# Patient Record
Sex: FEMALE | Race: BLACK OR AFRICAN AMERICAN | NOT HISPANIC OR LATINO | Employment: UNEMPLOYED | URBAN - METROPOLITAN AREA
[De-identification: names, ages, dates, MRNs, and addresses within clinical notes are randomized per-mention and may not be internally consistent; named-entity substitution may affect disease eponyms.]

---

## 2022-03-25 ENCOUNTER — HOSPITAL ENCOUNTER (EMERGENCY)
Facility: CLINIC | Age: 32
Discharge: HOME | End: 2022-03-25
Attending: EMERGENCY MEDICINE | Admitting: EMERGENCY MEDICINE

## 2022-03-25 ENCOUNTER — APPOINTMENT (OUTPATIENT)
Dept: CT IMAGING | Facility: CLINIC | Age: 32
End: 2022-03-25
Attending: EMERGENCY MEDICINE

## 2022-03-25 VITALS
OXYGEN SATURATION: 99 % | HEART RATE: 69 BPM | WEIGHT: 200 LBS | DIASTOLIC BLOOD PRESSURE: 68 MMHG | SYSTOLIC BLOOD PRESSURE: 108 MMHG | RESPIRATION RATE: 16 BRPM | TEMPERATURE: 98.8 F

## 2022-03-25 DIAGNOSIS — N76.0 BACTERIAL VAGINOSIS: ICD-10-CM

## 2022-03-25 DIAGNOSIS — N90.89 PERINEAL CYST IN FEMALE: ICD-10-CM

## 2022-03-25 DIAGNOSIS — N83.202 CYST OF LEFT OVARY: ICD-10-CM

## 2022-03-25 DIAGNOSIS — R10.84 GENERALIZED ABDOMINAL PAIN: ICD-10-CM

## 2022-03-25 DIAGNOSIS — B96.89 BACTERIAL VAGINOSIS: ICD-10-CM

## 2022-03-25 LAB
ALBUMIN SERPL-MCNC: 3.6 G/DL (ref 3.4–5)
ALBUMIN UR-MCNC: NEGATIVE MG/DL
ALP SERPL-CCNC: 54 U/L (ref 40–150)
ALT SERPL W P-5'-P-CCNC: 16 U/L (ref 0–50)
ANION GAP SERPL CALCULATED.3IONS-SCNC: 4 MMOL/L (ref 3–14)
APPEARANCE UR: CLEAR
AST SERPL W P-5'-P-CCNC: 13 U/L (ref 0–45)
BASOPHILS # BLD AUTO: 0 10E3/UL (ref 0–0.2)
BASOPHILS NFR BLD AUTO: 0 %
BILIRUB DIRECT SERPL-MCNC: <0.1 MG/DL (ref 0–0.2)
BILIRUB SERPL-MCNC: 0.4 MG/DL (ref 0.2–1.3)
BILIRUB UR QL STRIP: NEGATIVE
BUN SERPL-MCNC: 7 MG/DL (ref 7–30)
CALCIUM SERPL-MCNC: 9.3 MG/DL (ref 8.5–10.1)
CHLORIDE BLD-SCNC: 108 MMOL/L (ref 94–109)
CLUE CELLS: PRESENT
CO2 SERPL-SCNC: 24 MMOL/L (ref 20–32)
COLOR UR AUTO: ABNORMAL
CREAT SERPL-MCNC: 0.52 MG/DL (ref 0.52–1.04)
EOSINOPHIL # BLD AUTO: 0.2 10E3/UL (ref 0–0.7)
EOSINOPHIL NFR BLD AUTO: 2 %
ERYTHROCYTE [DISTWIDTH] IN BLOOD BY AUTOMATED COUNT: 13.6 % (ref 10–15)
FLUAV RNA SPEC QL NAA+PROBE: NEGATIVE
FLUBV RNA RESP QL NAA+PROBE: NEGATIVE
GFR SERPL CREATININE-BSD FRML MDRD: >90 ML/MIN/1.73M2
GLUCOSE BLD-MCNC: 96 MG/DL (ref 70–99)
GLUCOSE UR STRIP-MCNC: NEGATIVE MG/DL
HCG UR QL: NEGATIVE
HCT VFR BLD AUTO: 40.3 % (ref 35–47)
HGB BLD-MCNC: 13.4 G/DL (ref 11.7–15.7)
HGB UR QL STRIP: NEGATIVE
IMM GRANULOCYTES # BLD: 0 10E3/UL
IMM GRANULOCYTES NFR BLD: 0 %
INTERNAL QC OK POCT: NORMAL
KETONES UR STRIP-MCNC: NEGATIVE MG/DL
LEUKOCYTE ESTERASE UR QL STRIP: ABNORMAL
LIPASE SERPL-CCNC: 100 U/L (ref 73–393)
LYMPHOCYTES # BLD AUTO: 2.2 10E3/UL (ref 0.8–5.3)
LYMPHOCYTES NFR BLD AUTO: 18 %
MCH RBC QN AUTO: 28.8 PG (ref 26.5–33)
MCHC RBC AUTO-ENTMCNC: 33.3 G/DL (ref 31.5–36.5)
MCV RBC AUTO: 87 FL (ref 78–100)
MONOCYTES # BLD AUTO: 0.7 10E3/UL (ref 0–1.3)
MONOCYTES NFR BLD AUTO: 5 %
MUCOUS THREADS #/AREA URNS LPF: PRESENT /LPF
NEUTROPHILS # BLD AUTO: 9.2 10E3/UL (ref 1.6–8.3)
NEUTROPHILS NFR BLD AUTO: 75 %
NITRATE UR QL: NEGATIVE
NRBC # BLD AUTO: 0 10E3/UL
NRBC BLD AUTO-RTO: 0 /100
PH UR STRIP: 6.5 [PH] (ref 5–7)
PLATELET # BLD AUTO: 369 10E3/UL (ref 150–450)
POCT KIT EXPIRATION DATE: 1
POCT KIT LOT NUMBER: 1
POTASSIUM BLD-SCNC: 4.1 MMOL/L (ref 3.4–5.3)
PROT SERPL-MCNC: 8.1 G/DL (ref 6.8–8.8)
RBC # BLD AUTO: 4.66 10E6/UL (ref 3.8–5.2)
RBC URINE: 1 /HPF
SARS-COV-2 RNA RESP QL NAA+PROBE: NEGATIVE
SODIUM SERPL-SCNC: 136 MMOL/L (ref 133–144)
SP GR UR STRIP: 1.02 (ref 1–1.03)
SQUAMOUS EPITHELIAL: 4 /HPF
TRANSITIONAL EPI: <1 /HPF
TRICHOMONAS, WET PREP: ABNORMAL
UROBILINOGEN UR STRIP-MCNC: NORMAL MG/DL
WBC # BLD AUTO: 12.4 10E3/UL (ref 4–11)
WBC URINE: 4 /HPF
WBC'S/HIGH POWER FIELD, WET PREP: ABNORMAL
YEAST, WET PREP: ABNORMAL

## 2022-03-25 PROCEDURE — 87491 CHLMYD TRACH DNA AMP PROBE: CPT | Performed by: EMERGENCY MEDICINE

## 2022-03-25 PROCEDURE — 250N000009 HC RX 250: Performed by: EMERGENCY MEDICINE

## 2022-03-25 PROCEDURE — C9803 HOPD COVID-19 SPEC COLLECT: HCPCS

## 2022-03-25 PROCEDURE — 85025 COMPLETE CBC W/AUTO DIFF WBC: CPT | Performed by: EMERGENCY MEDICINE

## 2022-03-25 PROCEDURE — 81025 URINE PREGNANCY TEST: CPT | Performed by: EMERGENCY MEDICINE

## 2022-03-25 PROCEDURE — 82248 BILIRUBIN DIRECT: CPT | Performed by: EMERGENCY MEDICINE

## 2022-03-25 PROCEDURE — 74177 CT ABD & PELVIS W/CONTRAST: CPT

## 2022-03-25 PROCEDURE — 250N000011 HC RX IP 250 OP 636: Performed by: EMERGENCY MEDICINE

## 2022-03-25 PROCEDURE — 87636 SARSCOV2 & INF A&B AMP PRB: CPT | Performed by: EMERGENCY MEDICINE

## 2022-03-25 PROCEDURE — 36415 COLL VENOUS BLD VENIPUNCTURE: CPT | Performed by: EMERGENCY MEDICINE

## 2022-03-25 PROCEDURE — 250N000013 HC RX MED GY IP 250 OP 250 PS 637: Performed by: EMERGENCY MEDICINE

## 2022-03-25 PROCEDURE — 96375 TX/PRO/DX INJ NEW DRUG ADDON: CPT

## 2022-03-25 PROCEDURE — 87210 SMEAR WET MOUNT SALINE/INK: CPT | Performed by: EMERGENCY MEDICINE

## 2022-03-25 PROCEDURE — 96361 HYDRATE IV INFUSION ADD-ON: CPT

## 2022-03-25 PROCEDURE — 258N000003 HC RX IP 258 OP 636: Performed by: EMERGENCY MEDICINE

## 2022-03-25 PROCEDURE — 83690 ASSAY OF LIPASE: CPT | Performed by: EMERGENCY MEDICINE

## 2022-03-25 PROCEDURE — 96374 THER/PROPH/DIAG INJ IV PUSH: CPT | Mod: 59

## 2022-03-25 PROCEDURE — 81001 URINALYSIS AUTO W/SCOPE: CPT | Performed by: EMERGENCY MEDICINE

## 2022-03-25 PROCEDURE — 99285 EMERGENCY DEPT VISIT HI MDM: CPT | Mod: 25

## 2022-03-25 PROCEDURE — 99285 EMERGENCY DEPT VISIT HI MDM: CPT | Performed by: EMERGENCY MEDICINE

## 2022-03-25 PROCEDURE — 87591 N.GONORRHOEAE DNA AMP PROB: CPT | Performed by: EMERGENCY MEDICINE

## 2022-03-25 PROCEDURE — 82310 ASSAY OF CALCIUM: CPT | Performed by: EMERGENCY MEDICINE

## 2022-03-25 RX ORDER — ONDANSETRON 2 MG/ML
4 INJECTION INTRAMUSCULAR; INTRAVENOUS EVERY 30 MIN PRN
Status: DISCONTINUED | OUTPATIENT
Start: 2022-03-25 | End: 2022-03-25 | Stop reason: HOSPADM

## 2022-03-25 RX ORDER — IOPAMIDOL 755 MG/ML
100 INJECTION, SOLUTION INTRAVASCULAR ONCE
Status: COMPLETED | OUTPATIENT
Start: 2022-03-25 | End: 2022-03-25

## 2022-03-25 RX ORDER — ACETAMINOPHEN 500 MG
500-1000 TABLET ORAL EVERY 6 HOURS PRN
COMMUNITY
End: 2022-09-26

## 2022-03-25 RX ORDER — METRONIDAZOLE 500 MG/1
500 TABLET ORAL ONCE
Status: COMPLETED | OUTPATIENT
Start: 2022-03-25 | End: 2022-03-25

## 2022-03-25 RX ORDER — METRONIDAZOLE 500 MG/1
500 TABLET ORAL 2 TIMES DAILY
Qty: 14 TABLET | Refills: 0 | Status: SHIPPED | OUTPATIENT
Start: 2022-03-25 | End: 2022-04-01

## 2022-03-25 RX ORDER — SODIUM CHLORIDE 9 MG/ML
INJECTION, SOLUTION INTRAVENOUS CONTINUOUS
Status: DISCONTINUED | OUTPATIENT
Start: 2022-03-25 | End: 2022-03-25 | Stop reason: HOSPADM

## 2022-03-25 RX ORDER — HYDROMORPHONE HCL IN WATER/PF 6 MG/30 ML
0.2 PATIENT CONTROLLED ANALGESIA SYRINGE INTRAVENOUS
Status: COMPLETED | OUTPATIENT
Start: 2022-03-25 | End: 2022-03-25

## 2022-03-25 RX ORDER — ONDANSETRON 4 MG/1
4 TABLET, ORALLY DISINTEGRATING ORAL EVERY 6 HOURS PRN
Qty: 10 TABLET | Refills: 0 | Status: SHIPPED | OUTPATIENT
Start: 2022-03-25 | End: 2022-04-01

## 2022-03-25 RX ADMIN — METRONIDAZOLE 500 MG: 500 TABLET, FILM COATED ORAL at 13:25

## 2022-03-25 RX ADMIN — SODIUM CHLORIDE 65 ML: 9 INJECTION, SOLUTION INTRAVENOUS at 13:15

## 2022-03-25 RX ADMIN — IOPAMIDOL 98 ML: 755 INJECTION, SOLUTION INTRAVENOUS at 13:14

## 2022-03-25 RX ADMIN — SODIUM CHLORIDE 1000 ML: 9 INJECTION, SOLUTION INTRAVENOUS at 10:52

## 2022-03-25 RX ADMIN — ONDANSETRON 4 MG: 2 INJECTION INTRAMUSCULAR; INTRAVENOUS at 10:52

## 2022-03-25 RX ADMIN — SODIUM CHLORIDE: 9 INJECTION, SOLUTION INTRAVENOUS at 13:29

## 2022-03-25 RX ADMIN — HYDROMORPHONE HYDROCHLORIDE 0.2 MG: 0.2 INJECTION, SOLUTION INTRAMUSCULAR; INTRAVENOUS; SUBCUTANEOUS at 10:53

## 2022-03-25 ASSESSMENT — ENCOUNTER SYMPTOMS
APPETITE CHANGE: 1
CHILLS: 1
FLANK PAIN: 1
DIARRHEA: 0
NAUSEA: 1
FREQUENCY: 1
HEMATURIA: 0
FEVER: 0
ABDOMINAL PAIN: 1
VOMITING: 0
DYSURIA: 0

## 2022-03-25 NOTE — ED PROVIDER NOTES
[facility] EMERGENCY DEPARTMENT ([facility])    3/25/22     ED 4   11:58 AM   History     Chief Complaint   Patient presents with     Abdominal Pain     Pt reports for last 2 weeks     Flank Pain     Left flank pain started last week      The history is provided by the patient and medical records.     Briana Newman is a 32 year old healthy female with no past medical history who presents with abdominal pain, flank pain, and increased flatulence. She had multiple episodes of diarrhea from food poisoning last week.  The diarrhea did subside but she has had ongoing abdominal discomfort, abdominal cramping and nausea since then. This abdominal cramping is different than menses. She is going to bathroom after every sip of water with increased abdominal bloating and very gassy now. She has been eating less as a result of symptoms. She has had some flank pain with this. She has had prior UTIs in the past, but it felt different. Symptoms today feel more GI in origin. She has constant epigastric abdominal pain that did improve after medications given here. No fevers. Has chills. No hematuria, dysuria but does have urinary frequency. No vaginal discharge, odor or rash. She denies concerns for STI. No sick contacts. No recent antibiotics. No bloody stools. She notes something that came out of her rectum while she was pregnant 2 years ago. She doesn't know what it is, thought it was like an ingrown hair but now can feel it whenever she sits. Doesn't feel related to her other symptoms.    Patient has had COVID-19 vaccination.                     Past Medical History  History reviewed. No pertinent past medical history.  Past Surgical History:   Procedure Laterality Date     BREAST SURGERY       acetaminophen (TYLENOL) 500 MG tablet  metroNIDAZOLE (FLAGYL) 500 MG tablet  ondansetron (ZOFRAN ODT) 4 MG ODT tab      No Known Allergies  Family History  History reviewed. No pertinent family history.  Social History   Social  History     Tobacco Use     Smoking status: Never Smoker     Smokeless tobacco: Never Used   Substance Use Topics     Alcohol use: Never     Drug use: Never      Past medical history, past surgical history, medications, allergies, family history, and social history were reviewed with the patient. No additional pertinent items.       Review of Systems   Constitutional: Positive for appetite change (Poor) and chills. Negative for fever.   Gastrointestinal: Positive for abdominal pain (With bloating, gas) and nausea. Negative for diarrhea and vomiting.   Genitourinary: Positive for flank pain and frequency. Negative for dysuria, hematuria and vaginal discharge.   All other systems reviewed and are negative.    A complete review of systems was performed with pertinent positives and negatives noted in the HPI, and all other systems negative.    Physical Exam   BP: 109/66  Pulse: 68  Temp: 98.8  F (37.1  C)  Resp: 16  Weight: 90.7 kg (200 lb)  SpO2: 99 %  Physical Exam  Vitals and nursing note reviewed.   Constitutional:       General: She is not in acute distress.     Appearance: Normal appearance. She is well-developed. She is not diaphoretic.   HENT:      Head: Normocephalic and atraumatic.      Nose: Nose normal.      Mouth/Throat:      Mouth: Mucous membranes are dry.   Eyes:      General: No scleral icterus.     Conjunctiva/sclera: Conjunctivae normal.   Cardiovascular:      Rate and Rhythm: Normal rate.   Pulmonary:      Effort: Pulmonary effort is normal. No respiratory distress.      Breath sounds: No stridor.   Abdominal:      General: There is no distension.      Palpations: Abdomen is soft. There is no mass.      Tenderness: There is generalized abdominal tenderness and tenderness in the suprapubic area. There is right CVA tenderness and left CVA tenderness. There is no guarding or rebound. Negative signs include Collier's sign.   Genitourinary:     Vagina: Vaginal discharge present. No erythema, tenderness,  bleeding or prolapsed vaginal walls.      Cervix: Discharge present. No cervical motion tenderness, friability, lesion or cervical bleeding.      Uterus: Not deviated, not tender and no uterine prolapse.       Adnexa: Left adnexa normal.        Right: Tenderness present.           Comments: 1 cm round, hard, nontender, nodular lesion on right perineum just lateral to vaginal introitus. No redness or warmth. No fluctuance. No CMT. Mildly tender over right adnexal region. Small amount of thin white discharge with green mucoid streaks.  Musculoskeletal:         General: No deformity or signs of injury. Normal range of motion.      Cervical back: Normal range of motion and neck supple. No rigidity.   Skin:     General: Skin is warm and dry.      Coloration: Skin is not jaundiced or pale.      Findings: No rash.   Neurological:      General: No focal deficit present.      Mental Status: She is alert and oriented to person, place, and time.   Psychiatric:         Mood and Affect: Mood normal.         Behavior: Behavior normal.         Thought Content: Thought content normal.         ED Course      Procedures                     Results for orders placed or performed during the hospital encounter of 03/25/22   CT Abdomen Pelvis w Contrast     Status: None    Narrative    CT ABDOMEN AND PELVIS WITH CONTRAST 3/25/2022 1:16 PM    CLINICAL HISTORY: Flank pain, kidney stone suspected. Diverticulitis  suspected. Nausea/vomiting. Abdominal pain, acute, nonlocalized.    TECHNIQUE: CT scan of the abdomen and pelvis was performed following  injection of IV contrast. Multiplanar reformats were obtained. Dose  reduction techniques were used.  CONTRAST: 98 mL of isovue 370    COMPARISON: None.    FINDINGS:   LOWER CHEST: Normal.    HEPATOBILIARY: Normal.    PANCREAS: Normal.    SPLEEN: Normal.    ADRENAL GLANDS: Normal.    KIDNEYS/BLADDER: Normal.    BOWEL: No bowel obstruction or inflammatory change. Normal appendix.    PELVIC ORGANS:  3.9 cm left ovarian cyst. Small amount of pelvic free  fluid.    ADDITIONAL FINDINGS: None.    MUSCULOSKELETAL: Normal.      Impression    IMPRESSION:  3.9 cm left ovarian cyst associated with small amount of  pelvic free fluid.    LEONOR FRANZ MD         SYSTEM ID:  [id number]   Lipase     Status: Normal   Result Value Ref Range    Lipase 100 73 - 393 U/L   UA with Microscopic reflex to Culture     Status: Abnormal    Specimen: Urine, Clean Catch   Result Value Ref Range    Color Urine Light Yellow Colorless, Straw, Light Yellow, Yellow    Appearance Urine Clear Clear    Glucose Urine Negative Negative mg/dL    Bilirubin Urine Negative Negative    Ketones Urine Negative Negative mg/dL    Specific Gravity Urine 1.017 1.003 - 1.035    Blood Urine Negative Negative    pH Urine 6.5 5.0 - 7.0    Protein Albumin Urine Negative Negative mg/dL    Urobilinogen Urine Normal Normal, 2.0 mg/dL    Nitrite Urine Negative Negative    Leukocyte Esterase Urine Small (A) Negative    Mucus Urine Present (A) None Seen /LPF    RBC Urine 1 <=2 /HPF    WBC Urine 4 <=5 /HPF    Squamous Epithelials Urine 4 (H) <=1 /HPF    Transitional Epithelials Urine <1 <=1 /HPF    Narrative    Urine Culture not indicated   Basic metabolic panel     Status: Normal   Result Value Ref Range    Sodium 136 133 - 144 mmol/L    Potassium 4.1 3.4 - 5.3 mmol/L    Chloride 108 94 - 109 mmol/L    Carbon Dioxide (CO2) 24 20 - 32 mmol/L    Anion Gap 4 3 - 14 mmol/L    Urea Nitrogen 7 7 - 30 mg/dL    Creatinine 0.52 0.52 - 1.04 mg/dL    Calcium 9.3 8.5 - 10.1 mg/dL    Glucose 96 70 - 99 mg/dL    GFR Estimate >90 >60 mL/min/1.73m2   CBC with platelets and differential     Status: Abnormal   Result Value Ref Range    WBC Count 12.4 (H) 4.0 - 11.0 10e3/uL    RBC Count 4.66 3.80 - 5.20 10e6/uL    Hemoglobin 13.4 11.7 - 15.7 g/dL    Hematocrit 40.3 35.0 - 47.0 %    MCV 87 78 - 100 fL    MCH 28.8 26.5 - 33.0 pg    MCHC 33.3 31.5 - 36.5 g/dL    RDW 13.6 10.0 - 15.0 %     Platelet Count 369 150 - 450 10e3/uL    % Neutrophils 75 %    % Lymphocytes 18 %    % Monocytes 5 %    % Eosinophils 2 %    % Basophils 0 %    % Immature Granulocytes 0 %    NRBCs per 100 WBC 0 <1 /100    Absolute Neutrophils 9.2 (H) 1.6 - 8.3 10e3/uL    Absolute Lymphocytes 2.2 0.8 - 5.3 10e3/uL    Absolute Monocytes 0.7 0.0 - 1.3 10e3/uL    Absolute Eosinophils 0.2 0.0 - 0.7 10e3/uL    Absolute Basophils 0.0 0.0 - 0.2 10e3/uL    Absolute Immature Granulocytes 0.0 <=0.4 10e3/uL    Absolute NRBCs 0.0 10e3/uL   Symptomatic; Unknown Influenza A/B & SARS-CoV2 (COVID-19) Virus PCR Multiplex Nasopharyngeal     Status: Normal    Specimen: Nasopharyngeal; Swab   Result Value Ref Range    Influenza A PCR Negative Negative    Influenza B PCR Negative Negative    SARS CoV2 PCR Negative Negative    Narrative    Testing was performed using the ari SARS-CoV-2 & Influenza A/B Assay on the ari Ernestina System. This test should be ordered for the detection of SARS-CoV-2 and influenza viruses in individuals who meet clinical and/or epidemiological criteria. Test performance is unknown in asymptomatic patients. This test is for in vitro diagnostic use under the FDA EUA for laboratories certified under CLIA to perform moderate and/or high complexity testing. This test has not been FDA cleared or approved. A negative result does not rule out the presence of PCR inhibitors in the specimen or target RNA in concentration below the limit of detection for the assay. If only one viral target is positive but coinfection with multiple targets is suspected, the sample should be re-tested with another FDA cleared, approved or authorized test, if coinfection would change clinical management. [facility] are certified under the Clinical Laboratory Improvement Amendments of 1988 (CLIA-88) as  qualified to perform moderate and/or high complexity laboratory testing.   hCG qual urine POCT     Status: Normal   Result Value Ref Range     HCG Qual Urine Negative Negative    Internal QC Check POCT Valid Valid    POCT Kit Lot Number 1     POCT Kit Expiration Date 1    Wet prep     Status: Abnormal    Specimen: Vagina; Swab   Result Value Ref Range    Trichomonas Absent Absent    Yeast Absent Absent    Clue Cells Present (A) Absent    WBCs/high power field 1+ (A) None   CBC with Platelets & Differential     Status: Abnormal    Narrative    The following orders were created for panel order CBC with Platelets & Differential.  Procedure                               Abnormality         Status                     ---------                               -----------         ------                     CBC with platelets and d...[[id number]]  Abnormal            Final result                 Please view results for these tests on the individual orders.     Medications   0.9% sodium chloride BOLUS (0 mLs Intravenous Stopped 3/25/22 1325)   HYDROmorphone (DILAUDID) injection 0.2 mg (0.2 mg Intravenous Given 3/25/22 1053)   iopamidol (ISOVUE-370) solution 100 mL (98 mLs Intravenous Given 3/25/22 1314)   metroNIDAZOLE (FLAGYL) tablet 500 mg (500 mg Oral Given 3/25/22 1325)        Assessments & Plan (with Medical Decision Making)   Briana Newman is a 32 year old healthy female with no past medical history who presents with abdominal pain, flank pain, and increased flatulence.     Ddx: gastroenteritis, colitis, c diff, IBS, UTI, pyelo, vaginitis, PID, ectopic pregnancy, constipation    Patient with generalized abdominal pain, pelvic pain, and flank pain. Recent diarrheal illness. Unable to provide stool sample while in ED. Flu and COVID neg. BMP nl. WBC 12.4. Hgb nl. Lipase nl. UA wnl. UPT neg. Given IVF, zofran, dilaudid. CT with 3.9 cm left ovarian cyst and small amount of pelvic free fluid. No bowel abnormality. Patient with no left adnexal tenderness and pain is generalized. Discussed return for TVUS if pelvic pain worsens. Follow up with gyn for ovarian cyst  and lesion on perineum. Does not appear infected. Likely sebaceous cyst vs Genital wart vs noninfected bartholins gland lesion. Given formal referral and contact info. Wet prep with clue cells. Given course of flagyl for BV and zofran for nausea. No c/f STI. Suspect more diffuse pain is related to recent gastroenteritis syndrome that is largely resolved now. Return precautions provided.       I have reviewed the nursing notes. I have reviewed the findings, diagnosis, plan and need for follow up with the patient.    Discharge Medication List as of 3/25/2022  2:36 PM      START taking these medications    Details   metroNIDAZOLE (FLAGYL) 500 MG tablet Take 1 tablet (500 mg) by mouth 2 times daily for 7 days, Disp-14 tablet, R-0, E-PrescribeEat yogurt or cottage cheese daily to prevent diarrhea that can be caused by taking this antibiotic.      ondansetron (ZOFRAN ODT) 4 MG ODT tab Take 1 tablet (4 mg) by mouth every 6 hours as needed for nausea, Disp-10 tablet, R-0, E-Prescribe             Final diagnoses:   Generalized abdominal pain   Bacterial vaginosis   Cyst of left ovary   Perineal cyst in female     I, Cecilia Salguero, am serving as a trained medical scribe to document services personally performed by Claribel Molina MD based on the provider's statements to me on March 25, 2022.  This document has been checked and approved by the attending provider.    I, Claribel Molina MD, was physically present and have reviewed and verified the accuracy of this note documented by Cecilia Salguero, medical scribe.         --  Claribel Molina MD  [facility] EMERGENCY DEPARTMENT  3/25/2022     Claribel Molina MD  03/25/22 0406

## 2022-03-25 NOTE — ED TRIAGE NOTES
"Pt reports eating bad fish 2 weeks ago and developing food poisoning (n/v/d) and recovered from that. Pt reports since then having abdominal pain that has since worsened yesterday 8/10. Pt reports this is upper abdominal pain radiating to left and bilateral groin down legs. Pt reports left flank pain with urine frequency for last \"few days.\" Pt reports last period 03/06 with possibility of pregnancy.   "

## 2022-03-25 NOTE — DISCHARGE INSTRUCTIONS
Please make an appointment to follow up with Your Primary Care Provider and OB/Gyn - [facility] (phone: [phone]) in 7-10 days.    You were given a referral to Gynecology clinic. You may follow up regarding ovarian cyst, BV, and bump on skin near vagina. Someone should call you to set up this appointment, but please call the number listed above, if you do not hear from someone within 1-2 business days.     Take Flagyl to treat bacterial vaginosis.     Take over-the-counter Tylenol, ibuprofen for abdominal pain and fever.      Drink plenty of fluids to maintain hydration.  You should drink water and an electrolyte beverage (such as Powerade, Pedialyte, or Gatorade).      Limit food intake to bland, clear liquids (such as chicken or vegetable broth) until your symptoms improve.  You can advance your diet, as tolerated.      Take over the counter pepto bismol and/or Citrucel to help treat diarrhea and stomach cramping. You may take over-the-counter Imodium to help with diarrhea symptoms.  However, this can make some bacterial infections worse and you should discontinue if your symptoms progress.    You may take Zofran for nausea.    Seek evaluation in the emergency department if you develop worsening pain, dehydration, blood in your stool or vomit.

## 2022-03-26 LAB
C TRACH DNA SPEC QL NAA+PROBE: NEGATIVE
N GONORRHOEA DNA SPEC QL NAA+PROBE: NEGATIVE

## 2022-04-20 PROCEDURE — 56405 I&D VULVA/PERINEAL ABSCESS: CPT | Performed by: EMERGENCY MEDICINE

## 2022-04-20 PROCEDURE — 99284 EMERGENCY DEPT VISIT MOD MDM: CPT | Mod: 25 | Performed by: EMERGENCY MEDICINE

## 2022-04-20 PROCEDURE — 99283 EMERGENCY DEPT VISIT LOW MDM: CPT | Mod: 25 | Performed by: EMERGENCY MEDICINE

## 2022-04-21 ENCOUNTER — HOSPITAL ENCOUNTER (EMERGENCY)
Facility: CLINIC | Age: 32
Discharge: HOME | End: 2022-04-21
Attending: EMERGENCY MEDICINE | Admitting: EMERGENCY MEDICINE

## 2022-04-21 VITALS
OXYGEN SATURATION: 100 % | SYSTOLIC BLOOD PRESSURE: 120 MMHG | WEIGHT: 196 LBS | HEIGHT: 64 IN | DIASTOLIC BLOOD PRESSURE: 69 MMHG | BODY MASS INDEX: 33.46 KG/M2 | TEMPERATURE: 97.8 F | HEART RATE: 61 BPM | RESPIRATION RATE: 18 BRPM

## 2022-04-21 DIAGNOSIS — L72.3 SEBACEOUS CYST: ICD-10-CM

## 2022-04-21 RX ORDER — LIDOCAINE HYDROCHLORIDE AND EPINEPHRINE 10; 10 MG/ML; UG/ML
INJECTION, SOLUTION INFILTRATION; PERINEURAL
Status: DISCONTINUED
Start: 2022-04-21 | End: 2022-04-21 | Stop reason: HOSPADM

## 2022-04-21 NOTE — DISCHARGE INSTRUCTIONS
Thank you for your patience today.  Please follow-up with your regular doctor in the next 2-3 days for further evaluation and follow-up care.  Please call to schedule an appointment.  Please continue your own medications.  Please take a sitz bath 2-3 times daily to keep the area clean. Please take tylenol or ibuprofen every 6 hours as needed for pain. Please return to the ER if you develop high fever, severe pain, any worsening of your current symptoms.  It was a pleasure taking care of you today.  We hope you feel better soon.

## 2022-04-21 NOTE — ED PROVIDER NOTES
"ED Provider Note  [facility]      History   No chief complaint on file.    HPI  Briana Newman is a 32 year old female who has no significant past medical history who presents to the emergency department from home for evaluation of vaginal pain.  Patient reports that she has had a small bump near her vagina for the past 2 years.  Patient states that she has had it since she was pregnant with her first child.  Patient thought initially was an ingrown hair however head has been the same size until yesterday.  Patient noticed increased pain and swelling in that area.  Patient reports sharp pain with no radiation, pain is worse with sitting and seems to be better with standing.  Patient tried placing Neosporin on the area with no improvement of the symptoms.  Patient also reports taking Tylenol at home.  Patient denies any fever, chills, nausea, vomiting, abdominal pain.  Patient denies any vaginal pain, vaginal discharge.  No other complaints.  Patient also notes that she has some \"excessive flesh\" near her rectum but denies any difficulty or changes in her bowel movements, no bloody stools.    Past Medical History  No past medical history on file.  Past Surgical History:   Procedure Laterality Date     BREAST SURGERY       acetaminophen (TYLENOL) 500 MG tablet      No Known Allergies  Family History  No family history on file.  Social History   Social History     Tobacco Use     Smoking status: Never Smoker     Smokeless tobacco: Never Used   Substance Use Topics     Alcohol use: Never     Drug use: Never      Past medical history, past surgical history, medications, allergies, family history, and social history were reviewed with the patient. No additional pertinent items.       Review of Systems  A complete review of systems was performed with pertinent positives and negatives noted in the HPI, and all other systems negative.    Physical Exam   BP: 129/47  Pulse: 82  Temp: 97.8  F (36.6 " " C)  Resp: 18  Height: 162.6 cm (5' 4\")  Weight: 88.9 kg (196 lb)  SpO2: 99 %  General: Afebrile, no acute distress   HEENT: Normocephalic, atraumatic, conjunctivae normal. MMM  Neck: non-tender, supple  Cardio: regular rate. regular rhythm   Resp: Normal work of breathing, no respiratory distress, lungs clear bilaterally, no wheezing, rhonchi, rales  Chest/Back: no visual signs of trauma, no CVA tenderness   Abdomen: soft, non distension, no tenderness, no peritoneal signs  : Normal external genitalia, there is a small pea size area of fluctuance on her right lower labia majora with no surrounding erythema; small non thrombosed external hemorrhoid  Neuro: alert and fully oriented. CN II-XII grossly intact. Grossly normal strength and sensation in all extremities.   MSK: no deformities. Normal range of motion  Integumentary/Skin: no rash visualized, normal color  Psych: normal affect, normal behavior    ED Course      Procedures  .    Procedure: Incision and Drainage   LOCATIONS:  Vaginal area     ANESTHESIA:  Local field block using Lidocaine 1% with epinephrine, total of 2 mLs     PREPARATION:  Cleansed with chloraprep     PROCEDURE:  Area was incised with # 11 Blade ([place]) with a Single Straight incision.  Wound treatment included Expression of cyst.    Appropriate dressing was applied to cover the area.    Patient Status:        Patient tolerated the procedure well. There were no complications.         No results found for any visits on 04/21/22.  Medications   lidocaine 1% with EPINEPHrine 1:100,000 1 %-1:704610 injection (has no administration in time range)        Assessments & Plan (with Medical Decision Making)   Briana Newman is a 32 year old female who has no significant past medical history who presents to the emergency department from home for evaluation of vaginal pain.  Upon arrival patient is well-appearing, afebrile, no distress.  Patient here with increased pain and swelling to her right " lower vaginal region.  On exam area of fluctuance with no surrounding erythema.  I discussed with patient who verbally consents to incision and drainage.  On incision and drainage no purulent discharge however was able to remove a small pea sized white cheese like material suggestive of cyst. Patient tolerated procedure well with no complications. Also note patient does have a small non-thrombosed hemorrhoid.  I discussed findings with patient at this time plan for discharge home with continued supportive care Tylenol and ibuprofen as needed for pain, stool softeners, sitz bath 2-3 times daily, and close follow-up.  Patient is followed with her provider in May.  Return precautions discussed if high fever, severe pain, new or worsening symptoms.  Patient understands and agrees with plan.    I have reviewed the nursing notes. I have reviewed the findings, diagnosis, plan and need for follow up with the patient.    Discharge Medication List as of 4/21/2022  2:57 AM          Final diagnoses:   Sebaceous cyst       --  Mariel Uribe MD  [facility] EMERGENCY DEPARTMENT  4/20/2022     Mariel Uribe MD  04/21/22 031

## 2022-04-21 NOTE — ED TRIAGE NOTES
"Per pt has swelling in vaginal and rectum area. Pt thought it was an ingrown hair initially, but has now had increased swelling and pain to the area. She also c/o feeling \"excess flesh\" near rectum.   "

## 2022-07-05 ENCOUNTER — TELEPHONE (OUTPATIENT)
Dept: OBGYN | Facility: CLINIC | Age: 32
End: 2022-07-05

## 2022-07-06 ENCOUNTER — ANCILLARY PROCEDURE (OUTPATIENT)
Dept: ULTRASOUND IMAGING | Facility: CLINIC | Age: 32
End: 2022-07-06
Attending: ADVANCED PRACTICE MIDWIFE
Payer: MEDICAID

## 2022-07-06 DIAGNOSIS — Z32.01 PREGNANCY TEST POSITIVE: ICD-10-CM

## 2022-07-06 DIAGNOSIS — Z32.01 PREGNANCY TEST POSITIVE: Primary | ICD-10-CM

## 2022-07-06 PROCEDURE — 76801 OB US < 14 WKS SINGLE FETUS: CPT

## 2022-07-06 PROCEDURE — 76801 OB US < 14 WKS SINGLE FETUS: CPT | Mod: 26 | Performed by: OBSTETRICS & GYNECOLOGY

## 2022-07-14 ENCOUNTER — LAB (OUTPATIENT)
Dept: LAB | Facility: CLINIC | Age: 32
End: 2022-07-14
Attending: ADVANCED PRACTICE MIDWIFE
Payer: MEDICAID

## 2022-07-14 ENCOUNTER — OFFICE VISIT (OUTPATIENT)
Dept: OBGYN | Facility: CLINIC | Age: 32
End: 2022-07-14
Attending: ADVANCED PRACTICE MIDWIFE
Payer: MEDICAID

## 2022-07-14 VITALS — HEIGHT: 64 IN | WEIGHT: 198.1 LBS | BODY MASS INDEX: 33.82 KG/M2

## 2022-07-14 DIAGNOSIS — O09.90 HIGH-RISK PREGNANCY, UNSPECIFIED TRIMESTER: ICD-10-CM

## 2022-07-14 DIAGNOSIS — O99.891 HX OF POSTPARTUM DEPRESSION, CURRENTLY PREGNANT: ICD-10-CM

## 2022-07-14 DIAGNOSIS — Z86.59 HX OF POSTPARTUM DEPRESSION, CURRENTLY PREGNANT: ICD-10-CM

## 2022-07-14 DIAGNOSIS — O09.299 HX OF PREECLAMPSIA, PRIOR PREGNANCY, CURRENTLY PREGNANT: ICD-10-CM

## 2022-07-14 DIAGNOSIS — O09.90 HIGH-RISK PREGNANCY, UNSPECIFIED TRIMESTER: Primary | ICD-10-CM

## 2022-07-14 LAB
ABO/RH(D): NORMAL
ALT SERPL W P-5'-P-CCNC: 12 U/L (ref 0–50)
ANTIBODY SCREEN: NEGATIVE
AST SERPL W P-5'-P-CCNC: 7 U/L (ref 0–45)
CREAT UR-MCNC: 219 MG/DL
ERYTHROCYTE [DISTWIDTH] IN BLOOD BY AUTOMATED COUNT: 13.5 % (ref 10–15)
HCT VFR BLD AUTO: 35.5 % (ref 35–47)
HGB BLD-MCNC: 12.3 G/DL (ref 11.7–15.7)
MCH RBC QN AUTO: 29.7 PG (ref 26.5–33)
MCHC RBC AUTO-ENTMCNC: 34.6 G/DL (ref 31.5–36.5)
MCV RBC AUTO: 86 FL (ref 78–100)
PLATELET # BLD AUTO: 314 10E3/UL (ref 150–450)
PROT UR-MCNC: 0.17 G/L
PROT/CREAT 24H UR: 0.08 G/G CR (ref 0–0.2)
RBC # BLD AUTO: 4.14 10E6/UL (ref 3.8–5.2)
SPECIMEN EXPIRATION DATE: NORMAL
URATE SERPL-MCNC: 3.7 MG/DL (ref 2.6–6)
WBC # BLD AUTO: 12.3 10E3/UL (ref 4–11)

## 2022-07-14 PROCEDURE — 84460 ALANINE AMINO (ALT) (SGPT): CPT

## 2022-07-14 PROCEDURE — 86803 HEPATITIS C AB TEST: CPT

## 2022-07-14 PROCEDURE — 82306 VITAMIN D 25 HYDROXY: CPT

## 2022-07-14 PROCEDURE — 84550 ASSAY OF BLOOD/URIC ACID: CPT

## 2022-07-14 PROCEDURE — 87086 URINE CULTURE/COLONY COUNT: CPT

## 2022-07-14 PROCEDURE — G0463 HOSPITAL OUTPT CLINIC VISIT: HCPCS

## 2022-07-14 PROCEDURE — 84156 ASSAY OF PROTEIN URINE: CPT

## 2022-07-14 PROCEDURE — 87340 HEPATITIS B SURFACE AG IA: CPT

## 2022-07-14 PROCEDURE — 86762 RUBELLA ANTIBODY: CPT

## 2022-07-14 PROCEDURE — 87389 HIV-1 AG W/HIV-1&-2 AB AG IA: CPT

## 2022-07-14 PROCEDURE — 99207 PR PRENATAL VISIT: CPT | Performed by: ADVANCED PRACTICE MIDWIFE

## 2022-07-14 PROCEDURE — 84450 TRANSFERASE (AST) (SGOT): CPT

## 2022-07-14 PROCEDURE — 85014 HEMATOCRIT: CPT

## 2022-07-14 PROCEDURE — 86706 HEP B SURFACE ANTIBODY: CPT

## 2022-07-14 PROCEDURE — 86780 TREPONEMA PALLIDUM: CPT

## 2022-07-14 PROCEDURE — 86850 RBC ANTIBODY SCREEN: CPT

## 2022-07-14 PROCEDURE — 36415 COLL VENOUS BLD VENIPUNCTURE: CPT

## 2022-07-14 RX ORDER — ONDANSETRON 4 MG/1
4 TABLET, FILM COATED ORAL EVERY 8 HOURS PRN
Qty: 15 TABLET | Refills: 1 | Status: SHIPPED | OUTPATIENT
Start: 2022-07-14 | End: 2022-09-26

## 2022-07-14 RX ORDER — FOLIC ACID 0.8 MG
800 TABLET ORAL DAILY
Qty: 90 TABLET | Refills: 3 | Status: ON HOLD | OUTPATIENT
Start: 2022-07-14 | End: 2023-01-24

## 2022-07-14 ASSESSMENT — PAIN SCALES - GENERAL: PAINLEVEL: NO PAIN (0)

## 2022-07-14 NOTE — PROGRESS NOTES
[facility] OB Intake note  Subjective   32 year old female presents to clinic for initiation of OB care. Patient's last menstrual period was 2022 (approximate). Reports LMP 2022 today.  at 10w1d by Estimated Date of Delivery: 2023 based on US confirms. Reviewed dating ultrasound. Pregnancy is unplanned but coping well.    Partner name - Shannon Ca      Symptoms since LMP include nausea and vomiting. Patient has tried these relief measures: Zofran. Taking PNV, but has missed doses due to severe nausea. She has history of pre-eclampsia w/ severe features from last pregnancy, had systolic bp ~200, SOB, blacking out; she was induced and underwent  with no complications. She has history of postpartum depression, did not take any medications or go to any therapy for it. Currently feeling slightly disconnected from pregnancy, primarily hasn't envisioned future with second child.    - Genetic/Infection questionnaire completed, risks include none. Pt  does not have a recent known exposure to Parvo or CMV so IgG/IgM testing WILL NOT be ordered.   Recommended Flu Vaccine.  Patient declined, will consider next visit    - Current Medications   Current Outpatient Medications   Medication Sig Dispense Refill     aspirin (ASA) 81 MG EC tablet Take 1 tablet (81 mg) by mouth daily 100 tablet 3     folic acid 800 MCG tablet Take 1 tablet (800 mcg) by mouth daily 90 tablet 3     ondansetron (ZOFRAN) 4 MG tablet Take 1 tablet (4 mg) by mouth every 8 hours as needed for nausea 15 tablet 1     acetaminophen (TYLENOL) 500 MG tablet Take 500-1,000 mg by mouth every 6 hours as needed for mild pain       vitamin D3 (CHOLECALCIFEROL) 50 mcg (2000 units) tablet Take 2 tablets (100 mcg) by mouth daily 120 tablet 3         - Co-morbids   Past Medical History:   Diagnosis Date     Pre-eclampsia     HTN started 3rd tri, them PreE w severe features,induced at term     - Risk for GDM : Pre pregnancy BMI>30 so  WILL have an  early GCT and Hgb A1C    - High risk factors for Pre E-  History of Pre Eclampsia     Pregnant individuals at high risk of preeclampsia with one or more of the following risk factors:  History of preeclampsia, especially when accompanied by an adverse outcome  Multifetal gestation  Chronic hypertension  Pregestational type 1 or 2 diabetes  Kidney disease  Autoimmune disease (ie, systemic lupus erythematous, antiphospholipid syndrome)  Combinations of multiple moderate-risk factors    - Moderate risk factor for Pre E Pre Pregnancy body mass index >30   Meets one high risk factors or one of the moderate risk facrtors  Nulliparity  Obesity (ie, body mass index > 30)  Family history of preeclampsia (ie, mother or sister)  Black race (as a proxy for underlying racism)  Lower income  Age 35 years or older  Personal history factors (eg, low birth weight or small for gestational age, previous adverse pregnancy outcome, >10-year pregnancy interval)  In vitro fertilization  so WILL consider starting low dose aspirin (81mg) starting between 12 and 28 weeks to prevent early onset preeclampsia--RX sent      - The patient  does not have a history of spontaneous  birth so  WILL NOT consider progesterone starting at 16-20 weeks and/or serial transvaginal cervical length ultrasounds from 16-24 weeks.     -The patient does not have a history of immunosuppresion or HIV so Toxoplasma IgG/IgM WILL NOT be ordered.    -Assess risk for asymptomatic latent TB (prior infection, recent immigrant from epidemic areas, immunosuppression, living in overcrowded environment):   WILL NOT have PPD skin test or Quantiferon-TB Gold Plus blood draw. *both options valid*       PERSONAL/SOCIAL HISTORY    Lives with child - 2 year old daughter.  Employment: Unemployed 2/2 severe nausea, typically works as a .  Job involves light activity and moderate activity.  Immediate family lives in [place], has cousins, extended family,  "and friends in [place]  Her partner is currently in [place] (Citizen of [place] citizen) and they will be initiating the immigration process for him soon.  History of anxiety or depression - Postpartum depression, no medications or therapy per patient.  Additional items: None    Objective'  Ht 1.626 m (5' 4.02\")   Wt 89.9 kg (198 lb 1.6 oz)   LMP 05/02/2022 (Approximate)   BMI 33.99 kg/m    .vs  -VS: reviewed and within normal limits   -General appearance: no acute distress, patient is comfortable   NEUROLOGICAL/PSYCHIATRIC   - Orientated x3,   -Mood and affect: : normal     Assessment/Plan  Briana was seen today for prenatal care.    Diagnoses and all orders for this visit:    High-risk pregnancy, unspecified trimester  -     ABO/Rh type and screen; Future  -     Rubella Antibody IgG; Future  -     Hepatitis B Surface Antibody; Future  -     Hepatitis B surface antigen; Future  -     Hepatitis C antibody; Future  -     Vitamin D Deficiency; Future  -     HIV Antigen Antibody Combo; Future  -     CBC with platelets; Future  -     Urine Culture; Future  -     Treponema Abs w Reflex to RPR and Titer; Future  -     ALT; Future  -     AST; Future  -     CBC with platelets; Future  -     Protein  random urine; Future  -     Uric acid; Future  -     aspirin (ASA) 81 MG EC tablet; Take 1 tablet (81 mg) by mouth daily  -     folic acid 800 MCG tablet; Take 1 tablet (800 mcg) by mouth daily  -     ondansetron (ZOFRAN) 4 MG tablet; Take 1 tablet (4 mg) by mouth every 8 hours as needed for nausea  -     [facility] Referral - Pregnancy; Future    Hx of preeclampsia, prior pregnancy, currently pregnant  -     aspirin (ASA) 81 MG EC tablet; Take 1 tablet (81 mg) by mouth daily  -     folic acid 800 MCG tablet; Take 1 tablet (800 mcg) by mouth daily  -     ondansetron (ZOFRAN) 4 MG tablet; Take 1 tablet (4 mg) by mouth every 8 hours as needed for nausea  -     [facility] Referral - Pregnancy; Future        32 year old "  10w1d weeks of pregnancy with BARTOLOME of 2023 by LMP of Patient's last menstrual period was 2022 (approximate).. Ultrasound confirms.   Outpatient Encounter Medications as of 2022   Medication Sig Dispense Refill     aspirin (ASA) 81 MG EC tablet Take 1 tablet (81 mg) by mouth daily 100 tablet 3     folic acid 800 MCG tablet Take 1 tablet (800 mcg) by mouth daily 90 tablet 3     ondansetron (ZOFRAN) 4 MG tablet Take 1 tablet (4 mg) by mouth every 8 hours as needed for nausea 15 tablet 1     acetaminophen (TYLENOL) 500 MG tablet Take 500-1,000 mg by mouth every 6 hours as needed for mild pain       No facility-administered encounter medications on file as of 2022.      Orders Placed This Encounter   Procedures     Rubella Antibody IgG     Hepatitis B Surface Antibody     Hepatitis B surface antigen     Hepatitis C antibody     Vitamin D Deficiency     HIV Antigen Antibody Combo     CBC with platelets     Treponema Abs w Reflex to RPR and Titer     ALT     AST     CBC with platelets     Protein  random urine     Uric acid     [facility] Referral - Pregnancy                 Orders Placed This Encounter   Procedures     Rubella Antibody IgG     Hepatitis B Surface Antibody     Hepatitis B surface antigen     Hepatitis C antibody     Vitamin D Deficiency     HIV Antigen Antibody Combo     CBC with platelets     Treponema Abs w Reflex to RPR and Titer     ALT     AST     CBC with platelets     Protein  random urine     Uric acid     [facility] F[facility] Referral - Pregnancy     ABO/Rh type and screen         - Oriented to Practice, types of care, and how to reach a provider.  Pt prefers CNM team  - Patient received 1st trimester new OB education packet complete with aide of [employer] Expect[employer] Family booklet including information on genetic screening test options.  - Patient desires 1st trimester screening and desires level II ultrasound which were ordered.  - Educational handout on the prevention  of infections diseases during pregnancy provided.  - Patient was encouraged to start prenatal vitamins as tolerated.    - Patient was sent to lab for routine OB labs including baseline Pre-eclampsia labs.   - Reviewed risk for diabetes in pregnancy, plan to do 1 hr GCT NOB visit.  - Reviewed recommendation for 81mg low dose aspirin daily to prevent pre eclampsia, pt agrees, rx sent, follow up at NOB visit.  - Pregnancy concerns to be addressed by provider at new OB exam include: history of preeclampsia. ?use of SSRI  - Severe nausea, prescription for Zofran sent  - Discussed that it is normal to have a wide range of feelings regarding pregnancy and feeling disconnected isn't uncommon in early pregnancy. Continue to monitor mood and requested patient reach out to us if mood symptoms worsen, she was amenable to this plan.    Pt to RTO for NOB visit in 2 weeks and prn if questions or concerns    Jelly Campos, MS3    Luisana Pierson, APRN CNM  I agree with the PFSH and ROS as completed by the student, except for changes made by me. The remainder of the encounter was performed by me and scribed by the student. The scribed note accurately reflects my personal services and decisions made by me.  Luisana Pierson, RADHAMES, CNM, APRN

## 2022-07-14 NOTE — LETTER
Date:July 21, 2022      Patient was self referred, no letter generated. Do not send.        [facility] Health Information

## 2022-07-14 NOTE — LETTER
2022       RE: Briana Newman  [address] [address]     Dear Colleague,    Thank you for referring your patient, Briana Newman, to the [facility] [place] at [facility]. Please see a copy of my visit note below.    [facility] OB Intake note  Subjective   32 year old female presents to clinic for initiation of OB care. Patient's last menstrual period was 2022 (approximate). Reports LMP 2022 today.  at 10w1d by Estimated Date of Delivery: 2023 based on US confirms. Reviewed dating ultrasound. Pregnancy is unplanned but coping well.    Partner name - Shannon Lanny      Symptoms since LMP include nausea and vomiting. Patient has tried these relief measures: Zofran. Taking PNV, but has missed doses due to severe nausea. She has history of pre-eclampsia w/ severe features from last pregnancy, had systolic bp ~200, SOB, blacking out; she was induced and underwent  with no complications. She has history of postpartum depression, did not take any medications or go to any therapy for it. Currently feeling slightly disconnected from pregnancy, primarily hasn't envisioned future with second child.    - Genetic/Infection questionnaire completed, risks include none. Pt  does not have a recent known exposure to Parvo or CMV so IgG/IgM testing WILL NOT be ordered.   Recommended Flu Vaccine.  Patient declined, will consider next visit    - Current Medications   Current Outpatient Medications   Medication Sig Dispense Refill     aspirin (ASA) 81 MG EC tablet Take 1 tablet (81 mg) by mouth daily 100 tablet 3     folic acid 800 MCG tablet Take 1 tablet (800 mcg) by mouth daily 90 tablet 3     ondansetron (ZOFRAN) 4 MG tablet Take 1 tablet (4 mg) by mouth every 8 hours as needed for nausea 15 tablet 1     acetaminophen (TYLENOL) 500 MG tablet Take 500-1,000 mg by mouth every 6 hours as needed for mild pain       vitamin D3  (CHOLECALCIFEROL) 50 mcg (2000 units) tablet Take 2 tablets (100 mcg) by mouth daily 120 tablet 3         - Co-morbids   Past Medical History:   Diagnosis Date     Pre-eclampsia     HTN started 3rd tri, them PreE w severe features,induced at term     - Risk for GDM : Pre pregnancy BMI>30 so  WILL have an early GCT and Hgb A1C    - High risk factors for Pre E-  History of Pre Eclampsia     Pregnant individuals at high risk of preeclampsia with one or more of the following risk factors:  History of preeclampsia, especially when accompanied by an adverse outcome  Multifetal gestation  Chronic hypertension  Pregestational type 1 or 2 diabetes  Kidney disease  Autoimmune disease (ie, systemic lupus erythematous, antiphospholipid syndrome)  Combinations of multiple moderate-risk factors    - Moderate risk factor for Pre E Pre Pregnancy body mass index >30   Meets one high risk factors or one of the moderate risk facrtors  Nulliparity  Obesity (ie, body mass index > 30)  Family history of preeclampsia (ie, mother or sister)  Black race (as a proxy for underlying racism)  Lower income  Age 35 years or older  Personal history factors (eg, low birth weight or small for gestational age, previous adverse pregnancy outcome, >10-year pregnancy interval)  In vitro fertilization  so WILL consider starting low dose aspirin (81mg) starting between 12 and 28 weeks to prevent early onset preeclampsia--RX sent      - The patient  does not have a history of spontaneous  birth so  WILL NOT consider progesterone starting at 16-20 weeks and/or serial transvaginal cervical length ultrasounds from 16-24 weeks.     -The patient does not have a history of immunosuppresion or HIV so Toxoplasma IgG/IgM WILL NOT be ordered.    -Assess risk for asymptomatic latent TB (prior infection, recent immigrant from epidemic areas, immunosuppression, living in overcrowded environment):   WILL NOT have PPD skin test or Quantiferon-TB Gold Plus blood  "draw. *both options valid*       PERSONAL/SOCIAL HISTORY    Lives with child - 2 year old daughter.  Employment: Unemployed 2/2 severe nausea, typically works as a .  Job involves light activity and moderate activity.  Immediate family lives in [place], has cousins, extended family, and friends in [place]  Her partner is currently in [place] ([place] citizen) and they will be initiating the immigration process for him soon.  History of anxiety or depression - Postpartum depression, no medications or therapy per patient.  Additional items: None    Objective'  Ht 1.626 m (5' 4.02\")   Wt 89.9 kg (198 lb 1.6 oz)   LMP 05/02/2022 (Approximate)   BMI 33.99 kg/m    .vs  -VS: reviewed and within normal limits   -General appearance: no acute distress, patient is comfortable   NEUROLOGICAL/PSYCHIATRIC   - Orientated x3,   -Mood and affect: : normal     Assessment/Plan  Briana was seen today for prenatal care.    Diagnoses and all orders for this visit:    High-risk pregnancy, unspecified trimester  -     ABO/Rh type and screen; Future  -     Rubella Antibody IgG; Future  -     Hepatitis B Surface Antibody; Future  -     Hepatitis B surface antigen; Future  -     Hepatitis C antibody; Future  -     Vitamin D Deficiency; Future  -     HIV Antigen Antibody Combo; Future  -     CBC with platelets; Future  -     Urine Culture; Future  -     Treponema Abs w Reflex to RPR and Titer; Future  -     ALT; Future  -     AST; Future  -     CBC with platelets; Future  -     Protein  random urine; Future  -     Uric acid; Future  -     aspirin (ASA) 81 MG EC tablet; Take 1 tablet (81 mg) by mouth daily  -     folic acid 800 MCG tablet; Take 1 tablet (800 mcg) by mouth daily  -     ondansetron (ZOFRAN) 4 MG tablet; Take 1 tablet (4 mg) by mouth every 8 hours as needed for nausea  -     Mat Fetal Med Ctr Referral - Pregnancy; Future    Hx of preeclampsia, prior pregnancy, currently pregnant  -     aspirin (ASA) 81 " MG EC tablet; Take 1 tablet (81 mg) by mouth daily  -     folic acid 800 MCG tablet; Take 1 tablet (800 mcg) by mouth daily  -     ondansetron (ZOFRAN) 4 MG tablet; Take 1 tablet (4 mg) by mouth every 8 hours as needed for nausea  -     Mat Fetal Med Ctr Referral - Pregnancy; Future        32 year old  10w1d weeks of pregnancy with BARTOLOME of 2023 by LMP of Patient's last menstrual period was 2022 (approximate).. Ultrasound confirms.   Outpatient Encounter Medications as of 2022   Medication Sig Dispense Refill     aspirin (ASA) 81 MG EC tablet Take 1 tablet (81 mg) by mouth daily 100 tablet 3     folic acid 800 MCG tablet Take 1 tablet (800 mcg) by mouth daily 90 tablet 3     ondansetron (ZOFRAN) 4 MG tablet Take 1 tablet (4 mg) by mouth every 8 hours as needed for nausea 15 tablet 1     acetaminophen (TYLENOL) 500 MG tablet Take 500-1,000 mg by mouth every 6 hours as needed for mild pain       No facility-administered encounter medications on file as of 2022.      Orders Placed This Encounter   Procedures     Rubella Antibody IgG     Hepatitis B Surface Antibody     Hepatitis B surface antigen     Hepatitis C antibody     Vitamin D Deficiency     HIV Antigen Antibody Combo     CBC with platelets     Treponema Abs w Reflex to RPR and Titer     ALT     AST     CBC with platelets     Protein  random urine     Uric acid     [facility] Fetal Med [facility]tr Referral - Pregnancy                 Orders Placed This Encounter   Procedures     Rubella Antibody IgG     Hepatitis B Surface Antibody     Hepatitis B surface antigen     Hepatitis C antibody     Vitamin D Deficiency     HIV Antigen Antibody Combo     CBC with platelets     Treponema Abs w Reflex to RPR and Titer     ALT     AST     CBC with platelets     Protein  random urine     Uric acid     Mat Fetal Med Ctr Referral - Pregnancy     ABO/Rh type and screen         - Oriented to Practice, types of care, and how to reach a provider.  Pt prefers CNM  team  - Patient received 1st trimester new OB education packet complete with aide of [employer] Expectant Family booklet including information on genetic screening test options.  - Patient desires 1st trimester screening and desires level II ultrasound which were ordered.  - Educational handout on the prevention of infections diseases during pregnancy provided.  - Patient was encouraged to start prenatal vitamins as tolerated.    - Patient was sent to lab for routine OB labs including baseline Pre-eclampsia labs.   - Reviewed risk for diabetes in pregnancy, plan to do 1 hr GCT NOB visit.  - Reviewed recommendation for 81mg low dose aspirin daily to prevent pre eclampsia, pt agrees, rx sent, follow up at NOB visit.  - Pregnancy concerns to be addressed by provider at new OB exam include: history of preeclampsia. ?use of SSRI  - Severe nausea, prescription for Zofran sent  - Discussed that it is normal to have a wide range of feelings regarding pregnancy and feeling disconnected isn't uncommon in early pregnancy. Continue to monitor mood and requested patient reach out to us if mood symptoms worsen, she was amenable to this plan.    Pt to RTO for NOB visit in 2 weeks and prn if questions or concerns    Jelly Campos, MS3    ERICA José CNM  I agree with the PFSH and ROS as completed by the student, except for changes made by me. The remainder of the encounter was performed by me and scribed by the student. The scribed note accurately reflects my personal services and decisions made by me.  Luisana Pierson, RADHAMES, LEAH, APRLANCE                Again, thank you for allowing me to participate in the care of your patient.      Sincerely,    ERICA José CNM

## 2022-07-15 DIAGNOSIS — E55.9 VITAMIN D DEFICIENCY: Primary | ICD-10-CM

## 2022-07-15 LAB
DEPRECATED CALCIDIOL+CALCIFEROL SERPL-MC: 18 UG/L (ref 20–75)
HBV SURFACE AB SERPL IA-ACNC: 288.77 M[IU]/ML
HBV SURFACE AG SERPL QL IA: NONREACTIVE
HCV AB SERPL QL IA: NONREACTIVE
HIV 1+2 AB+HIV1 P24 AG SERPL QL IA: NONREACTIVE
RUBV IGG SERPL QL IA: 1 INDEX
RUBV IGG SERPL QL IA: POSITIVE
T PALLIDUM AB SER QL: NONREACTIVE

## 2022-07-15 RX ORDER — CHOLECALCIFEROL (VITAMIN D3) 50 MCG
2 TABLET ORAL DAILY
Qty: 120 TABLET | Refills: 3 | Status: SHIPPED | OUTPATIENT
Start: 2022-07-15 | End: 2023-02-02

## 2022-07-16 LAB — BACTERIA UR CULT: NORMAL

## 2022-07-20 PROBLEM — Z86.59 HX OF POSTPARTUM DEPRESSION, CURRENTLY PREGNANT: Status: ACTIVE | Noted: 2022-07-20

## 2022-07-20 PROBLEM — O99.891 HX OF POSTPARTUM DEPRESSION, CURRENTLY PREGNANT: Status: ACTIVE | Noted: 2022-07-20

## 2022-07-21 ENCOUNTER — TRANSCRIBE ORDERS (OUTPATIENT)
Dept: MATERNAL FETAL MEDICINE | Facility: CLINIC | Age: 32
End: 2022-07-21

## 2022-07-21 ENCOUNTER — HOSPITAL ENCOUNTER (EMERGENCY)
Facility: CLINIC | Age: 32
Discharge: HOME | End: 2022-07-21
Attending: EMERGENCY MEDICINE | Admitting: EMERGENCY MEDICINE
Payer: MEDICAID

## 2022-07-21 VITALS
OXYGEN SATURATION: 97 % | HEART RATE: 105 BPM | SYSTOLIC BLOOD PRESSURE: 136 MMHG | RESPIRATION RATE: 16 BRPM | TEMPERATURE: 98.7 F | DIASTOLIC BLOOD PRESSURE: 87 MMHG

## 2022-07-21 DIAGNOSIS — N39.0 URINARY TRACT INFECTION WITHOUT HEMATURIA, SITE UNSPECIFIED: ICD-10-CM

## 2022-07-21 DIAGNOSIS — R42 LIGHTHEADEDNESS: ICD-10-CM

## 2022-07-21 DIAGNOSIS — O26.90 PREGNANCY RELATED CONDITION, ANTEPARTUM: Primary | ICD-10-CM

## 2022-07-21 DIAGNOSIS — R03.0 ELEVATED BLOOD PRESSURE READING WITHOUT DIAGNOSIS OF HYPERTENSION: ICD-10-CM

## 2022-07-21 LAB
ALBUMIN SERPL BCG-MCNC: 4.4 G/DL (ref 3.5–5.2)
ALBUMIN UR-MCNC: NEGATIVE MG/DL
ALP SERPL-CCNC: 47 U/L (ref 35–104)
ALT SERPL W P-5'-P-CCNC: 7 U/L (ref 10–35)
ANION GAP SERPL CALCULATED.3IONS-SCNC: 11 MMOL/L (ref 7–15)
APPEARANCE UR: CLEAR
AST SERPL W P-5'-P-CCNC: 15 U/L (ref 10–35)
BACTERIA #/AREA URNS HPF: ABNORMAL /HPF
BASOPHILS # BLD AUTO: 0 10E3/UL (ref 0–0.2)
BASOPHILS NFR BLD AUTO: 0 %
BILIRUB SERPL-MCNC: 0.2 MG/DL
BILIRUB UR QL STRIP: NEGATIVE
BUN SERPL-MCNC: 9.9 MG/DL (ref 6–20)
CALCIUM SERPL-MCNC: 9.9 MG/DL (ref 8.6–10)
CHLORIDE SERPL-SCNC: 100 MMOL/L (ref 98–107)
COLOR UR AUTO: ABNORMAL
CREAT SERPL-MCNC: 0.45 MG/DL (ref 0.51–0.95)
DEPRECATED HCO3 PLAS-SCNC: 22 MMOL/L (ref 22–29)
EOSINOPHIL # BLD AUTO: 0.1 10E3/UL (ref 0–0.7)
EOSINOPHIL NFR BLD AUTO: 1 %
ERYTHROCYTE [DISTWIDTH] IN BLOOD BY AUTOMATED COUNT: 13.4 % (ref 10–15)
FLUAV RNA SPEC QL NAA+PROBE: NEGATIVE
FLUBV RNA RESP QL NAA+PROBE: NEGATIVE
GFR SERPL CREATININE-BSD FRML MDRD: >90 ML/MIN/1.73M2
GLUCOSE SERPL-MCNC: 85 MG/DL (ref 70–99)
GLUCOSE UR STRIP-MCNC: NEGATIVE MG/DL
HCT VFR BLD AUTO: 36.4 % (ref 35–47)
HGB BLD-MCNC: 12.4 G/DL (ref 11.7–15.7)
HGB UR QL STRIP: NEGATIVE
HYALINE CASTS: 1 /LPF
IMM GRANULOCYTES # BLD: 0.1 10E3/UL
IMM GRANULOCYTES NFR BLD: 1 %
KETONES UR STRIP-MCNC: NEGATIVE MG/DL
LEUKOCYTE ESTERASE UR QL STRIP: ABNORMAL
LYMPHOCYTES # BLD AUTO: 2.4 10E3/UL (ref 0.8–5.3)
LYMPHOCYTES NFR BLD AUTO: 16 %
MCH RBC QN AUTO: 29.5 PG (ref 26.5–33)
MCHC RBC AUTO-ENTMCNC: 34.1 G/DL (ref 31.5–36.5)
MCV RBC AUTO: 87 FL (ref 78–100)
MONOCYTES # BLD AUTO: 0.8 10E3/UL (ref 0–1.3)
MONOCYTES NFR BLD AUTO: 5 %
MUCOUS THREADS #/AREA URNS LPF: PRESENT /LPF
NEUTROPHILS # BLD AUTO: 11.8 10E3/UL (ref 1.6–8.3)
NEUTROPHILS NFR BLD AUTO: 77 %
NITRATE UR QL: NEGATIVE
NRBC # BLD AUTO: 0 10E3/UL
NRBC BLD AUTO-RTO: 0 /100
PH UR STRIP: 6 [PH] (ref 5–7)
PLATELET # BLD AUTO: 320 10E3/UL (ref 150–450)
POTASSIUM SERPL-SCNC: 3.8 MMOL/L (ref 3.4–5.3)
PROT SERPL-MCNC: 7.8 G/DL (ref 6.4–8.3)
RBC # BLD AUTO: 4.21 10E6/UL (ref 3.8–5.2)
RBC URINE: 2 /HPF
RSV RNA SPEC NAA+PROBE: NEGATIVE
SARS-COV-2 RNA RESP QL NAA+PROBE: NEGATIVE
SODIUM SERPL-SCNC: 133 MMOL/L (ref 136–145)
SP GR UR STRIP: 1.02 (ref 1–1.03)
SQUAMOUS EPITHELIAL: 4 /HPF
TROPONIN T SERPL HS-MCNC: <6 NG/L
UROBILINOGEN UR STRIP-MCNC: NORMAL MG/DL
WBC # BLD AUTO: 15.2 10E3/UL (ref 4–11)
WBC URINE: 9 /HPF

## 2022-07-21 PROCEDURE — 93005 ELECTROCARDIOGRAM TRACING: CPT | Performed by: EMERGENCY MEDICINE

## 2022-07-21 PROCEDURE — 87637 SARSCOV2&INF A&B&RSV AMP PRB: CPT | Performed by: NURSE PRACTITIONER

## 2022-07-21 PROCEDURE — 93010 ELECTROCARDIOGRAM REPORT: CPT | Performed by: EMERGENCY MEDICINE

## 2022-07-21 PROCEDURE — 81001 URINALYSIS AUTO W/SCOPE: CPT | Performed by: NURSE PRACTITIONER

## 2022-07-21 PROCEDURE — 84484 ASSAY OF TROPONIN QUANT: CPT | Performed by: NURSE PRACTITIONER

## 2022-07-21 PROCEDURE — C9803 HOPD COVID-19 SPEC COLLECT: HCPCS | Performed by: EMERGENCY MEDICINE

## 2022-07-21 PROCEDURE — 99284 EMERGENCY DEPT VISIT MOD MDM: CPT | Performed by: EMERGENCY MEDICINE

## 2022-07-21 PROCEDURE — 36415 COLL VENOUS BLD VENIPUNCTURE: CPT | Performed by: NURSE PRACTITIONER

## 2022-07-21 PROCEDURE — 85025 COMPLETE CBC W/AUTO DIFF WBC: CPT | Performed by: NURSE PRACTITIONER

## 2022-07-21 PROCEDURE — 87086 URINE CULTURE/COLONY COUNT: CPT | Performed by: NURSE PRACTITIONER

## 2022-07-21 PROCEDURE — 80053 COMPREHEN METABOLIC PANEL: CPT | Performed by: NURSE PRACTITIONER

## 2022-07-21 PROCEDURE — 99285 EMERGENCY DEPT VISIT HI MDM: CPT | Mod: 25 | Performed by: EMERGENCY MEDICINE

## 2022-07-21 RX ORDER — CEFDINIR 300 MG/1
300 CAPSULE ORAL 2 TIMES DAILY
Qty: 10 CAPSULE | Refills: 0 | Status: SHIPPED | OUTPATIENT
Start: 2022-07-21 | End: 2022-07-26

## 2022-07-22 ENCOUNTER — PRE VISIT (OUTPATIENT)
Dept: MATERNAL FETAL MEDICINE | Facility: CLINIC | Age: 32
End: 2022-07-22

## 2022-07-22 LAB
ATRIAL RATE - MUSE: 80 BPM
DIASTOLIC BLOOD PRESSURE - MUSE: NORMAL MMHG
INTERPRETATION ECG - MUSE: NORMAL
P AXIS - MUSE: 39 DEGREES
PR INTERVAL - MUSE: 152 MS
QRS DURATION - MUSE: 86 MS
QT - MUSE: 356 MS
QTC - MUSE: 410 MS
R AXIS - MUSE: 9 DEGREES
SYSTOLIC BLOOD PRESSURE - MUSE: NORMAL MMHG
T AXIS - MUSE: 20 DEGREES
VENTRICULAR RATE- MUSE: 80 BPM

## 2022-07-22 NOTE — DISCHARGE INSTRUCTIONS
Follow-up with your nurse midwife by phone tomorrow to let them know how you are doing and for further recommendations on blood pressure monitoring and or treatment.    Cefdinir as directed.    Rest and stay well-hydrated by drinking plenty of fluids.    Return to the emergency department for any problems.

## 2022-07-23 LAB — BACTERIA UR CULT: NORMAL

## 2022-07-25 ENCOUNTER — TELEPHONE (OUTPATIENT)
Dept: OBGYN | Facility: CLINIC | Age: 32
End: 2022-07-25

## 2022-07-25 NOTE — ED PROVIDER NOTES
ED Provider Note  [facility]      History     Chief Complaint   Patient presents with     Shortness of Breath     Chest Pain     HPI  Briana Newman is a 32 year old female who  has a past medical history of Postpartum depression and Pre-eclampsia.   She presents to the ED with weakness, shortness of breath, elevated blood pressure and chest pressure. Currently 11 weeks pregnant. Patient reports she's been feeling unwell for the past few days. Purchased a home BP cuff today and had BPs of 140/101 at home (normally 90s/60s since delivering 2 years ago).   She denies headache, neurologic changes, change in vision or history of seizure.  She did not live in [place] during her last pregnancy, but was followed by the high risk pregnancy service.  She is currently seeing a nurse midwife here.    Past Medical History  Past Medical History:   Diagnosis Date     Postpartum depression     w first, unsure about med use     Pre-eclampsia     HTN started 3rd tri, them PreE w severe features,induced at term     Past Surgical History:   Procedure Laterality Date     BREAST SURGERY  07/10/2012    cystecomy right breast-benign     wisdom teeth       cefdinir (OMNICEF) 300 MG capsule  acetaminophen (TYLENOL) 500 MG tablet  aspirin (ASA) 81 MG EC tablet  folic acid 800 MCG tablet  ondansetron (ZOFRAN) 4 MG tablet  vitamin D3 (CHOLECALCIFEROL) 50 mcg (2000 units) tablet      No Known Allergies  Family History  Family History   Problem Relation Age of Onset     No Known Problems Mother      No Known Problems Father      No Known Problems Sister      No Known Problems Brother      No Known Problems Brother      No Known Problems Brother      No Known Problems Brother      Hypertension Maternal Grandmother      Diabetes Maternal Grandmother      Ovarian Cancer Maternal Grandmother      Breast Cancer No family hx of      Colon Cancer No family hx of      Hyperlipidemia No family hx of      Asthma No family hx  of      Osteoporosis No family hx of      Mental Illness No family hx of      Depression No family hx of      Anxiety Disorder No family hx of      Substance Abuse No family hx of      Social History   Social History     Tobacco Use     Smoking status: Never Smoker     Smokeless tobacco: Never Used   Vaping Use     Vaping Use: Never used   Substance Use Topics     Alcohol use: Never     Drug use: Never      Past medical history, past surgical history, medications, allergies, family history, and social history were reviewed with the patient. No additional pertinent items.       Review of Systems  A complete review of systems was performed with pertinent positives and negatives noted in the HPI, and all other systems negative.    Physical Exam   BP: (!) 160/90  Pulse: 108  Temp: 98.7  F (37.1  C)  Resp: 16  SpO2: 100 %  Physical Exam  Vitals and nursing note reviewed.   Constitutional:       General: She is not in acute distress.     Appearance: She is well-developed. She is not ill-appearing or toxic-appearing.   HENT:      Head: Normocephalic and atraumatic.   Eyes:      General: No scleral icterus.     Pupils: Pupils are equal, round, and reactive to light.   Cardiovascular:      Rate and Rhythm: Normal rate and regular rhythm.      Pulses: Normal pulses.   Pulmonary:      Effort: Pulmonary effort is normal. No tachypnea or respiratory distress.      Breath sounds: Normal breath sounds.   Musculoskeletal:      Cervical back: Normal range of motion and neck supple.      Right lower leg: No edema.      Left lower leg: No edema.   Skin:     General: Skin is warm and dry.      Coloration: Skin is not pale.      Findings: No rash.   Neurological:      General: No focal deficit present.      Mental Status: She is alert and oriented to person, place, and time.      Cranial Nerves: No cranial nerve deficit.      Sensory: No sensory deficit.      Motor: No weakness.   Psychiatric:         Behavior: Behavior normal.          ED Course      Procedures            EKG Interpretation:      Interpreted by Greg Negro MD    Symptoms at time of EKG: high blood pressure   Rhythm: normal sinus   Rate: 80  Ectopy: none  Conduction: normal  ST Segments/ T Waves: No ST-T wave changes  Q Waves: none  Comparison to prior: No old EKG available    Clinical Impression: normal EKG                    Results for orders placed or performed during the hospital encounter of 07/21/22   Symptomatic; Unknown Influenza A/B & SARS-CoV2 (COVID-19) Virus PCR Multiplex Nasopharyngeal     Status: Normal    Specimen: Nasopharyngeal; Swab   Result Value Ref Range    Influenza A PCR Negative Negative    Influenza B PCR Negative Negative    RSV PCR Negative Negative    SARS CoV2 PCR Negative Negative, Testing sent to reference lab. Results will be returned via unsolicited result    Narrative    Testing was performed using the Xpert Xpress CoV2/Flu/RSV Assay on the Cepheid GeneXpert Instrument. This test should be ordered for the detection of SARS-CoV-2 and influenza viruses in individuals who meet clinical and/or epidemiological criteria. Test performance is unknown in asymptomatic patients. This test is for in vitro diagnostic use under the FDA EUA for laboratories certified under CLIA to perform high or moderate complexity testing. This test has not been FDA cleared or approved. A negative result does not rule out the presence of PCR inhibitors in the specimen or target RNA in concentration below the limit of detection for the assay. If only one viral target is positive but coinfection with multiple targets is suspected, the sample should be re-tested with another FDA cleared, approved, or authorized test, if coinfection would change clinical management. This test was validated by the [facility] [place] [facility]. These laboratories are certified under the Clinical  Laboratory Improvement Amendments of 1988 (CLIA-88) as qualified to perform high  complexity laboratory testing.   Comprehensive metabolic panel     Status: Abnormal   Result Value Ref Range    Sodium 133 (L) 136 - 145 mmol/L    Potassium 3.8 3.4 - 5.3 mmol/L    Creatinine 0.45 (L) 0.51 - 0.95 mg/dL    Urea Nitrogen 9.9 6.0 - 20.0 mg/dL    Chloride 100 98 - 107 mmol/L    Carbon Dioxide (CO2) 22 22 - 29 mmol/L    Anion Gap 11 7 - 15 mmol/L    Glucose 85 70 - 99 mg/dL    Calcium 9.9 8.6 - 10.0 mg/dL    Protein Total 7.8 6.4 - 8.3 g/dL    Albumin 4.4 3.5 - 5.2 g/dL    Bilirubin Total 0.2 <=1.2 mg/dL    Alkaline Phosphatase 47 35 - 104 U/L    AST 15 10 - 35 U/L    ALT 7 (L) 10 - 35 U/L    GFR Estimate >90 >60 mL/min/1.73m2   UA with Microscopic reflex to Culture     Status: Abnormal    Specimen: Urine, Midstream   Result Value Ref Range    Color Urine Light Yellow Colorless, Straw, Light Yellow, Yellow    Appearance Urine Clear Clear    Glucose Urine Negative Negative mg/dL    Bilirubin Urine Negative Negative    Ketones Urine Negative Negative mg/dL    Specific Gravity Urine 1.019 1.003 - 1.035    Blood Urine Negative Negative    pH Urine 6.0 5.0 - 7.0    Protein Albumin Urine Negative Negative mg/dL    Urobilinogen Urine Normal Normal, 2.0 mg/dL    Nitrite Urine Negative Negative    Leukocyte Esterase Urine Moderate (A) Negative    Bacteria Urine Few (A) None Seen /HPF    Mucus Urine Present (A) None Seen /LPF    RBC Urine 2 <=2 /HPF    WBC Urine 9 (H) <=5 /HPF    Squamous Epithelials Urine 4 (H) <=1 /HPF    Hyaline Casts Urine 1 <=2 /LPF    Narrative    Urine Culture ordered based on laboratory criteria   Troponin T, High Sensitivity     Status: Normal   Result Value Ref Range    Troponin T, High Sensitivity <6 <=14 ng/L   CBC with platelets and differential     Status: Abnormal   Result Value Ref Range    WBC Count 15.2 (H) 4.0 - 11.0 10e3/uL    RBC Count 4.21 3.80 - 5.20 10e6/uL    Hemoglobin 12.4 11.7 - 15.7 g/dL    Hematocrit 36.4 35.0 - 47.0 %    MCV 87 78 - 100 fL    MCH 29.5 26.5 - 33.0 pg     MCHC 34.1 31.5 - 36.5 g/dL    RDW 13.4 10.0 - 15.0 %    Platelet Count 320 150 - 450 10e3/uL    % Neutrophils 77 %    % Lymphocytes 16 %    % Monocytes 5 %    % Eosinophils 1 %    % Basophils 0 %    % Immature Granulocytes 1 %    NRBCs per 100 WBC 0 <1 /100    Absolute Neutrophils 11.8 (H) 1.6 - 8.3 10e3/uL    Absolute Lymphocytes 2.4 0.8 - 5.3 10e3/uL    Absolute Monocytes 0.8 0.0 - 1.3 10e3/uL    Absolute Eosinophils 0.1 0.0 - 0.7 10e3/uL    Absolute Basophils 0.0 0.0 - 0.2 10e3/uL    Absolute Immature Granulocytes 0.1 <=0.4 10e3/uL    Absolute NRBCs 0.0 10e3/uL   EKG 12-lead, tracing only     Status: None   Result Value Ref Range    Systolic Blood Pressure  mmHg    Diastolic Blood Pressure  mmHg    Ventricular Rate 80 BPM    Atrial Rate 80 BPM    MO Interval 152 ms    QRS Duration 86 ms     ms    QTc 410 ms    P Axis 39 degrees    R AXIS 9 degrees    T Axis 20 degrees    Interpretation ECG       Sinus rhythm  Moderate voltage criteria for LVH, may be normal variant  Borderline ECG  Unconfirmed report - interpretation of this ECG is computer generated - see medical record for final interpretation    Confirmed by - EMERGENCY ROOM, PHYSICIAN ([id number]),  CRYSTAL SALAZAR ([id number]) on 7/22/2022 6:49:56 AM     Urine Culture     Status: None    Specimen: Urine, Midstream   Result Value Ref Range    Culture <10,000 CFU/mL Mixture of urogenital ernestina    CBC with platelets differential     Status: Abnormal    Narrative    The following orders were created for panel order CBC with platelets differential.  Procedure                               Abnormality         Status                     ---------                               -----------         ------                     CBC with platelets and d...[[id number]]  Abnormal            Final result                 Please view results for these tests on the individual orders.     Medications - No data to display     Assessments & Plan (with Medical Decision Making)      This patient presented with concerns of lightheadedness and elevated blood pressure.  She is too early in her pregnancy to be considered for preeclampsia.  No evidence for end organ damage. EKG demonstrates no signs of WPW, prolonged QT interval, AV suly block, ischemia, past infarction or hypertrophy indicative of structural disease.  PE, SAH, aortic dissection/aneurysm and pericardial tamponade are unlikely given the patient's lack of risk factors or clinical signs and symptoms suggesting these serious conditions.  The patient has normal vital signs at this time which rules against significant hypovolemia or hemorrhage as a causative factor in his syncopal episode.  Orthostatic VS were negative.  At this time I'm comfortable discharging and having her f/u with her midwife.  I suspect she will need to be followed by MFM if she continues to be hypertensive during her pregnancy.      I have reviewed the nursing notes. I have reviewed the findings, diagnosis, plan and need for follow up with the patient.    Discharge Medication List as of 7/21/2022 11:27 PM      START taking these medications    Details   cefdinir (OMNICEF) 300 MG capsule Take 1 capsule (300 mg) by mouth 2 times daily for 5 days, Disp-10 capsule, R-0, Local Print             Final diagnoses:   Lightheadedness   Urinary tract infection without hematuria, site unspecified   Elevated blood pressure reading without diagnosis of hypertension       --    [facility] EMERGENCY DEPARTMENT  7/21/2022     Greg Negro MD  07/25/22 [id number]

## 2022-07-25 NOTE — TELEPHONE ENCOUNTER
"Writer received phone call from patient who is reporting cramping, shortness of breath, fatigue, and a \"fast heart rate\" since Thursday. Patient was seen in the ED and prescribed an antibiotic for a suspected UTI. Patient states she did not have good luck taking the antibiotic due to nausea in pregnancy. Writer encouraged patient to be seen again in ED due to shortness of breath and palpitations, patient declined. Writer encouraged patient to ensure that she is taking her antibiotic, as an untreated UTI can cause cramping as lead to bigger concerns. Writer also encouraged patient to call clinic with additional questions or concerns, and reemphasized the recommendation to present to the ED. Patient verbalized understanding and agreement to plan.  "

## 2022-07-27 ENCOUNTER — HOSPITAL ENCOUNTER (OUTPATIENT)
Dept: ULTRASOUND IMAGING | Facility: CLINIC | Age: 32
Discharge: HOME | End: 2022-07-27
Attending: ADVANCED PRACTICE MIDWIFE
Payer: MEDICAID

## 2022-07-27 ENCOUNTER — OFFICE VISIT (OUTPATIENT)
Dept: MATERNAL FETAL MEDICINE | Facility: CLINIC | Age: 32
End: 2022-07-27
Attending: ADVANCED PRACTICE MIDWIFE
Payer: MEDICAID

## 2022-07-27 DIAGNOSIS — O26.90 PREGNANCY RELATED CONDITION, ANTEPARTUM: Primary | ICD-10-CM

## 2022-07-27 DIAGNOSIS — O26.90 PREGNANCY RELATED CONDITION, ANTEPARTUM: ICD-10-CM

## 2022-07-27 DIAGNOSIS — Z36.9 ENCOUNTER FOR ANTENATAL SCREENING: Primary | ICD-10-CM

## 2022-07-27 PROCEDURE — 76813 OB US NUCHAL MEAS 1 GEST: CPT

## 2022-07-27 PROCEDURE — 96040 HC GENETIC COUNSELING, EACH 30 MINUTES: CPT | Performed by: GENETIC COUNSELOR, MS

## 2022-07-27 PROCEDURE — 76813 OB US NUCHAL MEAS 1 GEST: CPT | Mod: 26 | Performed by: OBSTETRICS & GYNECOLOGY

## 2022-07-27 NOTE — PROGRESS NOTES
"Please see \"Imaging\" tab under \"Chart Review\" for details of today's visit.    Zoe Reid MD PhD  Maternal Fetal Medicine     "

## 2022-07-27 NOTE — PROGRESS NOTES
[facility]  Genetic Counseling Consult    Patient: Briana Newman YOB: 1990   Date of Service: 22      Briana Newman was seen at [facility] for genetic consultation to discuss the options for routine screening for fetal chromosome abnormalities. The patient was unaccompanied to today's visit.       Impression/Plan:   1.  Briana had an ultrasound and genetic counseling consultation today. We reviewed availability of screening and diagnostic testing options. Briana has declined screening and diagnostic testing, however is aware these will remain available.     2. Maternal serum AFP (single marker screen) is recommended after 15 weeks to screen for open neural tube defects. A quad screen should not be performed.    3. Briana has a level II comprehensive ultrasound scheduled for .     Pregnancy History:   /Parity:    Age at Delivery: 32 year old  BARTOLOME: 2023, by Ultrasound  Gestational Age: 12w0d    No significant complications or exposures were reported in the current pregnancy.    Briana bryant pregnancy history is significant for one term delivery complicated by preeclampsia/htn.    Medical History:   Briana bryant reported medical history is not expected to impact pregnancy management or risks to fetal development.       Family History:   A three-generation pedigree was obtained, and is scanned under the  Media  tab.   The following significant findings were reported by Briana:  Briana's maternal grandmother reportedly passed away from ovarian cancer at age 64. No other family history of cancer was reported. We discussed how most cancer seen in families occurs sporadically, but about 5-10% may be due to an underlying genetic etiology. Briana was encouraged to share this family history information with her primary care provider to ensure appropriate screening. She was also made aware of the [facility]  [place]'s cancer risk management clinic if she or her family members are interested in more information.  Briana's partner, Shannon was reported to have a maternal half sister who was found to have bilateral deafness due to meningitis.   Brinaa shared that she has several maternal aunts/uncles who have experienced infertility or recurrent miscarriages. We discussed how infertility or recurrent pregnancy loss can sometimes be due to an underlying cause. Common causes of recurrent pregnancy loss include maternal factors, endocrine disorders, immune disorders, and chromosomal and single gene disorders. Without further information regarding an underlying cause for these relative's recurrent pregnancy loss, risk assessment is challenging and the possibility of a familial chromosomal rearrangement cannot be ruled out.     Otherwise, the reported family history is negative for multiple miscarriages, stillbirths, birth defects, intellectual disability, known genetic conditions, and consanguinity.       Carrier Screening:   The patient reports that she and the father of the pregnancy have  ancestry:     The hemoglobinopathies are a group of genetic blood diseases that occur with increased frequency in individuals of  ancestry and carrier screening for these conditions is available.  Carrier screening for the hemoglobinopathies includes a CBC with red blood cell indices, a ferritin level, and a quantitative hemoglobin electrophoresis or HPLC.  In addition,  screening in the [facility] includes many of the hemoglobinopathies.      Expanded carrier screening for mutations in a large panel of genes associated with autosomal recessive conditions including cystic fibrosis, spinal muscular atrophy, and others, is now available.      The patient has declined the carrier screening options reviewed today.       Risk Assessment for Chromosome Conditions:   We explained that the risk for fetal chromosome  abnormalities increases with maternal age. We discussed specific features of common chromosome abnormalities, including Down syndrome, trisomy 13, trisomy 18, and sex chromosome trisomies.      - At age 32 at midtrimester, the risk to have a baby with Down syndrome is 1 in 690.     - At age 32 at midtrimester, the risk to have a baby with any chromosome abnormality is 1 in 345.     Testing Options:   We discussed the following options:   First trimester screening    First trimester ultrasound with nuchal translucency and nasal bone assessments, maternal plasma hCG, ELISSA-A, and AFP measurement    Screens for fetal trisomy 21, trisomy 13, and trisomy 18    Cannot screen for open neural tube defects; maternal serum AFP after 15 weeks is recommended     Non-invasive Prenatal Testing (NIPT)    Maternal plasma cell-free DNA testing; first trimester ultrasound with nuchal translucency and nasal bone assessment is recommended, when appropriate    Screens for fetal trisomy 21, trisomy 13, trisomy 18, and sex chromosome aneuploidy    Cannot screen for open neural tube defects; maternal serum AFP after 15 weeks is recommended     Chorionic villus sampling (CVS)    Invasive procedure typically performed in the first trimester by which placental villi are obtained for the purpose of chromosome analysis and/or other prenatal genetic analysis    Diagnostic results; >99% sensitivity for fetal chromosome abnormalities    Cannot test for open neural tube defects; maternal serum AFP after 15 weeks is recommended     Genetic Amniocentesis    Invasive procedure typically performed in the second trimester by which amniotic fluid is obtained for the purpose of chromosome analysis and/or other prenatal genetic analysis    Diagnostic results; >99% sensitivity for fetal chromosome abnormalities    AFAFP measurement tests for open neural tube defects     Comprehensive (Level II) ultrasound: Detailed ultrasound performed between 18-22 weeks  gestation to screen for major birth defects and markers for aneuploidy.      We reviewed the benefits and limitations of this testing.  Screening tests provide a risk assessment specific to the pregnancy for certain fetal chromosome abnormalities, but cannot definitively diagnose or exclude a fetal chromosome abnormality.  Follow-up genetic counseling and consideration of diagnostic testing is recommended with any abnormal screening result.     Diagnostic tests carry inherent risks- including risk of miscarriage- that require careful consideration.  These tests can detect fetal chromosome abnormalities with greater than 99% certainty.  Results can be compromised by maternal cell contamination or mosaicism, and are limited by the resolution of cytogenetic G-banding technology.  There is no screening nor diagnostic test that can detect all forms of birth defects or mental disability.     It was a pleasure to be involved with Briana [facility]. Face-to-face time of the meeting was 20 minutes.    Mague Mcintyre MS, [facility]  Licensed Genetic Counselor  [facility]  Maternal Fetal Medicine  Ph: [phone]  [email or web address]

## 2022-07-29 ENCOUNTER — LAB (OUTPATIENT)
Dept: LAB | Facility: CLINIC | Age: 32
End: 2022-07-29
Attending: ADVANCED PRACTICE MIDWIFE
Payer: MEDICAID

## 2022-07-29 ENCOUNTER — OFFICE VISIT (OUTPATIENT)
Dept: OBGYN | Facility: CLINIC | Age: 32
End: 2022-07-29
Attending: ADVANCED PRACTICE MIDWIFE
Payer: MEDICAID

## 2022-07-29 VITALS
SYSTOLIC BLOOD PRESSURE: 129 MMHG | HEART RATE: 78 BPM | DIASTOLIC BLOOD PRESSURE: 84 MMHG | HEIGHT: 64 IN | WEIGHT: 198.4 LBS | BODY MASS INDEX: 33.87 KG/M2

## 2022-07-29 DIAGNOSIS — O09.299 HX OF PREECLAMPSIA, PRIOR PREGNANCY, CURRENTLY PREGNANT: ICD-10-CM

## 2022-07-29 DIAGNOSIS — Z86.59 HX OF POSTPARTUM DEPRESSION, CURRENTLY PREGNANT: ICD-10-CM

## 2022-07-29 DIAGNOSIS — O99.891 HX OF POSTPARTUM DEPRESSION, CURRENTLY PREGNANT: ICD-10-CM

## 2022-07-29 DIAGNOSIS — O09.90 HIGH-RISK PREGNANCY, UNSPECIFIED TRIMESTER: ICD-10-CM

## 2022-07-29 DIAGNOSIS — O09.90 HIGH-RISK PREGNANCY, UNSPECIFIED TRIMESTER: Primary | ICD-10-CM

## 2022-07-29 DIAGNOSIS — E55.9 VITAMIN D DEFICIENCY: ICD-10-CM

## 2022-07-29 LAB — HBA1C MFR BLD: 5.4 % (ref 0–5.6)

## 2022-07-29 PROCEDURE — 87591 N.GONORRHOEAE DNA AMP PROB: CPT

## 2022-07-29 PROCEDURE — 87491 CHLMYD TRACH DNA AMP PROBE: CPT

## 2022-07-29 PROCEDURE — G0463 HOSPITAL OUTPT CLINIC VISIT: HCPCS

## 2022-07-29 PROCEDURE — 99207 PR PRENATAL VISIT: CPT | Performed by: ADVANCED PRACTICE MIDWIFE

## 2022-07-29 PROCEDURE — 83036 HEMOGLOBIN GLYCOSYLATED A1C: CPT

## 2022-07-29 PROCEDURE — 36415 COLL VENOUS BLD VENIPUNCTURE: CPT

## 2022-07-29 ASSESSMENT — PAIN SCALES - GENERAL: PAINLEVEL: NO PAIN (0)

## 2022-07-29 NOTE — LETTER
Date:August 3, 2022      Provider requested that no letter be sent. Do not send.       [facility]
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
## 2022-07-30 LAB
C TRACH DNA SPEC QL NAA+PROBE: NEGATIVE
N GONORRHOEA DNA SPEC QL NAA+PROBE: NEGATIVE

## 2022-08-01 ASSESSMENT — PATIENT HEALTH QUESTIONNAIRE - PHQ9
5. POOR APPETITE OR OVEREATING: SEVERAL DAYS
SUM OF ALL RESPONSES TO PHQ QUESTIONS 1-9: 3

## 2022-08-01 ASSESSMENT — ANXIETY QUESTIONNAIRES
GAD7 TOTAL SCORE: 5
GAD7 TOTAL SCORE: 5
7. FEELING AFRAID AS IF SOMETHING AWFUL MIGHT HAPPEN: NOT AT ALL
5. BEING SO RESTLESS THAT IT IS HARD TO SIT STILL: NOT AT ALL
1. FEELING NERVOUS, ANXIOUS, OR ON EDGE: SEVERAL DAYS
3. WORRYING TOO MUCH ABOUT DIFFERENT THINGS: SEVERAL DAYS
6. BECOMING EASILY ANNOYED OR IRRITABLE: SEVERAL DAYS
2. NOT BEING ABLE TO STOP OR CONTROL WORRYING: SEVERAL DAYS

## 2022-08-02 ENCOUNTER — APPOINTMENT (OUTPATIENT)
Dept: CT IMAGING | Facility: CLINIC | Age: 32
End: 2022-08-02
Payer: MEDICAID

## 2022-08-02 ENCOUNTER — HOSPITAL ENCOUNTER (EMERGENCY)
Facility: CLINIC | Age: 32
Discharge: HOME | End: 2022-08-03
Attending: EMERGENCY MEDICINE | Admitting: EMERGENCY MEDICINE
Payer: MEDICAID

## 2022-08-02 ENCOUNTER — APPOINTMENT (OUTPATIENT)
Dept: GENERAL RADIOLOGY | Facility: CLINIC | Age: 32
End: 2022-08-02
Attending: EMERGENCY MEDICINE
Payer: MEDICAID

## 2022-08-02 DIAGNOSIS — O26.899 ABDOMINAL PAIN AFFECTING PREGNANCY: ICD-10-CM

## 2022-08-02 DIAGNOSIS — U07.1 INFECTION DUE TO 2019 NOVEL CORONAVIRUS: Primary | ICD-10-CM

## 2022-08-02 DIAGNOSIS — R31.29 MICROSCOPIC HEMATURIA: ICD-10-CM

## 2022-08-02 DIAGNOSIS — R10.9 ABDOMINAL PAIN AFFECTING PREGNANCY: ICD-10-CM

## 2022-08-02 DIAGNOSIS — R06.09 DYSPNEA ON EXERTION: ICD-10-CM

## 2022-08-02 LAB
ALBUMIN SERPL-MCNC: 3.6 G/DL (ref 3.4–5)
ALBUMIN UR-MCNC: 10 MG/DL
ALP SERPL-CCNC: 52 U/L (ref 40–150)
ALT SERPL W P-5'-P-CCNC: 21 U/L (ref 0–50)
ANION GAP SERPL CALCULATED.3IONS-SCNC: 10 MMOL/L (ref 3–14)
APPEARANCE UR: CLEAR
AST SERPL W P-5'-P-CCNC: 11 U/L (ref 0–45)
ATRIAL RATE - MUSE: 78 BPM
BASOPHILS # BLD AUTO: 0 10E3/UL (ref 0–0.2)
BASOPHILS NFR BLD AUTO: 0 %
BILIRUB SERPL-MCNC: 0.4 MG/DL (ref 0.2–1.3)
BILIRUB UR QL STRIP: NEGATIVE
BUN SERPL-MCNC: 5 MG/DL (ref 7–30)
CALCIUM SERPL-MCNC: 10 MG/DL (ref 8.5–10.1)
CHLORIDE BLD-SCNC: 106 MMOL/L (ref 94–109)
CO2 SERPL-SCNC: 22 MMOL/L (ref 20–32)
COLOR UR AUTO: ABNORMAL
CREAT SERPL-MCNC: 0.49 MG/DL (ref 0.52–1.04)
D DIMER PPP FEU-MCNC: 0.58 UG/ML FEU (ref 0–0.5)
DIASTOLIC BLOOD PRESSURE - MUSE: NORMAL MMHG
EOSINOPHIL # BLD AUTO: 0.1 10E3/UL (ref 0–0.7)
EOSINOPHIL NFR BLD AUTO: 1 %
ERYTHROCYTE [DISTWIDTH] IN BLOOD BY AUTOMATED COUNT: 13.3 % (ref 10–15)
FLUAV RNA SPEC QL NAA+PROBE: NEGATIVE
FLUBV RNA RESP QL NAA+PROBE: NEGATIVE
GFR SERPL CREATININE-BSD FRML MDRD: >90 ML/MIN/1.73M2
GLUCOSE BLD-MCNC: 67 MG/DL (ref 70–99)
GLUCOSE UR STRIP-MCNC: NEGATIVE MG/DL
HCG UR QL: POSITIVE
HCT VFR BLD AUTO: 35.4 % (ref 35–47)
HGB BLD-MCNC: 12.1 G/DL (ref 11.7–15.7)
HGB UR QL STRIP: ABNORMAL
HOLD SPECIMEN: NORMAL
IMM GRANULOCYTES # BLD: 0.1 10E3/UL
IMM GRANULOCYTES NFR BLD: 1 %
INTERNAL QC OK POCT: ABNORMAL
INTERPRETATION ECG - MUSE: NORMAL
KETONES UR STRIP-MCNC: 60 MG/DL
LEUKOCYTE ESTERASE UR QL STRIP: ABNORMAL
LIPASE SERPL-CCNC: 123 U/L (ref 73–393)
LYMPHOCYTES # BLD AUTO: 2.3 10E3/UL (ref 0.8–5.3)
LYMPHOCYTES NFR BLD AUTO: 17 %
MCH RBC QN AUTO: 29.5 PG (ref 26.5–33)
MCHC RBC AUTO-ENTMCNC: 34.2 G/DL (ref 31.5–36.5)
MCV RBC AUTO: 86 FL (ref 78–100)
MONOCYTES # BLD AUTO: 0.6 10E3/UL (ref 0–1.3)
MONOCYTES NFR BLD AUTO: 5 %
MUCOUS THREADS #/AREA URNS LPF: PRESENT /LPF
NEUTROPHILS # BLD AUTO: 10.7 10E3/UL (ref 1.6–8.3)
NEUTROPHILS NFR BLD AUTO: 76 %
NITRATE UR QL: NEGATIVE
NRBC # BLD AUTO: 0 10E3/UL
NRBC BLD AUTO-RTO: 0 /100
P AXIS - MUSE: 45 DEGREES
PH UR STRIP: 5.5 [PH] (ref 5–7)
PLATELET # BLD AUTO: 323 10E3/UL (ref 150–450)
POCT KIT EXPIRATION DATE: ABNORMAL
POCT KIT LOT NUMBER: ABNORMAL
POTASSIUM BLD-SCNC: 3.6 MMOL/L (ref 3.4–5.3)
PR INTERVAL - MUSE: 148 MS
PROT SERPL-MCNC: 8.7 G/DL (ref 6.8–8.8)
QRS DURATION - MUSE: 80 MS
QT - MUSE: 364 MS
QTC - MUSE: 414 MS
R AXIS - MUSE: 8 DEGREES
RBC # BLD AUTO: 4.1 10E6/UL (ref 3.8–5.2)
RBC URINE: 47 /HPF
SARS-COV-2 RNA RESP QL NAA+PROBE: POSITIVE
SODIUM SERPL-SCNC: 138 MMOL/L (ref 133–144)
SP GR UR STRIP: 1.02 (ref 1–1.03)
SQUAMOUS EPITHELIAL: 1 /HPF
SYSTOLIC BLOOD PRESSURE - MUSE: NORMAL MMHG
T AXIS - MUSE: 13 DEGREES
TROPONIN I SERPL HS-MCNC: 3 NG/L
TSH SERPL DL<=0.005 MIU/L-ACNC: 0.77 MU/L (ref 0.4–4)
UROBILINOGEN UR STRIP-MCNC: NORMAL MG/DL
VENTRICULAR RATE- MUSE: 78 BPM
WBC # BLD AUTO: 13.9 10E3/UL (ref 4–11)
WBC URINE: 1 /HPF

## 2022-08-02 PROCEDURE — 85379 FIBRIN DEGRADATION QUANT: CPT | Performed by: STUDENT IN AN ORGANIZED HEALTH CARE EDUCATION/TRAINING PROGRAM

## 2022-08-02 PROCEDURE — 81025 URINE PREGNANCY TEST: CPT | Performed by: EMERGENCY MEDICINE

## 2022-08-02 PROCEDURE — 71275 CT ANGIOGRAPHY CHEST: CPT

## 2022-08-02 PROCEDURE — 84484 ASSAY OF TROPONIN QUANT: CPT | Performed by: STUDENT IN AN ORGANIZED HEALTH CARE EDUCATION/TRAINING PROGRAM

## 2022-08-02 PROCEDURE — 250N000009 HC RX 250: Performed by: EMERGENCY MEDICINE

## 2022-08-02 PROCEDURE — 99285 EMERGENCY DEPT VISIT HI MDM: CPT | Mod: CS,25 | Performed by: EMERGENCY MEDICINE

## 2022-08-02 PROCEDURE — 80053 COMPREHEN METABOLIC PANEL: CPT | Performed by: STUDENT IN AN ORGANIZED HEALTH CARE EDUCATION/TRAINING PROGRAM

## 2022-08-02 PROCEDURE — C9803 HOPD COVID-19 SPEC COLLECT: HCPCS | Performed by: EMERGENCY MEDICINE

## 2022-08-02 PROCEDURE — 87636 SARSCOV2 & INF A&B AMP PRB: CPT | Performed by: STUDENT IN AN ORGANIZED HEALTH CARE EDUCATION/TRAINING PROGRAM

## 2022-08-02 PROCEDURE — 71046 X-RAY EXAM CHEST 2 VIEWS: CPT

## 2022-08-02 PROCEDURE — 250N000011 HC RX IP 250 OP 636: Performed by: EMERGENCY MEDICINE

## 2022-08-02 PROCEDURE — 250N000013 HC RX MED GY IP 250 OP 250 PS 637: Performed by: STUDENT IN AN ORGANIZED HEALTH CARE EDUCATION/TRAINING PROGRAM

## 2022-08-02 PROCEDURE — 36415 COLL VENOUS BLD VENIPUNCTURE: CPT | Performed by: STUDENT IN AN ORGANIZED HEALTH CARE EDUCATION/TRAINING PROGRAM

## 2022-08-02 PROCEDURE — 81001 URINALYSIS AUTO W/SCOPE: CPT | Performed by: STUDENT IN AN ORGANIZED HEALTH CARE EDUCATION/TRAINING PROGRAM

## 2022-08-02 PROCEDURE — 93010 ELECTROCARDIOGRAM REPORT: CPT | Performed by: EMERGENCY MEDICINE

## 2022-08-02 PROCEDURE — 85025 COMPLETE CBC W/AUTO DIFF WBC: CPT | Performed by: STUDENT IN AN ORGANIZED HEALTH CARE EDUCATION/TRAINING PROGRAM

## 2022-08-02 PROCEDURE — 99285 EMERGENCY DEPT VISIT HI MDM: CPT | Mod: CS | Performed by: EMERGENCY MEDICINE

## 2022-08-02 PROCEDURE — 93005 ELECTROCARDIOGRAM TRACING: CPT | Performed by: EMERGENCY MEDICINE

## 2022-08-02 PROCEDURE — 84443 ASSAY THYROID STIM HORMONE: CPT | Performed by: STUDENT IN AN ORGANIZED HEALTH CARE EDUCATION/TRAINING PROGRAM

## 2022-08-02 PROCEDURE — 83690 ASSAY OF LIPASE: CPT | Performed by: STUDENT IN AN ORGANIZED HEALTH CARE EDUCATION/TRAINING PROGRAM

## 2022-08-02 RX ORDER — IOPAMIDOL 755 MG/ML
100 INJECTION, SOLUTION INTRAVASCULAR ONCE
Status: COMPLETED | OUTPATIENT
Start: 2022-08-02 | End: 2022-08-02

## 2022-08-02 RX ORDER — ACETAMINOPHEN 325 MG/1
650 TABLET ORAL ONCE
Status: COMPLETED | OUTPATIENT
Start: 2022-08-02 | End: 2022-08-02

## 2022-08-02 RX ADMIN — IOPAMIDOL 64 ML: 755 INJECTION, SOLUTION INTRAVENOUS at 23:55

## 2022-08-02 RX ADMIN — ACETAMINOPHEN 650 MG: 325 TABLET, FILM COATED ORAL at 17:46

## 2022-08-02 RX ADMIN — SODIUM CHLORIDE 84 ML: 9 INJECTION, SOLUTION INTRAVENOUS at 23:59

## 2022-08-02 ASSESSMENT — ENCOUNTER SYMPTOMS
BLOOD IN STOOL: 0
SORE THROAT: 0
CHILLS: 0
PALPITATIONS: 1
BACK PAIN: 1
ADENOPATHY: 0
DIAPHORESIS: 0
RHINORRHEA: 0
COUGH: 0
ABDOMINAL PAIN: 1
FATIGUE: 0
NAUSEA: 1
CONSTIPATION: 1
ABDOMINAL DISTENTION: 0
VOMITING: 1
APPETITE CHANGE: 1
DIARRHEA: 0
FEVER: 0
MYALGIAS: 0
DIZZINESS: 1
SPEECH DIFFICULTY: 0
DIFFICULTY URINATING: 0
ROS SKIN COMMENTS: ITCHING
NUMBNESS: 0
TREMORS: 0
LIGHT-HEADEDNESS: 1
WEAKNESS: 0
WHEEZING: 0
FREQUENCY: 1
SHORTNESS OF BREATH: 1
HEADACHES: 0
DYSURIA: 0
ARTHRALGIAS: 0
HEMATURIA: 0

## 2022-08-02 NOTE — ED TRIAGE NOTES
Triage Assessment     Row Name 08/02/22 6551       Triage Assessment (Adult)    Airway WDL WDL       Respiratory WDL    Respiratory WDL X;rhythm/pattern    Rhythm/Pattern, Respiratory shortness of breath  subjective       Skin Circulation/Temperature WDL    Skin Circulation/Temperature WDL WDL       Cardiac WDL    Cardiac WDL WDL

## 2022-08-03 ENCOUNTER — APPOINTMENT (OUTPATIENT)
Dept: ULTRASOUND IMAGING | Facility: CLINIC | Age: 32
End: 2022-08-03
Payer: MEDICAID

## 2022-08-03 VITALS
WEIGHT: 199 LBS | HEART RATE: 89 BPM | BODY MASS INDEX: 34.14 KG/M2 | SYSTOLIC BLOOD PRESSURE: 111 MMHG | RESPIRATION RATE: 20 BRPM | TEMPERATURE: 98.8 F | OXYGEN SATURATION: 93 % | DIASTOLIC BLOOD PRESSURE: 78 MMHG

## 2022-08-03 PROCEDURE — 76801 OB US < 14 WKS SINGLE FETUS: CPT

## 2022-08-03 NOTE — DISCHARGE INSTRUCTIONS
You tested positive for COVID, which explains your shortness of breath. You do not have a blood clot in your lungs.     You can take tylenol for your abdominal pain.     Apply non-scented lotion to your dry itchy skin.     If you develop worsening shortness of breath, lightheadedness, chest pain, worsening abdominal pain, heavy vaginal bleeding, or other concerns, return to the emergency department.     Follow up with your OB provider for your routine prenatal care.

## 2022-08-08 ENCOUNTER — TELEPHONE (OUTPATIENT)
Dept: OBGYN | Facility: CLINIC | Age: 32
End: 2022-08-08

## 2022-08-08 NOTE — TELEPHONE ENCOUNTER
[facility]    Phone Message    May a detailed message be left on voicemail: yes     Reason for Call: Other: Patient called and spoke with writer, she states she was in ER last night and she is, is to be seen within 1-2 days. Please triage and call patient to schedule.      Action Taken: Message routed to:  [facility] ([facility] S    Travel Screening: Not Applicable

## 2022-08-15 NOTE — PROGRESS NOTES
Subjective:     32 year old  at 14w6d presents for a routine prenatal appointment.  Moderate cramping radiating from abdomen to mid-lower back. Epigastric pain. Recent onset scotomata.  No vaginal bleeding or leakage of fluid.  No contractions. No fetal movement. No HA or RUQ pain.    The patient presents with the following concerns:   - Postural tachycardia w/ syncope. Has been to the ED twice in the past month for concerns of lightheadedness and was diagnosed with COVID-19 in ED on 22. She does not believe these symptoms are related to COVID as they have been ongoing since the beginning of her pregnancy.  - Visual scotomata for the past few weeks. Describes as swirlyness in vision or black spots.   - Chronic depression with recent exacerbation by tachycardia syncope episodes.   - Nausea w/o vomiting.  - Reviewed intake/NOB labs - vit D 18. Vit D preciously ordered.        Objective:  Vitals:    22 1557   BP: 101/67   BP Location: Left arm   Patient Position: Chair   Pulse: 76   Weight: 90 kg (198 lb 8 oz)     See OB flowsheet     bpm    Assessment/Plan     Encounter Diagnoses   Name Primary?     High-risk pregnancy, unspecified trimester Yes     Hx of postpartum depression, currently pregnant      Hx of preeclampsia, prior pregnancy, currently pregnant      Vitamin D deficiency      BMI 33.0-33.9,adult      No orders of the defined types were placed in this encounter.    No orders of the defined types were placed in this encounter.    - Referral to internal medicine to further investigate postural tachycardia and scotomata   - Reviewed total weight gain, encouraged continued healthy diet and exercise.      - Reviewed why/how to contact provider.   - Reinforced COVID 19 recommendations including covid vaccine, social distancing, hand hygiene, report any known or suspected exposure or s/s promptly. Reviewed most common symptoms of fever, cough, and shortness of breath. Discussed hospital  restrictions- limit to 2 visitor, visitor must remain in room and can not switch visitors. Doulas are excluded from this restriction.      Patient education/orders or handouts today:  PTL signs/symptoms and level 2 u/s scheduled     Next visit in four weeks   Call prn if questions or concerns.     Patient was seen with student who acted as my scribe and was present for learning. I personally assessed, examined and made medical decisions reflected in the documentation. Any necessary edits to the note have been made by myself.     José Myers, Medical Student  [employer]  08/16/2022 5:01 PM     The encounter was performed by me and scribed by the SN. The scribed note accurately reflects my personal services and decisions made by me.     ERICA Cam, CNM

## 2022-08-16 ENCOUNTER — OFFICE VISIT (OUTPATIENT)
Dept: OBGYN | Facility: CLINIC | Age: 32
End: 2022-08-16
Attending: MIDWIFE
Payer: MEDICAID

## 2022-08-16 VITALS
DIASTOLIC BLOOD PRESSURE: 67 MMHG | BODY MASS INDEX: 34.06 KG/M2 | WEIGHT: 198.5 LBS | HEART RATE: 76 BPM | SYSTOLIC BLOOD PRESSURE: 101 MMHG

## 2022-08-16 DIAGNOSIS — O99.891 HX OF POSTPARTUM DEPRESSION, CURRENTLY PREGNANT: ICD-10-CM

## 2022-08-16 DIAGNOSIS — O09.299 HX OF PREECLAMPSIA, PRIOR PREGNANCY, CURRENTLY PREGNANT: ICD-10-CM

## 2022-08-16 DIAGNOSIS — H53.419 VISUAL FIELD SCOTOMA, UNSPECIFIED LATERALITY: ICD-10-CM

## 2022-08-16 DIAGNOSIS — Z86.59 HX OF POSTPARTUM DEPRESSION, CURRENTLY PREGNANT: ICD-10-CM

## 2022-08-16 DIAGNOSIS — E55.9 VITAMIN D DEFICIENCY: ICD-10-CM

## 2022-08-16 DIAGNOSIS — O09.90 HIGH-RISK PREGNANCY, UNSPECIFIED TRIMESTER: Primary | ICD-10-CM

## 2022-08-16 PROCEDURE — G0463 HOSPITAL OUTPT CLINIC VISIT: HCPCS

## 2022-08-16 PROCEDURE — 99207 PR PRENATAL VISIT: CPT | Performed by: MIDWIFE

## 2022-08-16 NOTE — NURSING NOTE
Chief Complaint   Patient presents with     Prenatal Care     14w6d ER follow up. Pt is experiencing tachycardia.        See SARAH Valencia 8/16/2022

## 2022-08-16 NOTE — Clinical Note
2022       RE: Briana Newman  [address] [facility] [address]     Dear Colleague,    Thank you for referring your patient, Briana Newman, to the [facility] [place] at [facility]. Please see a copy of my visit note below.    Subjective:      32 year old  at 14w6d presents for a routine prenatal appointment.       *** vaginal bleeding or leakage of fluid.  *** contractions or cramping. *** fetal movement.       No HA, visual changes, RUQ or epigastric pain.     The patient presents with the following concerns:   - ***  - Reviewed intake/NOB labs - vit D 18. Recommended starting 4,000 international unit(s) supplement  - Covid 22 - post covid prenatal check-up     Objective:  There were no vitals filed for this visit.  See OB flowsheet    Assessment/Plan     Encounter Diagnoses   Name Primary?     High-risk pregnancy, unspecified trimester Yes     Hx of postpartum depression, currently pregnant      Hx of preeclampsia, prior pregnancy, currently pregnant      Vitamin D deficiency      BMI 33.0-33.9,adult      No orders of the defined types were placed in this encounter.    No orders of the defined types were placed in this encounter.      - Reviewed total weight gain, encouraged continued healthy diet and exercise.      - Reviewed why/how to contact provider.   - Reinforced COVID 19 recommendations including covid vaccine, social distancing, hand hygiene, report any known or suspected exposure or s/s promptly. Reviewed most common symptoms of fever, cough, and shortness of breath. Discussed hospital restrictions- limit to 2 visitor, visitor must remain in room and can not switch visitors. Doulas are excluded from this restriction.      Patient education/orders or handouts today:  {Pt Education/orders/handouts today:449829}     Next visit in *** weeks and should be ***  Call prn if questions or concerns.     Marge Cummins, APRN  [facility]                Subjective:      32 year old  at 14w6d presents for a routine prenatal appointment.  Moderate cramping radiating from abdomen to mid-lower back. Epigastric pain. Recent onset scotomata.  No vaginal bleeding or leakage of fluid.  No contractions. No fetal movement. No HA or RUQ pain.    The patient presents with the following concerns:   - Orthostatic tachycardia w/ syncope. Has been to the ED twice in the past month for concerns of lightheadedness and was diagnosed with COVID-19 in ED on 22. She does not believe these symptoms are related to COVID as they have been ongoing since the beginning of her pregnancy.  - Visual scotomata for the past few weeks  - Chronic depression with recent exacerbation by tachycardia syncope episodes.  - Nausea w/o vomiting.  - Reviewed intake/NOB labs - vit D 18. Recommended starting 4,000 international unit(s) supplement  - Covid 22 - post covid prenatal check-up     Objective:  There were no vitals filed for this visit.  See OB flowsheet    Assessment/Plan     Encounter Diagnoses   Name Primary?     High-risk pregnancy, unspecified trimester Yes     Hx of postpartum depression, currently pregnant      Hx of preeclampsia, prior pregnancy, currently pregnant      Vitamin D deficiency      BMI 33.0-33.9,adult      No orders of the defined types were placed in this encounter.    No orders of the defined types were placed in this encounter.      - Reviewed total weight gain, encouraged continued healthy diet and exercise.      - Reviewed why/how to contact provider.   - Reinforced COVID 19 recommendations including covid vaccine, social distancing, hand hygiene, report any known or suspected exposure or s/s promptly. Reviewed most common symptoms of fever, cough, and shortness of breath. Discussed hospital restrictions- limit to 2 visitor, visitor must remain in room and can not switch visitors. Doulas are excluded from this restriction.      Patient  education/orders or handouts today:  {Pt Education/orders/handouts today:887876}     Next visit in *** weeks and should be ***  Call prn if questions or concerns.     ERICA Cam CNM                  Again, thank you for allowing me to participate in the care of your patient.      Sincerely,    ERICA Cam CNM     Yes

## 2022-08-16 NOTE — LETTER
Date:August 24, 2022      Provider requested that no letter be sent. Do not send.       [facility]

## 2022-08-23 ENCOUNTER — OFFICE VISIT (OUTPATIENT)
Dept: FAMILY MEDICINE | Facility: CLINIC | Age: 32
End: 2022-08-23
Attending: FAMILY MEDICINE
Payer: MEDICAID

## 2022-08-23 VITALS
SYSTOLIC BLOOD PRESSURE: 105 MMHG | BODY MASS INDEX: 33.53 KG/M2 | DIASTOLIC BLOOD PRESSURE: 73 MMHG | HEIGHT: 64 IN | HEART RATE: 98 BPM | WEIGHT: 196.4 LBS

## 2022-08-23 DIAGNOSIS — R00.0 TACHYCARDIA: ICD-10-CM

## 2022-08-23 DIAGNOSIS — O09.92 HIGH-RISK PREGNANCY IN SECOND TRIMESTER: Primary | ICD-10-CM

## 2022-08-23 PROBLEM — H53.419 SCOTOMA: Status: ACTIVE | Noted: 2022-08-23

## 2022-08-23 PROCEDURE — 99204 OFFICE O/P NEW MOD 45 MIN: CPT | Performed by: FAMILY MEDICINE

## 2022-08-23 PROCEDURE — G0463 HOSPITAL OUTPT CLINIC VISIT: HCPCS

## 2022-08-23 NOTE — PROGRESS NOTES
CC: Prenatal Care (Heart Rate)      ASSESSMENT/PLAN:   Briana was seen today for   High-risk pregnancy in second trimester  Tachycardia  -     Echocardiogram Complete; Future  -     Adult Leadless EKG Monitor 3 to 7 Days; Future  -     Adult Cardiology Eval  Referral; Future    Briana presents with postural tachycardia throughout this pregnancy, in clinic today HR went up 39 bpm with position changes. Will obtain leadless EKG 3 days, echo, and cardiology consult. Briana was given phone number for cardiology to schedule these next steps. Discussed worrisome s/sx and reasons to return to the ED.   Worrisome signs and symptoms were discussed with Briana and she was instructed to return to the clinic for concerning symptoms or to call with questions.    Veronica Pérez MD  Family Medicine      SUBJECTIVE: Briana Newman is a 32 year old female at 15w6d who comes in with the following concerns:    Tachycardia. Has been to the ED 2x for tachycardia during her pregnancy. Sometime in the first trimester started noticing symptoms like she felt really faint w/ position changes. Thought it was a BP issue, but her BP has been normal. Her HR will go into the 160s sometimes on her smart watch when moving positions and then walking, will get presyncopal and will have to pause and get herself together. Can feel that her heart is beating really fast. Will have chest tightness with over-exertion. Feels SOB with this as well. Heart rate does not feel irregular at this time. Heart rate will normalize pretty quickly. Only notices it w/ position changes or exertion (such as taking the stairs). Has even had syncope because of this.   She is hydrating well.     I reviewed notes, EKG, labs from the 2 ED visits -   ED 7/21 and 8/2 -   At ED visit 7/21 - BP was elevated, EKG NSR, mildly low Na 133, leukocytosis, orthostatic VS were normal, discharged home.   At ED visit 8/2 - PCR positive for Covid-19, but denied any Covid-19 sx. CT-PE  "negative. EKG showed sinus rhythm with voltage criteria for LVH. Labs showed leukocytosis, otherwise labs unremarkable. Sx thought to be secondary to Covid-19.     No symptoms like this with prior pregnancy. Last pregnancy did have pre-eclampsia.     Today, rooming staff did orthostatic VS - BP remained stable, HR went up from 95 bpm to 134 bpm, recovered within a minute to 98 bpm.      ROS: A complete ROS was reviewed and negative except as noted above.     OBJECTIVE:   /73   Pulse 98   Ht 1.626 m (5' 4.02\")   Wt 89.1 kg (196 lb 6.4 oz)   LMP 05/02/2022 (Approximate)   BMI 33.69 kg/m    General: Alert and oriented in no acute distress.  Skin: Clear without lesions or rashes.   Eyes: PERRL. EOMI.   Neuro: Grossly intact, nonfocal.  Cardio: Regular rhythm, tachycardic, normal S1 and S2, without murmurs, rubs, or gallops appreciated.    Resp: CTA bilaterally. Normal respiratory effort.   MSK: Distal pulses 2+ and symmetric, extremities without deformity, edema.  Psych: Mood and affect appropriate.    "

## 2022-08-24 ENCOUNTER — ANCILLARY PROCEDURE (OUTPATIENT)
Dept: CARDIOLOGY | Facility: CLINIC | Age: 32
End: 2022-08-24
Attending: FAMILY MEDICINE
Payer: MEDICAID

## 2022-08-24 DIAGNOSIS — R00.0 TACHYCARDIA: ICD-10-CM

## 2022-08-24 PROCEDURE — 93242 EXT ECG>48HR<7D RECORDING: CPT

## 2022-08-24 PROCEDURE — 93227 XTRNL ECG REC<48 HR R&I: CPT | Performed by: INTERNAL MEDICINE

## 2022-08-24 NOTE — PROGRESS NOTES
Per Dr. Pérez, patient to have 3-day zio monitor placed.  Diagnosis: Tachycardia   Monitor placed: Yes  Patient Instructed: Yes  Patient verbalized understanding: Yes  Holter # [id number]    Monitor was placed by JIGAR Lackey   11:28 AM

## 2022-08-25 NOTE — PROGRESS NOTES
"Subjective:      32 year old  at 16w1d presents for a routine prenatal appointment.     Hx of Pre-eclampsia, baseline HELLP panel normal  BMI>30, was too nauseous to complete 1 hour GCT at [facility], Briana declines early 1 hour GCT   HgbA1C 5.4%   Briana declined genetic screening, offered AFP today, Briana declines. Has her Level II scheduled     Hx of postpartum depression, currently feeling a little depressed due to feeling so nauseous and lightheaded but feels stable  Her family members live in [place]. Has some family here. Sol is in [place], the plan is for him to move here.     -Has had postural tachycardia with syncope, went to ED twice for concerns of lightheadedness   Heart rate has gone up to 150 during these episodes  Wore a Holter monitor for three days. She will ship the monitor today. Has an echo scheduled for early September and visit with cardiology in October    Briana has been trying to lay low to and not exert herself. Her sister, aunt and mother has been helping her at home with Jamaad     Denies vaginal bleeding or leakage of fluid.  Denies contractions or cramping.    Good fetal movement.       No HA, visual changes, RUQ or epigastric pain.     Level II US  Scheduled.   Offered AFP: declines     Objective:  Vitals:    22 1122   BP: 103/73   Pulse: 102   Weight: 88.9 kg (195 lb 14.4 oz)   Height: 1.624 m (5' 3.94\")     See OB flowsheet    Assessment/Plan     Encounter Diagnoses   Name Primary?     High-risk pregnancy, second trimester Yes     Hx of preeclampsia, prior pregnancy, currently pregnant      Postural tachycardia with syncope      No orders of the defined types were placed in this encounter.    No orders of the defined types were placed in this encounter.    -reviewed normal HELLP panel  -Plan to see Cardiology regarding her postural tachycardia and syncope  -Level II scheduled  -Offered continued support regarding her health concerns. Reviewed family/community support " systems  - Reviewed total weight gain, encouraged continued healthy diet and exercise.      - Reviewed why/how to contact provider.    Patient education/orders or handouts today:  PTL signs/symptoms, AFP only and Level 2 u/s scheduled   Return to clinic in 4 weeks and prn if questions or concerns.   Patricia Booker CNM

## 2022-08-26 ENCOUNTER — OFFICE VISIT (OUTPATIENT)
Dept: OBGYN | Facility: CLINIC | Age: 32
End: 2022-08-26
Attending: ADVANCED PRACTICE MIDWIFE
Payer: MEDICAID

## 2022-08-26 VITALS
WEIGHT: 195.9 LBS | SYSTOLIC BLOOD PRESSURE: 103 MMHG | HEART RATE: 102 BPM | DIASTOLIC BLOOD PRESSURE: 73 MMHG | BODY MASS INDEX: 33.44 KG/M2 | HEIGHT: 64 IN

## 2022-08-26 DIAGNOSIS — O09.92 HIGH-RISK PREGNANCY, SECOND TRIMESTER: Primary | ICD-10-CM

## 2022-08-26 DIAGNOSIS — O09.299 HX OF PREECLAMPSIA, PRIOR PREGNANCY, CURRENTLY PREGNANT: ICD-10-CM

## 2022-08-26 DIAGNOSIS — R55 VASOVAGAL SYNCOPE: ICD-10-CM

## 2022-08-26 PROCEDURE — 99207 PR PRENATAL VISIT: CPT | Performed by: ADVANCED PRACTICE MIDWIFE

## 2022-08-26 PROCEDURE — G0463 HOSPITAL OUTPT CLINIC VISIT: HCPCS

## 2022-08-26 ASSESSMENT — PAIN SCALES - GENERAL: PAINLEVEL: NO PAIN (0)

## 2022-08-26 NOTE — LETTER
Date:August 29, 2022      Provider requested that no letter be sent. Do not send.       [facility]

## 2022-08-26 NOTE — LETTER
"2022       RE: Briana Newman  [address] [address]     Dear Colleague,    Thank you for referring your patient, Briana Newman, to the [facility] [place] at [facility]. Please see a copy of my visit note below.    Subjective:      32 year old  at 16w1d presents for a routine prenatal appointment.     Hx of Pre-eclampsia, baseline HELLP panel normal  BMI>30, was too nauseous to complete 1 hour GCT at [facility], Briana declines early 1 hour GCT   HgbA1C 5.4%   Briana declined genetic screening, offered AFP today, Briana declines. Has her Level II scheduled     Hx of postpartum depression, currently feeling a little depressed due to feeling so nauseous and lightheaded but feels stable  Her family members live in [place]. Has some family here. Sol is in [place], the plan is for him to move here.     -Has had postural tachycardia with syncope, went to ED twice for concerns of lightheadedness   Heart rate has gone up to 150 during these episodes  Wore a Holter monitor for three days. She will ship the monitor today. Has an echo scheduled for early September and visit with cardiology in October    Briana has been trying to lay low to and not exert herself. Her sister, aunt and mother has been helping her at home with Jamaad     Denies vaginal bleeding or leakage of fluid.  Denies contractions or cramping.    Good fetal movement.       No HA, visual changes, RUQ or epigastric pain.     Level II US  Scheduled.   Offered AFP: declines     Objective:  Vitals:    22 1122   BP: 103/73   Pulse: 102   Weight: 88.9 kg (195 lb 14.4 oz)   Height: 1.624 m (5' 3.94\")     See OB flowsheet    Assessment/Plan     Encounter Diagnoses   Name Primary?     High-risk pregnancy, second trimester Yes     Hx of preeclampsia, prior pregnancy, currently pregnant      Postural tachycardia with syncope      No orders of the defined types were " placed in this encounter.    No orders of the defined types were placed in this encounter.    -reviewed normal HELLP panel  -Plan to see Cardiology regarding her postural tachycardia and syncope  -Level II scheduled  -Offered continued support regarding her health concerns. Reviewed family/community support systems  - Reviewed total weight gain, encouraged continued healthy diet and exercise.      - Reviewed why/how to contact provider.    Patient education/orders or handouts today:  PTL signs/symptoms, AFP only and Level 2 u/s scheduled   Return to clinic in 4 weeks and prn if questions or concerns.   Patricia Booker CNM                    Again, thank you for allowing me to participate in the care of your patient.      Sincerely,    Patricia Booker CNM

## 2022-09-06 ENCOUNTER — ANCILLARY PROCEDURE (OUTPATIENT)
Dept: CARDIOLOGY | Facility: CLINIC | Age: 32
End: 2022-09-06
Attending: FAMILY MEDICINE
Payer: COMMERCIAL

## 2022-09-06 DIAGNOSIS — R00.0 TACHYCARDIA: ICD-10-CM

## 2022-09-06 LAB — LVEF ECHO: NORMAL

## 2022-09-06 PROCEDURE — 93306 TTE W/DOPPLER COMPLETE: CPT | Performed by: INTERNAL MEDICINE

## 2022-09-09 ENCOUNTER — HOSPITAL ENCOUNTER (OUTPATIENT)
Dept: ULTRASOUND IMAGING | Facility: CLINIC | Age: 32
Discharge: HOME | End: 2022-09-09
Attending: ADVANCED PRACTICE MIDWIFE
Payer: COMMERCIAL

## 2022-09-09 ENCOUNTER — OFFICE VISIT (OUTPATIENT)
Dept: MATERNAL FETAL MEDICINE | Facility: CLINIC | Age: 32
End: 2022-09-09
Attending: ADVANCED PRACTICE MIDWIFE
Payer: COMMERCIAL

## 2022-09-09 DIAGNOSIS — O26.90 PREGNANCY RELATED CONDITION, ANTEPARTUM: ICD-10-CM

## 2022-09-09 DIAGNOSIS — Z36.3 ENCOUNTER FOR ROUTINE SCREENING FOR MALFORMATION USING ULTRASONICS: Primary | ICD-10-CM

## 2022-09-09 PROCEDURE — 76811 OB US DETAILED SNGL FETUS: CPT

## 2022-09-09 PROCEDURE — 76811 OB US DETAILED SNGL FETUS: CPT | Mod: 26 | Performed by: OBSTETRICS & GYNECOLOGY

## 2022-09-09 NOTE — PROGRESS NOTES
Please refer to ultrasound report under 'Imaging' Studies of 'Chart Review' tabs.    Sandip Lu M.D.

## 2022-09-12 PROBLEM — H53.419 SCOTOMA: Status: RESOLVED | Noted: 2022-08-23 | Resolved: 2022-09-12

## 2022-09-26 ENCOUNTER — APPOINTMENT (OUTPATIENT)
Dept: LAB | Facility: CLINIC | Age: 32
End: 2022-09-26
Attending: ADVANCED PRACTICE MIDWIFE
Payer: COMMERCIAL

## 2022-09-26 ENCOUNTER — OFFICE VISIT (OUTPATIENT)
Dept: OBGYN | Facility: CLINIC | Age: 32
End: 2022-09-26
Attending: ADVANCED PRACTICE MIDWIFE
Payer: COMMERCIAL

## 2022-09-26 VITALS
SYSTOLIC BLOOD PRESSURE: 107 MMHG | DIASTOLIC BLOOD PRESSURE: 69 MMHG | HEART RATE: 86 BPM | WEIGHT: 195 LBS | BODY MASS INDEX: 33.29 KG/M2 | HEIGHT: 64 IN

## 2022-09-26 DIAGNOSIS — O09.92 HIGH-RISK PREGNANCY, SECOND TRIMESTER: Primary | ICD-10-CM

## 2022-09-26 DIAGNOSIS — N90.810 FEMALE GENITAL MUTILATION: ICD-10-CM

## 2022-09-26 DIAGNOSIS — R55 VASOVAGAL SYNCOPE: ICD-10-CM

## 2022-09-26 DIAGNOSIS — L29.9 ITCHING: ICD-10-CM

## 2022-09-26 LAB
ALT SERPL W P-5'-P-CCNC: 8 U/L (ref 0–50)
AST SERPL W P-5'-P-CCNC: 9 U/L (ref 0–45)
CREAT SERPL-MCNC: 0.51 MG/DL (ref 0.52–1.04)
CREAT UR-MCNC: 168 MG/DL
ERYTHROCYTE [DISTWIDTH] IN BLOOD BY AUTOMATED COUNT: 13.4 % (ref 10–15)
GFR SERPL CREATININE-BSD FRML MDRD: >90 ML/MIN/1.73M2
HCT VFR BLD AUTO: 33.6 % (ref 35–47)
HGB BLD-MCNC: 11.4 G/DL (ref 11.7–15.7)
MCH RBC QN AUTO: 29.2 PG (ref 26.5–33)
MCHC RBC AUTO-ENTMCNC: 33.9 G/DL (ref 31.5–36.5)
MCV RBC AUTO: 86 FL (ref 78–100)
PLATELET # BLD AUTO: 322 10E3/UL (ref 150–450)
PROT UR-MCNC: 0.24 G/L
PROT/CREAT 24H UR: 0.14 G/G CR (ref 0–0.2)
RBC # BLD AUTO: 3.9 10E6/UL (ref 3.8–5.2)
URATE SERPL-MCNC: 3.6 MG/DL (ref 2.6–6)
WBC # BLD AUTO: 12.3 10E3/UL (ref 4–11)

## 2022-09-26 PROCEDURE — 99207 PR PRENATAL VISIT: CPT | Performed by: ADVANCED PRACTICE MIDWIFE

## 2022-09-26 PROCEDURE — 82565 ASSAY OF CREATININE: CPT | Performed by: ADVANCED PRACTICE MIDWIFE

## 2022-09-26 PROCEDURE — G0463 HOSPITAL OUTPT CLINIC VISIT: HCPCS

## 2022-09-26 PROCEDURE — 84550 ASSAY OF BLOOD/URIC ACID: CPT | Performed by: ADVANCED PRACTICE MIDWIFE

## 2022-09-26 PROCEDURE — 84460 ALANINE AMINO (ALT) (SGPT): CPT | Performed by: ADVANCED PRACTICE MIDWIFE

## 2022-09-26 PROCEDURE — 85027 COMPLETE CBC AUTOMATED: CPT | Performed by: ADVANCED PRACTICE MIDWIFE

## 2022-09-26 PROCEDURE — 36415 COLL VENOUS BLD VENIPUNCTURE: CPT | Performed by: ADVANCED PRACTICE MIDWIFE

## 2022-09-26 PROCEDURE — 84156 ASSAY OF PROTEIN URINE: CPT | Performed by: ADVANCED PRACTICE MIDWIFE

## 2022-09-26 PROCEDURE — 84450 TRANSFERASE (AST) (SGOT): CPT | Performed by: ADVANCED PRACTICE MIDWIFE

## 2022-09-26 RX ORDER — HYDROXYZINE PAMOATE 25 MG/1
25 CAPSULE ORAL 3 TIMES DAILY PRN
Qty: 20 CAPSULE | Refills: 1 | Status: SHIPPED | OUTPATIENT
Start: 2022-09-26 | End: 2022-10-25

## 2022-09-26 NOTE — LETTER
2022       RE: Briana Newman  [address] [address]     Dear Colleague,    Thank you for referring your patient, Briana Newman, to the [facility] [place] at [facility]. Please see a copy of my visit note below.    Subjective:      32 year old  at 20w5d presents for a routine prenatal appointment.        Level II US: Suboptimal views: Limited by fetal position: 18/2 weeks, anterior placenta no previa, normal fluid, EFW 66% AC 38%, suboptimal views of heart, repeat US 10/7/22    Has noticed itching on her back, arms, hands, legs. No rash, no itching on her palms or soles of her feet. Has noticed stars in her vision with position changes.  Would like to complete HELLP labs today due to concern regarding developing pre-eclampsia again    Hx of pre-E Briana tried to take ASA at 12 weeks but felt dizzy and had a racing heart even with rest and decided she would like to not take ASA for pre-e prevention    Briana has continued to feel a racing heart all day long. She has had a normal echocardiogram and Holter monitor workup. Has appt with cardiology early October. 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 She is not sure if she can have an orgasm. She is interested [facility]     Denies vaginal bleeding or leakage of fluid.  No contractions or cramping.    Good fetal movement.       No HA, visual changes, RUQ or epigastric pain.     Level II US  Results reviewed normal scan.   Offered AFP declines     Objective:  Vitals:    22 1118 22 1154   BP: 107/69    BP Location: Left arm    Patient Position: Sitting    Cuff Size: Adult Regular    Pulse: (!) 130 86   Weight: 88.5 kg (195 lb)    Height: 1.626 m (5'  "4\")      See OB flowsheet  Repeat heartrate 86, O2 sat 100%    Assessment/Plan     Encounter Diagnoses   Name Primary?     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 Try to take at night as it could cause sleepiness   - Reviewed total weight gain, encouraged continued healthy diet and exercise.      - Reviewed why/how to contact provider.    Patient education/orders or handouts today:  Reviewed s/sx of pre-eclampsia   Return to clinic in 4 weeks and prn if questions or concerns.   Patricia Booker CNM                      Again, thank you for allowing me to participate in the care of your patient.      Sincerely,    Patricia Booker CNM      "

## 2022-09-26 NOTE — LETTER
Date:September 26, 2022      Provider requested that no letter be sent. Do not send.       [facility]

## 2022-09-26 NOTE — PROGRESS NOTES
"Subjective:      32 year old  at 20w5d presents for a routine prenatal appointment.        Level II US: Suboptimal views: Limited by fetal position: 18/2 weeks, anterior placenta no previa, normal fluid, EFW 66% AC 38%, suboptimal views of heart, repeat US 10/7/22    Has noticed itching on her back, arms, hands, legs. No rash, no itching on her palms or soles of her feet. Has noticed stars in her vision with position changes.  Would like to complete HELLP labs today due to concern regarding developing pre-eclampsia again    Hx of pre-E Briana tried to take ASA at 12 weeks but felt dizzy and had a racing heart even with rest and decided she would like to not take ASA for pre-e prevention    Briana has continued to feel a racing heart all day long. She has had a normal echocardiogram and Holter monitor workup. Has appt with cardiology early October. 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 She is not sure if she can have an orgasm. She is interested in a [facility] to [facility] after [facility] gives [facility]     Denies vaginal bleeding or leakage of fluid.  No contractions or cramping.    Good fetal movement.       No HA, visual changes, RUQ or epigastric pain.     Level II US  Results reviewed normal scan.   Offered AFP declines     Objective:  Vitals:    22 1118 22 1154   BP: 107/69    BP Location: Left arm    Patient Position: Sitting    Cuff Size: Adult Regular    Pulse: (!) 130 86   Weight: 88.5 kg (195 lb)    Height: 1.626 m (5' 4\")      See OB flowsheet  Repeat heartrate 86, O2 sat 100%    Assessment/Plan     Encounter Diagnoses   Name Primary?     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 Try to take at night as it could cause sleepiness   - Reviewed total weight gain, encouraged continued healthy diet and exercise.      - Reviewed why/how to contact provider.    Patient education/orders or handouts today:  Reviewed s/sx of pre-eclampsia   Return to clinic in 4 weeks and prn if questions or concerns.   Patricia Booker CNM

## 2022-10-03 ENCOUNTER — HEALTH MAINTENANCE LETTER (OUTPATIENT)
Age: 32
End: 2022-10-03

## 2022-10-05 ENCOUNTER — HOSPITAL ENCOUNTER (OUTPATIENT)
Dept: ULTRASOUND IMAGING | Facility: CLINIC | Age: 32
Discharge: HOME | End: 2022-10-05
Attending: OBSTETRICS & GYNECOLOGY
Payer: COMMERCIAL

## 2022-10-05 ENCOUNTER — OFFICE VISIT (OUTPATIENT)
Dept: CARDIOLOGY | Facility: CLINIC | Age: 32
End: 2022-10-05
Attending: FAMILY MEDICINE
Payer: COMMERCIAL

## 2022-10-05 ENCOUNTER — OFFICE VISIT (OUTPATIENT)
Dept: MATERNAL FETAL MEDICINE | Facility: CLINIC | Age: 32
End: 2022-10-05
Attending: OBSTETRICS & GYNECOLOGY
Payer: COMMERCIAL

## 2022-10-05 VITALS
OXYGEN SATURATION: 99 % | BODY MASS INDEX: 34.25 KG/M2 | SYSTOLIC BLOOD PRESSURE: 110 MMHG | HEIGHT: 64 IN | WEIGHT: 200.6 LBS | DIASTOLIC BLOOD PRESSURE: 71 MMHG | HEART RATE: 74 BPM

## 2022-10-05 DIAGNOSIS — O09.299 HX OF PREECLAMPSIA, PRIOR PREGNANCY, CURRENTLY PREGNANT: ICD-10-CM

## 2022-10-05 DIAGNOSIS — R06.09 DYSPNEA ON EXERTION: ICD-10-CM

## 2022-10-05 DIAGNOSIS — O09.92 HIGH-RISK PREGNANCY, SECOND TRIMESTER: ICD-10-CM

## 2022-10-05 DIAGNOSIS — R00.0 TACHYCARDIA: ICD-10-CM

## 2022-10-05 DIAGNOSIS — R55 VASOVAGAL SYNCOPE: Primary | ICD-10-CM

## 2022-10-05 DIAGNOSIS — Z36.2 ENCOUNTER FOR FOLLOW-UP ULTRASOUND OF FETAL ANATOMY: Primary | ICD-10-CM

## 2022-10-05 PROCEDURE — 76816 OB US FOLLOW-UP PER FETUS: CPT

## 2022-10-05 PROCEDURE — 76816 OB US FOLLOW-UP PER FETUS: CPT | Mod: 26 | Performed by: OBSTETRICS & GYNECOLOGY

## 2022-10-05 PROCEDURE — 99203 OFFICE O/P NEW LOW 30 MIN: CPT | Performed by: INTERNAL MEDICINE

## 2022-10-05 PROCEDURE — G0463 HOSPITAL OUTPT CLINIC VISIT: HCPCS

## 2022-10-05 ASSESSMENT — PAIN SCALES - GENERAL: PAINLEVEL: SEVERE PAIN (6)

## 2022-10-05 NOTE — PATIENT INSTRUCTIONS
"Cardiology Providers you saw during your visit:  Dr. De Jesus    Medication changes: None    Follow up:   Compression stockings ordered  Follow up with Dr. De Jesus in 6 months    If you have any questions, contact  Jian Dominguez RN. We are encouraging the use of [email or web address] to communicate with your HealthCare Provider     To contact the [facility], please call, [phone]  To schedule an appointment or to leave a message for your Care Team Press #1  If you are a physician calling for another physician Press #2  For Billing Press #3  For Medical Records Press #4\"    "

## 2022-10-05 NOTE — NURSING NOTE
No medication changes.     Dr. De Jesus recommending compression stockings at this time.     Follow up with Dr. De Jesus in 6 months.     Jian Dominguez, RN   Cardiology Nurse Coordinator

## 2022-10-05 NOTE — PROGRESS NOTES
I am delighted to see Briana Newman in consultation.The primary encounter diagnosis was Postural tachycardia with syncope. Diagnoses of Tachycardia, Dyspnea on exertion, High-risk pregnancy, second trimester, BMI 33.0-33.9,adult, and Hx of preeclampsia, prior pregnancy, currently pregnant were also pertinent to this visit.   As you know, the patient is a 32 year old -American female. She   has a past medical history of Postpartum depression, Pre-eclampsia, Shortness of breath, and Syncope..    On this visit, the patient states that she has syncope and tachycardia when standing. She is currently pregnant.  The patient denies chest pressure/discomfort, orthopnea, paroxysmal nocturnal dyspnea and lower extermity edema.    The patient's cardiovascular risk factors include none.    The following portions of the patient's history were reviewed and updated as appropriate: allergies, current medications, past family history, past medical history, past social history, past surgical history, and the problem list.    PMH: The patient's past medical history includes:    Past Medical History:   Diagnosis Date     Postpartum depression     w first, unsure about med use     Pre-eclampsia     HTN started 3rd tri, them PreE w severe features,induced at term     Shortness of breath      Syncope       Past Surgical History:   Procedure Laterality Date     BREAST SURGERY  07/10/2012    cystecomy right breast-benign     wisdom teeth         The patient's medications as of the current encounter are:     Current Outpatient Medications   Medication Sig Dispense Refill     aspirin (ASA) 81 MG EC tablet Take 1 tablet (81 mg) by mouth daily 100 tablet 3     COMPRESSION STOCKINGS 1 each daily 10 each 3     folic acid 800 MCG tablet Take 1 tablet (800 mcg) by mouth daily 90 tablet 3     vitamin D3 (CHOLECALCIFEROL) 50 mcg (2000 units) tablet Take 2 tablets (100 mcg) by mouth daily 120 tablet 3     hydrOXYzine (VISTARIL) 25 MG capsule  Take 1 capsule (25 mg) by mouth 3 times daily as needed for itching (Patient not taking: Reported on 10/5/2022) 20 capsule 1       Labs:     Office Visit on 09/26/2022   Component Date Value Ref Range Status     Total Protein Random Urine g/L 09/26/2022 0.24  g/L Final    The reference range has not been established for total protein in random urine samples.  The result should be integrated into the clinical context for interpretation.     Total Protein Urine g/gr Creatinine 09/26/2022 0.14  0.00 - 0.20 g/g Cr Final     Creatinine Urine mg/dL 09/26/2022 168  mg/dL Final     Uric Acid 09/26/2022 3.6  2.6 - 6.0 mg/dL Final     ALT 09/26/2022 8  0 - 50 U/L Final     AST 09/26/2022 9  0 - 45 U/L Final     WBC Count 09/26/2022 12.3 (A) 4.0 - 11.0 10e3/uL Final     RBC Count 09/26/2022 3.90  3.80 - 5.20 10e6/uL Final     Hemoglobin 09/26/2022 11.4 (A) 11.7 - 15.7 g/dL Final     Hematocrit 09/26/2022 33.6 (A) 35.0 - 47.0 % Final     MCV 09/26/2022 86  78 - 100 fL Final     MCH 09/26/2022 29.2  26.5 - 33.0 pg Final     MCHC 09/26/2022 33.9  31.5 - 36.5 g/dL Final     RDW 09/26/2022 13.4  10.0 - 15.0 % Final     Platelet Count 09/26/2022 322  150 - 450 10e3/uL Final     Creatinine 09/26/2022 0.51 (A) 0.52 - 1.04 mg/dL Final     GFR Estimate 09/26/2022 >90  >60 mL/min/1.73m2 Final    Effective December 21, 2021 eGFRcr in adults is calculated using the 2021 CKD-EPI creatinine equation which includes age and gender (Dago et al., NEJ, DOI: 10.1056/OVHDfd9337741)       Allergies:  No Known Allergies    Family History:   Family History   Problem Relation Age of Onset     No Known Problems Mother      No Known Problems Father      Hypertension Maternal Grandmother      Diabetes Maternal Grandmother      Ovarian Cancer Maternal Grandmother      No Known Problems Brother      No Known Problems Brother      No Known Problems Brother      No Known Problems Sister      No Known Problems Daughter      Breast Cancer No family hx of       "Colon Cancer No family hx of      Hyperlipidemia No family hx of      Asthma No family hx of      Osteoporosis No family hx of      Mental Illness No family hx of      Depression No family hx of      Anxiety Disorder No family hx of      Substance Abuse No family hx of        Psychosocial history:  reports that she has never smoked. She has never used smokeless tobacco. She reports that she does not drink alcohol and does not use drugs.    Review of systems: negative for, paroxysmal nocturnal dyspnea, orthopnea, lower extremity edema and claudication    In addition,   General: No change in weight, sleep or appetite.  Normal energy.  No fever or chills  Eyes: Negative for vision changes or eye problems  ENT: No problems with ears, nose or throat.  No difficulty swallowing.  Resp: No coughing, wheezing or shortness of breath  GI: No nausea, vomiting,  heartburn, abdominal pain, diarrhea, constipation or change in bowel habits  : No urinary frequency or dysuria, bladder or kidney problems  Musculoskeletal: No significant muscle or joint pains  Neurologic: No headaches, numbness, tingling, weakness, problems with balance or coordination  Psychiatric: No problems with anxiety, depression or mental health  Heme/immune/allergy: No history of bleeding or clotting problems or anemia.  No allergies or immune system problems  Endocrine: No history of thyroid disease, diabetes or other endocrine disorders  Skin: No rashes,worrisome lesions or skin problems  Vascular:  No claudication, lifestyle limiting or otherwise; no ischemic rest pain; no non-healing ulcers. No weakness, No loss of sensation    Pregnant    Physical examination  Vitals: /71 (BP Location: Right arm, Patient Position: Chair, Cuff Size: Adult Large)   Pulse 74   Ht 1.62 m (5' 3.78\")   Wt 91 kg (200 lb 9.6 oz)   LMP 05/02/2022 (Approximate)   SpO2 99%   BMI 34.67 kg/m    BMI= Body mass index is 34.67 kg/m .    In general, the patient is a pleasant " female in no apparent distress.    HEENT: Normiocephalic and atraumatic.  PERRLA.  EOMI.  Sclerae white, not injected.  Nares clear.  Pharynx without erythema or exudate.  Dentition intact.    Neck: No adenopathy.  No thyromegaly. Carotids +2/2 bilaterally without bruits.  No jugular venous distension.   Heart:  The PMI is in the 5th ICS in the midclavicular line. There is no heave. Regular rate and rhythm. Normal S1, S2 splits physiologically. No murmur, rub, click, or gallop.    Lungs: Clear to asculation.  No ronchi, wheezes, rales.  No dullness to percussion.   Abdomen: No organomegaly. No AAA.  No bruits.   Extremities: No clubbing, cyanosis, or edema. The pulses were intact bilaterally.   Neurological: The neurological examination reveal a patient who was oriented to person, place, and time.  The remainder of the examination was nonfocal.    Cardiac tests include:    8/24/2022 - ziopatch benign max   9/6/22 - normal EF, no structural HD    Assessment and Plan    1. POTS - compression stockings    The patient is to return 6 months. The patient understood the treatment plan as outlined above.  There were no barriers to learning.      Shoaib De Jesus MD

## 2022-10-05 NOTE — LETTER
10/5/2022      RE: Briana Newman  [address] [id number]       Dear Colleague,    Thank you for the opportunity to participate in the care of your patient, Briana Newman, at the [facility] [place] [facility]. Please see a copy of my visit note below.    I am delighted to see Briana Newman in consultation.The primary encounter diagnosis was Postural tachycardia with syncope. Diagnoses of Tachycardia, Dyspnea on exertion, High-risk pregnancy, second trimester, BMI 33.0-33.9,adult, and Hx of preeclampsia, prior pregnancy, currently pregnant were also pertinent to this visit.   As you know, the patient is a 32 year old -American female. She   has a past medical history of Postpartum depression, Pre-eclampsia, Shortness of breath, and Syncope..    On this visit, the patient states that she has syncope and tachycardia when standing. She is currently pregnant.  The patient denies chest pressure/discomfort, orthopnea, paroxysmal nocturnal dyspnea and lower extermity edema.    The patient's cardiovascular risk factors include none.    The following portions of the patient's history were reviewed and updated as appropriate: allergies, current medications, past family history, past medical history, past social history, past surgical history, and the problem list.    PMH: The patient's past medical history includes:    Past Medical History:   Diagnosis Date     Postpartum depression     w first, unsure about med use     Pre-eclampsia     HTN started 3rd tri, them PreE w severe features,induced at term     Shortness of breath      Syncope       Past Surgical History:   Procedure Laterality Date     BREAST SURGERY  07/10/2012    cystecomy right breast-benign     wisdom teeth         The patient's medications as of the current encounter are:     Current Outpatient Medications   Medication Sig Dispense Refill     aspirin (ASA) 81 MG EC tablet  Take 1 tablet (81 mg) by mouth daily 100 tablet 3     COMPRESSION STOCKINGS 1 each daily 10 each 3     folic acid 800 MCG tablet Take 1 tablet (800 mcg) by mouth daily 90 tablet 3     vitamin D3 (CHOLECALCIFEROL) 50 mcg (2000 units) tablet Take 2 tablets (100 mcg) by mouth daily 120 tablet 3     hydrOXYzine (VISTARIL) 25 MG capsule Take 1 capsule (25 mg) by mouth 3 times daily as needed for itching (Patient not taking: Reported on 10/5/2022) 20 capsule 1       Labs:     Office Visit on 09/26/2022   Component Date Value Ref Range Status     Total Protein Random Urine g/L 09/26/2022 0.24  g/L Final    The reference range has not been established for total protein in random urine samples.  The result should be integrated into the clinical context for interpretation.     Total Protein Urine g/gr Creatinine 09/26/2022 0.14  0.00 - 0.20 g/g Cr Final     Creatinine Urine mg/dL 09/26/2022 168  mg/dL Final     Uric Acid 09/26/2022 3.6  2.6 - 6.0 mg/dL Final     ALT 09/26/2022 8  0 - 50 U/L Final     AST 09/26/2022 9  0 - 45 U/L Final     WBC Count 09/26/2022 12.3 (A) 4.0 - 11.0 10e3/uL Final     RBC Count 09/26/2022 3.90  3.80 - 5.20 10e6/uL Final     Hemoglobin 09/26/2022 11.4 (A) 11.7 - 15.7 g/dL Final     Hematocrit 09/26/2022 33.6 (A) 35.0 - 47.0 % Final     MCV 09/26/2022 86  78 - 100 fL Final     MCH 09/26/2022 29.2  26.5 - 33.0 pg Final     MCHC 09/26/2022 33.9  31.5 - 36.5 g/dL Final     RDW 09/26/2022 13.4  10.0 - 15.0 % Final     Platelet Count 09/26/2022 322  150 - 450 10e3/uL Final     Creatinine 09/26/2022 0.51 (A) 0.52 - 1.04 mg/dL Final     GFR Estimate 09/26/2022 >90  >60 mL/min/1.73m2 Final    Effective December 21, 2021 eGFRcr in adults is calculated using the 2021 CKD-EPI creatinine equation which includes age and gender (Dago et al., NEJM, DOI: 10.1056/DGLUnf8480963)       Allergies:  No Known Allergies    Family History:   Family History   Problem Relation Age of Onset     No Known Problems Mother       No Known Problems Father      Hypertension Maternal Grandmother      Diabetes Maternal Grandmother      Ovarian Cancer Maternal Grandmother      No Known Problems Brother      No Known Problems Brother      No Known Problems Brother      No Known Problems Sister      No Known Problems Daughter      Breast Cancer No family hx of      Colon Cancer No family hx of      Hyperlipidemia No family hx of      Asthma No family hx of      Osteoporosis No family hx of      Mental Illness No family hx of      Depression No family hx of      Anxiety Disorder No family hx of      Substance Abuse No family hx of        Psychosocial history:  reports that she has never smoked. She has never used smokeless tobacco. She reports that she does not drink alcohol and does not use drugs.    Review of systems: negative for, paroxysmal nocturnal dyspnea, orthopnea, lower extremity edema and claudication    In addition,   General: No change in weight, sleep or appetite.  Normal energy.  No fever or chills  Eyes: Negative for vision changes or eye problems  ENT: No problems with ears, nose or throat.  No difficulty swallowing.  Resp: No coughing, wheezing or shortness of breath  GI: No nausea, vomiting,  heartburn, abdominal pain, diarrhea, constipation or change in bowel habits  : No urinary frequency or dysuria, bladder or kidney problems  Musculoskeletal: No significant muscle or joint pains  Neurologic: No headaches, numbness, tingling, weakness, problems with balance or coordination  Psychiatric: No problems with anxiety, depression or mental health  Heme/immune/allergy: No history of bleeding or clotting problems or anemia.  No allergies or immune system problems  Endocrine: No history of thyroid disease, diabetes or other endocrine disorders  Skin: No rashes,worrisome lesions or skin problems  Vascular:  No claudication, lifestyle limiting or otherwise; no ischemic rest pain; no non-healing ulcers. No weakness, No loss of sensation   "  Pregnant    Physical examination  Vitals: /71 (BP Location: Right arm, Patient Position: Chair, Cuff Size: Adult Large)   Pulse 74   Ht 1.62 m (5' 3.78\")   Wt 91 kg (200 lb 9.6 oz)   LMP 05/02/2022 (Approximate)   SpO2 99%   BMI 34.67 kg/m    BMI= Body mass index is 34.67 kg/m .    In general, the patient is a pleasant female in no apparent distress.    HEENT: Normiocephalic and atraumatic.  PERRLA.  EOMI.  Sclerae white, not injected.  Nares clear.  Pharynx without erythema or exudate.  Dentition intact.    Neck: No adenopathy.  No thyromegaly. Carotids +2/2 bilaterally without bruits.  No jugular venous distension.   Heart:  The PMI is in the 5th ICS in the midclavicular line. There is no heave. Regular rate and rhythm. Normal S1, S2 splits physiologically. No murmur, rub, click, or gallop.    Lungs: Clear to asculation.  No ronchi, wheezes, rales.  No dullness to percussion.   Abdomen: No organomegaly. No AAA.  No bruits.   Extremities: No clubbing, cyanosis, or edema. The pulses were intact bilaterally.   Neurological: The neurological examination reveal a patient who was oriented to person, place, and time.  The remainder of the examination was nonfocal.    Cardiac tests include:    8/24/2022 - ziopatch benign max   9/6/22 - normal EF, no structural HD    Assessment and Plan    1. POTS - compression stockings    The patient is to return 6 months. The patient understood the treatment plan as outlined above.  There were no barriers to learning.      Shoaib De Jesus MD         "

## 2022-10-05 NOTE — PROGRESS NOTES
"Please see \"Imaging\" tab under \"Chart Review\" for details of today's US at the [facility].    Moiz Land MD  Maternal-Fetal Medicine      "

## 2022-10-25 NOTE — PROGRESS NOTES
"Subjective:      32 year old  at 25w0d presents for a routine prenatal appointment.    Accepts GCT, CBC, Treponema, vitamin D, Tdap vaccine next visit    Last US: 10/5/22: 22/0 weeks, normal anatomy anterior placenta, no previa, normal fluid, EFW 38%, AC 33%, cardiac anatomy not adequately visualized. Follow up US scheduled     Briana stopped taking aspirin as she felt like her POTS syndrome was worse when she took the Aspirin.      Briana saw cardiology Dr. De Jesus 10/5/22 diagnosed with POTS. They reviewed her normal Holter Monitor results and EKG and recommended that she use compression socks.   Briana thought she wanted to go back to work but is realizing she won't be able to tolerate work as she has tachycardia and feels faint too often. Would like us to complete documentation that she will be off work through her postpartum recovery    Briana continues to have itching on her back, arms, chest.  She tried to  the hydroxyzine Rx but her pharmacist told her it was not safe to take in pregnancy.   Open to trying Benadryl and hydroxyzine. Desires dermatology referral       Denies vaginal bleeding or leakage of fluid.  No contractions or cramping.    Good fetal movement.       No HA, visual changes, RUQ or epigastric pain.   The patient presents with the following concerns:    Level II US  reviewed, needs f/u US.     Objective:  Vitals:    10/26/22 1123   BP: 119/80   Pulse: 90   Weight: 90.7 kg (200 lb)   Height: 1.62 m (5' 3.78\")     See OB flowsheet  excoriations noted on back and chest from itching. Some scarring noted.     Assessment/Plan     Encounter Diagnoses   Name Primary?     High-risk pregnancy, second trimester Yes     Postural tachycardia with syncope      Orders Placed This Encounter   Procedures     Compression Sleeve/Stocking Order for DME - ONLY FOR DME     Orders Placed This Encounter   Medications     DISCONTD: hydrocortisone (CORTAID) 1 % external cream     Sig: Apply topically 2 " times daily     Dispense:  30 g     Refill:  1     DISCONTD: hydrOXYzine (ATARAX) 25 MG tablet     Sig: Take 1 tablet (25 mg) by mouth 3 times daily as needed for itching or anxiety     Dispense:  60 tablet     Refill:  1     hydrOXYzine (ATARAX) 25 MG tablet     Sig: Take 1 tablet (25 mg) by mouth 3 times daily as needed for itching, anxiety or other (sleep)     Dispense:  60 tablet     Refill:  1     hydrocortisone (CORTAID) 1 % external cream     Sig: Apply topically 2 times daily     Dispense:  60 g     Refill:  1     -Referred to dermatology for ongoing rash. Reviewed safety of Vistaril in pregnancy, and recommended she  the prescription from our pharmacy downstairs. Also recommended Benadryl and hydrocortisone cream. Possible PUPPS  -Will wait for form from her work to complete to be off for POTS  -Rx for compression socks given.   -EOB next visit 40 minutes.   - Reviewed total weight gain, encouraged continued healthy diet and exercise.      - Reviewed why/how to contact provider.      Return to clinic in 4 weeks and prn if questions or concerns.   Patricia Booker CNM

## 2022-10-26 ENCOUNTER — OFFICE VISIT (OUTPATIENT)
Dept: OBGYN | Facility: CLINIC | Age: 32
End: 2022-10-26
Attending: ADVANCED PRACTICE MIDWIFE
Payer: COMMERCIAL

## 2022-10-26 ENCOUNTER — TELEPHONE (OUTPATIENT)
Dept: DERMATOLOGY | Facility: CLINIC | Age: 32
End: 2022-10-26

## 2022-10-26 VITALS
HEART RATE: 90 BPM | DIASTOLIC BLOOD PRESSURE: 80 MMHG | SYSTOLIC BLOOD PRESSURE: 119 MMHG | BODY MASS INDEX: 34.15 KG/M2 | HEIGHT: 64 IN | WEIGHT: 200 LBS

## 2022-10-26 DIAGNOSIS — R21 RASH AND NONSPECIFIC SKIN ERUPTION: ICD-10-CM

## 2022-10-26 DIAGNOSIS — O09.92 HIGH-RISK PREGNANCY, SECOND TRIMESTER: Primary | ICD-10-CM

## 2022-10-26 DIAGNOSIS — R55 VASOVAGAL SYNCOPE: ICD-10-CM

## 2022-10-26 PROCEDURE — G0463 HOSPITAL OUTPT CLINIC VISIT: HCPCS

## 2022-10-26 PROCEDURE — 99207 PR PRENATAL VISIT: CPT | Performed by: ADVANCED PRACTICE MIDWIFE

## 2022-10-26 RX ORDER — HYDROXYZINE HYDROCHLORIDE 25 MG/1
25 TABLET, FILM COATED ORAL 3 TIMES DAILY PRN
Qty: 60 TABLET | Refills: 1 | Status: ON HOLD | OUTPATIENT
Start: 2022-10-26 | End: 2023-01-24

## 2022-10-26 RX ORDER — BENZOCAINE/MENTHOL 6 MG-10 MG
LOZENGE MUCOUS MEMBRANE 2 TIMES DAILY
Qty: 60 G | Refills: 1 | Status: ON HOLD | OUTPATIENT
Start: 2022-10-26 | End: 2023-01-24

## 2022-10-26 RX ORDER — HYDROXYZINE HYDROCHLORIDE 25 MG/1
25 TABLET, FILM COATED ORAL 3 TIMES DAILY PRN
Qty: 60 TABLET | Refills: 1 | Status: SHIPPED | OUTPATIENT
Start: 2022-10-26 | End: 2022-10-26

## 2022-10-26 RX ORDER — BENZOCAINE/MENTHOL 6 MG-10 MG
LOZENGE MUCOUS MEMBRANE 2 TIMES DAILY
Qty: 30 G | Refills: 1 | Status: SHIPPED | OUTPATIENT
Start: 2022-10-26 | End: 2022-10-26

## 2022-10-26 NOTE — LETTER
Date:October 27, 2022      Provider requested that no letter be sent. Do not send.       [facility]

## 2022-10-26 NOTE — LETTER
"10/26/2022       RE: Briana Newman  [address] [address]     Dear Colleague,    Thank you for referring your patient, Briana Newman, to the [facility] [place] at [facility]. Please see a copy of my visit note below.    Subjective:      32 year old  at 25w0d presents for a routine prenatal appointment.    Accepts GCT, CBC, Treponema, vitamin D, Tdap vaccine next visit    Last US: 10/5/22: 22/0 weeks, normal anatomy anterior placenta, no previa, normal fluid, EFW 38%, AC 33%, cardiac anatomy not adequately visualized. Follow up US scheduled     Braina stopped taking aspirin as she felt like her POTS syndrome was worse when she took the Aspirin.      Briana saw cardiology Dr. De Jesus 10/5/22 diagnosed with POTS. They reviewed her normal Holter Monitor results and EKG and recommended that she use compression socks.   Briana thought she wanted to go back to work but is realizing she won't be able to tolerate work as she has tachycardia and feels faint too often. Would like us to complete documentation that she will be off work through her postpartum recovery    Briana continues to have itching on her back, arms, chest.  She tried to  the hydroxyzine Rx but her pharmacist told her it was not safe to take in pregnancy.   Open to trying Benadryl and hydroxyzine. Desires dermatology referral       Denies vaginal bleeding or leakage of fluid.  No contractions or cramping.    Good fetal movement.       No HA, visual changes, RUQ or epigastric pain.   The patient presents with the following concerns:    Level II US  reviewed, needs f/u US.     Objective:  Vitals:    10/26/22 1123   BP: 119/80   Pulse: 90   Weight: 90.7 kg (200 lb)   Height: 1.62 m (5' 3.78\")     See OB flowsheet  excoriations noted on back and chest from itching. Some scarring noted.     Assessment/Plan     Encounter Diagnoses   Name Primary?     High-risk " pregnancy, second trimester Yes     Postural tachycardia with syncope      Orders Placed This Encounter   Procedures     Compression Sleeve/Stocking Order for DME - ONLY FOR DME     Orders Placed This Encounter   Medications     DISCONTD: hydrocortisone (CORTAID) 1 % external cream     Sig: Apply topically 2 times daily     Dispense:  30 g     Refill:  1     DISCONTD: hydrOXYzine (ATARAX) 25 MG tablet     Sig: Take 1 tablet (25 mg) by mouth 3 times daily as needed for itching or anxiety     Dispense:  60 tablet     Refill:  1     hydrOXYzine (ATARAX) 25 MG tablet     Sig: Take 1 tablet (25 mg) by mouth 3 times daily as needed for itching, anxiety or other (sleep)     Dispense:  60 tablet     Refill:  1     hydrocortisone (CORTAID) 1 % external cream     Sig: Apply topically 2 times daily     Dispense:  60 g     Refill:  1     -Referred to dermatology for ongoing rash. Reviewed safety of Vistaril in pregnancy, and recommended she  the prescription from our pharmacy downstairs. Also recommended Benadryl and hydrocortisone cream. Possible PUPPS  -Will wait for form from her work to complete to be off for POTS  -Rx for compression socks given.   -EOB next visit 40 minutes.   - Reviewed total weight gain, encouraged continued healthy diet and exercise.      - Reviewed why/how to contact provider.      Return to clinic in 4 weeks and prn if questions or concerns.   Patricia Booker CNM                      Again, thank you for allowing me to participate in the care of your patient.      Sincerely,    Patricia Booker CNM

## 2022-10-26 NOTE — TELEPHONE ENCOUNTER
This encounter is being sent to inform the clinic that this patient has a referral from Patricia Booker CNM for the diagnoses of 33 yo  at 25 weeks with rash on back arms, chest and has requested that this patient be seen within 1-2 weeks.  Based on the availability of our provider(s), we are unable to accommodate this request.      Were all sites offered this patient?  Yes      Does scheduling algorithm request to schedule next available?  Patient has been scheduled for the first available opening with Dr Angle Chilel on 3/30/23.  We have informed the patient that the clinic will review their referral and reach out if a sooner appointment is medically necessary.

## 2022-10-27 NOTE — PROGRESS NOTES
[facility] Dermatology Note  Encounter Date: Nov 3, 2022  Office Visit     Dermatology Problem List:  1. Atopic eruption of pregnancy  - start triamcinolone 0.1% ointment BID to affected areas on body, fluocinolone 0.01% oil to affected areas on scalp  ____________________________________________    Assessment & Plan:     # Atopic eruption of pregnancy  Discussed etiology further with patient. Given timing of symptoms during pregnancy and clinical exam consistent with eczematous plaques, symptoms appear most consistent with atopic eruption of pregnancy despite lack of atopic history. Reassured patient that any type of systemic process such as cholestasis of pregnancy is very unlikely given reassuring labs from one month ago, timing of symptoms within the first few weeks of pregnancy, and skin exam today. Will start topical steroid treatments and provide close follow up. Recommend that patient reach out via MyChart if symptoms are not improving or are worsening 2-3 weeks from now, and can discuss being seen sooner.  - start triamcinolone 0.1% ointment BID to affected areas on body  - start fluocinolone 0.01% oil to affected areas on scalp - daily for first week, then 2x/weekly  - dry skin/sensitive skin handout provided    Procedures Performed:   None    Follow-up: 2 month(s) in-person, or earlier for new or changing lesions    Staff and Resident:     Donna Campos MD  PGY2 Dermatology Resident    I have personally examined this patient and agree with the resident doctor's documentation and plan of care. I have reviewed and amended the resident's note above. The documentation accurately reflects my clinical observations, diagnoses, treatment and follow-up plans.     Abiola Terry MD  Dermatology Staff    ____________________________________________    CC: Derm Problem (Rash on arms, back and chest.  Itchy.  Feels like dry patches.  Worse since being pregnant.  )    HPI:  Ms. Briana Newman is  a(n) 32 year old female who presents today as a new patient for a generalized rash (on arms, chest, and back), which has developed during pregnancy. Currently 26w1d , has history of pre-eclampsia in prior pregnancy.    - Briana reports having a history of dry and itchy skin prior to pregnancy. Reports that when traveling to [place] her symptoms would flare. Symptoms started 3-4 years ago.  - She denies having a personal history of itchy rashes as a child, seasonal allergies, or asthma.  - Since being pregnant, Briana states that nearly immediately (within the first 2-3 weeks of pregnancy) she states that her symptoms significantly flared and the rashes are not intermittent anymore. She has been experiencing dry itchy spots on her back, upper arms, in her scalp, and on her chest.  - She denies having anyone else itchy or experiencing similar rashes around her.  - Has been treating these spots with oatmeal baths (1x/weeks) and moisturizers (cocoa butter, Aveeno, Vaseline, Eucerin) without relief noted. States that her midwife prescribed a steroid cream and hydroxyzine, though she has not yet used these treatments yet.   - She was diagnosed with POTS during this pregnancy. Is not currently working. Otherwise pregnancy is going well without complications, and baby is healthy.    Seen for eczematous dermatitis on chest and abdomen requiring topical steroids in 2021 at outside dermatology clinic. Symptoms did not significantly improve with topical steroids in the past. She attributed her itching to pre-eclampsia, and she was significantly swollen at that point. She states that this prior rash with her other pregnancy was not at all similar to her current symptoms, and notes that her current symptoms are worse.    Patient is otherwise feeling well, without additional skin concerns.    Labs Reviewed:  N/A    Physical Exam:  Vitals: LMP 2022 (Approximate)   SKIN: Focused examination of scalp, back, upper  arms, chest, and abdomen was performed.  - Diffusely throughout the back, upper arms, and chest, there are ill defined eczematous pink papules coalescing into plaques, some with resulting hyperpigmentation and central excoriations. Follicular prominence noted.  - Fine scale noted throughout scalp.  - There are two pink papules along lines of striae on gravid abdomen.  - No other lesions of concern on areas examined.     Medications:  Current Outpatient Medications   Medication     Fluocinolone Acetonide Scalp (DERMA-SMOOTHE/FS SCALP) 0.01 % OIL oil     folic acid 800 MCG tablet     hydrocortisone (CORTAID) 1 % external cream     hydrOXYzine (ATARAX) 25 MG tablet     triamcinolone (KENALOG) 0.1 % external ointment     vitamin D3 (CHOLECALCIFEROL) 50 mcg (2000 units) tablet     aspirin (ASA) 81 MG EC tablet     COMPRESSION STOCKINGS     No current facility-administered medications for this visit.      Past Medical History:   Patient Active Problem List   Diagnosis     High-risk pregnancy, second trimester     Hx of preeclampsia, prior pregnancy, currently pregnant     Vitamin D deficiency     Hx of postpartum depression, currently pregnant     BMI 33.0-33.9,adult     Postural tachycardia with syncope     Itching     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     Dyspnea on exertion     Rash and nonspecific skin eruption     Past Medical History:   Diagnosis Date     Postpartum depression     w first, unsure about med use     Pre-eclampsia     HTN started 3rd tri, them PreE w severe features,induced at term     Shortness of breath      Syncope      CC Patricia Booker, LEAH  [address]  [place] [address] on close of this encounter.

## 2022-11-03 ENCOUNTER — OFFICE VISIT (OUTPATIENT)
Dept: DERMATOLOGY | Facility: CLINIC | Age: 32
End: 2022-11-03
Attending: ADVANCED PRACTICE MIDWIFE
Payer: COMMERCIAL

## 2022-11-03 DIAGNOSIS — L20.9 ATOPIC DERMATITIS, UNSPECIFIED TYPE: Primary | ICD-10-CM

## 2022-11-03 DIAGNOSIS — R21 RASH AND NONSPECIFIC SKIN ERUPTION: ICD-10-CM

## 2022-11-03 PROCEDURE — 99204 OFFICE O/P NEW MOD 45 MIN: CPT | Mod: GC | Performed by: STUDENT IN AN ORGANIZED HEALTH CARE EDUCATION/TRAINING PROGRAM

## 2022-11-03 RX ORDER — TRIAMCINOLONE ACETONIDE 1 MG/G
OINTMENT TOPICAL 2 TIMES DAILY
Qty: 453.6 G | Refills: 2 | Status: SHIPPED | OUTPATIENT
Start: 2022-11-03 | End: 2022-12-29

## 2022-11-03 RX ORDER — FLUOCINOLONE ACETONIDE 0.11 MG/ML
OIL TOPICAL
Qty: 118.28 ML | Refills: 2 | Status: SHIPPED | OUTPATIENT
Start: 2022-11-03 | End: 2022-12-29

## 2022-11-03 ASSESSMENT — PAIN SCALES - GENERAL: PAINLEVEL: NO PAIN (0)

## 2022-11-03 NOTE — PATIENT INSTRUCTIONS
Dry Skin    What is dry skin?  Common skin problem  Can be worse during the winter   Affects all ages  Occurs in people with or without other skin problems    What does it look like?  Fine lines in the skin become more visible   Rough feeling skin   Flaky skin  Most common on the arms and legs  Skin can become cracked, especially on the hands and feet    What are some problems caused by dry skin?   Itching  Rubbing or scratching can cause thickened, rough skin patches  Cracks in skin can be painful  Red, itchy, scaly skin (called eczema) can occur  Yellow crusting or pus could be signs of an infection    What causes dry skin?  A lack of water in the top layer of the skin  Too much soapy water,  hot water, or harsh chemicals  Aging and sun damage    How do I treat dry skin?  Shower or bathe daily for under ten minutes with lukewarm water and mild soap.  Pat yourself dry with a towel gently and leave your skin slightly damp.  Use moisturizing cream or ointment right away.  Avoid lotions.    What kind of mild soap should I be using?  Camay , Dove , Tone , Neutrogena , Purpose , or Oil of Olay   A non-detergent cleanser, like Cetaphil , can be used.    What should I stay away from?  Scented soaps   Bath oils    What moisturizers should I be using?  Cetaphil Cream,CeraVe Cream, Vanicream, Aquaphilic, Eucerin, Aquaphor, or Vaseline   Always apply after showering or bathing.  Reapply throughout the day, if possible.  If dry skin affects your hands, always reapply after handwashing.    What else should I know?  Using a humidifier during winter months may help.  If dry skin gets worse or if eczema develops, a steroid cream may be needed.     Consider using coconut oil over olive oil.

## 2022-11-03 NOTE — LETTER
11/3/2022       RE: Briana Newman  [address] [facility] [address]     Dear Colleague,    Thank you for referring your patient, Briana Newman, to the [facility] DERMATOLOGY [facility] [place] at [facility]. Please see a copy of my visit note below.    [facility] Dermatology Note  Encounter Date: Nov 3, 2022  Office Visit     Dermatology Problem List:  1. Atopic eruption of pregnancy  - start triamcinolone 0.1% ointment BID to affected areas on body, fluocinolone 0.01% oil to affected areas on scalp  ____________________________________________    Assessment & Plan:     # Atopic eruption of pregnancy  Discussed etiology further with patient. Given timing of symptoms during pregnancy and clinical exam consistent with eczematous plaques, symptoms appear most consistent with atopic eruption of pregnancy despite lack of atopic history. Reassured patient that any type of systemic process such as cholestasis of pregnancy is very unlikely given reassuring labs from one month ago, timing of symptoms within the first few weeks of pregnancy, and skin exam today. Will start topical steroid treatments and provide close follow up. Recommend that patient reach out via MyChart if symptoms are not improving or are worsening 2-3 weeks from now, and can discuss being seen sooner.  - start triamcinolone 0.1% ointment BID to affected areas on body  - start fluocinolone 0.01% oil to affected areas on scalp - daily for first week, then 2x/weekly  - dry skin/sensitive skin handout provided    Procedures Performed:   None    Follow-up: 2 month(s) in-person, or earlier for new or changing lesions    Staff and Resident:     Donna Campos MD  PGY2 Dermatology Resident    I have personally examined this patient and agree with the resident doctor's documentation and plan of care. I have reviewed and amended the resident's note above. The documentation accurately  reflects my clinical observations, diagnoses, treatment and follow-up plans.     Abiola Terry MD  Dermatology Staff    ____________________________________________    CC: Derm Problem (Rash on arms, back and chest.  Itchy.  Feels like dry patches.  Worse since being pregnant.  )    HPI:  Ms. Briana Newman is a(n) 32 year old female who presents today as a new patient for a generalized rash (on arms, chest, and back), which has developed during pregnancy. Currently 26w1d , has history of pre-eclampsia in prior pregnancy.    - Briana reports having a history of dry and itchy skin prior to pregnancy. Reports that when traveling to [place] her symptoms would flare. Symptoms started 3-4 years ago.  - She denies having a personal history of itchy rashes as a child, seasonal allergies, or asthma.  - Since being pregnant, Briana states that nearly immediately (within the first 2-3 weeks of pregnancy) she states that her symptoms significantly flared and the rashes are not intermittent anymore. She has been experiencing dry itchy spots on her back, upper arms, in her scalp, and on her chest.  - She denies having anyone else itchy or experiencing similar rashes around her.  - Has been treating these spots with oatmeal baths (1x/weeks) and moisturizers (cocoa butter, Aveeno, Vaseline, Eucerin) without relief noted. States that her midwife prescribed a steroid cream and hydroxyzine, though she has not yet used these treatments yet.   - She was diagnosed with POTS during this pregnancy. Is not currently working. Otherwise pregnancy is going well without complications, and baby is healthy.    Seen for eczematous dermatitis on chest and abdomen requiring topical steroids in 2021 at outside dermatology clinic. Symptoms did not significantly improve with topical steroids in the past. She attributed her itching to pre-eclampsia, and she was significantly swollen at that point. She states that this prior rash with her  other pregnancy was not at all similar to her current symptoms, and notes that her current symptoms are worse.    Patient is otherwise feeling well, without additional skin concerns.    Labs Reviewed:  N/A    Physical Exam:  Vitals: LMP 05/02/2022 (Approximate)   SKIN: Focused examination of scalp, back, upper arms, chest, and abdomen was performed.  - Diffusely throughout the back, upper arms, and chest, there are ill defined eczematous pink papules coalescing into plaques, some with resulting hyperpigmentation and central excoriations. Follicular prominence noted.  - Fine scale noted throughout scalp.  - There are two pink papules along lines of striae on gravid abdomen.  - No other lesions of concern on areas examined.     Medications:  Current Outpatient Medications   Medication     Fluocinolone Acetonide Scalp (DERMA-SMOOTHE/FS SCALP) 0.01 % OIL oil     folic acid 800 MCG tablet     hydrocortisone (CORTAID) 1 % external cream     hydrOXYzine (ATARAX) 25 MG tablet     triamcinolone (KENALOG) 0.1 % external ointment     vitamin D3 (CHOLECALCIFEROL) 50 mcg (2000 units) tablet     aspirin (ASA) 81 MG EC tablet     COMPRESSION STOCKINGS     No current facility-administered medications for this visit.      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

## 2022-11-03 NOTE — NURSING NOTE
Dermatology Rooming Note    Briana Newman's goals for this visit include:   Chief Complaint   Patient presents with     Derm Problem     Rash on arms, back and chest.  Itchy.  Feels like dry patches.  Worse since being pregnant.       Dahiana Castro, CMA

## 2022-11-11 ENCOUNTER — HOSPITAL ENCOUNTER (OUTPATIENT)
Facility: CLINIC | Age: 32
End: 2022-11-11
Admitting: ADVANCED PRACTICE MIDWIFE
Payer: COMMERCIAL

## 2022-11-11 ENCOUNTER — HOSPITAL ENCOUNTER (OUTPATIENT)
Facility: CLINIC | Age: 32
Discharge: HOME | End: 2022-11-11
Attending: ADVANCED PRACTICE MIDWIFE | Admitting: ADVANCED PRACTICE MIDWIFE
Payer: COMMERCIAL

## 2022-11-11 ENCOUNTER — TELEPHONE (OUTPATIENT)
Dept: OBGYN | Facility: CLINIC | Age: 32
End: 2022-11-11

## 2022-11-11 VITALS — TEMPERATURE: 98.3 F | WEIGHT: 201 LBS | BODY MASS INDEX: 34.31 KG/M2 | HEIGHT: 64 IN | RESPIRATION RATE: 18 BRPM

## 2022-11-11 DIAGNOSIS — R10.2 PELVIC PRESSURE IN PREGNANCY: Primary | ICD-10-CM

## 2022-11-11 DIAGNOSIS — O26.899 PELVIC PRESSURE IN PREGNANCY: Primary | ICD-10-CM

## 2022-11-11 LAB
ALBUMIN UR-MCNC: NEGATIVE MG/DL
APPEARANCE UR: CLEAR
BACTERIA #/AREA URNS HPF: ABNORMAL /HPF
BILIRUB UR QL STRIP: NEGATIVE
CLUE CELLS: ABNORMAL
COLOR UR AUTO: YELLOW
CRYSTALS AMN MICRO: NORMAL
FFN SPECIMEN INTEGRITY: NORMAL
FIBRONECTIN FETAL VAG QL: NEGATIVE
GLUCOSE UR STRIP-MCNC: NEGATIVE MG/DL
HGB UR QL STRIP: ABNORMAL
KETONES UR STRIP-MCNC: 40 MG/DL
LEUKOCYTE ESTERASE UR QL STRIP: NEGATIVE
NITRATE UR QL: NEGATIVE
PH UR STRIP: 6 [PH] (ref 5–7)
RBC URINE: 2 /HPF
RUPTURE OF FETAL MEMBRANES BY ROM PLUS: NEGATIVE
SP GR UR STRIP: 1.02 (ref 1–1.03)
TRICHOMONAS, WET PREP: ABNORMAL
UROBILINOGEN UR STRIP-MCNC: 0.2 MG/DL
WBC URINE: 3 /HPF
WBC'S/HIGH POWER FIELD, WET PREP: ABNORMAL
YEAST, WET PREP: ABNORMAL

## 2022-11-11 PROCEDURE — 81001 URINALYSIS AUTO W/SCOPE: CPT | Performed by: ADVANCED PRACTICE MIDWIFE

## 2022-11-11 PROCEDURE — 87591 N.GONORRHOEAE DNA AMP PROB: CPT | Performed by: ADVANCED PRACTICE MIDWIFE

## 2022-11-11 PROCEDURE — 99214 OFFICE O/P EST MOD 30 MIN: CPT | Performed by: ADVANCED PRACTICE MIDWIFE

## 2022-11-11 PROCEDURE — 82731 ASSAY OF FETAL FIBRONECTIN: CPT | Performed by: ADVANCED PRACTICE MIDWIFE

## 2022-11-11 PROCEDURE — 87491 CHLMYD TRACH DNA AMP PROBE: CPT | Performed by: ADVANCED PRACTICE MIDWIFE

## 2022-11-11 PROCEDURE — 87210 SMEAR WET MOUNT SALINE/INK: CPT | Performed by: ADVANCED PRACTICE MIDWIFE

## 2022-11-11 PROCEDURE — G0463 HOSPITAL OUTPT CLINIC VISIT: HCPCS

## 2022-11-11 PROCEDURE — 84112 EVAL AMNIOTIC FLUID PROTEIN: CPT | Performed by: ADVANCED PRACTICE MIDWIFE

## 2022-11-11 PROCEDURE — 87086 URINE CULTURE/COLONY COUNT: CPT | Performed by: ADVANCED PRACTICE MIDWIFE

## 2022-11-11 PROCEDURE — 87653 STREP B DNA AMP PROBE: CPT | Performed by: ADVANCED PRACTICE MIDWIFE

## 2022-11-11 RX ORDER — ONDANSETRON 4 MG/1
4 TABLET, ORALLY DISINTEGRATING ORAL EVERY 6 HOURS PRN
Status: DISCONTINUED | OUTPATIENT
Start: 2022-11-11 | End: 2022-11-11 | Stop reason: HOSPADM

## 2022-11-11 RX ORDER — METOCLOPRAMIDE 10 MG/1
10 TABLET ORAL EVERY 6 HOURS PRN
Status: DISCONTINUED | OUTPATIENT
Start: 2022-11-11 | End: 2022-11-11 | Stop reason: HOSPADM

## 2022-11-11 RX ORDER — PROCHLORPERAZINE 25 MG
25 SUPPOSITORY, RECTAL RECTAL EVERY 12 HOURS PRN
Status: DISCONTINUED | OUTPATIENT
Start: 2022-11-11 | End: 2022-11-11 | Stop reason: HOSPADM

## 2022-11-11 RX ORDER — METOCLOPRAMIDE HYDROCHLORIDE 5 MG/ML
10 INJECTION INTRAMUSCULAR; INTRAVENOUS EVERY 6 HOURS PRN
Status: DISCONTINUED | OUTPATIENT
Start: 2022-11-11 | End: 2022-11-11 | Stop reason: HOSPADM

## 2022-11-11 RX ORDER — ONDANSETRON 2 MG/ML
4 INJECTION INTRAMUSCULAR; INTRAVENOUS EVERY 6 HOURS PRN
Status: DISCONTINUED | OUTPATIENT
Start: 2022-11-11 | End: 2022-11-11 | Stop reason: HOSPADM

## 2022-11-11 RX ORDER — PROCHLORPERAZINE MALEATE 10 MG
10 TABLET ORAL EVERY 6 HOURS PRN
Status: DISCONTINUED | OUTPATIENT
Start: 2022-11-11 | End: 2022-11-11 | Stop reason: HOSPADM

## 2022-11-11 ASSESSMENT — ACTIVITIES OF DAILY LIVING (ADL)
ADLS_ACUITY_SCORE: 18
ADLS_ACUITY_SCORE: 18

## 2022-11-11 NOTE — DISCHARGE INSTRUCTIONS
Discharge Instruction for Undelivered Patients      You were seen for:  Pelvic pressure and vaginal discharge  We Consulted: LEAH Carolina  You had (Test or Medicine):     Diet:   Drink 8 to 12 glasses of liquids (milk, juice, water) every day.  You may eat meals and snacks.     Activity:  Count fetal kicks everyday (see handout)  Call your doctor or nurse midwife if your baby is moving less than usual.     Call your provider if you notice:  Swelling in your face or increased swelling in your hands or legs.  Headaches that are not relieved by Tylenol (acetaminophen).  Changes in your vision (blurring: seeing spots or stars.)  Nausea (sick to your stomach) and vomiting (throwing up).   Weight gain of 5 pounds or more per week.  Heartburn that doesn't go away.  Signs of bladder infection: pain when you urinate (use the toilet), need to go more often and more urgently.  The bag of mei (rupture of membranes) breaks, or you notice leaking in your underwear.  Bright red blood in your underwear.  Abdominal (lower belly) or stomach pain.  For first baby: Contractions (tightening) less than 5 minutes apart for one hour or more.  Second (plus) baby: Contractions (tightening) less than 10 minutes apart and getting stronger.  *If less than 34 weeks: Contractions (tightening) more than 6 times in one hour.  Increase or change in vaginal discharge (note the color and amount)  Other:     Follow-up:  As scheduled in the clinic
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
## 2022-11-11 NOTE — TELEPHONE ENCOUNTER
Briana called triage line to say that she has been having bad back pain and lower abdominal cramping that feels like menstrual cramps.  Also having B-H contractions, but hasn't been timing how frequently.    She states that she has increased vaginal discharge, but denies leaking of fluid or bleeding.    Reports vaginal pressure and constant urge to have bowel movement.    Discussed with LEAH [place] and [facility] charge and pt advised to go to [facility].    Pt indicated understanding and agreed with plan.

## 2022-11-11 NOTE — PROGRESS NOTES
Patient seen for pelvic pressure and increased vaginal discharges. See flowsheet  For fetal and uterine monitoring. VSS, afebrile, complains of mild lower back pain. Patient is discharge home and will follow up with provider.

## 2022-11-12 LAB
C TRACH DNA SPEC QL NAA+PROBE: NEGATIVE
GP B STREP DNA SPEC QL NAA+PROBE: POSITIVE
N GONORRHOEA DNA SPEC QL NAA+PROBE: NEGATIVE

## 2022-11-13 LAB — BACTERIA UR CULT: NORMAL

## 2022-11-15 PROBLEM — B95.1 POSITIVE GBS TEST: Status: ACTIVE | Noted: 2022-11-15

## 2022-11-16 ENCOUNTER — HOSPITAL ENCOUNTER (OUTPATIENT)
Dept: ULTRASOUND IMAGING | Facility: CLINIC | Age: 32
Discharge: HOME | End: 2022-11-16
Attending: OBSTETRICS & GYNECOLOGY
Payer: COMMERCIAL

## 2022-11-16 ENCOUNTER — LAB (OUTPATIENT)
Dept: LAB | Facility: CLINIC | Age: 32
End: 2022-11-16
Attending: OBSTETRICS & GYNECOLOGY
Payer: COMMERCIAL

## 2022-11-16 ENCOUNTER — OFFICE VISIT (OUTPATIENT)
Dept: MATERNAL FETAL MEDICINE | Facility: CLINIC | Age: 32
End: 2022-11-16
Attending: OBSTETRICS & GYNECOLOGY
Payer: COMMERCIAL

## 2022-11-16 ENCOUNTER — OFFICE VISIT (OUTPATIENT)
Dept: OBGYN | Facility: CLINIC | Age: 32
End: 2022-11-16
Attending: REGISTERED NURSE
Payer: COMMERCIAL

## 2022-11-16 VITALS
DIASTOLIC BLOOD PRESSURE: 73 MMHG | WEIGHT: 201 LBS | HEART RATE: 85 BPM | HEIGHT: 64 IN | SYSTOLIC BLOOD PRESSURE: 102 MMHG | BODY MASS INDEX: 34.31 KG/M2

## 2022-11-16 DIAGNOSIS — O26.90 PREGNANCY RELATED CONDITION, ANTEPARTUM: Primary | ICD-10-CM

## 2022-11-16 DIAGNOSIS — O09.92 HIGH-RISK PREGNANCY, SECOND TRIMESTER: Primary | ICD-10-CM

## 2022-11-16 DIAGNOSIS — O09.92 HIGH-RISK PREGNANCY, SECOND TRIMESTER: ICD-10-CM

## 2022-11-16 DIAGNOSIS — O99.891 HX OF POSTPARTUM DEPRESSION, CURRENTLY PREGNANT: ICD-10-CM

## 2022-11-16 DIAGNOSIS — O09.299 HX OF PREECLAMPSIA, PRIOR PREGNANCY, CURRENTLY PREGNANT: ICD-10-CM

## 2022-11-16 DIAGNOSIS — L29.9 ITCHING: ICD-10-CM

## 2022-11-16 DIAGNOSIS — N90.810 FEMALE GENITAL MUTILATION: ICD-10-CM

## 2022-11-16 DIAGNOSIS — B95.1 POSITIVE GBS TEST: ICD-10-CM

## 2022-11-16 DIAGNOSIS — Z86.59 HX OF POSTPARTUM DEPRESSION, CURRENTLY PREGNANT: ICD-10-CM

## 2022-11-16 DIAGNOSIS — Z36.2 ENCOUNTER FOR FOLLOW-UP ULTRASOUND OF FETAL ANATOMY: ICD-10-CM

## 2022-11-16 DIAGNOSIS — R55 VASOVAGAL SYNCOPE: ICD-10-CM

## 2022-11-16 LAB
ERYTHROCYTE [DISTWIDTH] IN BLOOD BY AUTOMATED COUNT: 13.3 % (ref 10–15)
GLUCOSE 1H P 50 G GLC PO SERPL-MCNC: 164 MG/DL (ref 70–129)
HCT VFR BLD AUTO: 33.1 % (ref 35–47)
HGB BLD-MCNC: 11.2 G/DL (ref 11.7–15.7)
MCH RBC QN AUTO: 28.7 PG (ref 26.5–33)
MCHC RBC AUTO-ENTMCNC: 33.8 G/DL (ref 31.5–36.5)
MCV RBC AUTO: 85 FL (ref 78–100)
PLATELET # BLD AUTO: 311 10E3/UL (ref 150–450)
RBC # BLD AUTO: 3.9 10E6/UL (ref 3.8–5.2)
WBC # BLD AUTO: 13.2 10E3/UL (ref 4–11)

## 2022-11-16 PROCEDURE — 76816 OB US FOLLOW-UP PER FETUS: CPT

## 2022-11-16 PROCEDURE — 82306 VITAMIN D 25 HYDROXY: CPT

## 2022-11-16 PROCEDURE — 82950 GLUCOSE TEST: CPT

## 2022-11-16 PROCEDURE — 90715 TDAP VACCINE 7 YRS/> IM: CPT

## 2022-11-16 PROCEDURE — 36415 COLL VENOUS BLD VENIPUNCTURE: CPT

## 2022-11-16 PROCEDURE — G0463 HOSPITAL OUTPT CLINIC VISIT: HCPCS

## 2022-11-16 PROCEDURE — 90471 IMMUNIZATION ADMIN: CPT

## 2022-11-16 PROCEDURE — 85027 COMPLETE CBC AUTOMATED: CPT

## 2022-11-16 PROCEDURE — 250N000011 HC RX IP 250 OP 636

## 2022-11-16 PROCEDURE — 86780 TREPONEMA PALLIDUM: CPT

## 2022-11-16 PROCEDURE — 99207 PR PRENATAL VISIT: CPT | Performed by: REGISTERED NURSE

## 2022-11-16 PROCEDURE — 76816 OB US FOLLOW-UP PER FETUS: CPT | Mod: 26 | Performed by: OBSTETRICS & GYNECOLOGY

## 2022-11-16 NOTE — LETTER
"2022       RE: Briana Newman  [address] [facility] [address]     Dear Colleague,    Thank you for referring your patient, Briana Newman, to the [facility] [place] at [facility]. Please see a copy of my visit note below.    S: Briana Newman is a 32 year old  at 28w0d here for EOB visit.     She was scheduled in a 20 minute slot for visit today.     Reports no vaginal bleeding, LOF. Denies contractions. + fetal movement. Lots of movement!   Denies HA, vision changes, RUQ pain.     The patient presents with the following concerns:   - Seen in triage 22 for complaints of pelvic pressure, had negative work up at that time. Feeling better now although still reports having increased pelvic pressure.   - Reports rash is not as itchy now since seeing derm, was diagnosed with Atopic eruption of pregnancy, see MD note 11/3. Using prescribed steroids from derm.   - Has not started using compression stockings or maternal support belt but planning to pick both up soon.   - She reports she would prefer an IOL 39 weeks given h/o preeclampsia.   - Reviewed GBS+    - Had US at [facility] prior to visit today  Cardiac activity present.  bpm.  Fetal movements present.  Presentation cephalic.  Placenta Anterior, No Previa, > 2 cm from internal os.  Umbilical cord 3 vessel cord.  Amniotic fluid Amount of AF: normal. MVP 5.6 cm.  EFW 37%    O: /73 (BP Location: Left arm, Patient Position: Sitting, Cuff Size: Adult Large)   Pulse 85   Ht 1.626 m (5' 4\")   Wt 91.2 kg (201 lb)   LMP 2022 (Approximate)   BMI 34.50 kg/m       PHQ-9 SCORE 2022   PHQ-9 Total Score 4     See OB Flowsheet    Blood type:   Antibody Screen   Date Value Ref Range Status   2022 Negative Negative Final     Rhogam was not given.  TDAP was given.    A/P:  - EOB labs collected today  - TDAP given  - Unable to complete EOB education nd birth planning " today given limited visit time, follow-up at next visit   - Reviewed fetal movement, and warning signs to present to L&D    Orders Placed This Encounter   Procedures     TDAP (ADACEL,BOOSTRIX)     Glucose tolerance gest screen 1 hour     CBC with platelets     Treponema Abs w Reflex to RPR and Titer     Vitamin D Deficiency        Continue scheduled prenatal care  ERICA Yarbrough CNM                      Again, thank you for allowing me to participate in the care of your patient.      Sincerely,    ERICA Yarbrough CNM

## 2022-11-16 NOTE — LETTER
Date:November 18, 2022      Patient was self referred, no letter generated. Do not send.        [facility] Health Information

## 2022-11-16 NOTE — PROGRESS NOTES
"S: Briana Newman is a 32 year old  at 28w0d here for EOB visit.     She was scheduled in a 20 minute slot for visit today.     Reports no vaginal bleeding, LOF. Denies contractions. + fetal movement. Lots of movement!   Denies HA, vision changes, RUQ pain.     The patient presents with the following concerns:   - Seen in triage 22 for complaints of pelvic pressure, had negative work up at that time. Feeling better now although still reports having increased pelvic pressure.   - Reports rash is not as itchy now since seeing derm, was diagnosed with Atopic eruption of pregnancy, see MD note 11/3. Using prescribed steroids from derm.   - Has not started using compression stockings or maternal support belt but planning to pick both up soon.   - She reports she would prefer an IOL 39 weeks given h/o preeclampsia.   - Reviewed GBS+    - Had US at [facility] prior to visit today  Cardiac activity present.  bpm.  Fetal movements present.  Presentation cephalic.  Placenta Anterior, No Previa, > 2 cm from internal os.  Umbilical cord 3 vessel cord.  Amniotic fluid Amount of AF: normal. MVP 5.6 cm.  EFW 37%    O: /73 (BP Location: Left arm, Patient Position: Sitting, Cuff Size: Adult Large)   Pulse 85   Ht 1.626 m (5' 4\")   Wt 91.2 kg (201 lb)   LMP 2022 (Approximate)   BMI 34.50 kg/m       PHQ-9 SCORE 2022   PHQ-9 Total Score 4     See OB Flowsheet    Blood type:   Antibody Screen   Date Value Ref Range Status   2022 Negative Negative Final     Rhogam was not given.  TDAP was given.    A/P:  - EOB labs collected today  - TDAP given  - Unable to complete EOB education nd birth planning today given limited visit time, follow-up at next visit   - Reviewed fetal movement, and warning signs to present to L&D    Orders Placed This Encounter   Procedures     TDAP (ADACEL,BOOSTRIX)     Glucose tolerance gest screen 1 hour     CBC with platelets     Treponema Abs w Reflex to RPR and Titer     " Vitamin D Deficiency        Continue scheduled prenatal care  ERICA Yarbrough CNM

## 2022-11-16 NOTE — PROGRESS NOTES
The patient was seen for an ultrasound in the [facility] at the [facility] today.  For a detailed report of the ultrasound examination, please see the ultrasound report which can be found under the imaging tab.    Sherrie Salazar MD  , OB/GYN  Maternal-Fetal Medicine  [phone] (Pager)

## 2022-11-16 NOTE — NURSING NOTE
Chief Complaint   Patient presents with     Prenatal Care     28 weeks 0 days pt given glucose drink  finished at 340pm

## 2022-11-17 PROBLEM — R21 RASH AND NONSPECIFIC SKIN ERUPTION: Status: RESOLVED | Noted: 2022-10-26 | Resolved: 2022-11-17

## 2022-11-17 LAB
DEPRECATED CALCIDIOL+CALCIFEROL SERPL-MC: 25 UG/L (ref 20–75)
T PALLIDUM AB SER QL: NONREACTIVE

## 2022-12-06 ENCOUNTER — TELEPHONE (OUTPATIENT)
Dept: OBGYN | Facility: CLINIC | Age: 32
End: 2022-12-06

## 2022-12-06 ENCOUNTER — OFFICE VISIT (OUTPATIENT)
Dept: OBGYN | Facility: CLINIC | Age: 32
End: 2022-12-06
Attending: MIDWIFE
Payer: COMMERCIAL

## 2022-12-06 ENCOUNTER — LAB (OUTPATIENT)
Dept: LAB | Facility: CLINIC | Age: 32
End: 2022-12-06
Attending: MIDWIFE
Payer: COMMERCIAL

## 2022-12-06 VITALS
HEART RATE: 109 BPM | BODY MASS INDEX: 34.83 KG/M2 | SYSTOLIC BLOOD PRESSURE: 99 MMHG | HEIGHT: 64 IN | DIASTOLIC BLOOD PRESSURE: 62 MMHG | WEIGHT: 204 LBS

## 2022-12-06 DIAGNOSIS — O99.891 HX OF POSTPARTUM DEPRESSION, CURRENTLY PREGNANT: ICD-10-CM

## 2022-12-06 DIAGNOSIS — O09.92 HIGH-RISK PREGNANCY, SECOND TRIMESTER: Primary | ICD-10-CM

## 2022-12-06 DIAGNOSIS — R73.09 ELEVATED GLUCOSE TOLERANCE TEST: Primary | ICD-10-CM

## 2022-12-06 DIAGNOSIS — R73.09 ELEVATED GLUCOSE LEVEL: ICD-10-CM

## 2022-12-06 DIAGNOSIS — Z91.89 AT RISK FOR INEFFECTIVE BREASTFEEDING: ICD-10-CM

## 2022-12-06 DIAGNOSIS — O24.410 DIET CONTROLLED GESTATIONAL DIABETES MELLITUS (GDM) IN SECOND TRIMESTER: ICD-10-CM

## 2022-12-06 DIAGNOSIS — B95.1 POSITIVE GBS TEST: ICD-10-CM

## 2022-12-06 DIAGNOSIS — Z86.59 HX OF POSTPARTUM DEPRESSION, CURRENTLY PREGNANT: ICD-10-CM

## 2022-12-06 DIAGNOSIS — N90.810 FEMALE GENITAL MUTILATION: ICD-10-CM

## 2022-12-06 DIAGNOSIS — O09.299 HX OF PREECLAMPSIA, PRIOR PREGNANCY, CURRENTLY PREGNANT: ICD-10-CM

## 2022-12-06 LAB
GESTATIONAL GTT 1 HR POST DOSE: 187 MG/DL (ref 60–179)
GESTATIONAL GTT 2 HR POST DOSE: 186 MG/DL (ref 60–154)
GESTATIONAL GTT 3 HR POST DOSE: 136 MG/DL (ref 60–139)
GLUCOSE P FAST SERPL-MCNC: 92 MG/DL (ref 60–94)

## 2022-12-06 PROCEDURE — 82951 GLUCOSE TOLERANCE TEST (GTT): CPT

## 2022-12-06 PROCEDURE — G0463 HOSPITAL OUTPT CLINIC VISIT: HCPCS

## 2022-12-06 PROCEDURE — 82947 ASSAY GLUCOSE BLOOD QUANT: CPT

## 2022-12-06 PROCEDURE — 99207 PR PRENATAL VISIT: CPT | Performed by: MIDWIFE

## 2022-12-06 PROCEDURE — 82950 GLUCOSE TEST: CPT

## 2022-12-06 PROCEDURE — 36415 COLL VENOUS BLD VENIPUNCTURE: CPT

## 2022-12-06 RX ORDER — BLOOD-GLUCOSE METER
EACH MISCELLANEOUS
Qty: 1 KIT | Refills: 0 | Status: ON HOLD | OUTPATIENT
Start: 2022-12-06 | End: 2023-01-24

## 2022-12-06 NOTE — LETTER
2022     RE: Briana Newman  [address] [facility] [address]     Dear Colleague,    Thank you for referring your patient, Briana Newman, to the [facility] [place] at [facility]. Please see a copy of my visit note below.     32 year old, , 30w5d     No vaginal bleeding or leakage of fluid.  No contractions. Good fetal movement.       No HA, visual changes, RUQ or epigastric pain.   Patient concerns: Feeling well overall.       - Reviewed EOB labs with patient. Needs 3 hour - completing today. Hgb 11.2, vit slightly up, 35  - Atopic eruption of pregnancy, steroids from derm  - TDAP previously done      PHQ-9 SCORE 2022   PHQ-9 Total Score 3     Education completed today includes breast feeding, [facility] hand out, contraception, counting movements, signs of pre-term labor, when to present to [facility], post partum depression, GBS, getting enough iron, labor induction, nitrous oxide, doulas, vitamin K and hypertension disorders.  Birth preferences reviewed: Medicated - epidural  Labor support:  and mom    Feeding plans :  - difficulty with daughter latching and then with milk supply.    Contraception planned:  Unsure -  lives in [place] right now. Would like to wait until  plans to move back to    Reviewed Vit K recommendation      Water birth consent form was not given.  Blood type:   Antibody Screen   Date Value Ref Range Status   2022 Negative Negative Final   Rhogam  was notgiven.  TDAP  wasgiven.  A/P:  Encounter Diagnoses   Name Primary?     High-risk pregnancy, second trimester Yes     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      BMI 33.0-33.9,adult      Hx of postpartum depression, currently pregnant      Hx of preeclampsia, prior pregnancy, currently pregnant      Positive GBS test      No orders of the defined types were placed in this encounter.    Orders Placed This  Encounter   Medications     Cyanocobalamin (B-12) 1000 MCG TBCR     Sig: Take 1 capsule by mouth     Continue scheduled prenatal care  ERICA Cam CNM    - Reviewed total weight gain, encouraged continued healthy diet and exercise.    - Reviewed importance of daily fetal kick count and why/how to contact provider.  - Reviewed why/how to contact provider if headache/visual changes/RUQ or epigastric pain, decreased fetal movement, vaginal bleeding, leakage of fluid or more than 4 contractions in an hour.     Patient education/orders or handouts today:  PTL signs/symptoms  Plan for GBS screening at 36-37 wks.    Next visit in 2 weeks   Call prn if questions or concerns.     ERICA Cam CNM        Again, thank you for allowing me to participate in the care of your patient.      Sincerely,    ERICA Cam CNM

## 2022-12-06 NOTE — PROGRESS NOTES
32 year old, , 30w5d     No vaginal bleeding or leakage of fluid.  No contractions. Good fetal movement.       No HA, visual changes, RUQ or epigastric pain.   Patient concerns: Feeling well overall.       - Reviewed EOB labs with patient. Needs 3 hour - completing today. Hgb 11.2, vit slightly up, 35  - Atopic eruption of pregnancy, steroids from derm  - TDAP previously done      PHQ-9 SCORE 2022   PHQ-9 Total Score 3     Education completed today includes breast feeding, [facility] hand out, contraception, counting movements, signs of pre-term labor, when to present to birthplace, post partum depression, GBS, getting enough iron, labor induction, nitrous oxide, doulas, vitamin K and hypertension disorders.  Birth preferences reviewed: Medicated - epidural  Labor support:  and mom    Feeding plans :  - difficulty with daughter latching and then with milk supply.    Contraception planned:  Unsure -  lives in [place] right now. Would like to wait until  [place] to move back to [place]   Reviewed Vit K recommendation      Water birth consent form was not given.  Blood type:   Antibody Screen   Date Value Ref Range Status   2022 Negative Negative Final   Rhogam  was notgiven.  TDAP  wasgiven.  A/P:  Encounter Diagnoses   Name Primary?     High-risk pregnancy, second trimester Yes     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      BMI 33.0-33.9,adult      Hx of postpartum depression, currently pregnant      Hx of preeclampsia, prior pregnancy, currently pregnant      Positive GBS test      No orders of the defined types were placed in this encounter.    Orders Placed This Encounter   Medications     Cyanocobalamin (B-12) 1000 MCG TBCR     Sig: Take 1 capsule by mouth     Continue scheduled prenatal care  ERICA Cam CNM    - Reviewed total weight gain, encouraged continued healthy diet and exercise.    - Reviewed importance of daily fetal kick count and why/how to  contact provider.  - Reviewed why/how to contact provider if headache/visual changes/RUQ or epigastric pain, decreased fetal movement, vaginal bleeding, leakage of fluid or more than 4 contractions in an hour.     Patient education/orders or handouts today:  PTL signs/symptoms  Plan for GBS screening at 36-37 wks.    Next visit in 2 weeks   Call prn if questions or concerns.     ERICA Cam, LEAH

## 2022-12-07 NOTE — RESULT ENCOUNTER NOTE
Called patient and informed of 3H GTT results.  Patient is picking up supplies today and expecting a call from diabetic education.

## 2022-12-15 ENCOUNTER — VIRTUAL VISIT (OUTPATIENT)
Dept: EDUCATION SERVICES | Facility: CLINIC | Age: 32
End: 2022-12-15
Attending: MIDWIFE
Payer: COMMERCIAL

## 2022-12-15 DIAGNOSIS — O24.410 DIET CONTROLLED GESTATIONAL DIABETES MELLITUS (GDM) IN SECOND TRIMESTER: ICD-10-CM

## 2022-12-15 PROCEDURE — G0108 DIAB MANAGE TRN  PER INDIV: HCPCS | Mod: 93

## 2022-12-15 NOTE — LETTER
12/15/2022         RE: Briana Newman  [address] [address]        Dear Colleague,    Thank you for referring your patient, Briana Newman, to the [facility]. Please see a copy of my visit note below.    Diabetes Self-Management Education & Support  Type of Service: Telephone Visit/ 62 minutes     Originating Location (Patient Location): Home  Distant Location (Provider Location): [facility]  Mode of Communication:  Telephone     Telephone Visit Start Time: 2 PM  Telephone Visit End Time (telephone visit stop time): 3:02 PM     How would patient like to obtain AVS? MyChart      SUBJECTIVE/OBJECTIVE:  Presents for education related to gestational diabetes.    Accompanied by: Self  Diabetes management related comments/concerns: review blood sugars  Gestational weeks: 32w1d  [facility]: [facility]  Number of previous pregnancies: 1  Had any babies over 9 lbs: No  Previously had Gestational Diabetes: No  Have you ever had thyroid problems or taken thyroid medication?: No  Heart disease, mitral valve prolapse or rheumatic fever?: No  Hypertension : No  High Cholesterol: No  High Triglycerides: No  Do you use tobacco products?: No  Do you drink beer, wine or hard liquor?: No    Cultural Influences/Ethnic Background:  Not  or       Estimated Date of Delivery: Feb 8, 2023    1 hour OGTT  Lab Results   Component Value Date    GLU1 164 (H) 11/16/2022         3 hour OGTT    Fasting  Lab Results   Component Value Date    GTTGF 92 12/06/2022       1 hour  Lab Results   Component Value Date    GTTG1 187 (H) 12/06/2022       2 hour  Lab Results   Component Value Date    GTTG2 186 (H) 12/06/2022       3 hour  Lab Results   Component Value Date    GTTG3 136 12/06/2022       Lifestyle and Health Behaviors:  Pre-pregnancy weight (lbs): 196  Exercise:: Unable to exercise  Barrier to exercise: Safety (syncope, POTS  diagnosed this pregnancy)  Cultural/Alevism diet restrictions?: No (not while pregnant, fasts when not pregnant)  Meal planning/habits: None  Meals include: Breakfast, Lunch, Dinner, Afternoon Snack, Evening Snack  Breakfast: 10-11 AM: pancakes, eggs, and/or injera  Lunch: 1-2 PM: pasta with protein and vegetables in a sauce OR turkey sandwich OR rice with protein and vegetables in a sauce  Dinner: 8-9 PM: same as lunch OR bagel with hummus OR bagel with peanut butter and jelly  Snacks: occasionally cereal (cold) with milk between lunch and dinner; HS: cold cereal with milk twice a week  Other: to bed 11 PM - 12 AM  Beverages: Water (Vitamin water)  Pre- vitamin?: No  Supplements?: No (stopped B12, iron, vitamin C in past few days due to gag reflux, plans to resume)  Experiencing nausea?: No  Experiencing heartburn?: Yes    Healthy Coping:  Emotional response to diabetes: Ready to learn  Informal Support system:: None  Stage of change: ACTION (Actively working towards change)    Current Management:  Taking medications for gestational diabetes?: No  Difficulty affording diabetes testing supplies?: No    Patient reported blood sugars:   : 105 mg/dL fasting, 1 hour post meal 226 mg/dL    :  83 mg/dL fasting (taken in afternoon);  1 hour after dinner  123 mg/dL  (Timing 1 hour from end of meal)    ASSESSMENT:  Discussed pathophysiology of gestational diabetes.  Discussed importance of good blood glucose control for health of mom and baby.  Discussed tools for managing gestational diabetes including blood glucose monitoring and meal planning.  Patient unable to engage in physical activity due to medical diagnoses of syncope and POTS syndrome.     Has started testing glucoses and reports the above glucoses of which one fasting glucose is in target and the post meal glucoses are unable to be assessed as she timed from the end of her meal. Discussed when to test glucoses and glucose goals.      Discussed  carbohydrate sources and impact on blood glucose. Reviewed basics of healthy eating and incorporating a variety of foods into meal plan. Instructed on carbohydrate counting and label reading.     Discussed importance of not going too low in carbohydrates since that may cause liver to produce excess glucose and contribute to elevated blood glucose readings and ketone formation.  To also help prevent against ketone formation, protein was encouraged with meals and snacks, especially with the night snack.        INTERVENTION:  Educational topics covered today:  GDM pathophysiology, Risks and Complications of GDM, Means of controlling GDM, When to Call a Diabetes Educator or OB Provider, Healthy Eating During Pregnancy, Counting Carbohydrates, Meal Planning for GDM    Educational materials provided today:   No new educational materials provided today    Pt verbalized understanding of concepts discussed and recommendations provided today.     PLAN:  1. Test glucose 4 times per day:   Fasting (when you first awake for the day): 95 mg/dL or below   1 hour after breakfast: 140 mg/dL or below   1 hour after lunch: 140 mg/dL or below   1 hour after dinner: 140 mg/dL or below     Please bring your meter and log book to all appointments     If you miss 1 hour after meal test, test 2 hours after the meal.  Goal 2 hours after is 120 mg/dL or below.     2.  Meal Plan    Breakfast: 30 grams carbohydrate + protein   Snack: 15-30 grams carbohydrate + protein  Lunch: 45-60 grams carbohydrate + protein  Snack: 15-30 grams carbohydrate + protein  Dinner: 45-60 grams carbohydrate + protein  Snack: 15-30 grams carbohydrate + protein    A few tips:   -consume some carbohydrate every 2-3 hours while awake   -you need a minimum of 175 grams of carbohydrate per day   -use resources to count carbohydrates: food labels, [place] Carbohydrate Counting Guide:  https://journals.sagepub.com/doi/suppl/10.1177/7065476524621643/suppl_file/Supplementary_material.pdf   -fruit and cold breakfast cereal are best tolerated at lunch or later   -protein includes: cheese, eggs, fish, nuts, nut butter, chicken, turkey, beef, and pork   -snack ideas: an individual container of Greek yogurt (try Chobani Less Sugar), whole grain crackers and cheese, chocolate fairlife milk, a Kashi or KIND bar, a baseball size piece of whole fruit + nut butter (apple + peanut butter), fruit canned in it's own juice + cottage cheese    3.  Follow up: Thursday, December 22nd at 2:30 PM    4.  Call Diabetes Education at [phone] or send a [employer] message with:   -questions or concerns   -3 or more blood sugars above target in a 7 day period    Marielle Walls, MPH, RD, CDCES, LD 12/15/2022    Time Spent: 62 minutes  Encounter Type: Individual    Any diabetes medication dose changes were made via the CDE Protocol and Collaborative Practice Agreement with the patient's referring provider. A copy of this encounter was shared with the provider.

## 2022-12-15 NOTE — PROGRESS NOTES
Diabetes Self-Management Education & Support  Type of Service: Telephone Visit/ 62 minutes     Originating Location (Patient Location): Home  Distant Location (Provider Location): [facility]  Mode of Communication:  Telephone     Telephone Visit Start Time: 2 PM  Telephone Visit End Time (telephone visit stop time): 3:02 PM     How would patient like to obtain AVS? Albert      SUBJECTIVE/OBJECTIVE:  Presents for education related to gestational diabetes.    Accompanied by: Self  Diabetes management related comments/concerns: review blood sugars  Gestational weeks: 32w1d  Hospital planned for delivery: [facility]  Next OB Visit Date: 12/21/22  Number of previous pregnancies: 1  Had any babies over 9 lbs: No  Previously had Gestational Diabetes: No  Have you ever had thyroid problems or taken thyroid medication?: No  Heart disease, mitral valve prolapse or rheumatic fever?: No  Hypertension : No  High Cholesterol: No  High Triglycerides: No  Do you use tobacco products?: No  Do you drink beer, wine or hard liquor?: No    Cultural Influences/Ethnic Background:  Not  or       Estimated Date of Delivery: Feb 8, 2023    1 hour OGTT  Lab Results   Component Value Date    GLU1 164 (H) 11/16/2022         3 hour OGTT    Fasting  Lab Results   Component Value Date    GTTGF 92 12/06/2022       1 hour  Lab Results   Component Value Date    GTTG1 187 (H) 12/06/2022       2 hour  Lab Results   Component Value Date    GTTG2 186 (H) 12/06/2022       3 hour  Lab Results   Component Value Date    GTTG3 136 12/06/2022       Lifestyle and Health Behaviors:  Pre-pregnancy weight (lbs): 196  Exercise:: Unable to exercise  Barrier to exercise: Safety (syncope, POTS diagnosed this pregnancy)  Cultural/Judaism diet restrictions?: No (not while pregnant, fasts when not pregnant)  Meal planning/habits: None  Meals include: Breakfast, Lunch, Dinner, Afternoon Snack, Evening Snack  Breakfast: 10-11 AM: pancakes,  eggs, and/or injera  Lunch: 1-2 PM: pasta with protein and vegetables in a sauce OR turkey sandwich OR rice with protein and vegetables in a sauce  Dinner: 8-9 PM: same as lunch OR bagel with hummus OR bagel with peanut butter and jelly  Snacks: occasionally cereal (cold) with milk between lunch and dinner; HS: cold cereal with milk twice a week  Other: to bed 11 PM - 12 AM  Beverages: Water (Vitamin water)  Pre-zeinab vitamin?: No  Supplements?: No (stopped B12, iron, vitamin C in past few days due to gag reflux, plans to resume)  Experiencing nausea?: No  Experiencing heartburn?: Yes    Healthy Coping:  Emotional response to diabetes: Ready to learn  Informal Support system:: None  Stage of change: ACTION (Actively working towards change)    Current Management:  Taking medications for gestational diabetes?: No  Difficulty affording diabetes testing supplies?: No    Patient reported blood sugars:   : 105 mg/dL fasting, 1 hour post meal 226 mg/dL    :  83 mg/dL fasting (taken in afternoon);  1 hour after dinner  123 mg/dL  (Timing 1 hour from end of meal)    ASSESSMENT:  Discussed pathophysiology of gestational diabetes.  Discussed importance of good blood glucose control for health of mom and baby.  Discussed tools for managing gestational diabetes including blood glucose monitoring and meal planning.  Patient unable to engage in physical activity due to medical diagnoses of syncope and POTS syndrome.     Has started testing glucoses and reports the above glucoses of which one fasting glucose is in target and the post meal glucoses are unable to be assessed as she timed from the end of her meal. Discussed when to test glucoses and glucose goals.      Discussed carbohydrate sources and impact on blood glucose. Reviewed basics of healthy eating and incorporating a variety of foods into meal plan. Instructed on carbohydrate counting and label reading.     Discussed importance of not going too low in  carbohydrates since that may cause liver to produce excess glucose and contribute to elevated blood glucose readings and ketone formation.  To also help prevent against ketone formation, protein was encouraged with meals and snacks, especially with the night snack.        INTERVENTION:  Educational topics covered today:  GDM pathophysiology, Risks and Complications of GDM, Means of controlling GDM, When to Call a Diabetes Educator or OB Provider, Healthy Eating During Pregnancy, Counting Carbohydrates, Meal Planning for GDM    Educational materials provided today:   No new educational materials provided today    Pt verbalized understanding of concepts discussed and recommendations provided today.     PLAN:  1. Test glucose 4 times per day:   Fasting (when you first awake for the day): 95 mg/dL or below   1 hour after breakfast: 140 mg/dL or below   1 hour after lunch: 140 mg/dL or below   1 hour after dinner: 140 mg/dL or below     Please bring your meter and log book to all appointments     If you miss 1 hour after meal test, test 2 hours after the meal.  Goal 2 hours after is 120 mg/dL or below.     2.  Meal Plan    Breakfast: 30 grams carbohydrate + protein   Snack: 15-30 grams carbohydrate + protein  Lunch: 45-60 grams carbohydrate + protein  Snack: 15-30 grams carbohydrate + protein  Dinner: 45-60 grams carbohydrate + protein  Snack: 15-30 grams carbohydrate + protein    A few tips:   -consume some carbohydrate every 2-3 hours while awake   -you need a minimum of 175 grams of carbohydrate per day   -use resources to count carbohydrates: food labels, [place] Carbohydrate Counting Guide: https://journals.NOBOTub.com/doi/suppl/10.1177/3641434762183140/suppl_file/Supplementary_material.pdf   -fruit and cold breakfast cereal are best tolerated at lunch or later   -protein includes: cheese, eggs, fish, nuts, nut butter, chicken, turkey, beef, and pork   -snack ideas: an individual container of Greek yogurt (try  Chobani Less Sugar), whole grain crackers and cheese, chocolate fairlife milk, a Kashi or KIND bar, a baseball size piece of whole fruit + nut butter (apple + peanut butter), fruit canned in it's own juice + cottage cheese    3.  Follow up: Thursday, December 22nd at 2:30 PM    4.  Call Diabetes Education at [phone] or send a [employer] message with:   -questions or concerns   -3 or more blood sugars above target in a 7 day period    Marielle Walls, MPH, RD, CDCES, LD 12/15/2022    Time Spent: 62 minutes  Encounter Type: Individual    Any diabetes medication dose changes were made via the CDE Protocol and Collaborative Practice Agreement with the patient's referring provider. A copy of this encounter was shared with the provider.

## 2022-12-15 NOTE — PATIENT INSTRUCTIONS
Test glucose 4 times per day:   Fasting (when you first awake for the day): 95 mg/dL or below   1 hour after breakfast: 140 mg/dL or below   1 hour after lunch: 140 mg/dL or below   1 hour after dinner: 140 mg/dL or below     Please bring your meter and log book to all appointments     If you miss 1 hour after meal test, test 2 hours after the meal.  Goal 2 hours after is 120 mg/dL or below.     2.  Meal Plan    Breakfast: 30 grams carbohydrate + protein   Snack: 15-30 grams carbohydrate + protein  Lunch: 45-60 grams carbohydrate + protein  Snack: 15-30 grams carbohydrate + protein  Dinner: 45-60 grams carbohydrate + protein  Snack: 15-30 grams carbohydrate + protein    A few tips:   -consume some carbohydrate every 2-3 hours while awake   -you need a minimum of 175 grams of carbohydrate per day   -use resources to count carbohydrates: food labels, [place] Carbohydrate Counting Guide: https://journals.eyesFinderpub.com/doi/suppl/10.1177/8757797820780994/suppl_file/Supplementary_material.pdf   -fruit and cold breakfast cereal are best tolerated at lunch or later   -protein includes: cheese, eggs, fish, nuts, nut butter, chicken, turkey, beef, and pork   -snack ideas: an individual container of Greek yogurt (try Chobani Less Sugar), whole grain crackers and cheese, chocolate fairlife milk, a Kashi or KIND bar, a baseball size piece of whole fruit + nut butter (apple + peanut butter), fruit canned in it's own juice + cottage cheese    3.  Follow up: Thursday, December 22nd at 2:30 PM    4.  Call Diabetes Education at [phone] or send a [employer] message with:   -questions or concerns   -3 or more blood sugars above target in a 7 day period    Thank you,     Marielle Walls, MPH, RD, CDCES, LD 12/15/2022

## 2022-12-21 ENCOUNTER — OFFICE VISIT (OUTPATIENT)
Dept: OBGYN | Facility: CLINIC | Age: 32
End: 2022-12-21
Attending: ADVANCED PRACTICE MIDWIFE
Payer: COMMERCIAL

## 2022-12-21 VITALS
BODY MASS INDEX: 34.54 KG/M2 | HEART RATE: 93 BPM | DIASTOLIC BLOOD PRESSURE: 77 MMHG | SYSTOLIC BLOOD PRESSURE: 112 MMHG | WEIGHT: 202.3 LBS | HEIGHT: 64 IN

## 2022-12-21 DIAGNOSIS — O09.93 HIGH-RISK PREGNANCY IN THIRD TRIMESTER: Primary | ICD-10-CM

## 2022-12-21 DIAGNOSIS — O24.410 DIET CONTROLLED GESTATIONAL DIABETES MELLITUS (GDM) IN THIRD TRIMESTER: ICD-10-CM

## 2022-12-21 PROCEDURE — G0463 HOSPITAL OUTPT CLINIC VISIT: HCPCS | Performed by: ADVANCED PRACTICE MIDWIFE

## 2022-12-21 PROCEDURE — 99207 PR PRENATAL VISIT: CPT | Performed by: ADVANCED PRACTICE MIDWIFE

## 2022-12-21 NOTE — LETTER
"2022       RE: Briana Newman  [address] [address]     Dear Colleague,    Thank you for referring your patient, Briana Newman, to the [facility] [place] [facility]. Please see a copy of my visit note below.    Subjective:      32 year old  at 33w0d presentst for a routine prenatal appointment.     Denies cramping/contractions, vaginal bleeding, discharge or leakage of fluid. Reports +fetal movement.  No HA, vision changes, ruq/epigastric pain.      Patient concerns: Feeling well overall. Pregnancy c/b recent diagnosis of GDM. Had diabetic education appointment on 12/15/22. Pt was counseled on dietary changes as BG value elevated. Reports since this visit she has continued to have some elevated values. Fasting >100, 1 hours 160-180s. Has follow up appointment with diabetic ed tomorrow.    GDM:   Current therapy:   Nutritional Therapy    She is checking her blood sugars regularly, including Fasting and 1-hour post-prandial.    Diabetes Symptoms:   none      GDM Assess:  After reviewing her blood sugars, greater than 50% are at target. Based on this, I recommend follow up with diabetic ed tomorrow; may need to start insulin.     GDM Plan:   Appt tomorrow!  Continue dietary modifications, regular exercise as able  Continue to check blood glucose 4x daily  Bring glucose log to all appointments  Needs 2-hr GTT at 6 wks post-partum    Objective:  Vitals:    22 1159   BP: 112/77   Pulse: 93   Weight: 91.8 kg (202 lb 4.8 oz)   Height: 1.626 m (5' 4\")     See ob flowsheet    Assessment/Plan     Encounter Diagnoses   Name Primary?     High-risk pregnancy in third trimester Yes     Diet controlled gestational diabetes mellitus (GDM) in third trimester      Orders Placed This Encounter   Procedures     US OB Biophys Single Gestation Measure     - Reviewed total weight gain, encouraged continued healthy diet and exercise. "  .  Reviewed importance of daily fetal kick count and why/how to contact provider.    - Reviewed why/how to contact provider if headache/visual changes/RUQ or epigastric pain, decreased fetal movement, vaginal bleeding, leakage of fluid or more than 4 contractions in an hour.     Patient education/orders or handouts today:  PTL signs/symptoms    - Discussed diagnosis of GDM. Reviewed blood sugar values and expected target values.  Follow up at diabetic education appointment tomorrow.  Reviewed that insulin may be recommended if continued >50% values elevated.  Discussed  surveillance and delivery timing for gdma1 vs gdma2  - BPP and growth ultrasound ordered- will schedule within the next 1-2 weeks.    Continue scheduled prenatal care, RTC in 2 weeks and prn if questions or concerns.      ERICA Farias CNM       Again, thank you for allowing me to participate in the care of your patient.      Sincerely,    Caity Szymanski CNM

## 2022-12-21 NOTE — LETTER
Date:December 21, 2022      Provider requested that no letter be sent. Do not send.       [facility]

## 2022-12-21 NOTE — PROGRESS NOTES
"Subjective:      32 year old  at 33w0d presentst for a routine prenatal appointment.     Denies cramping/contractions, vaginal bleeding, discharge or leakage of fluid. Reports +fetal movement.  No HA, vision changes, ruq/epigastric pain.      Patient concerns: Feeling well overall. Pregnancy c/b recent diagnosis of GDM. Had diabetic education appointment on 12/15/22. Pt was counseled on dietary changes as BG value elevated. Reports since this visit she has continued to have some elevated values. Fasting >100, 1 hours 160-180s. Has follow up appointment with diabetic ed tomorrow.    GDM:   Current therapy:   Nutritional Therapy    She is checking her blood sugars regularly, including Fasting and 1-hour post-prandial.    Diabetes Symptoms:   none      GDM Assess:  After reviewing her blood sugars, greater than 50% are at target. Based on this, I recommend follow up with diabetic ed tomorrow; may need to start insulin.     GDM Plan:   Appt tomorrow!  Continue dietary modifications, regular exercise as able  Continue to check blood glucose 4x daily  Bring glucose log to all appointments  Needs 2-hr GTT at 6 wks post-partum    Objective:  Vitals:    22 1159   BP: 112/77   Pulse: 93   Weight: 91.8 kg (202 lb 4.8 oz)   Height: 1.626 m (5' 4\")     See ob flowsheet    Assessment/Plan     Encounter Diagnoses   Name Primary?     High-risk pregnancy in third trimester Yes     Diet controlled gestational diabetes mellitus (GDM) in third trimester      Orders Placed This Encounter   Procedures     US OB Biophys Single Gestation Measure     - Reviewed total weight gain, encouraged continued healthy diet and exercise.  .  Reviewed importance of daily fetal kick count and why/how to contact provider.    - Reviewed why/how to contact provider if headache/visual changes/RUQ or epigastric pain, decreased fetal movement, vaginal bleeding, leakage of fluid or more than 4 contractions in an hour.     Patient education/orders " or handouts today:  PTL signs/symptoms    - Discussed diagnosis of GDM. Reviewed blood sugar values and expected target values.  Follow up at diabetic education appointment tomorrow.  Reviewed that insulin may be recommended if continued >50% values elevated.  Discussed  surveillance and delivery timing for gdma1 vs gdma2  - BPP and growth ultrasound ordered- will schedule within the next 1-2 weeks.    Continue scheduled prenatal care, RTC in 2 weeks and prn if questions or concerns.      Caity Szymanski, ERICA, CNM

## 2022-12-22 ENCOUNTER — MYC MEDICAL ADVICE (OUTPATIENT)
Dept: EDUCATION SERVICES | Facility: CLINIC | Age: 32
End: 2022-12-22

## 2022-12-23 ENCOUNTER — TELEPHONE (OUTPATIENT)
Dept: FAMILY MEDICINE | Facility: CLINIC | Age: 32
End: 2022-12-23

## 2022-12-23 DIAGNOSIS — O24.414 INSULIN CONTROLLED GESTATIONAL DIABETES MELLITUS (GDM) IN THIRD TRIMESTER: Primary | ICD-10-CM

## 2022-12-23 DIAGNOSIS — O24.410 DIET CONTROLLED GESTATIONAL DIABETES MELLITUS (GDM) IN THIRD TRIMESTER: Primary | ICD-10-CM

## 2022-12-23 RX ORDER — PEN NEEDLE, DIABETIC 32GX 5/32"
NEEDLE, DISPOSABLE MISCELLANEOUS
Qty: 100 EACH | Refills: 1 | Status: ON HOLD | OUTPATIENT
Start: 2022-12-23 | End: 2023-01-24

## 2022-12-23 NOTE — TELEPHONE ENCOUNTER
Called patient regarding scheduling an appointment for 12/30 with Dr. Pérez about starting Levemir 10 units at bedtime for gestational diabetes.

## 2022-12-23 NOTE — TELEPHONE ENCOUNTER
Agree with insulin start, Rx signed.   [facility] RN team, please schedule her with Mami Pérez, or Marycruz to follow-up on insulin start and home blood sugar readings.

## 2022-12-23 NOTE — TELEPHONE ENCOUNTER
"Gestational Diabetes Follow-up    Subjective/Objective:    Briana Newman sent in blood glucose log for review. Last date of communication was: 12/15 appointment, was not able to complete follow-up yesterday as scheduled.    Gestational diabetes is being managed with diet and activity    Taking diabetes medications: no    Estimated Date of Delivery: Feb 8, 2023    BG/Food Log:   It has been a busy week I apologize for the inconsistencies         Assessment:    Ketones: not noted.   Fasting blood glucoses: 20% in target.  After breakfast: 0% in target. (2 data points)  Before lunch: x% in target.  After lunch: 20% in target.  Before dinner: x% in target.  After dinner: 50% in target.    Plan/Response:  Meal Plan Recommendation: 30 carbs at breakfast, 45 carbs at lunch, 45 carbs at supper, HS snack with carb + protein  ~ she has not been eating after 8 or 9 PM at the latest in hopes of keeping AM number down- encouraged that late night/bedtime snack as late as possible can actually help keep snack down, recommended try this    Recommend that patient begin NPH insulin 10 units at HS (0.1/kg)   Pended order.     ~ Spoke to Briana and identified high fasting BG (as first intervention point) will message Dr. Curran about the high BG's and insulin. She voices understanding.  ~ did NOT instruct on insulin technique today on phone    Encouraged carb control at meals as 30g-45g-45g. She says she is trying healthy eating. See note of 12/15 for details.     When I spoke to Briana, she reported having some \"sharp chest, upper abdominal pain, kind of where the liver would be\" for the past few days. She says it is tolerable and she can call OB Monday.   I strongly encouraged the need to call today- do not wait until Monday. OB can ask for more details and make a treatment plan as they wish.     I also called OB office and alerted them, spoke to triage nurse. She says they will reach out.    Jeanette Carmona RD, LD, " CDCES    Any diabetes medication dose changes were made via the CDE Protocol and Collaborative Practice Agreement with the patient's OB/GYN provider. A copy of this encounter was shared with the provider.

## 2022-12-23 NOTE — TELEPHONE ENCOUNTER
Patient with only 20% fasting BG in target, other elevations after meals as well but first step recommendation is to start NPH at HS.   Pending order -   Jeanette Carmona RD, LD, [facility]

## 2022-12-23 NOTE — TELEPHONE ENCOUNTER
PA Initiation    Medication: Humulin  Insurance Company:    Pharmacy Filling the Rx: [facility] [place], [place] - [address]  Filling Pharmacy Phone:    Filling Pharmacy Fax:    Start Date: 12/23/2022    [id number]        According to plan website, Novolog and Humalog Kwikpen's are covered. Sent PA but consider changing if able.    Thanks!  Sander Valdez [facility]   Specialty/Endo Pharmacy Liaison Float  P [phone]  F [phone]

## 2022-12-28 NOTE — PROGRESS NOTES
[facility] Dermatology Note  Encounter Date: Dec 29, 2022  Office Visit     Dermatology Problem List:  1. Atopic eruption of pregnancy  - continue triamcinolone 0.1% ointment daily prn to affected areas on body, fluocinolone 0.01% oil daily prn to affected areas on scalp  - continue gentle skin care  ____________________________________________    Assessment & Plan:     # Atopic eruption of pregnancy, improved  Appears significantly improved with topical steroid use and gentle skin care regimen, patient is currently using treatments relatively infrequently for maintenance. Given her current frequency of maintenance, discussed that it is safe to continue with topical steroids for recurrent flares as they arise. I am hopeful that with the delivery of her baby as she did not previously have atopic dermatitis unrelated to pregnancy that she will not require more long term treatment. She is agreeable with this plan.  - continue triamcinolone 0.1% ointment daily prn to affected areas on body  - continue fluocinolone 0.01% oil daily prn to affected areas on scalp  - dry skin/sensitive skin handout provided    # Benign nevus, L postauricular skin  Reassured patient of benign findings. There is no medical need for treatment/removal today, though given location with masks/headwear, if lesion becomes symptomatic can consider shave removal. Patient is agreeable with this plan.    Procedures Performed:   None    Follow-up: prn for new/worsening symptoms    Staff and Resident:     Donna Campos MD  PGY2 Dermatology Resident  ____________________________________________    CC: Derm Problem (Patient is here today for rash on arms, chest, back. Has gotten better.)    HPI:  Ms. Briana Newman is a(n) 32 year old female who presents today for follow-up for atopic eruption of pregnancy (back, upper arms, chest). She was last seen in clinic on 11/3/22 (2 months ago) by myself for the same concern, at which time  "topical steroids and sensitive/gentle skin products were recommended. She is now 34 weeks gestation.    Briana states that she has experienced 90% improvement of her rash with topical steroids. Will experience persistent flares on her upper back intermittently. Had been using fluocinolone 0.01% oil initially nightly for the first week, now using once weekly or every other week. Now using triamcinolone as needed (approximately once weekly or every other week for a few days in duration), was previously using nightly for the first few weeks. She has been using Aquaphor for body moisturizer, and Dove bar soap with oatmeal baths intermittently in the shower.     Additionally, Briana reports having a spot behind her left ear that she would like further evaluated today, present as a child which has become more \"bumpy\" over time. Patient is otherwise feeling well, without additional skin concerns.    Labs Reviewed:  N/A    Physical Exam:  Vitals: LMP 05/02/2022 (Approximate)   SKIN: Focused examination of back, upper arms, chest, and L postauricular skin was performed.  - Diffusely throughout the back, upper arms, and chest, there are numerous hyperpigmented macules and few excoriated papules on the central upper back, primarily consistent with post-inflammatory hyperpigmentation.   - On the L postauricular skin, there is a solitary brown pedunculated papule.  - No other lesions of concern on areas examined.     Medications:  Current Outpatient Medications   Medication     blood glucose (NO BRAND SPECIFIED) lancets standard     blood glucose (NO BRAND SPECIFIED) test strip     blood glucose monitoring (ACCU-CHEK TEJ PLUS) meter device kit     Cyanocobalamin (B-12) 1000 MCG TBCR     Fluocinolone Acetonide Scalp (DERMA-SMOOTHE/FS SCALP) 0.01 % OIL oil     folic acid 800 MCG tablet     insulin glargine (LANTUS PEN) 100 UNIT/ML pen     insulin pen needle (ULTICARE MICRO) 32G X 4 MM miscellaneous     vitamin D3 " (CHOLECALCIFEROL) 50 mcg (2000 units) tablet     aspirin (ASA) 81 MG EC tablet     COMPRESSION STOCKINGS     hydrocortisone (CORTAID) 1 % external cream     hydrOXYzine (ATARAX) 25 MG tablet     triamcinolone (KENALOG) 0.1 % external ointment     No current facility-administered medications for this visit.      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

## 2022-12-29 ENCOUNTER — OFFICE VISIT (OUTPATIENT)
Dept: DERMATOLOGY | Facility: CLINIC | Age: 32
End: 2022-12-29
Payer: COMMERCIAL

## 2022-12-29 ENCOUNTER — TELEPHONE (OUTPATIENT)
Dept: DERMATOLOGY | Facility: CLINIC | Age: 32
End: 2022-12-29

## 2022-12-29 DIAGNOSIS — L20.9 ATOPIC DERMATITIS, UNSPECIFIED TYPE: Primary | ICD-10-CM

## 2022-12-29 DIAGNOSIS — D22.9 BENIGN NEVUS: ICD-10-CM

## 2022-12-29 PROCEDURE — 99213 OFFICE O/P EST LOW 20 MIN: CPT | Mod: GC | Performed by: DERMATOLOGY

## 2022-12-29 RX ORDER — TRIAMCINOLONE ACETONIDE 1 MG/G
OINTMENT TOPICAL 2 TIMES DAILY
Qty: 453.6 G | Refills: 2 | Status: ON HOLD | OUTPATIENT
Start: 2022-12-29 | End: 2023-01-24

## 2022-12-29 RX ORDER — FLUOCINOLONE ACETONIDE 0.11 MG/ML
OIL TOPICAL
Qty: 118.28 ML | Refills: 2 | Status: SHIPPED | OUTPATIENT
Start: 2022-12-29 | End: 2023-02-02

## 2022-12-29 ASSESSMENT — PAIN SCALES - GENERAL: PAINLEVEL: NO PAIN (0)

## 2022-12-29 NOTE — TELEPHONE ENCOUNTER
Prior Authorization Retail Medication Request    Medication/Dose: Triamcinolone acetonide 0.1% ointment.  ICD code (if different than what is on RX):    Previously Tried and Failed:    Rationale:      Insurance Name:  [facility]  Insurance ID:  [id number]      Pharmacy Information (if different than what is on RX)  Name:  [facility]  Phone:  [phone]

## 2022-12-29 NOTE — PROGRESS NOTES
I talked with and examined Briana Newman and I agree with the assessment and the plan. MANDEEP Lambert MD.

## 2022-12-29 NOTE — PROGRESS NOTES
"  [facility]    CC: RECHECK (GDM. Pt is 34w2d)      HPI:  Briana Newman is a 32 year old  at 34w2d with a diagnosis of gestational diabetes. She also has a dx of POTS during this pregnancy. At last check in with DM educator, she was instructed to start insulin -- Lantus 10 units at bedtime. She did not start insulin yet. Says her blood sugar readings \"have been good for the most part.\" But upon review of the blood glucose readings, they have all been out of range. She is not checking very regularly. She states she is following diabetic diet. She is not having a bedtime snack, hard to fit in while taking care of a toddler.     No h/o GDM in prior pregnancy but she does have a history of pre-eclampsia in prior pregnancy.   She is not currently taking baby aspirin bc it was exacerbating her POTS symptoms.   She saw cardiology for POTS and was instructed to wear compression stockings which she is wearing most day.     She has seen diabetes education.   She does not have a history of gestational diabetes.   She does not have a history of Type 2 Diabetes.   She does  have a history of chronic hypertension, gestational hypertension or pre-eclampsia.   She does not have a history of LGA (infant >10 lbs).     She also notes some abdominal pain -- states sharp pain in the epigastric area that radiates to the right. Shoots down the right side. Feels like a contraction. Worse with eating or drinking. No changes in her bowel movements. No headaches. Some dizziness and lightheadedness occasionally.     Blood sugars are as follows:     Date Fasting Post-Breakfast Post-Lunch Post-Dinner    102 185  145    104 198 153     96 157      96       99  150     100       96          Past Medical History:   Diagnosis Date     Gestational diabetes mellitus (GDM) 2022     Postpartum depression     w first, unsure about med use     POTS (postural orthostatic " tachycardia syndrome)      Pre-eclampsia     HTN started 3rd tri, them PreE w severe features,induced at term     Shortness of breath      Past Surgical History:   Procedure Laterality Date     BREAST SURGERY  07/10/2012    cystecomy right breast-benign     wisdom teeth       Physical Exam:   BP 97/66   Pulse 63   LMP 2022 (Approximate)   Gen: Pleasant female, in NAD  Abd: Gravid abdomen, tender in RUQ and epigastric area, nl bowel sounds  Ext: WWP, no LE edema  Neuro: AOx3, no focal deficits    Assessment:   Briana Newman is a 32 year old  at 34w2d with a diagnosis of gestational diabetes. After reviewing her blood sugars, less than 50% are at target. Based on this, I recommend Initiation of insulin therapy.   RUQ abdominal pain    Plan:   Continue dietary modifications, regular exercise as able  Continue to check blood glucose 4x daily  Bring glucose log to all appointments  Needs 2-hr GTT at 6 wks post-partum  Start insulin today  10 units Lantus - discussed how to administer; insulin and pen needles were prescribed last week so should be ready to  at pharmacy  F/u on Tuesday for phone visit to review blood glucose readings -- will adjust insulin dose at that visit  Check pre-E labs, obtain RUQ US  Encouraged her to start baby aspirin 81 mg daily  Worrisome signs and symptoms were discussed with Briana and she was instructed to return to the clinic for concerning symptoms or to call with questions.    Veronica Pérez MD

## 2022-12-29 NOTE — PATIENT INSTRUCTIONS
Continue DermaSmoothe oil to scalp and triamcinolone to affected areas on the body as needed with symptoms.

## 2022-12-29 NOTE — TELEPHONE ENCOUNTER
Central Prior Authorization Team   Phone: [phone]    PA Initiation    Medication: PA Required for qty above #15 of triamcinolone acetonide 0.1% ointment.  Insurance Company: [email or web address] ([facility]) - Phone [phone] Fax [phone]  Pharmacy Filling the Rx: [place], [place] - [address]  Filling Pharmacy Phone: [phone]  Filling Pharmacy Fax:    Start Date: 12/29/2022

## 2022-12-30 ENCOUNTER — ANCILLARY PROCEDURE (OUTPATIENT)
Dept: ULTRASOUND IMAGING | Facility: CLINIC | Age: 32
End: 2022-12-30
Attending: ADVANCED PRACTICE MIDWIFE
Payer: COMMERCIAL

## 2022-12-30 ENCOUNTER — LAB (OUTPATIENT)
Dept: LAB | Facility: CLINIC | Age: 32
End: 2022-12-30
Attending: ADVANCED PRACTICE MIDWIFE
Payer: COMMERCIAL

## 2022-12-30 ENCOUNTER — OFFICE VISIT (OUTPATIENT)
Dept: FAMILY MEDICINE | Facility: CLINIC | Age: 32
End: 2022-12-30
Attending: ADVANCED PRACTICE MIDWIFE
Payer: COMMERCIAL

## 2022-12-30 VITALS — HEART RATE: 63 BPM | DIASTOLIC BLOOD PRESSURE: 66 MMHG | SYSTOLIC BLOOD PRESSURE: 97 MMHG

## 2022-12-30 DIAGNOSIS — O24.414 INSULIN CONTROLLED GESTATIONAL DIABETES MELLITUS (GDM) IN THIRD TRIMESTER: Primary | ICD-10-CM

## 2022-12-30 DIAGNOSIS — Z87.59 HISTORY OF PRE-ECLAMPSIA: ICD-10-CM

## 2022-12-30 DIAGNOSIS — O09.93 HIGH-RISK PREGNANCY IN THIRD TRIMESTER: ICD-10-CM

## 2022-12-30 DIAGNOSIS — R10.11 RUQ ABDOMINAL PAIN: ICD-10-CM

## 2022-12-30 LAB
ALBUMIN SERPL-MCNC: 2.8 G/DL (ref 3.4–5)
ALP SERPL-CCNC: 83 U/L (ref 40–150)
ALT SERPL W P-5'-P-CCNC: 10 U/L (ref 0–50)
ANION GAP SERPL CALCULATED.3IONS-SCNC: 10 MMOL/L (ref 3–14)
AST SERPL W P-5'-P-CCNC: 12 U/L (ref 0–45)
BILIRUB SERPL-MCNC: 0.3 MG/DL (ref 0.2–1.3)
BUN SERPL-MCNC: 5 MG/DL (ref 7–30)
CALCIUM SERPL-MCNC: 9 MG/DL (ref 8.5–10.1)
CHLORIDE BLD-SCNC: 108 MMOL/L (ref 94–109)
CO2 SERPL-SCNC: 20 MMOL/L (ref 20–32)
CREAT SERPL-MCNC: 0.42 MG/DL (ref 0.52–1.04)
CREAT UR-MCNC: 75 MG/DL
ERYTHROCYTE [DISTWIDTH] IN BLOOD BY AUTOMATED COUNT: 13.8 % (ref 10–15)
GFR SERPL CREATININE-BSD FRML MDRD: >90 ML/MIN/1.73M2
GLUCOSE BLD-MCNC: 117 MG/DL (ref 70–99)
HCT VFR BLD AUTO: 30.6 % (ref 35–47)
HGB BLD-MCNC: 10.5 G/DL (ref 11.7–15.7)
MCH RBC QN AUTO: 28.1 PG (ref 26.5–33)
MCHC RBC AUTO-ENTMCNC: 34.3 G/DL (ref 31.5–36.5)
MCV RBC AUTO: 82 FL (ref 78–100)
PLATELET # BLD AUTO: 391 10E3/UL (ref 150–450)
POTASSIUM BLD-SCNC: 3.5 MMOL/L (ref 3.4–5.3)
PROT SERPL-MCNC: 7.2 G/DL (ref 6.8–8.8)
PROT UR-MCNC: 0.2 G/L
PROT/CREAT 24H UR: 0.27 G/G CR (ref 0–0.2)
RBC # BLD AUTO: 3.74 10E6/UL (ref 3.8–5.2)
SODIUM SERPL-SCNC: 138 MMOL/L (ref 133–144)
WBC # BLD AUTO: 11.7 10E3/UL (ref 4–11)

## 2022-12-30 PROCEDURE — 76819 FETAL BIOPHYS PROFIL W/O NST: CPT | Mod: 26 | Performed by: OBSTETRICS & GYNECOLOGY

## 2022-12-30 PROCEDURE — 99214 OFFICE O/P EST MOD 30 MIN: CPT | Performed by: FAMILY MEDICINE

## 2022-12-30 PROCEDURE — 76816 OB US FOLLOW-UP PER FETUS: CPT | Mod: 26 | Performed by: OBSTETRICS & GYNECOLOGY

## 2022-12-30 PROCEDURE — 84156 ASSAY OF PROTEIN URINE: CPT

## 2022-12-30 PROCEDURE — 76819 FETAL BIOPHYS PROFIL W/O NST: CPT

## 2022-12-30 PROCEDURE — G0463 HOSPITAL OUTPT CLINIC VISIT: HCPCS | Mod: 25 | Performed by: FAMILY MEDICINE

## 2022-12-30 PROCEDURE — 85027 COMPLETE CBC AUTOMATED: CPT

## 2022-12-30 PROCEDURE — 76816 OB US FOLLOW-UP PER FETUS: CPT

## 2022-12-30 PROCEDURE — 80053 COMPREHEN METABOLIC PANEL: CPT

## 2022-12-30 PROCEDURE — 36415 COLL VENOUS BLD VENIPUNCTURE: CPT

## 2022-12-30 RX ORDER — LANCETS
EACH MISCELLANEOUS
Status: ON HOLD | COMMUNITY
Start: 2022-12-07 | End: 2023-01-24

## 2022-12-30 ASSESSMENT — PATIENT HEALTH QUESTIONNAIRE - PHQ9: SUM OF ALL RESPONSES TO PHQ QUESTIONS 1-9: 10

## 2023-01-02 NOTE — TELEPHONE ENCOUNTER
Prior Authorization Approval    Authorization Effective Date: 12/29/2022  Authorization Expiration Date: 12/29/2023  Medication: PA Required for qty above #15 of triamcinolone acetonide 0.1% ointment.  Approved Dose/Quantity:   Reference #:     Insurance Company: [employer] ([facility]) - Phone [phone] Fax [phone]  Expected CoPay:       CoPay Card Available:      Foundation Assistance Needed:    Which Pharmacy is filling the prescription (Not needed for infusion/clinic administered): [facility] [place], [place] - [address]  Pharmacy Notified: Yes  Patient Notified: Yes

## 2023-01-03 ENCOUNTER — OFFICE VISIT (OUTPATIENT)
Dept: OBGYN | Facility: CLINIC | Age: 33
End: 2023-01-03
Attending: ADVANCED PRACTICE MIDWIFE
Payer: COMMERCIAL

## 2023-01-03 VITALS
SYSTOLIC BLOOD PRESSURE: 111 MMHG | BODY MASS INDEX: 35.07 KG/M2 | DIASTOLIC BLOOD PRESSURE: 79 MMHG | HEART RATE: 80 BPM | WEIGHT: 205.4 LBS | HEIGHT: 64 IN

## 2023-01-03 DIAGNOSIS — O24.414 INSULIN CONTROLLED GESTATIONAL DIABETES MELLITUS (GDM) IN THIRD TRIMESTER: ICD-10-CM

## 2023-01-03 DIAGNOSIS — O09.92 HIGH-RISK PREGNANCY, SECOND TRIMESTER: Primary | ICD-10-CM

## 2023-01-03 PROCEDURE — G0463 HOSPITAL OUTPT CLINIC VISIT: HCPCS | Performed by: ADVANCED PRACTICE MIDWIFE

## 2023-01-03 PROCEDURE — 99207 PR PRENATAL VISIT: CPT | Performed by: ADVANCED PRACTICE MIDWIFE

## 2023-01-03 NOTE — LETTER
Date:January 3, 2023      Provider requested that no letter be sent. Do not send.       [facility]

## 2023-01-03 NOTE — LETTER
"1/3/2023       RE: Briana Newman  [address] [address]     Dear Colleague,    Thank you for referring your patient, Briana Newman, to the [facility] [place] [facility]. Please see a copy of my visit note below.      Date Fasting Post-Breakfast Post-Lunch Post-Dinner    100       100       92  128      94   132      89   168     1/3 90                     Subjective:      32 year old  at 34w6d presentst for a routine prenatal appointment.    n vaginal bleeding or leakage of fluid.  no contractions. pos fetal movement.       No HA, visual changes, RUQ or epigastric pain.   Patient concerns:    Feeling well overall.  Reviewed EOB labs with patient.  Reviewed TDAP Previously given   Recent growth US AGA  Objective:  Vitals:    23 1041   BP: 111/79   Pulse: 80   Weight: 93.2 kg (205 lb 6.4 oz)   Height: 1.626 m (5' 4\")   , see ob flowsheet  Assessment/Plan     Encounter Diagnoses   Name Primary?     High-risk pregnancy, second trimester Yes     Insulin controlled gestational diabetes mellitus (GDM) in third trimester      Orders Placed This Encounter   Procedures     US OB Fetal Biophys Prf wo NonStrs Singls Sgl     US OB Fetal Biophys Prf wo NonStrs Singls Sgl     US OB Fetal Biophys Prf wo NonStrs Singls Sgl     US OB Fetal Biophys Prf wo NonStrs Singls Sgl     US OB Fetal Biophys Prf wo NonStrs Singls Sgl     US OB Fetal Biophys Prf wo NonStrs Singls Sgl     US OB Follow Up >14 Weeks     US OB Fetal Biophys Prf wo NonStrs Singls Sgl     US OB Fetal Biophys Prf wo NonStrs Singls Sgl     No orders of the defined types were placed in this encounter.    Antibody Screen   Date Value Ref Range Status   2022 Negative Negative Final   , Rhogam  was notgiven.    - Reviewed total weight gain, encouraged continued healthy diet and exercise.  .  Reviewed importance of daily fetal kick count and " why/how to contact provider.    - Reviewed why/how to contact provider if headache/visual changes/RUQ or epigastric pain, decreased fetal movement, vaginal bleeding, leakage of fluid or more than 4 contractions in an hour.     Patient education/orders or handouts today:  PTL signs/symptoms and BS values, encouraged testing QID, ordered BPPs 2x wk and growth  Reviewed GBS screening at 35-36 wks.    Return to clinic in 1 weeks and prn if questions or concerns.     ERICA José CNM          Again, thank you for allowing me to participate in the care of your patient.      Sincerely,    ERICA José CNM

## 2023-01-03 NOTE — PATIENT INSTRUCTIONS
Pregnancy: Your Third Trimester Changes  As the baby grows, your body changes, too. You may also see signs that your body is getting ready for labor. Be patient. Within a few more weeks, your babywill be born.   How you are changing  Your body is preparing for the birth of your baby. Some of the most common changes are listed below. If you have any questions or concerns, ask yourhealthcare provider:     You ll gain more weight from fluids, extra blood, and fat deposits.    Your breasts will grow as your body gets ready to feed the baby. They may be more tender. You may also notice a slight yellow or white discharge from the nipples.    Discharge from your vagina may increase. This is normal.    You might see some skin color changes on your forehead, cheeks, or nose. Most of these will go away after you deliver.  How your baby is growing  Month 7  Your baby can open and close their eyes and weighs around 4 pounds (1.8 kg). If born prematurely (too early), your baby would likely survive with special care.     Month 8  Your baby is building up body fat and weighs around 6 pounds (2.7 kg).    Month 9  Your baby weighs nearly 7 pounds (3.2 kg) and is about 19 to 21 inches long. In other words, any day now...     [employer] last reviewed this educational content on9/1/2021 2000-2022 [employer]. All rights reserved. This information is not intended as a substitute for professional medical care. Always follow yourhealthcare professional's instructions.            Birth Control Methods  Birth control methods are used to help prevent pregnancy. There are many different methods to choose from. Talk with your healthcare provider about which method is right for you. Be sure to ask your provider how well each oneworks. Also ask about the benefits, risks, and side effects of each method.   Hormones  Some birth control methods work by releasing hormones such as progestin and estrogen. These methods include  hormone implants, hormone shots, the vaginal ring, the patch, and birth control pills. They all work by stopping the release of the egg from the ovary (ovulation). All of these methods work well and can be stopped at any time.     The implant is a small device that needs to be placed in the upper arm by a trained healthcare provider. It works for up to 3 years.    Hormone injections must be repeated every 3 months.    The vaginal ring must be replaced monthly. It can be removed during the fourth week of each cycle.    The patch must be replaced weekly. It's not worn during the fourth week of each cycle.    Birth control pills must be taken every day.  Intrauterine device (IUD)  An IUD is a small, T-shaped device. It must be placed in the uterus by a trained healthcare provider. There are different types of IUDs available. They work by causing changes in the uterus that make it harder for sperm to reach the egg. Depending on the type of IUD you have, it may work for several years or longer. The IUD is a reversible birth control method. This means it can beremoved at any time.   Condom  A condom is a sheath that forms a thin barrier between the penis and the vagina. It helps prevent pregnancy by keeping sperm from entering the vagina. When latex condoms are used, they have the added benefit of protecting against most STIs (sexually transmitted infections). Condoms are used each time there is sexual intercourse and should be discarded after each use. Ask your healthcare provider about the different types of condoms available. These include both the male condom and femalecondom.   Spermicide  Spermicides come as foams, jellies, creams, suppositories, and tablets.  They help prevent pregnancy by killing sperm. When used alone they are not that reliable. They work best when combined with other birth control methods such asdiaphragms and cervical caps.   Sponge, diaphragm, and cervical cap   All of these methods help  prevent pregnancy by covering the opening of the uterus (cervix). This prevents sperm from passing through.   The sponge contains spermicide. It can be bought over the counter. The sponge must be left in place for at least 6 hours after the last time you have sex. However, it should not stay in place for more than 24 hours. Discard afteruse.   The diaphragm and cervical cap must be fitted and prescribed by your healthcare provider. Both are used with spermicide. The diaphragm must be left in place for at least 6 hours after sex. However, it should not stay in place for more than 24 hours. It can be washed and reused. The cervical cap must be left in place for at least 6 hours after sex. However, it should not stay in place formore than 48 hours. It can be washed and reused.   Withdrawal method  This is when the man pulls his penis out of the vagina just before ejaculation ( coming ). This lowers the amount of sperm entering the vagina. Be aware that fluids released just before ejaculation often still contain some sperm, so this methodis not as reliable as certain other methods.   Rhythm method   This method is also call natural family planning or fertility awareness. It requires that you know when in your menstrual cycle you are likely to become pregnant. Then you not have sex during those days. This requires careful planning and good discipline. Your healthcare provider can explain more abouthow this works.   Tubal ligation and vasectomy  These are surgical methods to prevent pregnancy. Tubal ligation is an option for women. The fallopian tubes are blocked or cut (ligated). This keeps the egg from passing into the uterus or sperm from reaching the egg. Vasectomy is an option for men. The tubes that normally carry sperm to the penis are either closed or blocked. Both tubal ligation and vasectomy are permanent birth control methods. This means reversal is either not possible or unlikely to work. They are good choices  for women and men who know that theydon't want to have children in the future.   [employer] last reviewed this educational content on7/1/2020 2000-2022 [employer]. All rights reserved. This information is not intended as a substitute for professional medical care. Always follow yourhealthcare professional's instructions.            Adapting to Pregnancy: Third Trimester  Although common during pregnancy, some discomforts may seem worse in the final weeks. Simple lifestyle changes can help. Take care of yourself. And ask yourpartner to help out with small tasks.   Limiting leg problems  Ways to combat leg issues:    Wear support hose all day.    Don't wear snug shoes or clothes that bind, such as tight pants and socks with elastic tops.    Sit with your feet and legs raised often.  Caring for your breasts  Tips to follow include:    Wash with plain water. Don't use harsh soaps or rubbing alcohol. They may cause dryness.    Wear a nursing bra for extra support. It can also hide any leaks from your nipples.  Controlling hemorrhoids  Ways to prevent hemorrhoids include:     Eat foods that are high in fiber. Also exercise and drink enough fluids. This will reduce constipation and hemorrhoids.    Sleep and nap on your side. This limits pressure on the veins of your rectum.    Try not to stand or sit for long periods.  Controlling back pain  As your body changes during pregnancy, your back must work in new ways. Back pain has many causes. Physical changes in your body can strain your back and its supporting muscles. Also hormones increase during pregnancy. This can affect how your muscles and joints work together. All of these changes can leadto pain.   Pain may be felt in the upper or lower back. Pain is also common in the pelvis. Some pregnant women have sciatica. This is pain caused by pressure on the sciatic nerve running down the back of the leg. Ice or heat may help. Your provider may advise massage  therapy or a chiropractor. Sleep on your left side with a pillow between your knees. Use a brace or support device. Ask yourprovider for specific tips and exercises to help control your back pain.   Tips to help you rest  Good rest and sleep will help you feel better. Here are some ideas:     Ask your partner to massage your shoulders, neck, or back.    Limit the errands you do each day.    Lie down in the afternoon or after work for a few minutes.    Take a warm bath before you go to sleep.    Drink warm milk or teas without caffeine.    Don't drink coffee, black tea, and cola.  Stopping heartburn    Don't eat spicy, greasy, fried, or acidic foods.    Eat small amounts more often. Eat slowly.   Wait 2 hours after eating before lying down.    Sleep with your upper body raised 6 inches.   Managing mood swings  Ways to manage mood swings include:     Know that mood changes are normal.    Exercise often, but get plenty of rest.    Address any concerns and limit stress. Talking to your partner, other women, or your healthcare provider may help.   Dealing with urinary frequency  Tips to deal with having to urinate often include:     Drink plenty of water all day. But if you drink a lot in the evening, you may have to get up more in the night.    Limit coffee, black tea, and cola.  [employer] last reviewed this educational content on7/1/2021 2000-2022 [employer]. All rights reserved. This information is not intended as a substitute for professional medical care. Always follow yourhealthcare professional's instructions.

## 2023-01-09 ENCOUNTER — TELEPHONE (OUTPATIENT)
Dept: OBGYN | Facility: CLINIC | Age: 33
End: 2023-01-09

## 2023-01-09 ENCOUNTER — HOSPITAL ENCOUNTER (OUTPATIENT)
Facility: CLINIC | Age: 33
Discharge: HOME | End: 2023-01-09
Attending: ADVANCED PRACTICE MIDWIFE | Admitting: ADVANCED PRACTICE MIDWIFE
Payer: COMMERCIAL

## 2023-01-09 VITALS — RESPIRATION RATE: 18 BRPM | SYSTOLIC BLOOD PRESSURE: 136 MMHG | DIASTOLIC BLOOD PRESSURE: 85 MMHG | HEART RATE: 105 BPM

## 2023-01-09 PROBLEM — R03.0 ELEVATED BLOOD PRESSURE READING WITHOUT DIAGNOSIS OF HYPERTENSION: Status: ACTIVE | Noted: 2023-01-09

## 2023-01-09 LAB
ALT SERPL W P-5'-P-CCNC: 9 U/L (ref 0–50)
ANION GAP SERPL CALCULATED.3IONS-SCNC: 9 MMOL/L (ref 3–14)
AST SERPL W P-5'-P-CCNC: 13 U/L (ref 0–45)
BUN SERPL-MCNC: 5 MG/DL (ref 7–30)
CALCIUM SERPL-MCNC: 9.5 MG/DL (ref 8.5–10.1)
CHLORIDE BLD-SCNC: 107 MMOL/L (ref 94–109)
CO2 SERPL-SCNC: 20 MMOL/L (ref 20–32)
CREAT SERPL-MCNC: 0.45 MG/DL (ref 0.52–1.04)
GFR SERPL CREATININE-BSD FRML MDRD: >90 ML/MIN/1.73M2
GLUCOSE BLD-MCNC: 84 MG/DL (ref 70–99)
GLUCOSE BLDC GLUCOMTR-MCNC: 101 MG/DL (ref 70–99)
HOLD SPECIMEN: NORMAL
POTASSIUM BLD-SCNC: 3.7 MMOL/L (ref 3.4–5.3)
SODIUM SERPL-SCNC: 136 MMOL/L (ref 133–144)
URATE SERPL-MCNC: 3.8 MG/DL (ref 2.6–6)

## 2023-01-09 PROCEDURE — 84550 ASSAY OF BLOOD/URIC ACID: CPT | Performed by: ADVANCED PRACTICE MIDWIFE

## 2023-01-09 PROCEDURE — 99214 OFFICE O/P EST MOD 30 MIN: CPT | Mod: 25 | Performed by: ADVANCED PRACTICE MIDWIFE

## 2023-01-09 PROCEDURE — 84450 TRANSFERASE (AST) (SGOT): CPT | Performed by: ADVANCED PRACTICE MIDWIFE

## 2023-01-09 PROCEDURE — 82310 ASSAY OF CALCIUM: CPT | Performed by: ADVANCED PRACTICE MIDWIFE

## 2023-01-09 PROCEDURE — 84156 ASSAY OF PROTEIN URINE: CPT | Performed by: ADVANCED PRACTICE MIDWIFE

## 2023-01-09 PROCEDURE — 82962 GLUCOSE BLOOD TEST: CPT

## 2023-01-09 PROCEDURE — 59025 FETAL NON-STRESS TEST: CPT

## 2023-01-09 PROCEDURE — G0463 HOSPITAL OUTPT CLINIC VISIT: HCPCS | Mod: 25

## 2023-01-09 PROCEDURE — 36415 COLL VENOUS BLD VENIPUNCTURE: CPT | Performed by: ADVANCED PRACTICE MIDWIFE

## 2023-01-09 PROCEDURE — 59025 FETAL NON-STRESS TEST: CPT | Mod: 26 | Performed by: ADVANCED PRACTICE MIDWIFE

## 2023-01-09 PROCEDURE — 84460 ALANINE AMINO (ALT) (SGPT): CPT | Performed by: ADVANCED PRACTICE MIDWIFE

## 2023-01-09 RX ORDER — METOCLOPRAMIDE 10 MG/1
10 TABLET ORAL EVERY 6 HOURS PRN
Status: DISCONTINUED | OUTPATIENT
Start: 2023-01-09 | End: 2023-01-09 | Stop reason: HOSPADM

## 2023-01-09 RX ORDER — ONDANSETRON 4 MG/1
4 TABLET, ORALLY DISINTEGRATING ORAL EVERY 6 HOURS PRN
Status: DISCONTINUED | OUTPATIENT
Start: 2023-01-09 | End: 2023-01-09 | Stop reason: HOSPADM

## 2023-01-09 RX ORDER — PROCHLORPERAZINE 25 MG
25 SUPPOSITORY, RECTAL RECTAL EVERY 12 HOURS PRN
Status: DISCONTINUED | OUTPATIENT
Start: 2023-01-09 | End: 2023-01-09 | Stop reason: HOSPADM

## 2023-01-09 RX ORDER — ONDANSETRON 2 MG/ML
4 INJECTION INTRAMUSCULAR; INTRAVENOUS EVERY 6 HOURS PRN
Status: DISCONTINUED | OUTPATIENT
Start: 2023-01-09 | End: 2023-01-09 | Stop reason: HOSPADM

## 2023-01-09 RX ORDER — PROCHLORPERAZINE MALEATE 10 MG
10 TABLET ORAL EVERY 6 HOURS PRN
Status: DISCONTINUED | OUTPATIENT
Start: 2023-01-09 | End: 2023-01-09 | Stop reason: HOSPADM

## 2023-01-09 RX ORDER — METOCLOPRAMIDE HYDROCHLORIDE 5 MG/ML
10 INJECTION INTRAMUSCULAR; INTRAVENOUS EVERY 6 HOURS PRN
Status: DISCONTINUED | OUTPATIENT
Start: 2023-01-09 | End: 2023-01-09 | Stop reason: HOSPADM

## 2023-01-09 ASSESSMENT — ACTIVITIES OF DAILY LIVING (ADL)
ADLS_ACUITY_SCORE: 31
ADLS_ACUITY_SCORE: 18
ADLS_ACUITY_SCORE: 18

## 2023-01-09 NOTE — DISCHARGE INSTRUCTIONS
Discharge Instruction for Undelivered Patients      You were seen for: Labor Assessment  We Consulted: Kary Toro CNM  You had (Test or Medicine):uterine and fetal monitoring, blood pressure monitoring, blood and urine labs      Diet:   Drink 8 to 12 glasses of liquids (milk, juice, water) every day.  You may eat meals and snacks.  To manage your diabetes, follow the guidelines for eating and drinking given to you by your Clinic Provider or Diabetes Educator.       Activity:  Count fetal kicks everyday (see handout)  Call your doctor or nurse midwife if your baby is moving less than usual.     Call your provider if you notice:  Swelling in your face or increased swelling in your hands or legs.  Headaches that are not relieved by Tylenol (acetaminophen).  Changes in your vision (blurring: seeing spots or stars.)  Nausea (sick to your stomach) and vomiting (throwing up).   Weight gain of 5 pounds or more per week.  Heartburn that doesn't go away.  Signs of bladder infection: pain when you urinate (use the toilet), need to go more often and more urgently.  The bag of mei (rupture of membranes) breaks, or you notice leaking in your underwear.  Bright red blood in your underwear.  Abdominal (lower belly) or stomach pain.  For first baby: Contractions (tightening) less than 5 minutes apart for one hour or more.  Second (plus) baby: Contractions (tightening) less than 10 minutes apart and getting stronger.  *If less than 34 weeks: Contractions (tightening) more than 6 times in one hour.  Increase or change in vaginal discharge (note the color and amount)      Follow-up:  As scheduled in the clinic, go to your regularly scheduled appointments

## 2023-01-09 NOTE — TELEPHONE ENCOUNTER
"S-(situation): patient called through the emergency line and stated she is on her way to labor and delivery due to she has been having contractions since 10 am this morning and have been consistently 4-5 minutes apart.    B-(background): patient is 35+5 weeks pregnant    A-(assessment): asked a few questions:  1. Is the baby moving-\"yes, but not as much as usual\"  2. Have you tried any changes in position or drinking water-\"yes and it has not helped\"  3. Any leaking of fluid-\"no\"    R-(recommendations): I let her know that I will let Kary Cortez LYNN that you are on your way to the hospital.    Kary called and given an  sbar regarding this patient and that she was on her way. Kary stated she will let the charge nurse know.    Patient verbalized understanding and all questions were answered.    "

## 2023-01-09 NOTE — PROGRESS NOTES
"CHIEF COMPLAINT  ========================  Briana Newman is a 32 year old patient presenting today at 35w5d for evaluation of uterine contractions.     Patient's last menstrual period was 05/02/2022 (approximate).  Estimated Date of Delivery: Feb 8, 2023     HPI  ==================   Patient states that she started having a contractions that were regular around 1100 this morning.  She states that since arriving to triage they have spaced out.  CONTRACTIONS: mild  ABDOMINAL PAIN: none  FETAL MOVEMENT: active  VAGINAL BLEEDING: none  RUPTURE OF MEMBRANES: no  PELVIC PAIN: none  OTHER:   REVIEW OF SYSTEMS  =====================  C: NEGATIVE for fever, chills  I: NEGATIVE for worrisome rashes, moles or lesions  R: NEGATIVE for significant cough or SOB  CV: NEGATIVE for chest pain, palpitations or varicosities  GI: NEGATIVE for nausea, abdominal pain, heartburn, or change in bowel habits  : NEGATIVE for frequency, dysuria, or hematuria  M: NEGATIVE for significant arthralgias or myalgia  N: NEGATIVE for headache, weakness, dizziness or paresthesias  P: NEGATIVE for changes in mood or affect  Positive for \"seeing stars\" for the past month. With RUQ pain that has been ongoing for the past 2 weeks.  Missed gall bladder US appointment due to weather.      HISTORIES  ==============  ALLERGIES:  No Known Allergies  PAST MEDICAL HISTORY  Past Medical History:   Diagnosis Date     Gestational diabetes mellitus (GDM) 12/06/2022     Postpartum depression     w first, unsure about med use     POTS (postural orthostatic tachycardia syndrome)      Pre-eclampsia     HTN started 3rd tri, them PreE w severe features,induced at term     Shortness of breath      [place]  FAMILY HISTORY  Family History   Problem Relation Age of Onset     No Known Problems Mother      No Known Problems Father      Hypertension Maternal Grandmother      Diabetes Maternal Grandmother      Ovarian Cancer Maternal Grandmother      No Known " Problems Brother      No Known Problems Brother      No Known Problems Brother      No Known Problems Sister      No Known Problems Daughter      Breast Cancer No family hx of      Colon Cancer No family hx of      Hyperlipidemia No family hx of      Asthma No family hx of      Osteoporosis No family hx of      Mental Illness No family hx of      Depression No family hx of      Anxiety Disorder No family hx of      Substance Abuse No family hx of      OB HISTORY  Reviewed in EMR  EXAM  ============  Vital signs stable, afebrile and BP is elevated, 133/96, 145/88, 146/80  GENERAL APPEARANCE: healthy, alert and no distress  RESP: lungs clear to auscultation - no rales, rhonchi or wheezes  CV: regular rates and rhythm, normal S1 S2, no S3 or S4 and no murmur,and no varicosities  ABDOMEN:  soft, nontender,non-tender between contractions no epigastric pain  NEURO: Denies headache, blurred vision  PSYCH: mentation appears normal. and affect normal/bright  LEGS: Reflexes normal bilaterally and No edema  CONTRACTIONS: rare  uterine irritability  FETAL HEART TONES:  145 with moderate long term variability, accelerations-yes, decels none.  NST: REACTIVE    PELVIC EXAM: closed/ 3/ Posterior/ firm/ -3  PRESENTATION: VERTEX  BLOOD: no    LABS:  Baseline HELLP labs    DIAGNOSIS  ============  35w5d, Category 1 fetal status  Elevated blood pressure   GDM A2    PLAN  ============  Baseline HELLP labs collected today  Serial blood pressure readings until 1700  Blood glucose after meals  Anticipate discharge home.    Discussed diagnosis of GHTN vs Pre-eclampsia without severe features based on lab work and blood pressure readings over time.    ERICA Mcfarland [facility]    Addendum 1658:    Patient continues to have irregular contractions, has been able to rest.    Lab unable to run urine protein/creatinine ratio due to malfunctioning machine.  They have to send out the specimen.  No further elevated blood pressure readings in mild  range.    Plan to discharge patient to home, she has follow up in clinic scheduled.    Reviewed no diagnosis of GHTN/Pre-eclampsia during stay, if blood pressure elevated in clinic may meet criteria.    Reviewed warning signs and when to return.   Discharge to home.  ERICA Mcfarland CNM

## 2023-01-09 NOTE — CARE PLAN
[facility]: [facility]   Reason for maternal/fetal assessment per patient is  Labor  . Patient is a . Prenatal record reviewed.      OB History    Para Term  AB Living   2 1 1 0 0 1   SAB IAB Ectopic Multiple Live Births   0 0 0 0 1      # Outcome Date GA Lbr Moshe/2nd Weight Sex Delivery Anes PTL Lv   2 Current            1 Term 20 39w6d  2.92 kg (6 lb 7 oz) F  EPI N LIVE BIRTH      Complications: Preeclampsia/Hypertension      Name: Jericho      Obstetric Comments   Denies GDM, PPH. +PreE.  Worried about PPMD, no meds or therapy      Medical History:   Past Medical History:   Diagnosis Date     Gestational diabetes mellitus (GDM) 2022     Postpartum depression     w first, unsure about med use     POTS (postural orthostatic tachycardia syndrome)      Pre-eclampsia     HTN started 3rd tri, them PreE w severe features,induced at term     Shortness of breath    . Gestational Age 35w5d. VSS. Cervix: closed, long and effaced <30%.  Fetal movement present. Patient denies cramping, backache, vaginal discharge, pelvic pressure, UTI symptoms, GI problems, bloody show, vaginal bleeding, edema, headache, visual disturbances, abdominal pain, rupture of membranes. Support persons were present.  Action: Verbal consent for EFM. Triage assessment completed. EFM applied for upon admission and nonstress test performed- Reactive. Uterine assessment reveals uterine irritability contractions palpate mild. Fetal assessment: Presumed adequate fetal oxygenation documented (see flow record). Patient education pamphlets given on fetal movement counts and discharge instructions given to patient- no further questions offered. Patient instructed to report change in fetal movement, vaginal leaking of fluid or bleeding, abdominal pain, or any concerns related to the pregnancy to her nurse/physician.   Response: Kary Toro CNM informed of patient arrival. Plan per provider is  serial blood pressures, labs- all information resulted. Per CNM, ok for patient to discharge home. Patient verbalized understanding of education and verbalized agreement with plan. Discharged ambulatory at 1705.

## 2023-01-10 ENCOUNTER — HOSPITAL ENCOUNTER (OUTPATIENT)
Facility: CLINIC | Age: 33
End: 2023-01-10
Admitting: ADVANCED PRACTICE MIDWIFE
Payer: COMMERCIAL

## 2023-01-10 ENCOUNTER — HOSPITAL ENCOUNTER (OUTPATIENT)
Facility: CLINIC | Age: 33
Discharge: HOME | End: 2023-01-10
Attending: ADVANCED PRACTICE MIDWIFE | Admitting: ADVANCED PRACTICE MIDWIFE
Payer: COMMERCIAL

## 2023-01-10 VITALS
WEIGHT: 205.47 LBS | RESPIRATION RATE: 18 BRPM | SYSTOLIC BLOOD PRESSURE: 102 MMHG | OXYGEN SATURATION: 100 % | TEMPERATURE: 98.6 F | HEIGHT: 64 IN | DIASTOLIC BLOOD PRESSURE: 55 MMHG | BODY MASS INDEX: 35.08 KG/M2

## 2023-01-10 LAB
ABO/RH(D): NORMAL
ALBUMIN MFR UR ELPH: 18.1 MG/DL (ref 1–14)
ALT SERPL W P-5'-P-CCNC: 7 U/L (ref 0–50)
ANION GAP SERPL CALCULATED.3IONS-SCNC: 10 MMOL/L (ref 3–14)
ANTIBODY SCREEN: NEGATIVE
AST SERPL W P-5'-P-CCNC: 13 U/L (ref 0–45)
ATRIAL RATE - MUSE: 104 BPM
BUN SERPL-MCNC: 4 MG/DL (ref 7–30)
CALCIUM SERPL-MCNC: 9.5 MG/DL (ref 8.5–10.1)
CHLORIDE BLD-SCNC: 105 MMOL/L (ref 94–109)
CO2 SERPL-SCNC: 19 MMOL/L (ref 20–32)
CREAT SERPL-MCNC: 0.42 MG/DL (ref 0.52–1.04)
CREAT UR-MCNC: 52 MG/DL
CREAT UR-MCNC: 88.3 MG/DL
DIASTOLIC BLOOD PRESSURE - MUSE: NORMAL MMHG
ERYTHROCYTE [DISTWIDTH] IN BLOOD BY AUTOMATED COUNT: 13.7 % (ref 10–15)
FLUAV RNA SPEC QL NAA+PROBE: NEGATIVE
FLUBV RNA RESP QL NAA+PROBE: NEGATIVE
GFR SERPL CREATININE-BSD FRML MDRD: >90 ML/MIN/1.73M2
GLUCOSE BLD-MCNC: 106 MG/DL (ref 70–99)
GLUCOSE BLDC GLUCOMTR-MCNC: 103 MG/DL (ref 70–99)
HCT VFR BLD AUTO: 33 % (ref 35–47)
HGB BLD-MCNC: 10.9 G/DL (ref 11.7–15.7)
INTERPRETATION ECG - MUSE: NORMAL
LIPASE SERPL-CCNC: 83 U/L (ref 73–393)
MCH RBC QN AUTO: 27.3 PG (ref 26.5–33)
MCHC RBC AUTO-ENTMCNC: 33 G/DL (ref 31.5–36.5)
MCV RBC AUTO: 83 FL (ref 78–100)
P AXIS - MUSE: 35 DEGREES
PLATELET # BLD AUTO: 405 10E3/UL (ref 150–450)
POTASSIUM BLD-SCNC: 3.6 MMOL/L (ref 3.4–5.3)
PR INTERVAL - MUSE: 144 MS
PROT UR-MCNC: 0.21 G/L
PROT/CREAT 24H UR: 0.2 MG/MG CR (ref 0–0.2)
PROT/CREAT 24H UR: 0.4 G/G CR (ref 0–0.2)
QRS DURATION - MUSE: 80 MS
QT - MUSE: 328 MS
QTC - MUSE: 431 MS
R AXIS - MUSE: 7 DEGREES
RBC # BLD AUTO: 4 10E6/UL (ref 3.8–5.2)
RSV RNA SPEC NAA+PROBE: NEGATIVE
SARS-COV-2 RNA RESP QL NAA+PROBE: NEGATIVE
SODIUM SERPL-SCNC: 134 MMOL/L (ref 133–144)
SPECIMEN EXPIRATION DATE: NORMAL
SYSTOLIC BLOOD PRESSURE - MUSE: NORMAL MMHG
T AXIS - MUSE: 11 DEGREES
VENTRICULAR RATE- MUSE: 104 BPM
WBC # BLD AUTO: 13.4 10E3/UL (ref 4–11)

## 2023-01-10 PROCEDURE — 84460 ALANINE AMINO (ALT) (SGPT): CPT | Performed by: ADVANCED PRACTICE MIDWIFE

## 2023-01-10 PROCEDURE — 250N000009 HC RX 250: Performed by: ADVANCED PRACTICE MIDWIFE

## 2023-01-10 PROCEDURE — 82962 GLUCOSE BLOOD TEST: CPT

## 2023-01-10 PROCEDURE — G0463 HOSPITAL OUTPT CLINIC VISIT: HCPCS | Mod: 25

## 2023-01-10 PROCEDURE — 84156 ASSAY OF PROTEIN URINE: CPT | Performed by: ADVANCED PRACTICE MIDWIFE

## 2023-01-10 PROCEDURE — 250N000011 HC RX IP 250 OP 636: Performed by: ADVANCED PRACTICE MIDWIFE

## 2023-01-10 PROCEDURE — 96361 HYDRATE IV INFUSION ADD-ON: CPT

## 2023-01-10 PROCEDURE — 80048 BASIC METABOLIC PNL TOTAL CA: CPT | Performed by: ADVANCED PRACTICE MIDWIFE

## 2023-01-10 PROCEDURE — 84450 TRANSFERASE (AST) (SGOT): CPT | Performed by: ADVANCED PRACTICE MIDWIFE

## 2023-01-10 PROCEDURE — 258N000003 HC RX IP 258 OP 636

## 2023-01-10 PROCEDURE — 85027 COMPLETE CBC AUTOMATED: CPT | Performed by: ADVANCED PRACTICE MIDWIFE

## 2023-01-10 PROCEDURE — 86901 BLOOD TYPING SEROLOGIC RH(D): CPT | Performed by: ADVANCED PRACTICE MIDWIFE

## 2023-01-10 PROCEDURE — 86850 RBC ANTIBODY SCREEN: CPT | Performed by: ADVANCED PRACTICE MIDWIFE

## 2023-01-10 PROCEDURE — 83690 ASSAY OF LIPASE: CPT | Performed by: ADVANCED PRACTICE MIDWIFE

## 2023-01-10 PROCEDURE — 250N000013 HC RX MED GY IP 250 OP 250 PS 637: Performed by: ADVANCED PRACTICE MIDWIFE

## 2023-01-10 PROCEDURE — 59025 FETAL NON-STRESS TEST: CPT

## 2023-01-10 PROCEDURE — 96360 HYDRATION IV INFUSION INIT: CPT

## 2023-01-10 PROCEDURE — 96374 THER/PROPH/DIAG INJ IV PUSH: CPT | Mod: 59

## 2023-01-10 PROCEDURE — 87637 SARSCOV2&INF A&B&RSV AMP PRB: CPT | Performed by: ADVANCED PRACTICE MIDWIFE

## 2023-01-10 RX ORDER — LORAZEPAM 2 MG/ML
2 INJECTION INTRAMUSCULAR
Status: DISCONTINUED | OUTPATIENT
Start: 2023-01-10 | End: 2023-01-10 | Stop reason: HOSPADM

## 2023-01-10 RX ORDER — SODIUM CHLORIDE, SODIUM LACTATE, POTASSIUM CHLORIDE, CALCIUM CHLORIDE 600; 310; 30; 20 MG/100ML; MG/100ML; MG/100ML; MG/100ML
INJECTION, SOLUTION INTRAVENOUS CONTINUOUS
Status: DISCONTINUED | OUTPATIENT
Start: 2023-01-10 | End: 2023-01-10 | Stop reason: HOSPADM

## 2023-01-10 RX ORDER — DEXTROSE MONOHYDRATE 25 G/50ML
25-50 INJECTION, SOLUTION INTRAVENOUS
Status: DISCONTINUED | OUTPATIENT
Start: 2023-01-10 | End: 2023-01-10 | Stop reason: HOSPADM

## 2023-01-10 RX ORDER — ACETAMINOPHEN 325 MG/1
975 TABLET ORAL EVERY 4 HOURS PRN
Status: DISCONTINUED | OUTPATIENT
Start: 2023-01-10 | End: 2023-01-10 | Stop reason: HOSPADM

## 2023-01-10 RX ORDER — MAGNESIUM SULFATE HEPTAHYDRATE 500 MG/ML
10 INJECTION, SOLUTION INTRAMUSCULAR; INTRAVENOUS
Status: DISCONTINUED | OUTPATIENT
Start: 2023-01-10 | End: 2023-01-10 | Stop reason: HOSPADM

## 2023-01-10 RX ORDER — ONDANSETRON 4 MG/1
4 TABLET, ORALLY DISINTEGRATING ORAL EVERY 6 HOURS PRN
Status: DISCONTINUED | OUTPATIENT
Start: 2023-01-10 | End: 2023-01-10 | Stop reason: HOSPADM

## 2023-01-10 RX ORDER — MAGNESIUM SULFATE HEPTAHYDRATE 40 MG/ML
4 INJECTION, SOLUTION INTRAVENOUS
Status: DISCONTINUED | OUTPATIENT
Start: 2023-01-10 | End: 2023-01-10 | Stop reason: HOSPADM

## 2023-01-10 RX ORDER — METOCLOPRAMIDE HYDROCHLORIDE 5 MG/ML
10 INJECTION INTRAMUSCULAR; INTRAVENOUS EVERY 6 HOURS PRN
Status: DISCONTINUED | OUTPATIENT
Start: 2023-01-10 | End: 2023-01-10 | Stop reason: HOSPADM

## 2023-01-10 RX ORDER — METOCLOPRAMIDE 10 MG/1
10 TABLET ORAL EVERY 6 HOURS PRN
Status: DISCONTINUED | OUTPATIENT
Start: 2023-01-10 | End: 2023-01-10 | Stop reason: HOSPADM

## 2023-01-10 RX ORDER — SODIUM CHLORIDE, SODIUM LACTATE, POTASSIUM CHLORIDE, CALCIUM CHLORIDE 600; 310; 30; 20 MG/100ML; MG/100ML; MG/100ML; MG/100ML
INJECTION, SOLUTION INTRAVENOUS
Status: COMPLETED
Start: 2023-01-10 | End: 2023-01-10

## 2023-01-10 RX ORDER — PROCHLORPERAZINE 25 MG
25 SUPPOSITORY, RECTAL RECTAL EVERY 12 HOURS PRN
Status: DISCONTINUED | OUTPATIENT
Start: 2023-01-10 | End: 2023-01-10 | Stop reason: HOSPADM

## 2023-01-10 RX ORDER — MAGNESIUM SULFATE HEPTAHYDRATE 40 MG/ML
2 INJECTION, SOLUTION INTRAVENOUS
Status: DISCONTINUED | OUTPATIENT
Start: 2023-01-10 | End: 2023-01-10 | Stop reason: HOSPADM

## 2023-01-10 RX ORDER — SODIUM CHLORIDE, SODIUM LACTATE, POTASSIUM CHLORIDE, CALCIUM CHLORIDE 600; 310; 30; 20 MG/100ML; MG/100ML; MG/100ML; MG/100ML
10-125 INJECTION, SOLUTION INTRAVENOUS CONTINUOUS
Status: DISCONTINUED | OUTPATIENT
Start: 2023-01-10 | End: 2023-01-10 | Stop reason: HOSPADM

## 2023-01-10 RX ORDER — PROCHLORPERAZINE MALEATE 10 MG
10 TABLET ORAL EVERY 6 HOURS PRN
Status: DISCONTINUED | OUTPATIENT
Start: 2023-01-10 | End: 2023-01-10 | Stop reason: HOSPADM

## 2023-01-10 RX ORDER — ONDANSETRON 2 MG/ML
4 INJECTION INTRAMUSCULAR; INTRAVENOUS EVERY 6 HOURS PRN
Status: DISCONTINUED | OUTPATIENT
Start: 2023-01-10 | End: 2023-01-10 | Stop reason: HOSPADM

## 2023-01-10 RX ORDER — LIDOCAINE 40 MG/G
CREAM TOPICAL
Status: DISCONTINUED | OUTPATIENT
Start: 2023-01-10 | End: 2023-01-10 | Stop reason: HOSPADM

## 2023-01-10 RX ORDER — NICOTINE POLACRILEX 4 MG
15-30 LOZENGE BUCCAL
Status: DISCONTINUED | OUTPATIENT
Start: 2023-01-10 | End: 2023-01-10 | Stop reason: HOSPADM

## 2023-01-10 RX ADMIN — SODIUM CHLORIDE, POTASSIUM CHLORIDE, SODIUM LACTATE AND CALCIUM CHLORIDE 1000 ML: 600; 310; 30; 20 INJECTION, SOLUTION INTRAVENOUS at 02:25

## 2023-01-10 RX ADMIN — METOCLOPRAMIDE HYDROCHLORIDE 10 MG: 5 INJECTION INTRAMUSCULAR; INTRAVENOUS at 02:54

## 2023-01-10 RX ADMIN — LIDOCAINE HYDROCHLORIDE 30 ML: 20 SOLUTION OROPHARYNGEAL at 02:42

## 2023-01-10 RX ADMIN — ACETAMINOPHEN 975 MG: 325 TABLET, FILM COATED ORAL at 03:05

## 2023-01-10 ASSESSMENT — ACTIVITIES OF DAILY LIVING (ADL)
ADLS_ACUITY_SCORE: 35
ADLS_ACUITY_SCORE: 18
ADLS_ACUITY_SCORE: 35
ADLS_ACUITY_SCORE: 18
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
## 2023-01-10 NOTE — PROVIDER NOTIFICATION
01/10/23 0645   Provider Notification   Provider Name/Title ALESIA Vaughn CNM   Method of Notification At Bedside   Request Evaluate in Person   Notification Reason Status Update;Other (Comment)  (for discharge)     CNM at bedside, pt re-evaluated. Patient is in stable condition. May go home as ordered

## 2023-01-10 NOTE — PROGRESS NOTES
S: Patient reports symptoms have completely resolved. She was able to get some sleep over the last few hours. Her contractions have also ceased and she feels comfortable discharging home.    Electronic Fetal Monitoring:  O: Baseline rate 140, normal  Variability moderate  Accelerations present  Decelerations not present    Assessment: Category I EFM interpretation suggests absence of concern for fetal metabolic acidemia at this time due to moderate variability and accelerations present    Uterine Activity none.    Strip reviewed on unit     A:  Elevated BP without diagnosis of hypertension  IUP 35+6    P:  - Reviewed warning signs/sx of preeclampsia including HA unresolved with Tylenol, visual changes, RUQ pain. Also reviewed PTL s/sx and kick counts.  - Discussed elevated protein creatinine ratio and discussed that if she has another mild range BP she would meet criteria for preeclampsia without severe features and IOL would be recommended at 37 weeks.  - Troponin I still pending- will follow up based on results  - Encouraged rest and increased hydration  - Advised keeping scheduled appointment Thursday 1/12 for BPP and RICCI visit.  She states understanding of the above and is agreeable to plan.     ERICA Manuel CNM

## 2023-01-10 NOTE — PLAN OF CARE
Patient rested well, VSS. Denies any headache, visual disturbances, RUQ pain, chest pain. See flowsheet for FM charting, contractions spaced out, FHR in normal range, appreciating fetal movements. Pt. Received IV hydration, pain analgesia and underwent NST. Patient felt more relieved after the treatment. Seen by the midwife, no complaint, patient may go home.     Patient left the unit at 0720, ambulatory accompanied by her relative.

## 2023-01-10 NOTE — PLAN OF CARE
Data: Patient presented to [facility] at 0115. Reason for maternal/fetal assessment per patient is Rule Out Labor  .  Patient is a . Prenatal record reviewed.      OB History    Para Term  AB Living   2 1 1 0 0 1   SAB IAB Ectopic Multiple Live Births   0 0 0 0 1      # Outcome Date GA Lbr Moshe/2nd Weight Sex Delivery Anes PTL Lv   2 Current            1 Term 20 39w6d  2.92 kg (6 lb 7 oz) F  EPI N LIVE BIRTH      Complications: Preeclampsia/Hypertension      Name: Jericho      Obstetric Comments   Denies GDM, PPH. +PreE.  Worried about PPMD, no meds or therapy   . Medical history:   Past Medical History:   Diagnosis Date     Gestational diabetes mellitus (GDM) 2022     Postpartum depression     w first, unsure about med use     POTS (postural orthostatic tachycardia syndrome)      Pre-eclampsia     HTN started 3rd tri, them PreE w severe features,induced at term     Shortness of breath    . Gestational Age 35w6d. VSS. Fetal movement present. Patient complaint of  headache, chest pain, RUQ pain and abdominal cramping, some visual disturbances, epigastric or URQ pain. Support persons family member present.  Action: Verbal consent for EFM. Triage assessment completed. EFM applied for  Uterine assessment. Fetal assessment: Presumed adequate fetal oxygenation documented (see flow record).   Response: Patricia Majano CNM informed of patient's arrival. Plan per provider is FM monitoring, IV hydration repeat HELLP labs, CBC and urine investigations. Patient verbalized agreement with the plan.

## 2023-01-10 NOTE — DISCHARGE INSTRUCTIONS
Discharge Instruction for Undelivered Patients      You were seen for: Labor Assessment  We Consulted: Patricia Majano CNM  You had (Test or Medicine): NST, IV Hydration, Pain medication     Diet:   Drink 8 to 12 glasses of liquids (milk, juice, water) every day.  To manage your diabetes, follow the guidelines for eating and drinking given to you by your Clinic Provider or Diabetes Educator.       Activity:  Count fetal kicks everyday (see handout)  Call your doctor or nurse midwife if your baby is moving less than usual.     Call your provider if you notice:  Swelling in your face or increased swelling in your hands or legs.  Headaches that are not relieved by Tylenol (acetaminophen).  Changes in your vision (blurring: seeing spots or stars.)  Nausea (sick to your stomach) and vomiting (throwing up).   Weight gain of 5 pounds or more per week.  Heartburn that doesn't go away.  Signs of bladder infection: pain when you urinate (use the toilet), need to go more often and more urgently.  The bag of mei (rupture of membranes) breaks, or you notice leaking in your underwear.  Bright red blood in your underwear.  Abdominal (lower belly) or stomach pain.  For first baby: Contractions (tightening) less than 5 minutes apart for one hour or more.  Second (plus) baby: Contractions (tightening) less than 10 minutes apart and getting stronger.  Increase or change in vaginal discharge (note the color and amount)    Follow-up:  As scheduled in the clinic  01/10/22 at 1110 for US OB Fetal Biophysical Profile

## 2023-01-10 NOTE — PLAN OF CARE
Re-assessed patient's status after pain medication administration. pt verbalized reduced in her pain, With score of 5/10 from 9/10. Chest pain and headache has been relieved, tolerating pain labor contraction. See flowsheet for FM. She is able to sleep and rest in between. Provided quiet environment, kept the light dimmed.

## 2023-01-12 ENCOUNTER — OFFICE VISIT (OUTPATIENT)
Dept: OBGYN | Facility: CLINIC | Age: 33
End: 2023-01-12
Attending: NURSE PRACTITIONER
Payer: COMMERCIAL

## 2023-01-12 ENCOUNTER — HOSPITAL ENCOUNTER (OUTPATIENT)
Dept: ULTRASOUND IMAGING | Facility: CLINIC | Age: 33
Discharge: HOME | End: 2023-01-12
Attending: ADVANCED PRACTICE MIDWIFE
Payer: COMMERCIAL

## 2023-01-12 ENCOUNTER — OFFICE VISIT (OUTPATIENT)
Dept: PHARMACY | Facility: CLINIC | Age: 33
End: 2023-01-12
Payer: COMMERCIAL

## 2023-01-12 ENCOUNTER — LAB (OUTPATIENT)
Dept: LAB | Facility: CLINIC | Age: 33
End: 2023-01-12
Attending: ADVANCED PRACTICE MIDWIFE
Payer: COMMERCIAL

## 2023-01-12 VITALS
SYSTOLIC BLOOD PRESSURE: 117 MMHG | HEART RATE: 94 BPM | BODY MASS INDEX: 33.82 KG/M2 | HEIGHT: 65 IN | DIASTOLIC BLOOD PRESSURE: 81 MMHG | WEIGHT: 203 LBS

## 2023-01-12 DIAGNOSIS — O09.92 HIGH-RISK PREGNANCY, SECOND TRIMESTER: ICD-10-CM

## 2023-01-12 DIAGNOSIS — O24.414 INSULIN CONTROLLED GESTATIONAL DIABETES MELLITUS (GDM) IN THIRD TRIMESTER: ICD-10-CM

## 2023-01-12 DIAGNOSIS — L29.9 ITCHING: ICD-10-CM

## 2023-01-12 DIAGNOSIS — O09.93 HIGH-RISK PREGNANCY IN THIRD TRIMESTER: Primary | ICD-10-CM

## 2023-01-12 DIAGNOSIS — O09.93 HIGH-RISK PREGNANCY IN THIRD TRIMESTER: ICD-10-CM

## 2023-01-12 DIAGNOSIS — O24.414 INSULIN CONTROLLED GESTATIONAL DIABETES MELLITUS (GDM) IN THIRD TRIMESTER: Primary | ICD-10-CM

## 2023-01-12 LAB — BILIRUB SERPL-MCNC: 0.3 MG/DL (ref 0.2–1.3)

## 2023-01-12 PROCEDURE — 76819 FETAL BIOPHYS PROFIL W/O NST: CPT | Mod: 26 | Performed by: RADIOLOGY

## 2023-01-12 PROCEDURE — 99207 PR PRENATAL VISIT: CPT | Performed by: NURSE PRACTITIONER

## 2023-01-12 PROCEDURE — 36415 COLL VENOUS BLD VENIPUNCTURE: CPT

## 2023-01-12 PROCEDURE — 82239 BILE ACIDS TOTAL: CPT

## 2023-01-12 PROCEDURE — 99207 PR NO CHARGE LOS: CPT | Performed by: PHARMACIST

## 2023-01-12 PROCEDURE — G0463 HOSPITAL OUTPT CLINIC VISIT: HCPCS | Mod: 25 | Performed by: NURSE PRACTITIONER

## 2023-01-12 PROCEDURE — 82247 BILIRUBIN TOTAL: CPT

## 2023-01-12 PROCEDURE — 76819 FETAL BIOPHYS PROFIL W/O NST: CPT

## 2023-01-12 ASSESSMENT — ANXIETY QUESTIONNAIRES
6. BECOMING EASILY ANNOYED OR IRRITABLE: NOT AT ALL
5. BEING SO RESTLESS THAT IT IS HARD TO SIT STILL: NOT AT ALL
3. WORRYING TOO MUCH ABOUT DIFFERENT THINGS: SEVERAL DAYS
2. NOT BEING ABLE TO STOP OR CONTROL WORRYING: SEVERAL DAYS
GAD7 TOTAL SCORE: 3
7. FEELING AFRAID AS IF SOMETHING AWFUL MIGHT HAPPEN: NOT AT ALL
1. FEELING NERVOUS, ANXIOUS, OR ON EDGE: NOT AT ALL
GAD7 TOTAL SCORE: 3

## 2023-01-12 ASSESSMENT — PATIENT HEALTH QUESTIONNAIRE - PHQ9
SUM OF ALL RESPONSES TO PHQ QUESTIONS 1-9: 3
5. POOR APPETITE OR OVEREATING: SEVERAL DAYS

## 2023-01-12 NOTE — PROGRESS NOTES
"Subjective:     32 year old  at 36w1d presents for a routine prenatal appointment.  She denies vaginal bleeding,  leakage of fluid, or change in vaginal discharge.  Experiences intermittent, infrequent contractions.  + fetal movement.      Patient was evaluated in the birthplace overnight (discharged 2 days ago) for chest pain, upper abdominal pain and uterine contractions. She reports she has had a headache since she was inpatient - no new symptoms of hypertensive disorders today.    She does endorse having itching of her palms, soles of her feet, and forearms; no evidence of a rash in these areas. Does not worsen at night. Not improved with lotion. Had seen derm for a rash a few months ago and was prescribed a steroid cream. The cream has not been helping.     Inpatient labs:  CBC drawn inpatient which showed Hgb of 10.9  AST, ALT normal  Protein Urine / creatinine elevated at 0.40     GDM: pt did not bring her log with her for today's visit but reports the following:  - Blood glucose has been between 110-116 fasting for the last week.  -  After meals, the highest has been between 110-125, one hour after eating  - Has been using Levemir 10 units daily at bedtime.     No longer taking ASA  Pt reports heartburn, taking Tums without adequate relief but she declines further intervention today.     BPP was 88 today.      Objective:  Vitals:    23 1123   BP: 117/81   Pulse: 94   Weight: 92.1 kg (203 lb)   Height: 1.64 m (5' 4.57\")    See OB flowsheet    Assessment/Plan     Encounter Diagnoses   Name Primary?     Itching      High-risk pregnancy in third trimester Yes     Insulin controlled gestational diabetes mellitus (GDM) in third trimester      Orders Placed This Encounter   Procedures     Bilirubin  total     Bile acids total     PHQ-9 SCORE 2022   PHQ-9 Total Score 3 10 3     PHQ 2022   PHQ-9 Total Score 3 10 3   Q9: Thoughts of better off " dead/self-harm past 2 weeks Not at all Not at all Not at all     - GBS screening: GBS Pos (done 11/11/2022)   - Did not have a chance to review postpartum OTC medication.   - Labor signs discussed. Reinforced daily fetal movement counts.  - Reviewed why/how to contact provider if headache/visual changes/RUQ or epigastric pain, decreased fetal movement, vaginal bleeding, leakage of fluid.  - Continue twice weekly BPPs  - Pt to go to the lab today for Bile acids and bilirubin (fasting) today due to itching. Low suspicion for cholestasis, but desire to rule out.   - Discussed elevated protein creatinine ratio and discussed that if she has another mild range BP she would meet criteria for preeclampsia without severe features and IOL would be recommended at 37 weeks.  - Insulin was adjusted (increased to Levemir 14 Units daily) by Marycruz Tavares, Pharm D, today due to elevated fasting glucoses. See note in Epic.   - Continue twice weekly BPPs   - Counseled on indication for IOL between 37-38wks gestation due to GDMA2; pt desires induction. This has been scheduled for 1/21/23 at 7am.   Return for RICCI in 1 week    Pt expressed understanding and agreement with the plan for care.  Jackeline Bowser, DNP, APRN, WHNP

## 2023-01-12 NOTE — LETTER
Date:January 19, 2023      Provider requested that no letter be sent. Do not send.       [facility]

## 2023-01-12 NOTE — PROGRESS NOTES
Clinical Pharmacy Consult:                                                    Briana Newman is a 32 year old female coming in for a clinical pharmacist consult.  She was referred to me from Jackeline Bowser.     Reason for Consult: GDM    Discussion: currently taking Levemir 10 units daily. She did not bring in her glucose log today. She is planning to be induced in 1 week.   Reports:  Fasting 110-115  Under 120 the rest of the day (1 hr checks)    She has been struggling with having a bedtime snack because of heartburn.  Reports no missing injections of insulin    Plan:  1. Increase Levemir to 14 units daily    No GDM follow-up planned due to upcoming induction    Marycruz Tavares, PharmD, BCACP   Medication Management Pharmacist   [facility]  [phone]

## 2023-01-12 NOTE — LETTER
"2023       RE: Briana Newman  [address] [place] [address]     Dear Colleague,    Thank you for referring your patient, Briana Newman, to the [facility] [place] at [facility]. Please see a copy of my visit note below.    Subjective:     32 year old  at 36w1d presents for a routine prenatal appointment.  She denies vaginal bleeding,  leakage of fluid, or change in vaginal discharge.  Experiences intermittent, infrequent contractions.  + fetal movement.      Patient was evaluated in the birthplace overnight (discharged 2 days ago) for chest pain, upper abdominal pain and uterine contractions. She reports she has had a headache since she was inpatient - no new symptoms of hypertensive disorders today.    She does endorse having itching of her palms, soles of her feet, and forearms; no evidence of a rash in these areas. Does not worsen at night. Not improved with lotion. Had seen derm for a rash a few months ago and was prescribed a steroid cream. The cream has not been helping.     Inpatient labs:  CBC drawn inpatient which showed Hgb of 10.9  AST, ALT normal  Protein Urine / creatinine elevated at 0.40     GDM: pt did not bring her log with her for today's visit but reports the following:  - Blood glucose has been between 110-116 fasting for the last week.  -  After meals, the highest has been between 110-125, one hour after eating  - Has been using Levemir 10 units daily at bedtime.     No longer taking ASA  Pt reports heartburn, taking Tums without adequate relief but she declines further intervention today.     BPP was 8/8 today.      Objective:  Vitals:    23 1123   BP: 117/81   Pulse: 94   Weight: 92.1 kg (203 lb)   Height: 1.64 m (5' 4.57\")    See OB flowsheet    Assessment/Plan     Encounter Diagnoses   Name Primary?     Itching      High-risk pregnancy in third trimester Yes     Insulin controlled gestational " diabetes mellitus (GDM) in third trimester      Orders Placed This Encounter   Procedures     Bilirubin  total     Bile acids total     PHQ-9 SCORE 8/1/2022 12/30/2022 1/12/2023   PHQ-9 Total Score 3 10 3     PHQ 8/1/2022 12/30/2022 1/12/2023   PHQ-9 Total Score 3 10 3   Q9: Thoughts of better off dead/self-harm past 2 weeks Not at all Not at all Not at all     - GBS screening: GBS Pos (done 11/11/2022)   - Did not have a chance to review postpartum OTC medication.   - Labor signs discussed. Reinforced daily fetal movement counts.  - Reviewed why/how to contact provider if headache/visual changes/RUQ or epigastric pain, decreased fetal movement, vaginal bleeding, leakage of fluid.  - Continue twice weekly BPPs  - Pt to go to the lab today for Bile acids and bilirubin (fasting) today due to itching. Low suspicion for cholestasis, but desire to rule out.   - Discussed elevated protein creatinine ratio and discussed that if she has another mild range BP she would meet criteria for preeclampsia without severe features and IOL would be recommended at 37 weeks.  - Insulin was adjusted (increased to Levemir 14 Units daily) by Marycruz Tavares, Pharm D, today due to elevated fasting glucoses. See note in Epic.   - Continue twice weekly BPPs   - Counseled on indication for IOL between 37-38wks gestation due to GDMA2; pt desires induction. This has been scheduled for 1/21/23 at 7am.   Return for RICCI in 1 week    Pt expressed understanding and agreement with the plan for care.  Jackeline Bowser, RADHAMES, APRN, WHNP              Again, thank you for allowing me to participate in the care of your patient.      Sincerely,    Jackeline Bowser, ERICA CNP

## 2023-01-14 LAB — BILE AC SERPL-SCNC: 1 UMOL/L

## 2023-01-17 ENCOUNTER — HOSPITAL ENCOUNTER (OUTPATIENT)
Dept: ULTRASOUND IMAGING | Facility: CLINIC | Age: 33
Discharge: HOME | End: 2023-01-17
Attending: ADVANCED PRACTICE MIDWIFE | Admitting: ADVANCED PRACTICE MIDWIFE
Payer: COMMERCIAL

## 2023-01-17 DIAGNOSIS — O24.414 INSULIN CONTROLLED GESTATIONAL DIABETES MELLITUS (GDM) IN THIRD TRIMESTER: ICD-10-CM

## 2023-01-17 DIAGNOSIS — O09.92 HIGH-RISK PREGNANCY, SECOND TRIMESTER: ICD-10-CM

## 2023-01-17 PROCEDURE — 76819 FETAL BIOPHYS PROFIL W/O NST: CPT | Mod: 26 | Performed by: RADIOLOGY

## 2023-01-17 PROCEDURE — 76819 FETAL BIOPHYS PROFIL W/O NST: CPT

## 2023-01-18 NOTE — PROGRESS NOTES
Informed by the lab via staff messaging after patient had been discharged that they were unable to run the Troponin I level for Briana.       ERICA Lowery, SMOOTHM

## 2023-01-19 ENCOUNTER — OFFICE VISIT (OUTPATIENT)
Dept: OBGYN | Facility: CLINIC | Age: 33
End: 2023-01-19
Attending: ADVANCED PRACTICE MIDWIFE
Payer: COMMERCIAL

## 2023-01-19 VITALS
HEART RATE: 94 BPM | HEIGHT: 65 IN | BODY MASS INDEX: 34.02 KG/M2 | WEIGHT: 204.2 LBS | SYSTOLIC BLOOD PRESSURE: 134 MMHG | DIASTOLIC BLOOD PRESSURE: 86 MMHG

## 2023-01-19 DIAGNOSIS — O09.93 HIGH-RISK PREGNANCY, THIRD TRIMESTER: Primary | ICD-10-CM

## 2023-01-19 PROCEDURE — G0463 HOSPITAL OUTPT CLINIC VISIT: HCPCS | Performed by: ADVANCED PRACTICE MIDWIFE

## 2023-01-19 PROCEDURE — 99207 PR PRENATAL VISIT: CPT | Performed by: ADVANCED PRACTICE MIDWIFE

## 2023-01-19 NOTE — PROGRESS NOTES
"Subjective:     32 year old  at 37w1d presents for routine prenatal visit.          Denies vaginal bleeding, discharge or leakage of fluid. Reports +fetal movement.  No HA, vision changes, ruq/epigastric pain.      Patient concerns: Feeling well overall. Has been feeling cramping and random contractions.Denies vaginal bleeding, discharge or leakage of fluid. Reports +fetal movement.    Pregnancy c/b GDMA2. Insulin increased to 14 units last week. Reports blood glucose numbers have been mostly normal but fastings are often elevated. Scheduled for induction of labor on 23. Asking if she can defer Friday's BPP.   Remembers having miso, galeas balloon and pitocin last induction. Hoping to avoid balloon.    Would like cervical exam today and membrane sweep if possible.     Objective:  Vitals:    23 0918   BP: 134/86   Pulse: 94   Weight: 92.6 kg (204 lb 3.2 oz)   Height: 1.64 m (5' 4.57\")        See OB flowsheet    Int os closed (ext os 1-2cm)/long/-3, post/med  Sanchez score: 2    Assessment/Plan     Encounter Diagnosis   Name Primary?     High-risk pregnancy, third trimester Yes       - Reviewed why/how to contact provider if headache/visual changes/RUQ or epigastric pain, decreased fetal movement, vaginal bleeding, leakage of fluid or strong/regular contractions.   Patient education/orders or handouts today:  Sign/symptoms of labor, When to call for labor or other concerns and Induction of labor    - Ok to defer BPP on Friday as IOL is Saturday. Canceled appt.   - Reviewed cervical ripening and induction methods, including po or pv misoprostol, galeas balloon, pitocin, arom.  - IOL scheduled for 23.    ERICA Farias, LEAH  "

## 2023-01-19 NOTE — LETTER
"2023       RE: Briana Newman  [address] [address]     Dear Colleague,    Thank you for referring your patient, Briana Newman, to the [facility] [place] at [facility]. Please see a copy of my visit note below.    Subjective:     32 year old  at 37w1d presents for routine prenatal visit.          Denies vaginal bleeding, discharge or leakage of fluid. Reports +fetal movement.  No HA, vision changes, ruq/epigastric pain.      Patient concerns: Feeling well overall. Has been feeling cramping and random contractions.Denies vaginal bleeding, discharge or leakage of fluid. Reports +fetal movement.    Pregnancy c/b GDMA2. Insulin increased to 14 units last week. Reports blood glucose numbers have been mostly normal but fastings are often elevated. Scheduled for induction of labor on 23. Asking if she can defer Friday's BPP.   Remembers having miso, galeas balloon and pitocin last induction. Hoping to avoid balloon.    Would like cervical exam today and membrane sweep if possible.     Objective:  Vitals:    23 0918   BP: 134/86   Pulse: 94   Weight: 92.6 kg (204 lb 3.2 oz)   Height: 1.64 m (5' 4.57\")        See OB flowsheet    Int os closed (ext os 1-2cm)/long/-3, post/med  Sanchez score: 2    Assessment/Plan     Encounter Diagnosis   Name Primary?     High-risk pregnancy, third trimester Yes       - Reviewed why/how to contact provider if headache/visual changes/RUQ or epigastric pain, decreased fetal movement, vaginal bleeding, leakage of fluid or strong/regular contractions.   Patient education/orders or handouts today:  Sign/symptoms of labor, When to call for labor or other concerns and Induction of labor    - Ok to defer BPP on Friday as IOL is Saturday. Canceled appt.   - Reviewed cervical ripening and induction methods, including po or pv misoprostol, galeas balloon, pitocin, arom.  - IOL scheduled for " 1/21/23.    ERICA Farias CNM      Again, thank you for allowing me to participate in the care of your patient.      Sincerely,    Caity Szymanski CNM

## 2023-01-19 NOTE — LETTER
Date:January 19, 2023      Provider requested that no letter be sent. Do not send.       [facility]

## 2023-01-21 ENCOUNTER — HOSPITAL ENCOUNTER (INPATIENT)
Facility: CLINIC | Age: 33
LOS: 3 days | Discharge: HOME HEALTH | End: 2023-01-24
Attending: ADVANCED PRACTICE MIDWIFE | Admitting: MIDWIFE
Payer: COMMERCIAL

## 2023-01-21 ENCOUNTER — ANESTHESIA (OUTPATIENT)
Dept: OBGYN | Facility: CLINIC | Age: 33
End: 2023-01-21
Payer: COMMERCIAL

## 2023-01-21 ENCOUNTER — ANESTHESIA EVENT (OUTPATIENT)
Dept: OBGYN | Facility: CLINIC | Age: 33
End: 2023-01-21
Payer: COMMERCIAL

## 2023-01-21 DIAGNOSIS — O24.414 INSULIN CONTROLLED GESTATIONAL DIABETES MELLITUS (GDM) IN THIRD TRIMESTER: ICD-10-CM

## 2023-01-21 PROBLEM — Z34.90 ENCOUNTER FOR INDUCTION OF LABOR: Status: ACTIVE | Noted: 2023-01-21

## 2023-01-21 LAB
ABO/RH(D): NORMAL
ANTIBODY SCREEN: NEGATIVE
ERYTHROCYTE [DISTWIDTH] IN BLOOD BY AUTOMATED COUNT: 14.1 % (ref 10–15)
GLUCOSE BLDC GLUCOMTR-MCNC: 150 MG/DL (ref 70–99)
GLUCOSE BLDC GLUCOMTR-MCNC: 88 MG/DL (ref 70–99)
HBA1C MFR BLD: 5.2 % (ref 0–5.6)
HCT VFR BLD AUTO: 30.7 % (ref 35–47)
HGB BLD-MCNC: 10.4 G/DL (ref 11.7–15.7)
HOLD SPECIMEN: NORMAL
MCH RBC QN AUTO: 26.9 PG (ref 26.5–33)
MCHC RBC AUTO-ENTMCNC: 33.9 G/DL (ref 31.5–36.5)
MCV RBC AUTO: 80 FL (ref 78–100)
PLATELET # BLD AUTO: 436 10E3/UL (ref 150–450)
RBC # BLD AUTO: 3.86 10E6/UL (ref 3.8–5.2)
SARS-COV-2 RNA RESP QL NAA+PROBE: NEGATIVE
SPECIMEN EXPIRATION DATE: NORMAL
T PALLIDUM AB SER QL: NONREACTIVE
WBC # BLD AUTO: 12.6 10E3/UL (ref 4–11)

## 2023-01-21 PROCEDURE — 3E0P7VZ INTRODUCTION OF HORMONE INTO FEMALE REPRODUCTIVE, VIA NATURAL OR ARTIFICIAL OPENING: ICD-10-PCS | Performed by: MIDWIFE

## 2023-01-21 PROCEDURE — 86901 BLOOD TYPING SEROLOGIC RH(D): CPT | Performed by: MIDWIFE

## 2023-01-21 PROCEDURE — 250N000012 HC RX MED GY IP 250 OP 636 PS 637: Performed by: MIDWIFE

## 2023-01-21 PROCEDURE — U0005 INFEC AGEN DETEC AMPLI PROBE: HCPCS | Performed by: MIDWIFE

## 2023-01-21 PROCEDURE — 85027 COMPLETE CBC AUTOMATED: CPT | Performed by: MIDWIFE

## 2023-01-21 PROCEDURE — 370N000003 HC ANESTHESIA WARD SERVICE

## 2023-01-21 PROCEDURE — 250N000013 HC RX MED GY IP 250 OP 250 PS 637: Performed by: MIDWIFE

## 2023-01-21 PROCEDURE — 86780 TREPONEMA PALLIDUM: CPT | Performed by: MIDWIFE

## 2023-01-21 PROCEDURE — 120N000002 HC R&B MED SURG/OB UMMC

## 2023-01-21 PROCEDURE — 36415 COLL VENOUS BLD VENIPUNCTURE: CPT | Performed by: MIDWIFE

## 2023-01-21 PROCEDURE — 83036 HEMOGLOBIN GLYCOSYLATED A1C: CPT | Performed by: MIDWIFE

## 2023-01-21 RX ORDER — CARBOPROST TROMETHAMINE 250 UG/ML
250 INJECTION, SOLUTION INTRAMUSCULAR
Status: DISCONTINUED | OUTPATIENT
Start: 2023-01-21 | End: 2023-01-23 | Stop reason: HOSPADM

## 2023-01-21 RX ORDER — MISOPROSTOL 200 UG/1
400 TABLET ORAL
Status: DISCONTINUED | OUTPATIENT
Start: 2023-01-21 | End: 2023-01-23 | Stop reason: HOSPADM

## 2023-01-21 RX ORDER — NALOXONE HYDROCHLORIDE 0.4 MG/ML
0.2 INJECTION, SOLUTION INTRAMUSCULAR; INTRAVENOUS; SUBCUTANEOUS
Status: DISCONTINUED | OUTPATIENT
Start: 2023-01-21 | End: 2023-01-23 | Stop reason: HOSPADM

## 2023-01-21 RX ORDER — CITRIC ACID/SODIUM CITRATE 334-500MG
30 SOLUTION, ORAL ORAL
Status: DISCONTINUED | OUTPATIENT
Start: 2023-01-21 | End: 2023-01-23 | Stop reason: HOSPADM

## 2023-01-21 RX ORDER — IBUPROFEN 800 MG/1
800 TABLET, FILM COATED ORAL
Status: DISCONTINUED | OUTPATIENT
Start: 2023-01-21 | End: 2023-01-24 | Stop reason: HOSPADM

## 2023-01-21 RX ORDER — TRANEXAMIC ACID 10 MG/ML
1 INJECTION, SOLUTION INTRAVENOUS EVERY 30 MIN PRN
Status: DISCONTINUED | OUTPATIENT
Start: 2023-01-21 | End: 2023-01-23 | Stop reason: HOSPADM

## 2023-01-21 RX ORDER — METOCLOPRAMIDE HYDROCHLORIDE 5 MG/ML
10 INJECTION INTRAMUSCULAR; INTRAVENOUS EVERY 6 HOURS PRN
Status: DISCONTINUED | OUTPATIENT
Start: 2023-01-21 | End: 2023-01-23 | Stop reason: HOSPADM

## 2023-01-21 RX ORDER — ONDANSETRON 4 MG/1
4 TABLET, ORALLY DISINTEGRATING ORAL EVERY 6 HOURS PRN
Status: DISCONTINUED | OUTPATIENT
Start: 2023-01-21 | End: 2023-01-23 | Stop reason: HOSPADM

## 2023-01-21 RX ORDER — FENTANYL CITRATE 50 UG/ML
100 INJECTION, SOLUTION INTRAMUSCULAR; INTRAVENOUS
Status: DISCONTINUED | OUTPATIENT
Start: 2023-01-21 | End: 2023-01-23 | Stop reason: HOSPADM

## 2023-01-21 RX ORDER — NALOXONE HYDROCHLORIDE 0.4 MG/ML
0.4 INJECTION, SOLUTION INTRAMUSCULAR; INTRAVENOUS; SUBCUTANEOUS
Status: DISCONTINUED | OUTPATIENT
Start: 2023-01-21 | End: 2023-01-23 | Stop reason: HOSPADM

## 2023-01-21 RX ORDER — PENICILLIN G POTASSIUM 5000000 [IU]/1
5 INJECTION, POWDER, FOR SOLUTION INTRAMUSCULAR; INTRAVENOUS ONCE
Status: COMPLETED | OUTPATIENT
Start: 2023-01-21 | End: 2023-01-22

## 2023-01-21 RX ORDER — NICOTINE POLACRILEX 4 MG
15-30 LOZENGE BUCCAL
Status: DISCONTINUED | OUTPATIENT
Start: 2023-01-21 | End: 2023-01-23 | Stop reason: HOSPADM

## 2023-01-21 RX ORDER — OXYTOCIN/0.9 % SODIUM CHLORIDE 30/500 ML
100-340 PLASTIC BAG, INJECTION (ML) INTRAVENOUS CONTINUOUS PRN
Status: DISCONTINUED | OUTPATIENT
Start: 2023-01-21 | End: 2023-01-24 | Stop reason: HOSPADM

## 2023-01-21 RX ORDER — KETOROLAC TROMETHAMINE 30 MG/ML
30 INJECTION, SOLUTION INTRAMUSCULAR; INTRAVENOUS
Status: DISCONTINUED | OUTPATIENT
Start: 2023-01-21 | End: 2023-01-24 | Stop reason: HOSPADM

## 2023-01-21 RX ORDER — METHYLERGONOVINE MALEATE 0.2 MG/ML
200 INJECTION INTRAVENOUS
Status: DISCONTINUED | OUTPATIENT
Start: 2023-01-21 | End: 2023-01-23 | Stop reason: HOSPADM

## 2023-01-21 RX ORDER — METOCLOPRAMIDE 10 MG/1
10 TABLET ORAL EVERY 6 HOURS PRN
Status: DISCONTINUED | OUTPATIENT
Start: 2023-01-21 | End: 2023-01-23 | Stop reason: HOSPADM

## 2023-01-21 RX ORDER — MISOPROSTOL 100 UG/1
25 TABLET ORAL EVERY 4 HOURS PRN
Status: DISCONTINUED | OUTPATIENT
Start: 2023-01-21 | End: 2023-01-23 | Stop reason: HOSPADM

## 2023-01-21 RX ORDER — PENICILLIN G 3000000 [IU]/50ML
3 INJECTION, SOLUTION INTRAVENOUS EVERY 4 HOURS
Status: DISCONTINUED | OUTPATIENT
Start: 2023-01-21 | End: 2023-01-23 | Stop reason: HOSPADM

## 2023-01-21 RX ORDER — MISOPROSTOL 200 UG/1
800 TABLET ORAL
Status: DISCONTINUED | OUTPATIENT
Start: 2023-01-21 | End: 2023-01-23 | Stop reason: HOSPADM

## 2023-01-21 RX ORDER — PROCHLORPERAZINE 25 MG
25 SUPPOSITORY, RECTAL RECTAL EVERY 12 HOURS PRN
Status: DISCONTINUED | OUTPATIENT
Start: 2023-01-21 | End: 2023-01-23 | Stop reason: HOSPADM

## 2023-01-21 RX ORDER — DEXTROSE MONOHYDRATE 25 G/50ML
25-50 INJECTION, SOLUTION INTRAVENOUS
Status: DISCONTINUED | OUTPATIENT
Start: 2023-01-21 | End: 2023-01-23 | Stop reason: HOSPADM

## 2023-01-21 RX ORDER — PROCHLORPERAZINE MALEATE 10 MG
10 TABLET ORAL EVERY 6 HOURS PRN
Status: DISCONTINUED | OUTPATIENT
Start: 2023-01-21 | End: 2023-01-23 | Stop reason: HOSPADM

## 2023-01-21 RX ORDER — OXYTOCIN 10 [USP'U]/ML
10 INJECTION, SOLUTION INTRAMUSCULAR; INTRAVENOUS
Status: DISCONTINUED | OUTPATIENT
Start: 2023-01-21 | End: 2023-01-24 | Stop reason: HOSPADM

## 2023-01-21 RX ORDER — OXYTOCIN/0.9 % SODIUM CHLORIDE 30/500 ML
340 PLASTIC BAG, INJECTION (ML) INTRAVENOUS CONTINUOUS PRN
Status: DISCONTINUED | OUTPATIENT
Start: 2023-01-21 | End: 2023-01-23 | Stop reason: HOSPADM

## 2023-01-21 RX ORDER — OXYTOCIN 10 [USP'U]/ML
10 INJECTION, SOLUTION INTRAMUSCULAR; INTRAVENOUS
Status: DISCONTINUED | OUTPATIENT
Start: 2023-01-21 | End: 2023-01-23 | Stop reason: HOSPADM

## 2023-01-21 RX ORDER — ONDANSETRON 2 MG/ML
4 INJECTION INTRAMUSCULAR; INTRAVENOUS EVERY 6 HOURS PRN
Status: DISCONTINUED | OUTPATIENT
Start: 2023-01-21 | End: 2023-01-23 | Stop reason: HOSPADM

## 2023-01-21 RX ADMIN — INSULIN GLARGINE 14 UNITS: 100 INJECTION, SOLUTION SUBCUTANEOUS at 22:08

## 2023-01-21 RX ADMIN — MISOPROSTOL 25 MCG: 100 TABLET ORAL at 09:55

## 2023-01-21 RX ADMIN — MISOPROSTOL 25 MCG: 100 TABLET ORAL at 15:50

## 2023-01-21 RX ADMIN — MISOPROSTOL 25 MCG: 100 TABLET ORAL at 19:58

## 2023-01-21 ASSESSMENT — ACTIVITIES OF DAILY LIVING (ADL)
ADLS_ACUITY_SCORE: 18

## 2023-01-21 NOTE — PLAN OF CARE
Data: Maternal status VSS. Afebrile. Denies leaking of fluids, vaginal bleeding, s/s of hypoglycemia and/or pre-E. See flow sheets for details on FHR tracings and uterine activity. Signs and symptoms of infection absent.  Action/interventions: One dose of vaginal miso administered. Encouraged voiding, oral fluid and food intake, ambulating, and repositioning.   Response: Patient is coping well with progressing labor and rested throughout the day. Pt reports intermittent cramping and is managing well with repositioning and distraction. Declines pain management interventions at this time.  Plan: Continue cervical ripening. Initiate penicillin as advised by provider. Pt desires to be consented for epidural and is interested in nitrous oxide. Will continue to observe for and notify care provider of indicators of progressing labor, signs/symptoms of infection, fetal/maternal compromise. Report given to Jesus Manuel Echeverria RN, at shift change.

## 2023-01-21 NOTE — PROGRESS NOTES
Induction of Labor Admit Note  Briana Newman  MRN: [id number]  Gestational Age: 37w3d      Briana Newman presented for induction of labor for GDM at 0712. Patient oriented to room and call light within reach. Patient denies contractions, bleeding, ROM, and/or s/s of pre-E.   Past Medical History:   Diagnosis Date     Gestational diabetes mellitus (GDM) 2022     Postpartum depression     w first, unsure about med use     POTS (postural orthostatic tachycardia syndrome)      Pre-eclampsia     HTN started 3rd tri, them PreE w severe features,induced at term     Shortness of breath      RN completed admission assessment and questions. Pt consented to fetal and uterine monitoring, US and toco applied. Marge Cummins CNM, notified of pt arrival and came to bedside at 0850. See flowsheet for fetal heart tracing and uterine activity.     Induction Plan: First dose of vaginal miso given at 0955. Will consider galeas as cervix begins to make change.    Pt is interested in nitrous oxide for pain management, and was informed of other pain management options. Pt desires anesthesia to consent her for epidural in the event she would like an epidural. Spouse at bedside for support.

## 2023-01-21 NOTE — PROGRESS NOTES
Blood pressure 114/59, temperature 98.2  F (36.8  C), temperature source Oral, resp. rate 16, last menstrual period 05/02/2022, not currently breastfeeding.  Patient Vitals for the past 24 hrs:   BP Temp Temp src Resp   01/21/23 1143 114/59 -- -- 16   01/21/23 0835 -- 98.2  F (36.8  C) Oral --   01/21/23 0725 121/64 -- -- 16     Labor Course  0906 SVE: 0, 40%, -3, posterior, medium = 2  Vaginal miso #1 0955      General appearance: comfortable. Had a period for about one hour when contractions were feeling more intense but have since become milder.     CONTRACTIONS: every 2-5 minutes. Difficulty tracing with external FM due to movement, then RN placed Ade monitor.   FETAL HEART TONES: continuous EFM- baseline 150 with moderate variability and positive accelerations. No decelerations.  ROM: not ruptured  PELVIC EXAM: deferred  BLOOD SUGAR: 150 mg/dL 1 hour postprandial. Pt had not eaten before arrival so ordered a large breakfast      # Pain Assessment:  Current Pain Score 1/21/2023   Patient currently in pain? yes   Briana bryant pain level was assessed and she currently denies pain.      ASSESSMENT:  ==============  IUP @ 37w3d for induction of labor.  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  Continue glucose monitoring QID  Cervical ripening with vaginal Misoprostol risks and benefits reviewed with pt. Agreeable to plan.  Prophylactic IV antibiotic for positive GBS status reviewed with pt. Agreeable to PCN.   Anticipate NSVB    ERICA Cam CNM

## 2023-01-21 NOTE — PROVIDER NOTIFICATION
01/21/23 0850   Provider Notification   Provider Name/Title HONEY Cummins CNM   Method of Notification At Bedside   Notification Reason Other (Comment);SVE  (Bedside US, plan for IOL)   Provider at bedside to discuss plan for IOL. Per provider guidance, start with vaginal miso and consider galeas later as next step.

## 2023-01-21 NOTE — PROVIDER NOTIFICATION
01/21/23 1300   Provider Notification   Provider Name/Title HONEY Cummins CNM   Method of Notification At Bedside     Provider at bedside. Per provider, ok to hold off on penicillin until pt is in more active labor. Plan to continue to monitor uterine activity and give additional dose of miso if contractions space out. Provider made aware of 1 hour post prandial glucose of 150, and confirmed to order to continue with glucose checks 1 hour post meals and 1 fasting. Pt switched over to paola for uterine and fetal monitoring.

## 2023-01-21 NOTE — PROVIDER NOTIFICATION
01/21/23 1229   Provider Notification   Provider Name/Title HONEY Cummins CNM   Method of Notification Electronic Page   Notification Reason Other (Comment)  (Contractions picking up, pt feeling cramping. Having a hard time monitoring baby, planning to switch to paola. 1 hour post-meal BG was 150. Do you want me to start penicillin this early or wait? Due for next miso at 155. Thx!)

## 2023-01-21 NOTE — PROGRESS NOTES
Blood pressure 114/59, temperature 98.2  F (36.8  C), temperature source Oral, resp. rate 16, last menstrual period 05/02/2022, not currently breastfeeding.  Patient Vitals for the past 24 hrs:   BP Temp Temp src Resp   01/21/23 1143 114/59 -- -- 16   01/21/23 0835 -- 98.2  F (36.8  C) Oral --   01/21/23 0725 121/64 -- -- 16     Labor Course  0906 SVE: 0, 40%, -3, posterior, medium = 2  Vaginal miso #1 0955      General appearance: comfortable. Had a period for about one hour when contractions were feeling more intense but have since become milder.     CONTRACTIONS: every 2-4 minutes. Difficulty tracing with external FM due to movement. RN placing Ade monitor.   FETAL HEART TONES: continuous EFM- baseline 150 with moderate variability and positive accelerations. No decelerations.  ROM: not ruptured  PELVIC EXAM: deferred  BLOOD SUGAR: 150 mg/dL 1 hour postprandial. Pt had not eaten before arrival so ordered a large breakfast      # Pain Assessment:  Current Pain Score 1/21/2023   Patient currently in pain? yes   Briana bryant pain level was assessed and she currently denies pain.      ASSESSMENT:  ==============  IUP @ 37w3d for induction of labor.  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  Continue glucose monitoring QID  Cervical ripening with vaginal Misoprostol risks and benefits reviewed with pt. Agreeable to plan.  Prophylactic IV antibiotic for positive GBS status reviewed with pt. Agreeable to PCN.   Anticipate NSVB    ERICA Cam CNM
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
## 2023-01-22 LAB
GLUCOSE BLDC GLUCOMTR-MCNC: 141 MG/DL (ref 70–99)
GLUCOSE BLDC GLUCOMTR-MCNC: 142 MG/DL (ref 70–99)
GLUCOSE BLDC GLUCOMTR-MCNC: 72 MG/DL (ref 70–99)
GLUCOSE BLDC GLUCOMTR-MCNC: 73 MG/DL (ref 70–99)

## 2023-01-22 PROCEDURE — 250N000013 HC RX MED GY IP 250 OP 250 PS 637: Performed by: MIDWIFE

## 2023-01-22 PROCEDURE — 10907ZC DRAINAGE OF AMNIOTIC FLUID, THERAPEUTIC FROM PRODUCTS OF CONCEPTION, VIA NATURAL OR ARTIFICIAL OPENING: ICD-10-PCS | Performed by: MIDWIFE

## 2023-01-22 PROCEDURE — 250N000011 HC RX IP 250 OP 636: Performed by: STUDENT IN AN ORGANIZED HEALTH CARE EDUCATION/TRAINING PROGRAM

## 2023-01-22 PROCEDURE — 120N000002 HC R&B MED SURG/OB UMMC

## 2023-01-22 PROCEDURE — 250N000011 HC RX IP 250 OP 636: Performed by: MIDWIFE

## 2023-01-22 PROCEDURE — 00HU33Z INSERTION OF INFUSION DEVICE INTO SPINAL CANAL, PERCUTANEOUS APPROACH: ICD-10-PCS | Performed by: ANESTHESIOLOGY

## 2023-01-22 PROCEDURE — 258N000003 HC RX IP 258 OP 636: Performed by: STUDENT IN AN ORGANIZED HEALTH CARE EDUCATION/TRAINING PROGRAM

## 2023-01-22 PROCEDURE — 250N000013 HC RX MED GY IP 250 OP 250 PS 637: Performed by: REGISTERED NURSE

## 2023-01-22 PROCEDURE — 3E0R3BZ INTRODUCTION OF ANESTHETIC AGENT INTO SPINAL CANAL, PERCUTANEOUS APPROACH: ICD-10-PCS | Performed by: ANESTHESIOLOGY

## 2023-01-22 PROCEDURE — 250N000009 HC RX 250: Performed by: REGISTERED NURSE

## 2023-01-22 PROCEDURE — 258N000003 HC RX IP 258 OP 636

## 2023-01-22 RX ORDER — SODIUM CHLORIDE, SODIUM LACTATE, POTASSIUM CHLORIDE, CALCIUM CHLORIDE 600; 310; 30; 20 MG/100ML; MG/100ML; MG/100ML; MG/100ML
INJECTION, SOLUTION INTRAVENOUS
Status: COMPLETED
Start: 2023-01-22 | End: 2023-01-22

## 2023-01-22 RX ORDER — HYDROXYZINE HYDROCHLORIDE 50 MG/1
50 TABLET, FILM COATED ORAL EVERY 6 HOURS PRN
Status: DISCONTINUED | OUTPATIENT
Start: 2023-01-22 | End: 2023-01-24 | Stop reason: HOSPADM

## 2023-01-22 RX ORDER — ACETAMINOPHEN 325 MG/1
650-975 TABLET ORAL EVERY 6 HOURS PRN
Status: DISCONTINUED | OUTPATIENT
Start: 2023-01-22 | End: 2023-01-24 | Stop reason: HOSPADM

## 2023-01-22 RX ORDER — SODIUM CHLORIDE, SODIUM LACTATE, POTASSIUM CHLORIDE, CALCIUM CHLORIDE 600; 310; 30; 20 MG/100ML; MG/100ML; MG/100ML; MG/100ML
INJECTION, SOLUTION INTRAVENOUS CONTINUOUS PRN
Status: DISCONTINUED | OUTPATIENT
Start: 2023-01-22 | End: 2023-01-23 | Stop reason: HOSPADM

## 2023-01-22 RX ORDER — LIDOCAINE HYDROCHLORIDE 10 MG/ML
INJECTION, SOLUTION EPIDURAL; INFILTRATION; INTRACAUDAL; PERINEURAL
Status: DISCONTINUED
Start: 2023-01-22 | End: 2023-01-22 | Stop reason: WASHOUT

## 2023-01-22 RX ORDER — HYDROXYZINE HYDROCHLORIDE 50 MG/1
50-100 TABLET, FILM COATED ORAL EVERY 6 HOURS PRN
Status: DISCONTINUED | OUTPATIENT
Start: 2023-01-22 | End: 2023-01-24 | Stop reason: HOSPADM

## 2023-01-22 RX ORDER — MISOPROSTOL 200 UG/1
TABLET ORAL
Status: DISCONTINUED
Start: 2023-01-22 | End: 2023-01-22 | Stop reason: HOSPADM

## 2023-01-22 RX ORDER — OXYTOCIN/0.9 % SODIUM CHLORIDE 30/500 ML
1-24 PLASTIC BAG, INJECTION (ML) INTRAVENOUS CONTINUOUS
Status: DISCONTINUED | OUTPATIENT
Start: 2023-01-22 | End: 2023-01-23 | Stop reason: HOSPADM

## 2023-01-22 RX ORDER — FENTANYL/ROPIVACAINE/NS/PF 2MCG/ML-.1
PLASTIC BAG, INJECTION (ML) EPIDURAL
Status: DISCONTINUED | OUTPATIENT
Start: 2023-01-22 | End: 2023-01-23 | Stop reason: HOSPADM

## 2023-01-22 RX ORDER — LIDOCAINE 40 MG/G
CREAM TOPICAL
Status: DISCONTINUED | OUTPATIENT
Start: 2023-01-22 | End: 2023-01-23 | Stop reason: HOSPADM

## 2023-01-22 RX ORDER — FENTANYL CITRATE-0.9 % NACL/PF 10 MCG/ML
100 PLASTIC BAG, INJECTION (ML) INTRAVENOUS EVERY 5 MIN PRN
Status: DISCONTINUED | OUTPATIENT
Start: 2023-01-22 | End: 2023-01-23 | Stop reason: HOSPADM

## 2023-01-22 RX ORDER — NALBUPHINE HYDROCHLORIDE 10 MG/ML
2.5-5 INJECTION, SOLUTION INTRAMUSCULAR; INTRAVENOUS; SUBCUTANEOUS EVERY 6 HOURS PRN
Status: DISCONTINUED | OUTPATIENT
Start: 2023-01-22 | End: 2023-01-24 | Stop reason: HOSPADM

## 2023-01-22 RX ORDER — OXYTOCIN 10 [USP'U]/ML
INJECTION, SOLUTION INTRAMUSCULAR; INTRAVENOUS
Status: DISCONTINUED
Start: 2023-01-22 | End: 2023-01-22 | Stop reason: WASHOUT

## 2023-01-22 RX ORDER — OXYTOCIN/0.9 % SODIUM CHLORIDE 30/500 ML
PLASTIC BAG, INJECTION (ML) INTRAVENOUS
Status: DISCONTINUED
Start: 2023-01-22 | End: 2023-01-22 | Stop reason: HOSPADM

## 2023-01-22 RX ADMIN — SODIUM CHLORIDE, POTASSIUM CHLORIDE, SODIUM LACTATE AND CALCIUM CHLORIDE 1000 ML: 600; 310; 30; 20 INJECTION, SOLUTION INTRAVENOUS at 14:43

## 2023-01-22 RX ADMIN — ACETAMINOPHEN 975 MG: 325 TABLET ORAL at 07:32

## 2023-01-22 RX ADMIN — PENICILLIN G 3 MILLION UNITS: 3000000 INJECTION, SOLUTION INTRAVENOUS at 16:57

## 2023-01-22 RX ADMIN — ACETAMINOPHEN 975 MG: 325 TABLET ORAL at 13:51

## 2023-01-22 RX ADMIN — PENICILLIN G POTASSIUM 5 MILLION UNITS: 5000000 POWDER, FOR SOLUTION INTRAMUSCULAR; INTRAPLEURAL; INTRATHECAL; INTRAVENOUS at 12:25

## 2023-01-22 RX ADMIN — SODIUM CHLORIDE, POTASSIUM CHLORIDE, SODIUM LACTATE AND CALCIUM CHLORIDE: 600; 310; 30; 20 INJECTION, SOLUTION INTRAVENOUS at 12:04

## 2023-01-22 RX ADMIN — BUPIVACAINE HYDROCHLORIDE 10 ML: 2.5 INJECTION, SOLUTION EPIDURAL; INFILTRATION; INTRACAUDAL at 16:35

## 2023-01-22 RX ADMIN — MISOPROSTOL 25 MCG: 100 TABLET ORAL at 07:13

## 2023-01-22 RX ADMIN — Medication: at 16:00

## 2023-01-22 RX ADMIN — ONDANSETRON 4 MG: 2 INJECTION INTRAMUSCULAR; INTRAVENOUS at 12:20

## 2023-01-22 RX ADMIN — Medication 2 MILLI-UNITS/MIN: at 12:31

## 2023-01-22 RX ADMIN — MISOPROSTOL 25 MCG: 100 TABLET ORAL at 00:36

## 2023-01-22 RX ADMIN — PENICILLIN G 3 MILLION UNITS: 3000000 INJECTION, SOLUTION INTRAVENOUS at 20:39

## 2023-01-22 ASSESSMENT — ACTIVITIES OF DAILY LIVING (ADL)
ADLS_ACUITY_SCORE: 18

## 2023-01-22 NOTE — PROVIDER NOTIFICATION
01/22/23 1615   Provider Notification   Provider Name/Title DARRELL Herrera CNM   Method of Notification At Bedside   Notification Reason SVE;Status Update;Patient Request     Provider at bedside after pt positioned in bed following epidural placement. SVE performed, no change since last check. Plan to continue increasing pitocin, ensure comfort with epidural, and consider AROM at a later time. Per provider, check blood glucose now and every 4 hours until active labor. Continue with penicillin doses. Patient and spouse agreeable to plan.

## 2023-01-22 NOTE — PROVIDER NOTIFICATION
01/22/23 1752   Provider Notification   Provider Name/Title DARRELL Herrera CNM   Method of Notification Electronic Page   Notification Reason Decels     Baby with significant decel at 1749. Repositioned pt and FHR came back to baseline. Please review strip and advise. Thx.

## 2023-01-22 NOTE — PROGRESS NOTES
Labor Progress Note    Subjective: Briana is resting in bed using the peanut ball, she would like to discuss next steps in her induction process.     Objective:  /70 (BP Location: Right arm, Patient Position: Right side, Cuff Size: Adult Regular)   Temp 98.2  F (36.8  C) (Oral)   Resp 16   LMP 05/02/2022 (Approximate)   Breastfeeding No    General appearance: comfortable.  Contractions: Every 1-7 minutes. 40-80 seconds duration.   FHR: continuous EFM- baseline 145 with moderate variability and positive accelerations. No decelerations.  ROM: not ruptured  PELVIC EXAM: deferred  Pitocin - none,  Antibiotics - none    Assessment:  IUP @ 37w4d for induction of labor.  Indication: GDM on insulin   Fetal Heart Rate Tracing category one  GBS - positive- not treated until active labor  H/o pre-eclampsia  S/p vaginal miso x5  Patient Active Problem List   Diagnosis     High-risk pregnancy, second trimester     Hx of preeclampsia, prior pregnancy, currently pregnant     Vitamin D deficiency     Hx of postpartum depression, currently pregnant     BMI 33.0-33.9,adult     Postural tachycardia with syncope     Atopic eruption of pregnancy     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     Dyspnea on exertion     Positive GBS test     At risk for ineffective breastfeeding - Lactation consult on PP unit     Insulin controlled gestational diabetes mellitus (GDM) in third trimester     Elevated blood pressure reading without diagnosis of hypertension     Encounter for induction of labor       Plan:  - Discussed at this time recommend continuing with cervical ripening, reviewed option of 6th dose of vaginal miso in 2 hours or may reevaluate at time medication is due and consider attempting balloon placement again. Reviewed use of IV pain medication during placement. Pt would like cervical exam prior to administration of 6th dose of miso and then discuss next steps.   - Ambulation, hydration, position changes, birthing ball/sling and tub  options to facilitate labor.  - Reevaluate in 2 hours/sooner prn  - Reviewed with Dr. Linda ORDONEZ consultant on call during AM safety rounds     ERICA YarbroughM

## 2023-01-22 NOTE — PROVIDER NOTIFICATION
01/22/23 1123   Provider Notification   Provider Name/Title DARRELL Herrera CNM   Method of Notification At Bedside   Notification Reason SVE     Provider at bedside for SVE and to discuss next steps for induction.See flowsheet for SVE, castellanos score 6. Plan per provider guidance, start pitocin and penicillin. Pt requesting zofran prior to penicillin due to nausea. Patient and  verbalizing agreement to plan, have no further questions at this time. Pt's family arrived at the bedside around 1230 to visit pt.

## 2023-01-22 NOTE — PLAN OF CARE
VSS. Pt denies pain, headaches, LOF, or visual disturbances. x3 vag miso's given. Pt reports intermittent cramping in abdomen with more intense cramping during contractions. FHR reactive, see flowsheets for more. SVE at 1525 with cervix unchanged at 0-1/40/-3. Plan to continue cervical ripening with vag miso overnight and possible balloon in the morning.

## 2023-01-22 NOTE — PROGRESS NOTES
Patient reporting increased pain and discomfort. Requesting an epidural for pain relief at 1410. [facility], anesthesia resident (Gustavo Garcia) called and in room at 1500. Patient and procedure correctly identified/ verified with DOYLE. Confirmed consent was signed. 1,000 mL fluid bolus given prior to epidural placement. Epidural placed by Gustavo Garcia, attending provider Dr. Carter took over placement. Test dose/ bolus given by Dr. Carter at 1558. Patient tolerated epidural well though verbalized frustration with the number of attempts it took. Patient reports significant pain decrease after procedure.

## 2023-01-22 NOTE — PLAN OF CARE
VSS. Vaginal misoprostol was given at 0036. Ade is in use however it does not accurately trace contractions. Initially, did not want to be on the external FHR moniter and [place] moniter because it was very challenging to keep baby's HR on the external moniter yesterday but agreed to have the moniter switched later today after she received a dose of Tylenol. EFM shows FHR baseline of 135-145, minimal to moderate variability, accels, and intermittent decels that resolve with position changes. Pt is aware of pain management options and wants to have a natural birth. Consent for an epidural was completed yesterday during previous shift in case pt asks for an epidural. Pt currently finding releif with hot packs. Denies vaginal bleeding or leaking of fluid. Results of SVE completed by LEAH at 0515 are 1-2/50/-2. Pt received nitrous oxide during Villarreal insertion attempt by CNM, but the Villarreal was unable to stay in place and the pt said she did not feel any pain relief with the nitrous oxide. RN pulled miso dose shortly after the failed insertion but pt requested that miso dose be held until later in the morning due to cramping. That miso dose was given at 0713. Fasting BG was 73. Delivery meds are in the room.  , Shannon, at bedside. Continue with plan of care. Report given to JUSTINE Suarez.

## 2023-01-22 NOTE — PROVIDER NOTIFICATION
01/22/23 1412   Provider Notification   Provider Name/Title DARRELL Herrera CNM   Method of Notification Electronic Page   Notification Reason Pain     Pt reporting increasing pain, requesting epidural for pain management. Request to provider to do handoff to anesthesia and have them come to bedside for placement when available.

## 2023-01-22 NOTE — PROVIDER NOTIFICATION
01/22/23 0836   Provider Notification   Provider Name/Title DARRELL Herrera CNM   Method of Notification At Bedside     Provider at bedside to discuss plan for progression of labor. Patient received oral tylenol at 0932 for pain management, declines other pain management interventions at this time. Plan is to recheck pt cervix at 1115 when due for last dose of miso and determine next steps at this time. Pt and partner in agreement with the plan, verbalizing strong desires to keep things moving forward. Warm packs and peanut ball provided for additional comfort. Pt desires to rest at this time.

## 2023-01-22 NOTE — ANESTHESIA PREPROCEDURE EVALUATION
Anesthesia Pre-Procedure Evaluation    Patient: Briana Newman   MRN: [id number] : 1990        Procedure :           Past Medical History:   Diagnosis Date     Gestational diabetes mellitus (GDM) 2022     Postpartum depression     w first, unsure about med use     POTS (postural orthostatic tachycardia syndrome)      Pre-eclampsia     HTN started 3rd tri, them PreE w severe features,induced at term     Shortness of breath       Past Surgical History:   Procedure Laterality Date     BREAST SURGERY  07/10/2012    cystecomy right breast-benign     wisdom teeth        No Known Allergies   Social History     Tobacco Use     Smoking status: Never     Smokeless tobacco: Never   Substance Use Topics     Alcohol use: Never      Wt Readings from Last 1 Encounters:   23 92.6 kg (204 lb 3.2 oz)        Anesthesia Evaluation            ROS/MED HX  ENT/Pulmonary:  - neg pulmonary ROS     Neurologic:  - neg neurologic ROS     Cardiovascular:     (+) hypertension-----    METS/Exercise Tolerance:     Hematologic:     (+) anemia,     Musculoskeletal:  - neg musculoskeletal ROS     GI/Hepatic:  - neg GI/hepatic ROS     Renal/Genitourinary:  - neg Renal ROS     Endo: Comment: Gestational diabetes      Psychiatric/Substance Use:  - neg psychiatric ROS     Infectious Disease:  - neg infectious disease ROS     Malignancy:  - neg malignancy ROS     Other:               OUTSIDE LABS:  CBC:   Lab Results   Component Value Date    WBC 12.6 (H) 2023    WBC 13.4 (H) 01/10/2023    HGB 10.4 (L) 2023    HGB 10.9 (L) 01/10/2023    HCT 30.7 (L) 2023    HCT 33.0 (L) 01/10/2023     2023     01/10/2023     BMP:   Lab Results   Component Value Date     01/10/2023     2023    POTASSIUM 3.6 01/10/2023    POTASSIUM 3.7 2023    CHLORIDE 105 01/10/2023    CHLORIDE 107 2023    CO2 19 (L) 01/10/2023    CO2 20 2023    BUN 4 (L) 01/10/2023    BUN 5 (L) 2023    CR  0.42 (L) 01/10/2023    CR 0.45 (L) 01/09/2023     (H) 01/21/2023     (H) 01/10/2023     COAGS: No results found for: PTT, INR, FIBR  POC:   Lab Results   Component Value Date    HCG Positive 08/02/2022     HEPATIC:   Lab Results   Component Value Date    ALBUMIN 2.8 (L) 12/30/2022    PROTTOTAL 7.2 12/30/2022    ALT 7 01/10/2023    AST 13 01/10/2023    ALKPHOS 83 12/30/2022    BILITOTAL 0.3 01/12/2023     OTHER:   Lab Results   Component Value Date    A1C 5.2 01/21/2023    MYRNA 9.5 01/10/2023    LIPASE 83 01/10/2023    TSH 0.77 08/02/2022       Anesthesia Plan    ASA Status:  3      Anesthesia Type: Epidural.   Induction: N/a.   Maintenance: N/A.        Consents    Anesthesia Plan(s) and associated risks, benefits, and realistic alternatives discussed. Questions answered and patient/representative(s) expressed understanding.    - Discussed:     - Discussed with:  Patient      - Extended Intubation/Ventilatory Support Discussed: No.      - Patient is DNR/DNI Status: No    Use of blood products discussed: No .     Postoperative Care    Pain management: Neuraxial analgesia.        Comments:                Leighton Almanzar Junior, MD

## 2023-01-22 NOTE — PROGRESS NOTES
S: General appearance: uncomfortable with contractions. Slept a little on and off during night.     Support:  Muhamed    Labor course:  906 0, 40%, -3, posterior, medium = 2  1725 SVE: 0-1, 40%, -3, posterior, medium  Vaginal miso #1 0955, #2 1550, #3 1958, #4 0036     Blood pressure 113/70, temperature 98.2  F (36.8  C), temperature source Oral, resp. rate 16, last menstrual period 2022, not currently breastfeeding.    Baseline rate 140, normal  Variability moderate  Accelerations present   Decelerations not present      CONTRACTIONS: Contractions every 2-4 minutes. Some coupling. Palpate: mild     ROM: not ruptured    PELVIC EXAM: 1-2cm, 50%, -2, posterior, soft. Galeas balloon attempted but cervix kept pushing more posterior and exam was not tolerated well by patient. Pt asked to stop prior to insertion.     # Pain Assessment:  Current Pain Score 2023   Patient currently in pain? yes   Briana s pain level was assessed and she currently has mild cramping with contractions but is worried about pain with galeas insertion.   - N2O was set up for galeas insertion but pt reports not feeling a difference     Assessment: EFM interpretation suggests absence of concern for fetal metabolic acidemia at this time due to moderate variability and accelerations present      ASSESSMENT:  ==============  Briana RAMSEY Trent  32 year old  female  Estimated Date of Delivery: 2023  IUP @ 37w4d IOL for GDMA2   Fetal Heart rate tracing  category one  GBS- positive, treat with active labor  No S&S of hyoglycemia    Patient Active Problem List   Diagnosis     High-risk pregnancy, second trimester     Hx of preeclampsia, prior pregnancy, currently pregnant     Vitamin D deficiency     Hx of postpartum depression, currently pregnant     BMI 33.0-33.9,adult     Postural tachycardia with syncope     Atopic eruption of pregnancy     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     Dyspnea on exertion     Positive GBS test     At risk for  ineffective breastfeeding - Lactation consult on PP unit     Insulin controlled gestational diabetes mellitus (GDM) in third trimester     Elevated blood pressure reading without diagnosis of hypertension     Encounter for induction of labor        PLAN:  ===========  Villarreal balloon attempted but was not successful and then patient asked to stop. Continue cervical ripening with vaginal misoprostol.   Prophylactic antibiotic for + GBS status with active labor or ROM  Second nurse called to assess nitrous oxide   Continue QID blood sugar monitoring, then hourly with active labor  Anticipate      ERICA Cam, CNM

## 2023-01-22 NOTE — PROGRESS NOTES
S: General appearance: uncomfortable with contractions. Feels cramping that wakes her from sleep. Feels she has slept about 1 hour.     Support:  Muhamed    Labor course:  09 0, 40%, -3, posterior, medium = 2  1725 SVE: 0-1, 40%, -3, posterior, medium  Vaginal miso #1 0955, #2 1550, #3 1958, #4 0036       Blood pressure 109/70, temperature 98.3  F (36.8  C), temperature source Oral, resp. rate 16, last menstrual period 2022, not currently breastfeeding.    Baseline rate 135, normal  Variability moderate  Accelerations present   Decelerations not present      CONTRACTIONS: Contractions every 2-5 minutes. Some coupling. Palpate: mild    ROM: not ruptured    PELVIC EXAM: deferred    # Pain Assessment:  Current Pain Score 2023   Patient currently in pain? yes   Briana bryant pain level was assessed and she currently denies pain.    - Discussed benefits of therapeutic rest, pt declines    Assessment: EFM interpretation suggests absence of concern for fetal metabolic acidemia at this time due to moderate variability and accelerations present      ASSESSMENT:  ==============  Briana MUSTAFA Trent  32 year old  female  Estimated Date of Delivery: 2023  IUP @ 37w4d IOL for GDMA2   Fetal Heart rate tracing  category one  GBS- positive, treat with active labor  No S&S of hyoglycemia    Patient Active Problem List   Diagnosis     High-risk pregnancy, second trimester     Hx of preeclampsia, prior pregnancy, currently pregnant     Vitamin D deficiency     Hx of postpartum depression, currently pregnant     BMI 33.0-33.9,adult     Postural tachycardia with syncope     Atopic eruption of pregnancy     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     Dyspnea on exertion     Positive GBS test     At risk for ineffective breastfeeding - Lactation consult on PP unit     Insulin controlled gestational diabetes mellitus (GDM) in third trimester     Elevated blood pressure reading without diagnosis of hypertension     Encounter for  induction of labor          PLAN:  ===========  Continue cervical ripening with misoprostol. Plan SVE with possible galeas insertion at 0500 with next miso  Prophylactic antibiotic for + GBS status with active labor or ROM  Continue QID blood sugar monitoring, then hourly with active labor  Anticipate        Marge Cummins, ERICA, CNM

## 2023-01-22 NOTE — PROGRESS NOTES
Labor Progress Note    Subjective: Briana is feeling overall comfortable, would like cervical exam and to discuss next steps in her induction.     Objective:  /52 (BP Location: Right arm, Patient Position: Right side, Cuff Size: Adult Regular)   Temp 97.9  F (36.6  C) (Oral)   Resp 16   LMP 05/02/2022 (Approximate)   Breastfeeding No    General appearance: comfortable.  Contractions: Every 1-5 minutes. 40-60 seconds duration.  Palpate: mild.  FHR: continuous EFM- baseline 140 with moderate variability and positive accelerations. No decelerations.  ROM: not ruptured  PELVIC EXAM: 3/ 50%/ Posterior/ average/ -2, BOW palpated.   Sanchez score: 6  Pitocin - none,  Antibiotics - none    Assessment:  IUP @ 37w4d for induction of labor.  Indication: GDM on insulin   Fetal Heart Rate Tracing category one  GBS - positive- antibiotics started  H/o pre-eclampsia  S/p vaginal miso x5  Patient Active Problem List   Diagnosis     High-risk pregnancy, second trimester     Hx of preeclampsia, prior pregnancy, currently pregnant     Vitamin D deficiency     Hx of postpartum depression, currently pregnant     BMI 33.0-33.9,adult     Postural tachycardia with syncope     Atopic eruption of pregnancy     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     Dyspnea on exertion     Positive GBS test     At risk for ineffective breastfeeding - Lactation consult on PP unit     Insulin controlled gestational diabetes mellitus (GDM) in third trimester     Elevated blood pressure reading without diagnosis of hypertension     Encounter for induction of labor       Plan:  - Discussed option of attempting galeas balloon placement again versus IV Pitocin. Reviewed r/b/a. She would like to proceed with IV Pitocin. Orders placed.   - Discussed option of AROM later in IOL process, given GBS+ reviewed option of starting Abx now versus waiting. She prefers to start abx now so she may have option of AROM once she is adequately treated.   - Ambulation, hydration,  position changes, birthing ball/sling and tub options to facilitate labor.  - Labor induction with Pitocin risks and benefits reviewed with pt. Agreeable to plan.  - Prophylactic IV antibiotic for positive GBS status reviewed with pt. Agreeable to PCN.   - Reevaluate in 2-4 hours/sooner prn    ERICA Yarbrough CNM

## 2023-01-22 NOTE — PROGRESS NOTES
Blood pressure 122/75, temperature 98.1  F (36.7  C), temperature source Oral, resp. rate 16, last menstrual period 05/02/2022, not currently breastfeeding.  Patient Vitals for the past 24 hrs:   BP Temp Temp src Resp   01/21/23 1535 122/75 98.1  F (36.7  C) Oral 16   01/21/23 1143 114/59 -- -- 16   01/21/23 0835 -- 98.2  F (36.8  C) Oral --   01/21/23 0725 121/64 -- -- 16     Labor Course  0906 SVE: 0, 40%, -3, posterior, medium = 2  Vaginal miso #1 0955      General appearance: comfortable, feeling mild cramping. Has  and friend at bedside providing support.     CONTRACTIONS: irregular, 4-12 minutes apart.   FETAL HEART TONES: continuous EFM- baseline 150 with moderate variability and positive accelerations. No decelerations.  ROM: not ruptured  PELVIC EXAM: SVE requested by pt at 1725 - 0-1cm, 40, -3, posterior, medium. Still unable to get finger through internal os.        # Pain Assessment:  Current Pain Score 1/21/2023   Patient currently in pain? yes   Briana bryant pain level was assessed and she currently denies pain.      ASSESSMENT:  ==============  IUP @ 37w3d for induction of labor.  Indication: GDM on insulin   Fetal Heart Rate Tracing category one  GBS- positive- not treated until active labor  Patient Active Problem List   Diagnosis     High-risk pregnancy, second trimester     Hx of preeclampsia, prior pregnancy, currently pregnant     Vitamin D deficiency     Hx of postpartum depression, currently pregnant     BMI 33.0-33.9,adult     Postural tachycardia with syncope     Atopic eruption of pregnancy     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     Dyspnea on exertion     Positive GBS test     At risk for ineffective breastfeeding - Lactation consult on PP unit     Insulin controlled gestational diabetes mellitus (GDM) in third trimester     Elevated blood pressure reading without diagnosis of hypertension     Encounter for induction of labor     PLAN:  ===========  Ambulation, hydration, position changes,  birthing ball/sling and tub options to facilitate labor.  Continue glucose monitoring QID  Continue cervical ripening with vaginal Misoprostol. Discussed option to try galeas balloon, but patient declines this since SVE was difficult for her. Will plan several more vaginal miso and then reassess for galeas balloon   Prophylactic IV antibiotic for positive GBS status with active labor. RN aware to start with regular painful contractions or ROM.   Reviewed options for therapeutic sleep overnight but patient declines sleep and pain medications. Aware they are options as needed.   Anticipate ERICA Villanueva CNM    ADDENDUM at 2202:   Pt comfortable with contractions. Declines sleep aid medications, feels she can rest on her own.     S/P vaginal miso x3, last at 1958    /66 (BP Location: Right arm, Patient Position: Supine, Cuff Size: Adult Regular)   Temp 98.4  F (36.9  C) (Oral)   Resp 16   LMP 05/02/2022 (Approximate)   Breastfeeding No   CONTRACTIONS: had been every 1-3 minutes, now spaced to 2-4 minutes  FETAL HEART TONES: continuous EFM- baseline 145 with moderate variability and positive accelerations. No decelerations.  ROM: not ruptured  PELVIC EXAM:deferred  Blood sugar 88, pt forget to notify nurse of meal. BS was taken 2.5 hours postprandial    A/P:  IOL for GDMA2, cervical ripening - continue every 4 hour vaginal miso   GBS positive - treat with PCN in active labor or with ROM  Rest/sleep overnight   Insulin - 14u glargine HS  Continue QID blood glucose and transition to hourly glucose checks with active labor  Anticipate ERICA Villanueva CNM

## 2023-01-22 NOTE — PROGRESS NOTES
Labor Progress Note    Subjective: Briana just received an epidural, however during placement a STAT  for another patient on L&D was called so anesthesia was unable to complete epidural and finish setting up continuous infusion pump. She is feeling relief from initial bolus, and is requesting a cervical exam.     Objective:  /60   Temp 98.5  F (36.9  C) (Oral)   Resp 18   LMP 2022 (Approximate)   SpO2 98%   Breastfeeding No    General appearance: comfortable.  Contractions: Every 1.5-6 minutes. 40-60 seconds duration.   FHR: continuous EFM- baseline 140 with moderate variability and positive accelerations. No decelerations.  ROM: not ruptured  PELVIC EXAM: unchanged from previous exam  Pitocin - 4 mu/min.,  Antibiotics - PCN, s/p x2 doses    Assessment:  IUP @ 37w4d for induction of labor.  Indication: GDM on insulin   Fetal Heart Rate Tracing category one  GBS - positive- adequately treated  S/p x5 doses vaginal miso   Epidural in place   Pitocin infusion x4 hours  Patient Active Problem List   Diagnosis     High-risk pregnancy, second trimester     Hx of preeclampsia, prior pregnancy, currently pregnant     Vitamin D deficiency     Hx of postpartum depression, currently pregnant     BMI 33.0-33.9,adult     Postural tachycardia with syncope     Atopic eruption of pregnancy     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     Dyspnea on exertion     Positive GBS test     At risk for ineffective breastfeeding - Lactation consult on PP unit     Insulin controlled gestational diabetes mellitus (GDM) in third trimester     Elevated blood pressure reading without diagnosis of hypertension     Encounter for induction of labor       Plan:  - Reviewed no change with cervical exam, recommended to defer AROM at this time given high fetal station and not yet well applied to cervix.   - Frequent position changes to facilitate labor with epidural anesthesia.  - Continue labor induction with Pitocin  - Continue prophylactic  IV antibiotic PCN for positive GBS status.   - Continue with current GDM orders, will place active labor orders prn.   - Reevaluate in 2-4 hours/sooner prn    ERICA Yarbrough CNM

## 2023-01-23 LAB
GLUCOSE BLDC GLUCOMTR-MCNC: 112 MG/DL (ref 70–99)
GLUCOSE BLDC GLUCOMTR-MCNC: 152 MG/DL (ref 70–99)
GLUCOSE BLDC GLUCOMTR-MCNC: 74 MG/DL (ref 70–99)
GLUCOSE BLDC GLUCOMTR-MCNC: 90 MG/DL (ref 70–99)
GLUCOSE BLDC GLUCOMTR-MCNC: 99 MG/DL (ref 70–99)
HGB BLD-MCNC: 10.7 G/DL (ref 11.7–15.7)

## 2023-01-23 PROCEDURE — 59400 OBSTETRICAL CARE: CPT | Performed by: MIDWIFE

## 2023-01-23 PROCEDURE — 120N000002 HC R&B MED SURG/OB UMMC

## 2023-01-23 PROCEDURE — 722N000001 HC LABOR CARE VAGINAL DELIVERY SINGLE

## 2023-01-23 PROCEDURE — 258N000003 HC RX IP 258 OP 636: Performed by: REGISTERED NURSE

## 2023-01-23 PROCEDURE — 250N000013 HC RX MED GY IP 250 OP 250 PS 637: Performed by: MIDWIFE

## 2023-01-23 PROCEDURE — 250N000011 HC RX IP 250 OP 636: Performed by: MIDWIFE

## 2023-01-23 PROCEDURE — 85018 HEMOGLOBIN: CPT | Performed by: MIDWIFE

## 2023-01-23 PROCEDURE — 36415 COLL VENOUS BLD VENIPUNCTURE: CPT | Performed by: MIDWIFE

## 2023-01-23 RX ORDER — SODIUM CHLORIDE 9 MG/ML
INJECTION, SOLUTION INTRAVENOUS CONTINUOUS
Status: DISCONTINUED | OUTPATIENT
Start: 2023-01-23 | End: 2023-01-23 | Stop reason: HOSPADM

## 2023-01-23 RX ORDER — IBUPROFEN 800 MG/1
800 TABLET, FILM COATED ORAL EVERY 6 HOURS PRN
Status: DISCONTINUED | OUTPATIENT
Start: 2023-01-23 | End: 2023-01-24 | Stop reason: HOSPADM

## 2023-01-23 RX ORDER — BISACODYL 10 MG
10 SUPPOSITORY, RECTAL RECTAL DAILY PRN
Status: DISCONTINUED | OUTPATIENT
Start: 2023-01-23 | End: 2023-01-24 | Stop reason: HOSPADM

## 2023-01-23 RX ORDER — OXYTOCIN 10 [USP'U]/ML
10 INJECTION, SOLUTION INTRAMUSCULAR; INTRAVENOUS
Status: DISCONTINUED | OUTPATIENT
Start: 2023-01-23 | End: 2023-01-24 | Stop reason: HOSPADM

## 2023-01-23 RX ORDER — DEXTROSE MONOHYDRATE 25 G/50ML
25-50 INJECTION, SOLUTION INTRAVENOUS
Status: DISCONTINUED | OUTPATIENT
Start: 2023-01-23 | End: 2023-01-23 | Stop reason: HOSPADM

## 2023-01-23 RX ORDER — OXYTOCIN/0.9 % SODIUM CHLORIDE 30/500 ML
340 PLASTIC BAG, INJECTION (ML) INTRAVENOUS CONTINUOUS PRN
Status: DISCONTINUED | OUTPATIENT
Start: 2023-01-23 | End: 2023-01-24 | Stop reason: HOSPADM

## 2023-01-23 RX ORDER — MISOPROSTOL 200 UG/1
400 TABLET ORAL
Status: DISCONTINUED | OUTPATIENT
Start: 2023-01-23 | End: 2023-01-24 | Stop reason: HOSPADM

## 2023-01-23 RX ORDER — MISOPROSTOL 200 UG/1
800 TABLET ORAL
Status: DISCONTINUED | OUTPATIENT
Start: 2023-01-23 | End: 2023-01-24 | Stop reason: HOSPADM

## 2023-01-23 RX ORDER — HYDROCORTISONE 25 MG/G
CREAM TOPICAL 3 TIMES DAILY PRN
Status: DISCONTINUED | OUTPATIENT
Start: 2023-01-23 | End: 2023-01-24 | Stop reason: HOSPADM

## 2023-01-23 RX ORDER — NICOTINE POLACRILEX 4 MG
15-30 LOZENGE BUCCAL
Status: DISCONTINUED | OUTPATIENT
Start: 2023-01-23 | End: 2023-01-24 | Stop reason: HOSPADM

## 2023-01-23 RX ORDER — CARBOPROST TROMETHAMINE 250 UG/ML
250 INJECTION, SOLUTION INTRAMUSCULAR
Status: DISCONTINUED | OUTPATIENT
Start: 2023-01-23 | End: 2023-01-24 | Stop reason: HOSPADM

## 2023-01-23 RX ORDER — DEXTROSE MONOHYDRATE 25 G/50ML
25-50 INJECTION, SOLUTION INTRAVENOUS
Status: DISCONTINUED | OUTPATIENT
Start: 2023-01-23 | End: 2023-01-24 | Stop reason: HOSPADM

## 2023-01-23 RX ORDER — MODIFIED LANOLIN
OINTMENT (GRAM) TOPICAL
Status: DISCONTINUED | OUTPATIENT
Start: 2023-01-23 | End: 2023-01-24 | Stop reason: HOSPADM

## 2023-01-23 RX ORDER — TRANEXAMIC ACID 10 MG/ML
1 INJECTION, SOLUTION INTRAVENOUS EVERY 30 MIN PRN
Status: DISCONTINUED | OUTPATIENT
Start: 2023-01-23 | End: 2023-01-24 | Stop reason: HOSPADM

## 2023-01-23 RX ORDER — METHYLERGONOVINE MALEATE 0.2 MG/ML
200 INJECTION INTRAVENOUS
Status: DISCONTINUED | OUTPATIENT
Start: 2023-01-23 | End: 2023-01-24 | Stop reason: HOSPADM

## 2023-01-23 RX ORDER — SODIUM CHLORIDE, SODIUM LACTATE, POTASSIUM CHLORIDE, CALCIUM CHLORIDE 600; 310; 30; 20 MG/100ML; MG/100ML; MG/100ML; MG/100ML
INJECTION, SOLUTION INTRAVENOUS
Status: DISCONTINUED
Start: 2023-01-23 | End: 2023-01-23 | Stop reason: HOSPADM

## 2023-01-23 RX ORDER — ACETAMINOPHEN 325 MG/1
650 TABLET ORAL EVERY 4 HOURS PRN
Status: DISCONTINUED | OUTPATIENT
Start: 2023-01-23 | End: 2023-01-24 | Stop reason: HOSPADM

## 2023-01-23 RX ORDER — DOCUSATE SODIUM 100 MG/1
100 CAPSULE, LIQUID FILLED ORAL DAILY
Status: DISCONTINUED | OUTPATIENT
Start: 2023-01-23 | End: 2023-01-24 | Stop reason: HOSPADM

## 2023-01-23 RX ORDER — NICOTINE POLACRILEX 4 MG
15-30 LOZENGE BUCCAL
Status: DISCONTINUED | OUTPATIENT
Start: 2023-01-23 | End: 2023-01-23 | Stop reason: HOSPADM

## 2023-01-23 RX ADMIN — ACETAMINOPHEN 650 MG: 325 TABLET, FILM COATED ORAL at 08:09

## 2023-01-23 RX ADMIN — KETOROLAC TROMETHAMINE 30 MG: 30 INJECTION, SOLUTION INTRAMUSCULAR; INTRAVENOUS at 04:14

## 2023-01-23 RX ADMIN — SODIUM CHLORIDE, POTASSIUM CHLORIDE, SODIUM LACTATE AND CALCIUM CHLORIDE: 600; 310; 30; 20 INJECTION, SOLUTION INTRAVENOUS at 02:14

## 2023-01-23 RX ADMIN — ACETAMINOPHEN 650 MG: 325 TABLET, FILM COATED ORAL at 18:58

## 2023-01-23 RX ADMIN — DOCUSATE SODIUM 100 MG: 100 CAPSULE, LIQUID FILLED ORAL at 08:09

## 2023-01-23 RX ADMIN — PENICILLIN G 3 MILLION UNITS: 3000000 INJECTION, SOLUTION INTRAVENOUS at 00:30

## 2023-01-23 RX ADMIN — IBUPROFEN 800 MG: 800 TABLET, FILM COATED ORAL at 12:43

## 2023-01-23 ASSESSMENT — ACTIVITIES OF DAILY LIVING (ADL)
ADLS_ACUITY_SCORE: 18

## 2023-01-23 NOTE — PROGRESS NOTES
Vaginal Delivery Note   of viable Female with Marge Cummins CNM in attendance.  Nursery RN, Juana Weeks present.  Infant with spontaneous cry, to mother's abdomen, dried and stimulated.  APGAR at 1 minute:  9 and APGAR at 5 minutes:  9.  Placenta delivered with out complication,1st laceration, without repair, tomas cares provided.  Mother and baby in stable condition.

## 2023-01-23 NOTE — PROGRESS NOTES
Labor Note    /75 (BP Location: Right arm, Patient Position: Right side, Cuff Size: Adult Regular)   Temp 98.6  F (37  C) (Oral)   Resp 18   LMP 05/02/2022 (Approximate)   SpO2 100%   Breastfeeding No     INDUCTION COURSE:  1/21/23  0906 0, 40%, -3, posterior, medium = 2. Vaginal miso   #1 0955, #2 1550, #3 1958,   1725 SVE: 0-1, 40%, -3, posterior, medium.     1/22/23  0036 vag miso #4   0530 SVE with galeas attempted, unsuccessful d/t pt discomfort   0713 vag miso #5  1130 SVE 3/50-60%/-2, med, post, castellanos=6.   1230 pit started, dose #1 PCN   1619 Epidural in place. SVE unchanged, unable to AROM   2204 3, 50,-2 membrane sweep done and pt changed to 4.5.   2210 AROM, clear     General appearance: pt recently uncomfortable, breathing through contractions. Feeling sharper pelvic cramping, more on right. Was turned to right and bolused epidural without improvement.       CONTRACTIONS: every 2-4 minutes.   Pitocin- 10 mu/min.  Antibiotics- PCN  FETAL HEART TONES: continuous EFM- baseline 130 with moderate variability and positive accelerations. Recent intermittent variable decelerations.   PELVIC EXAM: 7.5cm, 90%, -1  ROM: AROM performed at 2210, clear fluid  PAIN PLAN: epidural, will request bolus from anesthesiologist   BLOOD SUGARS: Last 72    Assessment: EFM interpretation suggests absence of concern for fetal metabolic acidemia at this time due to moderate variability and accelerations present     The interventions currently taken to improve the fetal heart rate tracing and fetal oxygenation are: maternal positioning, increase frequency of assessment, consult Category 2 algorithm and sterile vaginal exam      ASSESSMENT:  ==============  IUP @ 37w4d for induction of labor.  Indication: GDM on insulin   Normal glucose level, no S&S of hypo glycemia  ROM, clear fluid   Fetal Heart Rate Tracing category two  GBS- positive- adequately treated    Patient Active Problem List   Diagnosis     High-risk  pregnancy, second trimester     Hx of preeclampsia, prior pregnancy, currently pregnant     Vitamin D deficiency     Hx of postpartum depression, currently pregnant     BMI 33.0-33.9,adult     Postural tachycardia with syncope     Atopic eruption of pregnancy     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     Dyspnea on exertion     Positive GBS test     At risk for ineffective breastfeeding - Lactation consult on PP unit     Insulin controlled gestational diabetes mellitus (GDM) in third trimester     Elevated blood pressure reading without diagnosis of hypertension     Encounter for induction of labor     PLAN:  ===========  Q1 hour glucose checks and follow protocol for IV insulin prn  Continue PCN every 4 hours for GBS prophylaxis   Apply cat 2 FHT algorithm prn  Per the category II algorithm, the plan is to continue observe/conservative management. Reevaluate in 60 minutes.   Anticipate      ERICA CamM

## 2023-01-23 NOTE — PLAN OF CARE
Data: Maternal status VSS. Afebrile. No leaking of fluids or vaginal bleeding. See flow sheets for details on FHR tracing and uterine activity. Signs and symptoms of infection absent.   Action/interventions: Pt given total of 5 vaginal misos prior to switching to pitocin for continued induction of labor. Pt requested epidural for pain management. Penicillin given for GBS treatment. Encouraging frequent position changes to help open pelvic inlet. Emptying bladder via straight cath.  Response: Epidural placed at 1600, pt has been resting comfortably since. Pt's family has been at bedside on and off throughout the day.  Plan: Continue expectant management. Per provider, continue to titrate the pitocin and continue with penicillin doses for positive GBS. Pt verbalizes desire for AROM when appropriate. Observe for and notify care provider of indicators of progressing labor, signs/symptoms of infection, fetal/maternal compromise. Recommendation to consider switching from paola to US and toco. Report given to Delores Bunn RN, at shift change.

## 2023-01-23 NOTE — PROGRESS NOTES
Labor Note    /56 (BP Location: Right arm, Patient Position: Right side, Cuff Size: Adult Regular)   Temp 98.2  F (36.8  C) (Oral)   Resp 18   LMP 05/02/2022 (Approximate)   SpO2 99%   Breastfeeding No     INDUCTION COURSE:  1/21/23  0906 0, 40%, -3, posterior, medium = 2. Vaginal miso   #1 0955, #2 1550, #3 1958,   1725 SVE: 0-1, 40%, -3, posterior, medium.     1/22/23  0036 vag miso #4   0530 SVE with galeas attempted, unsuccessful d/t pt discomfort   0713 vag miso #5  1130 SVE 3/50-60%/-2, med, post, castellanos=6.   1230 pit started, dose #1 PCN   1619 Epidural in place. SVE unchanged, unable to AROM     General appearance: comfortable with epidural in place. Has been able to nap.     CONTRACTIONS: every 3 minutes. Having difficulty accurately assessing contractions with Ade so changed to regular external FM.    Pitocin- 10 mu/min.  Antibiotics- PCN  FETAL HEART TONES: continuous EFM- baseline 150 with moderate variability and positive accelerations. One isolated deceleration.   ROM: not ruptured  PELVIC EXAM: deferred. Last at 1619 was 3,50,-2  PAIN PLAN: comfortable with epidural   BLOOD SUGARS: 1 hour postprandial were 141, 142.     ASSESSMENT:  ==============  IUP @ 37w4d for induction of labor.  Indication: GDM on insulin   No S&S of hypo glycemia, postprandial blood sugars slightly elevated  Fetal Heart Rate Tracing predominantly category one  GBS- positive- adequately treated  Patient Active Problem List   Diagnosis     High-risk pregnancy, second trimester     Hx of preeclampsia, prior pregnancy, currently pregnant     Vitamin D deficiency     Hx of postpartum depression, currently pregnant     BMI 33.0-33.9,adult     Postural tachycardia with syncope     Atopic eruption of pregnancy     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     Dyspnea on exertion     Positive GBS test     At risk for ineffective breastfeeding - Lactation consult on PP unit     Insulin controlled gestational diabetes mellitus (GDM) in third  trimester     Elevated blood pressure reading without diagnosis of hypertension     Encounter for induction of labor     PLAN:  ===========  Frequent position changes to facilitate labor with epidural anesthesia.  Reevaluate in 2 hours prn  Plan SVE ~2200    ERICA Cam CNM

## 2023-01-23 NOTE — PLAN OF CARE
Assessment complete and WDL. VSS. Fundus is firm and midline. Pt is breastfeeding infant independently. Pt denies pain, declines pain medication. Pt has 1st degree lac no repair, ice on, medicated pads and tomas bottle in use. Pt's IV is saline locked. Pt is voiding WDL. Spouse present and supportive in cares. Continue POC.

## 2023-01-23 NOTE — L&D DELIVERY NOTE
Delivery Summary    Briana Newman MRN# [id number]   Age: 32 year old YOB: 1990     ASSESSMENT & PLAN: Delivery Note    Labor Course:   Briana Newman is a 32 year old   at  37w5d presented to labor floor for induction of labor for GDMA2.  Her induction course included vaginal miso x5 and then Pitocin. Pt was adequately treated for GBS. Pt used birth ball, ambulation, and position changes for comfort measures and an epidural for pain management. AROM at 2215 showing clear fluid.     Delivery Course:  Progressed to complete at 0310 and started pushing at 0312. Pushed effectively with CNM coaching. FHR with category 1 prior to delivery. While in semi dick position the fetal head delivered MOA and restituted to EDIS . Double nuchal cord reduced. Shoulders easily delivered under maternal effort. Baby girl was born at 0327 and was immediately placed on mom's abdomen. Spontaneous breath, vigorous cry, well flexed, Hr>100. IV pitocin started after delivery of infant. Delayed cord clamping until cord stopped pulsing, then clamped x2 and cut by Christianne. Cord segment cut for lab hold. Cord blood obtained for typing. Placenta spontaneously delivered intact without difficulty via  Crook mechanism. Inspection of vagina and perineum revealed a small 1st degree laceration that was hemostatic and not repaired. Fundus is firm and midline.  Mom and baby are stable.      IUP at 37 weeks 5 days gestation delivered on 2023.     delivery of a viable Female infant.  Weight : pending  Apgars of 9 at 1 minute and 9 at 5 minutes.  Labor was induced.  Medications administered  in labor:  Pain Rx Epidural; Antibiotics Yes: PCN  Perineum: 1st degree not repaired  Placenta-mechanism: spontaneous, intact,  with a 3 vessel cord. IV oxytocin was given.  QBL was 60mL.  Anticipated Discharge Date: 23  Complications of pregnancy, labor and delivery: GDMA2   Birth attendants:ERICA Cam, LEAH       [facility],  Héctor-Briana [[id number]]    Labor Event Times    Dilation complete date: 23 Complete time:  3:10 AM   Start pushing date/time: 2023 0312      Labor Events     labor?: No   steroids: None  Labor Type: Induction/Cervical ripening  Predominate monitoring during 1st stage: continuous electronic fetal monitoring     Antibiotics received during labor?: Yes  Reason for Antibiotics: GBS  Antibiotics received for GBS: Penicillin  Antibiotics Given (GBS): Greater than 4 hours prior to delivery     Rupture identifier: Sac 1  Rupture date/time: 23   Rupture type: Artificial Rupture of Membranes  Fluid color: Clear  Fluid odor: Normal     Induction: Misoprostol, AROM, Oxytocin  Induction date/time:     Cervical ripening date/time:        1:1 continuous labor support provided by?: RN Labor partogram used?: no      Delivery/Placenta Date and Time    Delivery Date: 23 Delivery Time:  3:27 AM   Placenta Date/Time: 2023  3:38 AM  Oxytocin given at the time of delivery: after delivery of baby  Delivering clinician: Marge Cummins APRN CNM   Other personnel present at delivery:  Provider Role   Juliana Bunn RN Delivery Nurse   Juana Weeks RN Delivery Assist   Marge Cummins APRN CNM Certified Nurse Midwife         Vaginal Counts     Initial count performed by 2 team members:  Two Team Members   Marge Weeks RN       Needles Suture Needles Sponges (RETIRED) Instruments   Initial counts 2  5    Added to count       Relief counts       Final counts 2  5          Placed during labor Accounted for at the end of labor   FSE No NA   IUPC No NA   Cervidil No NA              Final count performed by 2 team members:  Two Team Members   Marge Bunn RN      Final count correct?: Yes     Cord    Cord Complications: Nuchal   Nuchal Intervention: reduced         Nuchal cord description: loose nuchal cord         Stem cell collection?: No         "Resuscitation    Output in Delivery Room: Voided     Collyer Measurements    Weight: 6 lb 8.1 oz Length: 1' 7.5\"   Head circumference: 30.5 cm    Output in delivery room: Voided     Labor Events and Shoulder Dystocia    Fetal Tracing Prior to Delivery: Category 1  Fetal Tracing Comments: Cat 1 prior to delivery   Shoulder dystocia present?: Neg     Delivery (Maternal) (Provider to Complete) ([id number])    Episiotomy: None  Perineal lacerations: 1st Repaired?: No   Repair suture: None  Genital tract inspection done: Pos     Blood Loss  Mother: Briana Newman #[id number]   Start of Mother's Information    Delivery Blood Loss  23 1527 - 23 0437    Delivery QBL (mL) Hospital Encounter 60 mL    Total  60 mL         End of Mother's Information  Mother: Briana Newman #[id number]          Delivery - Provider to Complete ([id number])    Delivering clinician: Marge Cummins APRN CNM  CNM Care: Exclusive CNM care in labor  Attempted Delivery Types (Choose all that apply): Spontaneous Vaginal Delivery  Delivery Type (Choose the 1 that will go to the Birth History): Vaginal, Spontaneous                   Other personnel:  Provider Role   Juliana Bunn, RN Delivery Nurse   Juana Weeks, JUSTINE Delivery Assist   Marge Cummins APRN CNM Certified Nurse Midwife                Placenta    Date/Time: 2023  3:38 AM  Removal: Spontaneous  Comments: Double nuchal cord   Disposition: Hospital disposal           Anesthesia    Method: Epidural                Presentation and Position    Presentation: Vertex    Position: Left Occiput Anterior                 ERICA Cam CNM  "

## 2023-01-23 NOTE — PROGRESS NOTES
Data: Briana MUSTAFA Trent transferred to [facility] via wheelchair at 0537. Baby transferred via parent's arms.  Action: Receiving unit notified of transfer: Yes. Patient and family notified of room change. Report given to Shelley No RN at 0445. Belongings sent to receiving unit. Accompanied by Registered Nurse. Oriented patient to surroundings. Call light within reach. ID bands double-checked with receiving RN.  Response: Patient tolerated transfer and is stable.

## 2023-01-23 NOTE — PLAN OF CARE
Goal Outcome Evaluation: [id number]  Afebrile. VSS, except 0-5/10. Fundus UU, scant, rubra lochia. LS clear on RA. Good PO intake, good appetite. Patient is using belly band and tolerating well. Voiding independently, passing gas. Taking tylenol and IBU as needed. Bonding well with infant in room. Helping with infant cares. Patient is breast feeding every 2-3 hours. EDs score 3. BC and ROP completed today. Plan to continue monitoring. Hourly monitoring completed, will continue to monitor.     Problem: Plan of Care - These are the overarching goals to be used throughout the patient stay.    Goal: Plan of Care Review  Description: The Plan of Care Review/Shift note should be completed every shift.  The Outcome Evaluation is a brief statement about your assessment that the patient is improving, declining, or no change.  This information will be displayed automatically on your shift note.  Outcome: Progressing  Goal: Absence of Hospital-Acquired Illness or Injury  Intervention: Prevent Skin Injury  Recent Flowsheet Documentation  Taken 1/23/2023 0800 by Dariana Marcelo RN  Body Position: position changed independently  Goal: Optimal Comfort and Wellbeing  Outcome: Progressing  Intervention: Monitor Pain and Promote Comfort  Recent Flowsheet Documentation  Taken 1/23/2023 0809 by Dariana Marcelo RN  Pain Management Interventions: medication (see MAR)  Intervention: Provide Person-Centered Care  Recent Flowsheet Documentation  Taken 1/23/2023 0800 by Dariana Marcelo RN  Trust Relationship/Rapport:    care explained    choices provided    emotional support provided    empathic listening provided    questions answered    questions encouraged    reassurance provided    thoughts/feelings acknowledged  Goal: Readiness for Transition of Care  Outcome: Progressing     Problem: Postpartum (Vaginal Delivery)  Goal: Successful Maternal Role Transition  Outcome: Progressing  Goal: Hemostasis  Outcome: Progressing  Goal: Optimal  Pain Control and Function  Outcome: Progressing  Intervention: Prevent or Manage Pain  Recent Flowsheet Documentation  Taken 1/23/2023 0809 by Dariana Marcelo RN  Pain Management Interventions: medication (see MAR)  Goal: Effective Urinary Elimination  Outcome: Progressing

## 2023-01-23 NOTE — ANESTHESIA PROCEDURE NOTES
"Epidural catheter Procedure Note    Pre-Procedure   Staff -        Anesthesiologist:  Sandip Carter MD       Resident/Fellow: José Garcia MD       Performed By: resident       Location: OB       Procedure Start/Stop Times: 1/22/2023 3:30 PM       Pre-Anesthestic Checklist: patient identified, IV checked, risks and benefits discussed, informed consent, monitors and equipment checked, pre-op evaluation, at physician/surgeon's request and post-op pain management  Timeout:       Correct Patient: Yes        Correct Procedure: Yes        Correct Site: Yes        Correct Position: Yes   Procedure Documentation  Procedure: epidural catheter       Patient Position: sitting       Skin prep: Betadine       Local skin infiltrated with mL of 1% lidocaine.        Insertion Site: L3-4. (midline approach).       Technique: LORT saline and LORT air        Needle Type: Touhy needle       Catheter: 19 G.          Catheter threaded easily.             # of attempts: 3 and  # of redirects:  3    Assessment/Narrative         Paresthesias: No.       Test dose of 3 mL lidocaine 1.5% w/ 1:200,000 epinephrine at 15:45 CST.        .       Insertion/Infusion Method: LORT saline and LORT air       No aspiration negative for Heme or CSF via Epidural Catheter.    Medication(s) Administered   0.125% bupivacaine 5 mL + fentanyl 20 mcg + NS 5 mL (Epidural) (Mixture components: bupivacaine HCl (PF) 0.25 % Soln, 5 mL; fentaNYL (PF) 100 MCG/2ML Soln, 20 mcg; sodium chloride 0.9 % Soln, 5 mL) - EPIDURAL   10 mL - 1/22/2023 4:35:00 PM  Medication Administration Time: 1/22/2023 3:30 PM      FOR [facility] ([facility]) ONLY:   Pain Team Contact information: please page the Pain Team Via [employer]. Search \"Pain\". During daytime hours, please page the attending first. At night please page the resident first.    "

## 2023-01-23 NOTE — PROGRESS NOTES
Labor Note    /63 (BP Location: Right arm, Patient Position: Right side, Cuff Size: Adult Regular)   Temp 98.6  F (37  C) (Oral)   Resp 18   LMP 05/02/2022 (Approximate)   SpO2 100%   Breastfeeding No     INDUCTION COURSE:  1/21/23  0906 0, 40%, -3, posterior, medium = 2. Vaginal miso   #1 0955, #2 1550, #3 1958,   1725 SVE: 0-1, 40%, -3, posterior, medium.     1/22/23  0036 vag miso #4   0530 SVE with galeas attempted, unsuccessful d/t pt discomfort   0713 vag miso #5  1130 SVE 3/50-60%/-2, med, post, castellanos=6.   1230 pit started, dose #1 PCN   1619 Epidural in place. SVE unchanged, unable to AROM     General appearance: comfortable with epidural in place. Has been resting well.  at bedside providing support.       CONTRACTIONS: every 2-5 minutes.   Pitocin- 10 mu/min.  Antibiotics- PCN  FETAL HEART TONES: continuous EFM- baseline 145 with moderate variability and positive accelerations. Isolated variable deceleration.   PELVIC EXAM: initially exam was unchanged 3cm, 50%, -2. Membrane sweep done with patient permission. Cervix changed to 4.5cm with taut bag of water. Discussed r/b of AROM with patient including infection and prolapse. Pt consented.   ROM: AROM performed at 2210, clear fluid  PAIN PLAN: comfortable with epidural   BLOOD SUGARS: Blood glucose this evening was 72, much lower than usual for patient.     ASSESSMENT:  ==============  IUP @ 37w4d for induction of labor.  Indication: GDM on insulin   Normal glucose level, no S&S of hypo glycemia  ROM, clear fluid   Fetal Heart Rate Tracing category one for over 60 mins   GBS- positive- adequately treated  Patient Active Problem List   Diagnosis     High-risk pregnancy, second trimester     Hx of preeclampsia, prior pregnancy, currently pregnant     Vitamin D deficiency     Hx of postpartum depression, currently pregnant     BMI 33.0-33.9,adult     Postural tachycardia with syncope     Atopic eruption of pregnancy     Female genital  mutilation     Dyspnea on exertion     Positive GBS test     At risk for ineffective breastfeeding - Lactation consult on PP unit     Insulin controlled gestational diabetes mellitus (GDM) in third trimester     Elevated blood pressure reading without diagnosis of hypertension     Encounter for induction of labor     PLAN:  ===========  Plan hourly temperatures and continue frequent position changes to facilitate labor with epidural anesthesia.  Discussed plan for insulin and opted to hold 2200 Lantus dose since patient is expected to move into active labor tonight and will begin hourly glucose checks with IV insulin per protocol.   Continue PCN every 4 hours for GBS prophylaxis   Reevaluate in 2-3 hours, sooner prn  Anticipate      ERICA Cam CNM

## 2023-01-23 NOTE — PROVIDER NOTIFICATION
01/23/23 0215   Provider Notification   Provider Name/Title Marge Cummins LEAH   Method of Notification In Department   Request Evaluate - Remote   Notification Reason Variability Change     Notified provider of minimal fetal variability that has not resolved with position changes. Plan is to administer a 500 ml fluid bolus. Will notify provider if minimal variability persists.

## 2023-01-23 NOTE — PROGRESS NOTES
Patient arrived to [facility] unit via wheelchair at 0540,with belongings, accompanied by spouse/ significant other, with infant in arms. Received report from Juliana STEPHEN&ACE FLETCHER and checked bands. Unit and room orientation started. Call light given and within arms reach; no concerns present at this time. Continue with plan of care.

## 2023-01-24 VITALS
HEART RATE: 60 BPM | DIASTOLIC BLOOD PRESSURE: 51 MMHG | TEMPERATURE: 98.2 F | RESPIRATION RATE: 16 BRPM | OXYGEN SATURATION: 99 % | SYSTOLIC BLOOD PRESSURE: 96 MMHG

## 2023-01-24 LAB — GLUCOSE BLDC GLUCOMTR-MCNC: 76 MG/DL (ref 70–99)

## 2023-01-24 PROCEDURE — 250N000013 HC RX MED GY IP 250 OP 250 PS 637: Performed by: MIDWIFE

## 2023-01-24 RX ORDER — IBUPROFEN 600 MG/1
600 TABLET, FILM COATED ORAL EVERY 6 HOURS PRN
Qty: 60 TABLET | Refills: 0 | Status: SHIPPED | OUTPATIENT
Start: 2023-01-24 | End: 2023-02-02

## 2023-01-24 RX ORDER — FERROUS SULFATE 325(65) MG
325 TABLET ORAL DAILY
Qty: 90 TABLET | Refills: 1 | Status: SHIPPED | OUTPATIENT
Start: 2023-01-24 | End: 2023-02-02

## 2023-01-24 RX ORDER — ACETAMINOPHEN 325 MG/1
650 TABLET ORAL EVERY 6 HOURS PRN
Qty: 100 TABLET | Refills: 0 | Status: SHIPPED | OUTPATIENT
Start: 2023-01-24 | End: 2023-02-02

## 2023-01-24 RX ORDER — AMOXICILLIN 250 MG
1 CAPSULE ORAL DAILY
Qty: 100 TABLET | Refills: 0 | Status: SHIPPED | OUTPATIENT
Start: 2023-01-24 | End: 2023-02-02

## 2023-01-24 RX ADMIN — DOCUSATE SODIUM 100 MG: 100 CAPSULE, LIQUID FILLED ORAL at 07:49

## 2023-01-24 RX ADMIN — ACETAMINOPHEN 650 MG: 325 TABLET, FILM COATED ORAL at 16:04

## 2023-01-24 RX ADMIN — ACETAMINOPHEN 650 MG: 325 TABLET, FILM COATED ORAL at 07:49

## 2023-01-24 ASSESSMENT — ACTIVITIES OF DAILY LIVING (ADL)
ADLS_ACUITY_SCORE: 18

## 2023-01-24 NOTE — DISCHARGE SUMMARY
[facility] Discharge Summary    Briana Newman MRN# [id number]   Age: 32 year old YOB: 1990     Date of Admission:  1/21/2023  Date of Discharge::  1/24/2023  Admitting Physician:  ERICA Cam CNM  Discharge Physician:  ERICA Cam CNM, LEAH, MS      Home clinic: [facility]          Admission Diagnoses:   Encounter for induction of labor [Z34.90]          Discharge Diagnosis:     Normal spontaneous vaginal delivery at 37w5d gestation  GDMA2  First degree perineal laceration, not repaired   Anemia postpartum, 10.7          Procedures:     Procedure(s): No additional procedures performed             Medications Prior to Admission:     Medications Prior to Admission   Medication Sig Dispense Refill Last Dose     Fluocinolone Acetonide Scalp (DERMA-SMOOTHE/FS SCALP) 0.01 % OIL oil Apply topically twice a week Can apply every night overnight for one week. Then decrease to twice weekly once itching is well controlled. 118.28 mL 2 Past Week     Cyanocobalamin (B-12) 1000 MCG TBCR Take 1 capsule by mouth        vitamin D3 (CHOLECALCIFEROL) 50 mcg (2000 units) tablet Take 2 tablets (100 mcg) by mouth daily 120 tablet 3      [DISCONTINUED] aspirin (ASA) 81 MG EC tablet Take 1 tablet (81 mg) by mouth daily (Patient not taking: Reported on 1/12/2023) 100 tablet 3      [DISCONTINUED] blood glucose (NO BRAND SPECIFIED) lancets standard Use to test blood sugar 4 times daily or as directed. 1 each 3      [DISCONTINUED] blood glucose (NO BRAND SPECIFIED) test strip Use to test blood sugar 4 times daily or as directed. 100 strip 3      [DISCONTINUED] blood glucose monitoring (ACCU-CHEK TEJ PLUS) meter device kit Use to test blood sugar 4 times daily or as directed. 1 kit 0      [DISCONTINUED] blood glucose monitoring (SOFTCLIX) lancets USE TO TEST BLOOD GLUCOSE FOUR TIMES DAILY OR AS DIRECTED        [DISCONTINUED] COMPRESSION STOCKINGS 1 each daily 10 each 3      [DISCONTINUED]  folic acid 800 MCG tablet Take 1 tablet (800 mcg) by mouth daily 90 tablet 3      [DISCONTINUED] hydrocortisone (CORTAID) 1 % external cream Apply topically 2 times daily 60 g 1 Past Week     [DISCONTINUED] hydrOXYzine (ATARAX) 25 MG tablet Take 1 tablet (25 mg) by mouth 3 times daily as needed for itching, anxiety or other (sleep) (Patient not taking: Reported on 2023) 60 tablet 1      [DISCONTINUED] insulin glargine (LANTUS PEN) 100 UNIT/ML pen Inject 14 Units Subcutaneous At Bedtime 15 mL 0 2023 at 2230     [DISCONTINUED] insulin pen needle (ULTICARE MICRO) 32G X 4 MM miscellaneous Use 1 pen needles daily as directed. 100 each 1      [DISCONTINUED] triamcinolone (KENALOG) 0.1 % external ointment Apply topically 2 times daily To areas of itching and rash on back, chest, and upper arms until resolved. Avoid applying to groin, armpits, and face. 453.6 g 2              Discharge Medications:     Current Discharge Medication List      START taking these medications    Details   acetaminophen (TYLENOL) 325 MG tablet Take 2 tablets (650 mg) by mouth every 6 hours as needed for mild pain Start after Delivery.  Qty: 100 tablet, Refills: 0    Associated Diagnoses: First degree perineal laceration;  (normal spontaneous vaginal delivery)      ferrous sulfate (FEROSUL) 325 (65 Fe) MG tablet Take 1 tablet (325 mg) by mouth daily Try to take with vitamin C and vitamin B12  Qty: 90 tablet, Refills: 1    Associated Diagnoses: First degree perineal laceration;  (normal spontaneous vaginal delivery); Anemia, postpartum      ibuprofen (ADVIL/MOTRIN) 600 MG tablet Take 1 tablet (600 mg) by mouth every 6 hours as needed for moderate pain (4-6) Start after delivery  Qty: 60 tablet, Refills: 0    Associated Diagnoses: First degree perineal laceration;  (normal spontaneous vaginal delivery)      senna-docusate (SENOKOT-S/PERICOLACE) 8.6-50 MG tablet Take 1 tablet by mouth daily Start after delivery.  Qty: 100  tablet, Refills: 0    Associated Diagnoses: First degree perineal laceration;  (normal spontaneous vaginal delivery)         CONTINUE these medications which have NOT CHANGED    Details   Fluocinolone Acetonide Scalp (DERMA-SMOOTHE/FS SCALP) 0.01 % OIL oil Apply topically twice a week Can apply every night overnight for one week. Then decrease to twice weekly once itching is well controlled.  Qty: 118.28 mL, Refills: 2    Associated Diagnoses: Atopic dermatitis, unspecified type      Cyanocobalamin (B-12) 1000 MCG TBCR Take 1 capsule by mouth      vitamin D3 (CHOLECALCIFEROL) 50 mcg (2000 units) tablet Take 2 tablets (100 mcg) by mouth daily  Qty: 120 tablet, Refills: 3    Associated Diagnoses: Vitamin D deficiency         STOP taking these medications       aspirin (ASA) 81 MG EC tablet Comments:   Reason for Stopping:         blood glucose (NO BRAND SPECIFIED) lancets standard Comments:   Reason for Stopping:         blood glucose (NO BRAND SPECIFIED) test strip Comments:   Reason for Stopping:         blood glucose monitoring (ACCU-CHEK TEJ PLUS) meter device kit Comments:   Reason for Stopping:         blood glucose monitoring (SOFTCLIX) lancets Comments:   Reason for Stopping:         COMPRESSION STOCKINGS Comments:   Reason for Stopping:         folic acid 800 MCG tablet Comments:   Reason for Stopping:         hydrocortisone (CORTAID) 1 % external cream Comments:   Reason for Stopping:         hydrOXYzine (ATARAX) 25 MG tablet Comments:   Reason for Stopping:         insulin glargine (LANTUS PEN) 100 UNIT/ML pen Comments:   Reason for Stopping:         insulin pen needle (ULTICARE MICRO) 32G X 4 MM miscellaneous Comments:   Reason for Stopping:         triamcinolone (KENALOG) 0.1 % external ointment Comments:   Reason for Stopping:                     Consultations:   No consultations were requested during this admission          Brief History of Labor:   Briana Newman is a 32 year old   at   37w5d presented to labor floor for induction of labor for GDMA2.  Her induction course included vaginal miso x5 and then Pitocin. Pt was adequately treated for GBS. Pt used birth ball, ambulation, and position changes for comfort measures and an epidural for pain management. AROM at 2215 showing clear fluid.      Delivery Course:  Progressed to complete at 0310 and started pushing at 0312. Pushed effectively with CNM coaching. FHR with category 1 prior to delivery. While in semi dick position the fetal head delivered MOA and restituted to EDIS . Double nuchal cord reduced. Shoulders easily delivered under maternal effort. Baby girl was born at 0327 and was immediately placed on mom's abdomen. Spontaneous breath, vigorous cry, well flexed, Hr>100. IV pitocin started after delivery of infant. Delayed cord clamping until cord stopped pulsing, then clamped x2 and cut by Christianne. Cord segment cut for lab hold. Cord blood obtained for typing. Placenta spontaneously delivered intact without difficulty via  Crook mechanism. Inspection of vagina and perineum revealed a small 1st degree laceration that was hemostatic and not repaired. Fundus is firm and midline.  Mom and baby are stable.      IUP at 37 weeks 5 days gestation delivered on 2023.     delivery of a viable Female infant.  Weight : pending  Apgars of 9 at 1 minute and 9 at 5 minutes.  Labor was induced.  Medications administered  in labor:  Pain Rx Epidural; Antibiotics Yes: PCN  Perineum: 1st degree not repaired  Placenta-mechanism: spontaneous, intact,  with a 3 vessel cord. IV oxytocin was given.  QBL was 60mL.  Anticipated Discharge Date: 23  Complications of pregnancy, labor and delivery: GDMA2   Birth attendants:ERICA Cam CNM     Assessment Day of Discharge      Breasts: soft, filling  Nipples:intact, without lesion  Fundus: firm @ umbilicus, midline, nontender  Abdomen: soft, nontender  Lochia: small rubra, no clots,  No  odor  Perineum: well approximated, healing well, no erythema, edema, bruising, hematoma or s/s of infection  Legs: no edema or erythema, nontender           Hospital Course:     INTERVAL HISTORY:  BP 96/51 (BP Location: Right arm, Patient Position: Semi-Falk's, Cuff Size: Adult Large)   Pulse 60   Temp 98.2  F (36.8  C) (Oral)   Resp 16   LMP 05/02/2022 (Approximate)   SpO2 99%   Breastfeeding Unknown    No fever  Pt stable, baby girl is rooming in and stable.   Breast feeding status:well established. Independently latching without concern. Slight pain at first latch, but improves quickly.   Complications since 2 hours post delivery: None  Patient is tolerating activity well, and normal diet. Voiding without difficulty, no bowel movement yet, cramping is relieved by Ibuprofen, lochia is decreasing and patient denies clots.  Perineal pain is is relieved by Ibuprofen.      Postpartum hemoglobin   Recent Labs   Lab 01/23/23  0723 01/21/23  0833   HGB 10.7* 10.4*      Blood type   No results found for: ABO, RH, [id number]   Rubella status: Immune    History of depression: denies. Postpartum depression warning signs reviewed.    ASSESSMENT/PLAN:  Normal postpartum exam , Stable Post-partum day #1  Complications:  - anemic, Hgb 10.7. Plan for PO iron supplementation every other day   - hx of gestational diabetes, plan for 2 hour OGTT at 6 wks postpartum    Home Visit Ordered- Yes: routine  Plan d/c home today  Follow-up: 2 weeks with phone visit and 6-8 weeks in clinic  Teaching done: D/C Instructions: Nutrition/Activity, Engorgement Management, Birth Control Options, Warning Signs/When to Call: Excessive Bleeding, Infection, PP Depression, RTC Clinic for PP Appointment, PNV and Iron supplemenation     Reviewed blood pressure warning signs including headache, vision changes, RUQ/epigastric pain, N&V, or sudden swelling.     Continue prenatal vitamins  Discharge medications ordered- senna, ibuprofen, acetaminophen,  iron.     Birthcontrol planned: declines. Feels she does not need this since her  will be going back to [place] next month.            Discharge Instructions and Follow-Up:     Discharge diet: Regular   Discharge activity: Pelvic rest: abstain from intercourse and do not use tampons for 6 week(s)   Discharge follow-up: Follow up with [facility] in 2 weeks with a phone visit    Wound care: Drink plenty of fluids  Keep wound clean and dry  Witch hazel pads as needed            Discharge Disposition:     Discharged to home        ERICA Cam, LEAH

## 2023-01-24 NOTE — DISCHARGE INSTRUCTIONS
Barbara Warren,  Congratulations on the birth of your beautiful baby girl! Thank you for letting the midwives from the [facility] care for you during this special time. Please call us at [phone] with any questions or concerns.   Marge Samuel CNM    Postpartum Vaginal Delivery Instructions    Activity     Ask family and friends for help when you need it.  Do not place anything in your vagina for 6 weeks.  You are not restricted on other activities, but take it easy for a few weeks to allow your body to recover from delivery.  You are able to do any activities you feel up to that point.  No driving until you have stopped taking your pain medications (usually two weeks after delivery).     Call your health care provider if you have any of these symptoms:     Increased pain, swelling, redness, or fluid around your stiches from an episiotomy or perineal tear.  A fever above 100.4 F (38 C) with or without chills when placing a thermometer under your tongue.  You soak a sanitary pad with blood within 1 hour, or you see blood clots larger than a golf ball.  Bleeding that lasts more than 6 weeks.  Vaginal discharge that smells bad.  Severe pain, cramping or tenderness in your lower belly area.  A need to urinate more frequently (use the toilet more often), more urgently (use the toilet very quickly), or it burns when you urinate.  Nausea and vomiting.  Redness, swelling or pain around a vein in your leg.  Problems breastfeeding or a red or painful area on your breast.  Chest pain and cough or are gasping for air.  Problems coping with sadness, anxiety, or depression.  If you have any concerns about hurting yourself or the baby, call your provider immediately.   You have questions or concerns after you return home.     Keep your hands clean:  Always wash your hands before touching your perineal area and stitches.  This helps reduce your risk of infection.  If your hands aren't dirty, you may use  an alcohol hand-rub to clean your hands. Keep your nails clean and short.

## 2023-01-24 NOTE — PLAN OF CARE
Postpartum assessments WDL. VSS. Ambulating in room independently, passing gas. Pain managed with Tylenol and Ibuprofen intermittently. Fundus firm, midline and below the U. Scant amounts of lochia with no clots or odor present. Breastfeeding throughout the night, needing minimal assistance. Infant and mother showing positive signs of attachment. Patients significant other present in the evening and her mother stayed the night. Call light within reach. Continue with plan of care.    Farrah Villasenor RN

## 2023-01-24 NOTE — PLAN OF CARE
Goal Outcome Evaluation:  Afebrile. VSS, except 0/10. Fundus U1, scant, rubra lochia. LS clear on RA. Good PO intake, good appetite. Voiding independently, passing gas. Taking IBU and tylenol (PRN meds) as needed. Bonding well with infant in room. Helping with infant cares. Patient is breast feeding every 2-3 hours. Plan to discharge. Patient discharged. All education reviewed, questions addressed, medication reviewed and verified. EDS 3, BC completed, Videos reviewed. ROP copleted. Received Pump Medela Pump. Hourly monitoring completed. Discharged at 1300.    Problem: Plan of Care - These are the overarching goals to be used throughout the patient stay.    Goal: Plan of Care Review  Description: The Plan of Care Review/Shift note should be completed every shift.  The Outcome Evaluation is a brief statement about your assessment that the patient is improving, declining, or no change.  This information will be displayed automatically on your shift note.  Outcome: Progressing  Goal: Absence of Hospital-Acquired Illness or Injury  Intervention: Prevent Skin Injury  Recent Flowsheet Documentation  Taken 1/24/2023 0748 by Dariana Marcelo RN  Body Position: position changed independently  Goal: Optimal Comfort and Wellbeing  Outcome: Progressing  Intervention: Monitor Pain and Promote Comfort  Recent Flowsheet Documentation  Taken 1/24/2023 0749 by Dariaan Marcelo RN  Pain Management Interventions: medication (see MAR)  Intervention: Provide Person-Centered Care  Recent Flowsheet Documentation  Taken 1/24/2023 0748 by Dariana Marcelo RN  Trust Relationship/Rapport:    care explained    choices provided    emotional support provided    empathic listening provided    questions answered    questions encouraged    reassurance provided    thoughts/feelings acknowledged  Goal: Readiness for Transition of Care  Outcome: Progressing     Problem: Postpartum (Vaginal Delivery)  Goal: Successful Maternal Role Transition  Outcome:  Progressing  Goal: Optimal Pain Control and Function  Outcome: Progressing  Intervention: Prevent or Manage Pain  Recent Flowsheet Documentation  Taken 1/24/2023 0749 by Dariana Marcelo, RN  Pain Management Interventions: medication (see MAR)  Goal: Effective Urinary Elimination  Outcome: Met
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
## 2023-01-26 ENCOUNTER — PATIENT OUTREACH (OUTPATIENT)
Dept: CARE COORDINATION | Facility: CLINIC | Age: 33
End: 2023-01-26
Payer: COMMERCIAL

## 2023-01-26 NOTE — PROGRESS NOTES
Clinic Care Coordination Contact  [facility]       Clinical Data: Care Coordinator Outreach  Outreach attempted x 2.  Left message on patient's voicemail with call back information and requested return call.  Plan:  Care Coordinator will do no further outreaches at this time.    Anne MCKEON Community Health Worker  Clinic Care Coordination  [facility]  Phone: [phone]

## 2023-02-02 ENCOUNTER — OFFICE VISIT (OUTPATIENT)
Dept: URGENT CARE | Facility: URGENT CARE | Age: 33
End: 2023-02-02
Payer: COMMERCIAL

## 2023-02-02 ENCOUNTER — TELEPHONE (OUTPATIENT)
Dept: OBGYN | Facility: CLINIC | Age: 33
End: 2023-02-02
Payer: COMMERCIAL

## 2023-02-02 VITALS
DIASTOLIC BLOOD PRESSURE: 98 MMHG | HEART RATE: 66 BPM | SYSTOLIC BLOOD PRESSURE: 137 MMHG | TEMPERATURE: 97.7 F | BODY MASS INDEX: 32.07 KG/M2 | WEIGHT: 190.2 LBS | OXYGEN SATURATION: 98 %

## 2023-02-02 DIAGNOSIS — R30.0 DYSURIA: ICD-10-CM

## 2023-02-02 DIAGNOSIS — N30.00 ACUTE CYSTITIS WITHOUT HEMATURIA: Primary | ICD-10-CM

## 2023-02-02 LAB
ALBUMIN UR-MCNC: NEGATIVE MG/DL
APPEARANCE UR: ABNORMAL
BACTERIA #/AREA URNS HPF: ABNORMAL /HPF
BILIRUB UR QL STRIP: NEGATIVE
CLUE CELLS: ABNORMAL
COLOR UR AUTO: YELLOW
GLUCOSE UR STRIP-MCNC: NEGATIVE MG/DL
HGB UR QL STRIP: ABNORMAL
KETONES UR STRIP-MCNC: NEGATIVE MG/DL
LEUKOCYTE ESTERASE UR QL STRIP: ABNORMAL
NITRATE UR QL: NEGATIVE
PH UR STRIP: 6 [PH] (ref 5–7)
RBC #/AREA URNS AUTO: ABNORMAL /HPF
SP GR UR STRIP: 1.01 (ref 1–1.03)
SQUAMOUS #/AREA URNS AUTO: ABNORMAL /LPF
TRICHOMONAS, WET PREP: ABNORMAL
UROBILINOGEN UR STRIP-ACNC: 0.2 E.U./DL
WBC #/AREA URNS AUTO: ABNORMAL /HPF
WBC CLUMPS #/AREA URNS HPF: PRESENT /HPF
WBC'S/HIGH POWER FIELD, WET PREP: ABNORMAL
YEAST, WET PREP: ABNORMAL

## 2023-02-02 PROCEDURE — 99213 OFFICE O/P EST LOW 20 MIN: CPT | Performed by: PHYSICIAN ASSISTANT

## 2023-02-02 PROCEDURE — 87210 SMEAR WET MOUNT SALINE/INK: CPT | Performed by: PHYSICIAN ASSISTANT

## 2023-02-02 PROCEDURE — 81001 URINALYSIS AUTO W/SCOPE: CPT | Performed by: PHYSICIAN ASSISTANT

## 2023-02-02 PROCEDURE — 87086 URINE CULTURE/COLONY COUNT: CPT | Performed by: PHYSICIAN ASSISTANT

## 2023-02-02 RX ORDER — CEFDINIR 300 MG/1
300 CAPSULE ORAL 2 TIMES DAILY
Qty: 14 CAPSULE | Refills: 0 | Status: SHIPPED | OUTPATIENT
Start: 2023-02-02 | End: 2023-02-09

## 2023-02-02 ASSESSMENT — ENCOUNTER SYMPTOMS
GASTROINTESTINAL NEGATIVE: 1
HEADACHES: 0
FLANK PAIN: 1
DIZZINESS: 0
FREQUENCY: 0
MYALGIAS: 0
ADENOPATHY: 0
RESPIRATORY NEGATIVE: 1
SHORTNESS OF BREATH: 0
NECK STIFFNESS: 0
NAUSEA: 0
COUGH: 0
ACTIVITY CHANGE: 0
DIARRHEA: 0
HEMATURIA: 0
NECK PAIN: 0
NEUROLOGICAL NEGATIVE: 1
FATIGUE: 0
RHINORRHEA: 0
CARDIOVASCULAR NEGATIVE: 1
PALPITATIONS: 0
ABDOMINAL PAIN: 0
VOMITING: 0
WEAKNESS: 0
ENDOCRINE NEGATIVE: 1
POLYDIPSIA: 0
FEVER: 0
CONSTITUTIONAL NEGATIVE: 1
DYSURIA: 1
LIGHT-HEADEDNESS: 0
CHILLS: 0
SORE THROAT: 0

## 2023-02-02 NOTE — PROGRESS NOTES
Chief Complaint:    Chief Complaint   Patient presents with     Urgent Care     About 10 days postpartum, have lower abdomen pain, concern about UTI-have kidney pain in right side, concern about hemorrhoids      UTI     Hemorrhoids     Abdominal Pain       ASSESSMENT     1. Acute cystitis without hematuria    2. Breast feeding status of mother    3. Dysuria           PLAN    Patient is in no acute distress.  She is afebrile with stable vital signs.  No R CVA tenderness.  Low suspicion for acute pyelonephritis today.    Urinalysis discussed with patient  We will call with culture results if resistant.  Wet prep was negative.  Rx for Omnicef today.  This is safe for breastfeeding per up to date.  BRAT diet discussed for diarrhea.  Follow up with PCP in 1 week if symptoms are not improving.  Worrisome symptoms discussed with instructions to go to the ED.  Patient verbalized understanding and agreed with this plan.    Labs:     Results for orders placed or performed in visit on 02/02/23   UA Macro with Reflex to Micro and Culture - lab collect     Status: Abnormal    Specimen: Urine, Midstream   Result Value Ref Range    Color Urine Yellow Colorless, Straw, Light Yellow, Yellow    Appearance Urine Slightly Cloudy (A) Clear    Glucose Urine Negative Negative mg/dL    Bilirubin Urine Negative Negative    Ketones Urine Negative Negative mg/dL    Specific Gravity Urine 1.010 1.003 - 1.035    Blood Urine Large (A) Negative    pH Urine 6.0 5.0 - 7.0    Protein Albumin Urine Negative Negative mg/dL    Urobilinogen Urine 0.2 0.2, 1.0 E.U./dL    Nitrite Urine Negative Negative    Leukocyte Esterase Urine Large (A) Negative   Wet prep - lab collect     Status: Abnormal    Specimen: Vagina; Swab   Result Value Ref Range    Trichomonas Absent Absent    Yeast Absent Absent    Clue Cells Absent Absent    WBCs/high power field 1+ (A) None       Problem history    Patient Active Problem List   Diagnosis     High-risk pregnancy, second  trimester     Hx of preeclampsia, prior pregnancy, currently pregnant     Vitamin D deficiency     Hx of postpartum depression, currently pregnant     BMI 33.0-33.9,adult     Postural tachycardia with syncope     Atopic eruption of pregnancy     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     Dyspnea on exertion     Positive GBS test     At risk for ineffective breastfeeding - Lactation consult on PP unit     Insulin controlled gestational diabetes mellitus (GDM) in third trimester     Elevated blood pressure reading without diagnosis of hypertension     Encounter for induction of labor      (normal spontaneous vaginal delivery)     First degree perineal laceration       Current Meds    Current Outpatient Medications:      cefdinir (OMNICEF) 300 MG capsule, Take 1 capsule (300 mg) by mouth 2 times daily for 7 days, Disp: 14 capsule, Rfl: 0    Allergies  No Known Allergies    SUBJECTIVE    HPI:  Briana Newman is a 32 year old female who has symptoms of urinary dysuria and R sided flank pain for 1 day(s), as well as pelvic pain that she has had since she gave birth 7 days ago. The pelvic pain has not changed in the past week.  She has had some diarrhea since coming home from the hospital.  she denies nausea, vomiting, fever and chills, flank pain, vaginal discharge, and vaginal odor.    ROS:      Review of Systems   Constitutional: Negative.  Negative for activity change, chills, fatigue and fever.   HENT: Negative for congestion, ear pain, rhinorrhea and sore throat.    Respiratory: Negative.  Negative for cough and shortness of breath.    Cardiovascular: Negative.  Negative for chest pain and palpitations.   Gastrointestinal: Negative.  Negative for abdominal pain, diarrhea, nausea and vomiting.   Endocrine: Negative.  Negative for polydipsia and polyuria.   Genitourinary: Positive for dysuria and flank pain. Negative for frequency, hematuria, pelvic pain, urgency, vaginal discharge and vaginal pain.   Musculoskeletal: Negative  for myalgias, neck pain and neck stiffness.   Allergic/Immunologic: Negative for immunocompromised state.   Neurological: Negative.  Negative for dizziness, weakness, light-headedness and headaches.   Hematological: Negative for adenopathy.       Family History   Family History   Problem Relation Age of Onset     No Known Problems Mother      No Known Problems Father      Hypertension Maternal Grandmother      Diabetes Maternal Grandmother      Ovarian Cancer Maternal Grandmother      No Known Problems Brother      No Known Problems Brother      No Known Problems Brother      No Known Problems Sister      No Known Problems Daughter      Breast Cancer No family hx of      Colon Cancer No family hx of      Hyperlipidemia No family hx of      Asthma No family hx of      Osteoporosis No family hx of      Mental Illness No family hx of      Depression No family hx of      Anxiety Disorder No family hx of      Substance Abuse No family hx of         Social History  Social History     Socioeconomic History     Marital status:      Spouse name: Christianne Ca     Number of children: 1     Years of education: Not on file     Highest education level: Not on file   Occupational History     Not on file   Tobacco Use     Smoking status: Never     Smokeless tobacco: Never   Vaping Use     Vaping Use: Never used   Substance and Sexual Activity     Alcohol use: Never     Drug use: Never     Sexual activity: Yes     Partners: Male   Other Topics Concern     Not on file   Social History Narrative     Not on file     Social Determinants of Health     Financial Resource Strain: Not on file   Food Insecurity: Not on file   Transportation Needs: Not on file   Physical Activity: Not on file   Stress: Not on file   Social Connections: Not on file   Intimate Partner Violence: Not At Risk     Fear of Current or Ex-Partner: No     Emotionally Abused: No     Physically Abused: No     Sexually Abused: No   Housing Stability: Not on file            OBJECTIVE     Vital signs noted and reviewed by Kalpesh Don PA-C  BP (!) 137/98 (BP Location: Left arm, Patient Position: Sitting, Cuff Size: Adult Large)   Pulse 66   Temp 97.7  F (36.5  C) (Tympanic)   Wt 86.3 kg (190 lb 3.2 oz)   LMP 05/02/2022 (Approximate)   SpO2 98%   Breastfeeding Yes   BMI 32.07 kg/m       Physical Exam  Vitals and nursing note reviewed.   Constitutional:       General: She is not in acute distress.     Appearance: Normal appearance. She is well-developed. She is not ill-appearing, toxic-appearing or diaphoretic.   HENT:      Head: Normocephalic and atraumatic.      Right Ear: Tympanic membrane and external ear normal.      Left Ear: Tympanic membrane and external ear normal.   Eyes:      Pupils: Pupils are equal, round, and reactive to light.   Cardiovascular:      Rate and Rhythm: Normal rate and regular rhythm.      Heart sounds: Normal heart sounds. No murmur heard.    No friction rub. No gallop.   Pulmonary:      Effort: Pulmonary effort is normal. No respiratory distress.      Breath sounds: Normal breath sounds. No wheezing or rales.   Chest:      Chest wall: No tenderness.   Abdominal:      General: Bowel sounds are normal. There is no distension.      Palpations: Abdomen is soft. Abdomen is not rigid. There is no mass.      Tenderness: There is no abdominal tenderness. There is no guarding or rebound. Negative signs include Collier's sign and McBurney's sign.   Musculoskeletal:      Cervical back: Normal range of motion and neck supple.   Lymphadenopathy:      Cervical: No cervical adenopathy.   Skin:     General: Skin is warm and dry.   Neurological:      Mental Status: She is alert and oriented to person, place, and time.      Cranial Nerves: No cranial nerve deficit.      Deep Tendon Reflexes: Reflexes are normal and symmetric.   Psychiatric:         Behavior: Behavior normal. Behavior is cooperative.         Thought Content: Thought content normal.          Judgment: Judgment normal.             Kalpesh Don PA-C  2/2/2023, 1:43 PM

## 2023-02-02 NOTE — TELEPHONE ENCOUNTER
Patient called with complaints of burning with urination that started yesterday and pain in the right kidney area since last night. She took tylenol to help, which allowed her to rest some.  The pain is getting worse.  She states she had an epidural during labor and she was straight cathed every 3 hours until delivery on 1/23/2023.     RN recommended that the patient go be seen in the urgent care or ED today.  Patient was agreeable to plan.

## 2023-02-04 LAB — BACTERIA UR CULT: NORMAL

## 2023-02-16 ENCOUNTER — DOCUMENTATION ONLY (OUTPATIENT)
Dept: ONCOLOGY | Facility: CLINIC | Age: 33
End: 2023-02-16

## 2023-02-16 ENCOUNTER — ANCILLARY PROCEDURE (OUTPATIENT)
Dept: CT IMAGING | Facility: CLINIC | Age: 33
End: 2023-02-16
Payer: COMMERCIAL

## 2023-02-16 ENCOUNTER — OFFICE VISIT (OUTPATIENT)
Dept: INTERNAL MEDICINE | Facility: CLINIC | Age: 33
End: 2023-02-16
Payer: COMMERCIAL

## 2023-02-16 ENCOUNTER — LAB (OUTPATIENT)
Dept: LAB | Facility: CLINIC | Age: 33
End: 2023-02-16
Payer: COMMERCIAL

## 2023-02-16 VITALS
HEIGHT: 65 IN | WEIGHT: 191.1 LBS | DIASTOLIC BLOOD PRESSURE: 80 MMHG | SYSTOLIC BLOOD PRESSURE: 118 MMHG | BODY MASS INDEX: 31.84 KG/M2 | HEART RATE: 71 BPM | TEMPERATURE: 97.9 F | OXYGEN SATURATION: 98 %

## 2023-02-16 DIAGNOSIS — R10.13 ABDOMINAL PAIN, EPIGASTRIC: ICD-10-CM

## 2023-02-16 DIAGNOSIS — D50.9 IRON DEFICIENCY ANEMIA, UNSPECIFIED IRON DEFICIENCY ANEMIA TYPE: ICD-10-CM

## 2023-02-16 DIAGNOSIS — R10.13 ABDOMINAL PAIN, EPIGASTRIC: Primary | ICD-10-CM

## 2023-02-16 LAB
ALBUMIN SERPL BCG-MCNC: 4.3 G/DL (ref 3.5–5.2)
ALP SERPL-CCNC: 99 U/L (ref 35–104)
ALT SERPL W P-5'-P-CCNC: 12 U/L (ref 10–35)
ANION GAP SERPL CALCULATED.3IONS-SCNC: 11 MMOL/L (ref 7–15)
AST SERPL W P-5'-P-CCNC: 16 U/L (ref 10–35)
BASOPHILS # BLD AUTO: 0 10E3/UL (ref 0–0.2)
BASOPHILS NFR BLD AUTO: 0 %
BILIRUB SERPL-MCNC: 0.3 MG/DL
BUN SERPL-MCNC: 9.6 MG/DL (ref 6–20)
CALCIUM SERPL-MCNC: 9.6 MG/DL (ref 8.6–10)
CHLORIDE SERPL-SCNC: 105 MMOL/L (ref 98–107)
CHOLEST SERPL-MCNC: 293 MG/DL
CREAT SERPL-MCNC: 0.63 MG/DL (ref 0.51–0.95)
DEPRECATED HCO3 PLAS-SCNC: 22 MMOL/L (ref 22–29)
EOSINOPHIL # BLD AUTO: 0.1 10E3/UL (ref 0–0.7)
EOSINOPHIL NFR BLD AUTO: 1 %
ERYTHROCYTE [DISTWIDTH] IN BLOOD BY AUTOMATED COUNT: 15.9 % (ref 10–15)
GFR SERPL CREATININE-BSD FRML MDRD: >90 ML/MIN/1.73M2
GLUCOSE SERPL-MCNC: 115 MG/DL (ref 70–99)
HCG SERPL QL: NEGATIVE
HCT VFR BLD AUTO: 40.9 % (ref 35–47)
HDLC SERPL-MCNC: 49 MG/DL
HGB BLD-MCNC: 13.2 G/DL (ref 11.7–15.7)
IMM GRANULOCYTES # BLD: 0 10E3/UL
IMM GRANULOCYTES NFR BLD: 0 %
LDLC SERPL CALC-MCNC: 207 MG/DL
LIPASE SERPL-CCNC: 33 U/L (ref 13–60)
LYMPHOCYTES # BLD AUTO: 1.4 10E3/UL (ref 0.8–5.3)
LYMPHOCYTES NFR BLD AUTO: 10 %
MCH RBC QN AUTO: 27 PG (ref 26.5–33)
MCHC RBC AUTO-ENTMCNC: 32.3 G/DL (ref 31.5–36.5)
MCV RBC AUTO: 84 FL (ref 78–100)
MONOCYTES # BLD AUTO: 0.6 10E3/UL (ref 0–1.3)
MONOCYTES NFR BLD AUTO: 4 %
NEUTROPHILS # BLD AUTO: 11.7 10E3/UL (ref 1.6–8.3)
NEUTROPHILS NFR BLD AUTO: 85 %
NONHDLC SERPL-MCNC: 244 MG/DL
NRBC # BLD AUTO: 0 10E3/UL
NRBC BLD AUTO-RTO: 0 /100
PLATELET # BLD AUTO: 452 10E3/UL (ref 150–450)
POTASSIUM SERPL-SCNC: 4.3 MMOL/L (ref 3.4–5.3)
PROT SERPL-MCNC: 8.2 G/DL (ref 6.4–8.3)
RBC # BLD AUTO: 4.89 10E6/UL (ref 3.8–5.2)
SODIUM SERPL-SCNC: 138 MMOL/L (ref 136–145)
TRIGL SERPL-MCNC: 183 MG/DL
WBC # BLD AUTO: 13.9 10E3/UL (ref 4–11)

## 2023-02-16 PROCEDURE — 83690 ASSAY OF LIPASE: CPT | Performed by: PATHOLOGY

## 2023-02-16 PROCEDURE — 85025 COMPLETE CBC W/AUTO DIFF WBC: CPT | Performed by: PATHOLOGY

## 2023-02-16 PROCEDURE — 74177 CT ABD & PELVIS W/CONTRAST: CPT | Mod: GC | Performed by: RADIOLOGY

## 2023-02-16 PROCEDURE — 84703 CHORIONIC GONADOTROPIN ASSAY: CPT | Performed by: PATHOLOGY

## 2023-02-16 PROCEDURE — 80053 COMPREHEN METABOLIC PANEL: CPT | Performed by: PATHOLOGY

## 2023-02-16 PROCEDURE — 36415 COLL VENOUS BLD VENIPUNCTURE: CPT | Performed by: PATHOLOGY

## 2023-02-16 PROCEDURE — 80061 LIPID PANEL: CPT | Performed by: PATHOLOGY

## 2023-02-16 PROCEDURE — 99214 OFFICE O/P EST MOD 30 MIN: CPT | Mod: GC

## 2023-02-16 RX ORDER — IOPAMIDOL 755 MG/ML
107 INJECTION, SOLUTION INTRAVASCULAR ONCE
Status: COMPLETED | OUTPATIENT
Start: 2023-02-16 | End: 2023-02-16

## 2023-02-16 RX ADMIN — IOPAMIDOL 107 ML: 755 INJECTION, SOLUTION INTRAVASCULAR at 11:42

## 2023-02-16 ASSESSMENT — ENCOUNTER SYMPTOMS
RECTAL PAIN: 0
HEMATURIA: 0
ABDOMINAL PAIN: 1
BLOATING: 0
POOR WOUND HEALING: 0
NAUSEA: 1
JAUNDICE: 0
DIFFICULTY URINATING: 0
BLOOD IN STOOL: 0
HEARTBURN: 0
FLANK PAIN: 0
NAIL CHANGES: 0
DYSURIA: 1
DIARRHEA: 1
SKIN CHANGES: 0
VOMITING: 0
CONSTIPATION: 0

## 2023-02-16 ASSESSMENT — PAIN SCALES - GENERAL: PAINLEVEL: WORST PAIN (10)

## 2023-02-16 NOTE — Clinical Note
Hello, IVAN 3 wk postpartum woman with worsened abdominal pain, 'feels like contractions.' We are doing urine and blood test (including HCG) and given description are planning on a CT with contrast. Please let us or patient know if you want anything else. Gavin Huang Alar Island Pedicle Flap Text: The defect edges were debeveled with a #15 scalpel blade.  Given the location of the defect, shape of the defect and the proximity to the alar rim an island pedicle advancement flap was deemed most appropriate.  Using a sterile surgical marker, an appropriate advancement flap was drawn incorporating the defect, outlining the appropriate donor tissue and placing the expected incisions within the nasal ala running parallel to the alar rim. The area thus outlined was incised with a #15 scalpel blade.  The skin margins were undermined minimally to an appropriate distance in all directions around the primary defect and laterally outward around the island pedicle utilizing iris scissors.  There was minimal undermining beneath the pedicle flap.

## 2023-02-16 NOTE — PROGRESS NOTES
"I, Rito Huang MD saw the patient with the resident, and agree with the resident's findings and plan of care as documented in the resident's note.  /80 (BP Location: Left arm, Patient Position: Sitting, Cuff Size: Adult Regular)   Pulse 71   Temp 97.9  F (36.6  C) (Oral)   Ht 1.64 m (5' 4.57\")   Wt 86.7 kg (191 lb 1.6 oz)   LMP 05/02/2022 (Approximate)   SpO2 98%   BMI 32.23 kg/m    I personally reviewed vital signs and past record.  Key findings: abd pain 3 weeks ago after giving birth, anemia, GDM. Worsened after delivery (vag). Has night sweats and other miscellaneous symptoms. DDx broad, will need testing. Will also send a staff message to her midwife.    "

## 2023-02-16 NOTE — PROGRESS NOTES
PRIMARY CARE CENTER     Patient Name: Briana Newman  YOB: 1990  MRN: [id number]    Date of Service: February 16, 2023  Chief Complaint: abdominal pain diffuse, and nausea, vomiting.  Would also like to establish care,         HISTORY OF PRESENT ILLNESS:    Abdominal pain for today; perhaps establish care too if we have enough time.     She moved to the [facility] in late 2021 from [place] and hasn't established with a PCP just yet.   She gave birth 3 weeks ago, vaginal delivery, to her second daughter.   Has a past medical history of pre-eclampsia with her first pregnancy, treated this 2nd time around so no complications.   Recently had a UTI, per chart review post-partum.     Upper abdominal pain, it comes and goes in waves. Appears to be located in the epigastrium but radiates over to the bilateral sides.   Reports that it was intermittent when she was pregnant, but then since last night, she has been feeling it more, and also feels like radiating to her back.   She used to associate these with meals but since last night it has been unrelenting though still intermittent.   Initially the famotidine she took would help, but since last night this has given no help.   She has a history of H  Pylori, and treated with triple therapy per patient somewhere around 2014 in [place].       She endorses chills and night sweats that drench her clothes requiring change. She endorses, abdominal pain as above. Some lochia currently but tapering off per patient, says hasn't really had much bleeding since last week.   She denies currently breastfeeding, denies vomiting, hematochezia, melena, hematemesis, or hematuria. She denies no changes in her bowel habits.     Medications and allergies reviewed by me today.          PAST MEDICAL HISTORY:     Past Medical History:   Diagnosis Date     Gestational diabetes mellitus (GDM) 12/06/2022     Postpartum depression     w first, unsure about med use     POTS  "(postural orthostatic tachycardia syndrome)      Pre-eclampsia     HTN started 3rd tri, them PreE w severe features,induced at term     Shortness of breath             REVIEW OF SYSTEMS:     10 point ROS was negative except as noted in HPI         HOME MEDICATIONS:     No current outpatient medications on file.     No current facility-administered medications for this visit.            PHYSICAL EXAM:   Vitals: /80 (BP Location: Left arm, Patient Position: Sitting, Cuff Size: Adult Regular)   Pulse 71   Temp 97.9  F (36.6  C) (Oral)   Ht 1.64 m (5' 4.57\")   Wt 86.7 kg (191 lb 1.6 oz)   LMP 05/02/2022 (Approximate)   SpO2 98%   BMI 32.23 kg/m      Wt Readings from Last 4 Encounters:   02/16/23 86.7 kg (191 lb 1.6 oz)   02/02/23 86.3 kg (190 lb 3.2 oz)   01/19/23 92.6 kg (204 lb 3.2 oz)   01/12/23 92.1 kg (203 lb)       GENERAL: Alert, interactive, NAD  HEENT: Normocephalic, atraumatic, EOMI, no conjunctivitis, anicteric sclera/  LUNGS: clear to auscultation bilaterally, no crackles or wheezes  HEART: regular rate and rhythm, normal S1 and S2, no murmur appreciated  ABDOMEN: Soft, epigastric tenderness bandlike across, no rebound or guarding. Non-distended, +BS, no HSM or masses, no rebound or guarding.  MUSCULOSKELETAL: no tenderness to spinous processes, no chest wall tenderness  EXTREMITIES: No LE edema bilaterally, 2+ DP and radial pulses bialterally  SKIN: Warm and dry, no jaundice or acute rashes  NEURO: CN II-XII intact, 5/5 upper and lower extremity strength bilaterally, sensation intact, normal gait  PSYCH: A&O to person, place and time. appropriate mood, normal speech, linear thought.            DATA:     Laboratory Data:  Pending.   Imaging:  Pending.           ASSESSMENT & PLAN:     Briana was seen today for establish care, abdominal pain and nausea.    Diagnoses and all orders for this visit:    # Abdominal pain, epigastric, acutely worsening.   # Post Partum state   3 weeks post partum, " uncomplicated vaginal delivery of her second baby with induction; 1st degree laceration w/o need for repair, complicated by post partum anemia Hgb 10.7, and gestational diabetes. Does have a history of pre-eclampsia and treated for adequately during this pregnant.     Differential includes dyspepsia/gastric ulcer in setting of prior H pylori, association with meals prior to this, and recurrence after triple therapy, pancreatitis, less likely endometritis given location of pain and time after delivery. Will broadly work up as follows and obtain a stat HCG along with a stat CT abdomen pelvis w/ contrast given the acute pain.   -     Lipase; Future  -     Comprehensive metabolic panel (BMP + Alb, Alk Phos, ALT, AST, Total. Bili, TP); Future  -     Lipid Profile (Chol, Trig, HDL, LDL calc); Future  -     CBC with platelets and differential; Future  -     HCG qualitative; Future  -     Helicobacter pylori Antigen Stool; Future  -     CT Abdomen Pelvis w Contrast; Future  - will follow up these results, and call patient and advice as indicated.     Iron deficiency anemia, unspecified iron deficiency anemia type  -     CBC with platelets and differential; Future        #Healthcare Maintenance:   tackle at next visit in 1 week.    - discuss chronic leukocytosis    - discuss vaccines    - discuss preventative screening; family history, social history, nutrition, activity, and patient care goals.   - PHQ -9, other care gaps remaining.     Return to clinic:in 1 week with Dr. Robertson     Patient care plan discussed with attending physician, Gavin Joyner, who agreed with above.    Fozia Robertson MD  Internal Medicine, PGY-2  [phone]  Answers for HPI/ROS submitted by the patient on 2/16/2023  General Symptoms: No  Skin Symptoms: Yes  HENT Symptoms: No  EYE SYMPTOMS: No  HEART SYMPTOMS: No  LUNG SYMPTOMS: No  INTESTINAL SYMPTOMS: Yes  URINARY SYMPTOMS: Yes  GYNECOLOGIC SYMPTOMS: No  BREAST SYMPTOMS: No  SKELETAL SYMPTOMS:  No  BLOOD SYMPTOMS: No  NERVOUS SYSTEM SYMPTOMS: No  MENTAL HEALTH SYMPTOMS: No  Changes in hair: No  Changes in moles/birth marks: No  Itching: Yes  Rashes: Yes  Changes in nails: No  Acne: No  Hair in places you don't want it: No  Change in facial hair: No  Warts: No  Non-healing sores: No  Scarring: No  Flaking of skin: No  Color changes of hands/feet in cold : No  Sun sensitivity: No  Skin thickening: No  Heart burn or indigestion: No  Nausea: Yes  Vomiting: No  Abdominal pain: Yes  Bloating: No  Constipation: No  Diarrhea: Yes  Blood in stool: No  Black stools: No  Rectal or Anal pain: No  Yellowing of skin or eyes: No  Vomit with blood: No  Change in stools: No  Trouble holding urine or incontinence: No  Pain or burning: Yes  Trouble starting or stopping: No  Increased frequency of urination: Yes  Blood in urine: No  Decreased frequency of urination: No  Frequent nighttime urination: No  Flank pain: No  Difficulty emptying bladder: No

## 2023-02-16 NOTE — NURSING NOTE
Rapid Response Epic Documentation     Situation:   Patient found seated in the lab accompanied by staff.  Patient reports she is here today for labs and a scan because she has been having intermittent abdominal pain and nausea.  Patient reports she had an episode of pain and became nauseous while seated in the lobby.  Patient reports she is feeling better upon response team arrival.    Assessment:    Patient found to be alert and answering questions appropriately.  Patient denies currently having any pain or nausea.  Patient was offered the option of transport to the ED by ambulance but declined.  Patient reports she is comfortable remaining in clinic to complete the lab draw and scan.  Patient reports she will follow up with her doctor.  Patient advised to seek further medical evaluation if she feels it is necessary.          BP:  138/85    Pulse: 74  Respiration: 18  SPO2: 100 %  Mental Status: Alert  CMS: Intact  Stroke Scale: Not Applicable  EKG: Not Performed          Location:     Lab      Disposition:      Stable (D/C home)

## 2023-02-16 NOTE — NURSING NOTE
Briana Newman is a 32 year old female patient that presents today in clinic for the following:    Chief Complaint   Patient presents with     Establish Care     Abdominal Pain     Pt complains of abdominal pain     Nausea     The patient's allergies and medications were reviewed as noted. A set of vitals were recorded as noted without incident. The patient does not have any other questions for the provider.    Michelle Mancini, EMT at 9:13 AM on 2/16/2023

## 2023-03-02 ENCOUNTER — OFFICE VISIT (OUTPATIENT)
Dept: INTERNAL MEDICINE | Facility: CLINIC | Age: 33
End: 2023-03-02
Payer: COMMERCIAL

## 2023-03-02 VITALS — SYSTOLIC BLOOD PRESSURE: 95 MMHG | DIASTOLIC BLOOD PRESSURE: 69 MMHG | HEART RATE: 56 BPM | OXYGEN SATURATION: 98 %

## 2023-03-02 DIAGNOSIS — D72.829 LEUKOCYTOSIS, UNSPECIFIED TYPE: ICD-10-CM

## 2023-03-02 DIAGNOSIS — B37.31 CANDIDIASIS OF VAGINA: ICD-10-CM

## 2023-03-02 DIAGNOSIS — E78.5 DYSLIPIDEMIA: ICD-10-CM

## 2023-03-02 PROCEDURE — 99214 OFFICE O/P EST MOD 30 MIN: CPT | Mod: GC

## 2023-03-02 RX ORDER — FLUCONAZOLE 150 MG/1
150 TABLET ORAL ONCE
Qty: 1 TABLET | Refills: 0 | Status: SHIPPED | OUTPATIENT
Start: 2023-03-02 | End: 2023-03-02

## 2023-03-02 RX ORDER — ATORVASTATIN CALCIUM 20 MG/1
20 TABLET, FILM COATED ORAL DAILY
Qty: 90 TABLET | Refills: 1 | Status: SHIPPED | OUTPATIENT
Start: 2023-03-02 | End: 2023-12-06

## 2023-03-02 NOTE — PROGRESS NOTES
PRIMARY CARE CENTER     Patient Name: Briana Newman  YOB: 1990  MRN: [id number]    Date of Service: March 2, 2023  Chief Complaint: follow up          HISTORY OF PRESENT ILLNESS:    Briana is following up from last week's visit when I had seen her 3-4 weeks post-partum with abdominal pain.     First seen by me on 2/16/2022; for epigastric abdominal pain.   Abdominal pain has improved, markedly. Reviews of systems notable for vaginal itchiness, cheesy discharge c/f vaginal candidiasis, tired and feeling 'woozy occasionally, not sure if part of her post-partum symptoms but says not dizzy or feeling like she will fall, prefers to monitor for now per patient, improved abdominal pain as before.     BP Readings from Last 6 Encounters:   03/02/23 95/69   02/16/23 118/80   02/02/23 (!) 137/98   01/24/23 96/51   01/19/23 134/86   01/12/23 117/81       Reviewed labs with patient; chronic leukocytosis persists, CT abd/pelvis showed enteritis, likely etiology of the pain appears improving, has yet to get h pylori stool antigen test       See MY NOTES FROM last week.          PAST MEDICAL HISTORY:     Past Medical History:   Diagnosis Date     Gestational diabetes mellitus (GDM) 12/06/2022     Postpartum depression     w first, unsure about med use     POTS (postural orthostatic tachycardia syndrome)      Pre-eclampsia     HTN started 3rd tri, them PreE w severe features,induced at term     Shortness of breath             REVIEW OF SYSTEMS:     10 point ROS was negative except as noted in HPI         HOME MEDICATIONS:     No current outpatient medications on file.     No current facility-administered medications for this visit.            PHYSICAL EXAM:   Vitals: BP 95/69 (BP Location: Left arm, Patient Position: Sitting, Cuff Size: Adult Regular)   Pulse 56   LMP 05/02/2022 (Approximate)   SpO2 98%   Breastfeeding No     Wt Readings from Last 4 Encounters:   02/16/23 86.7 kg (191 lb 1.6 oz)   02/02/23 86.3  kg (190 lb 3.2 oz)   01/19/23 92.6 kg (204 lb 3.2 oz)   01/12/23 92.1 kg (203 lb)       GENERAL: Alert, interactive, NAD  HEENT: Normocephalic, atraumatic, PERRL, EOMI,   LUNGS:breathing comfortably on room air  MUSCULOSKELETAL: able to lift and carry her 5 week old baby, bottle feed, lay her down etc; appears to have full range of motion in all extremities.   EXTREMITIES:hands appear well perfused.   SKIN:No jaundice or acute rashes  NEURO:sensation intact, normal gait  PSYCH: A&O to person, place and time. appropriate mood, normal speech, linear thought.            DATA:     Laboratory Data:  Component      Latest Ref Rng & Units 2/16/2023   WBC      4.0 - 11.0 10e3/uL 13.9 (H)   RBC Count      3.80 - 5.20 10e6/uL 4.89   Hemoglobin      11.7 - 15.7 g/dL 13.2   Hematocrit      35.0 - 47.0 % 40.9   MCV      78 - 100 fL 84   MCH      26.5 - 33.0 pg 27.0   MCHC      31.5 - 36.5 g/dL 32.3   RDW      10.0 - 15.0 % 15.9 (H)   Platelet Count      150 - 450 10e3/uL 452 (H)   % Neutrophils      % 85   % Lymphocytes      % 10   % Monocytes      % 4   % Eosinophils      % 1   % Basophils      % 0   % Immature Granulocytes      % 0   NRBCs per 100 WBC      <1 /100 0   Absolute Neutrophils      1.6 - 8.3 10e3/uL 11.7 (H)   Absolute Lymphocytes      0.8 - 5.3 10e3/uL 1.4   Absolute Monocytes      0.0 - 1.3 10e3/uL 0.6   Absolute Eosinophils      0.0 - 0.7 10e3/uL 0.1   Absolute Basophils      0.0 - 0.2 10e3/uL 0.0   Absolute Immature Granulocytes      <=0.4 10e3/uL 0.0   Absolute NRBCs      10e3/uL 0.0   Sodium      136 - 145 mmol/L 138   Potassium      3.4 - 5.3 mmol/L 4.3   Chloride      98 - 107 mmol/L 105   Carbon Dioxide (CO2)      22 - 29 mmol/L 22   Anion Gap      7 - 15 mmol/L 11   Urea Nitrogen      6.0 - 20.0 mg/dL 9.6   Creatinine      0.51 - 0.95 mg/dL 0.63   Calcium      8.6 - 10.0 mg/dL 9.6   Glucose      70 - 99 mg/dL 115 (H)   Alkaline Phosphatase      35 - 104 U/L 99   AST      10 - 35 U/L 16   ALT      10 - 35  U/L 12   Protein Total      6.4 - 8.3 g/dL 8.2   Albumin      3.5 - 5.2 g/dL 4.3   Bilirubin Total      <=1.2 mg/dL 0.3   GFR Estimate      >60 mL/min/1.73m2 >90   Cholesterol      <200 mg/dL 293 (H)   Triglycerides      <150 mg/dL 183 (H)   HDL Cholesterol      >=50 mg/dL 49 (L)   LDL Cholesterol Calculated      <=100 mg/dL 207 (H)   Non HDL Cholesterol      <130 mg/dL 244 (H)   Lipase      13 - 60 U/L 33   HCG Qualitative Serum      Negative Negative     Imaging:  CT Abdomen and pelvis 2/16/2023                                                                   IMPRESSION:  No definite acute pathology in the abdomen or pelvis. Nonspecific  fluid-filled loops of small and large bowel could represent enteritis.        I have personally reviewed the examination and initial interpretation  and I agree with the findings.             ASSESSMENT & PLAN:     Briana was seen today for follow up and establish care.    Diagnoses and all orders for this visit:    Leukocytosis, chronic  unspecified type  Per chart review, her WBC in last years appear to be between 11-13 since June 2021 (outside records from the [facility]). Unclear etiology, but appears persistent throughout her pregnancy. Will monitor, repeat now that she is post-partum. Also will get inflammatory markers at a later date as ordered.  -     CRP, inflammation; Future  -     ESR: Erythrocyte sedimentation rate; Future  -     Lipid panel reflex to direct LDL Non-fasting; Future  -     CBC with platelets and differential; Future    Dyslipidemia      Secondary family history as noted (maternal uncle and maternal grandmother with dyslipidemia). Elevated lipids from 2/16/20232, hepatic panel wnl. Adequate to start atorvastatin and encourage life style modifications.         -  Education provided for activity, diet and low carbohydrate diet today in clinic; patient open also to in addition starting statin therapy.   -     Lipid panel reflex to direct LDL Non-fasting;  Future  -     atorvastatin (LIPITOR) 20 MG tablet; Take 1 tablet (20 mg) by mouth daily for 180 days    Candidiasis of vagina  Will empirically treat per patient symptom; reported has had episodes, but not in the last year. Will treat as initial for now, will give a 3 day course if recurrs.    - keep appointment with ob-gyn women's clinic in 10 days.    -  fluconazole (DIFLUCAN) 150 MG tablet; Take 1 tablet (150 mg) by mouth once for 1 dose     # Abdominal pain, epigastric, (improved)   #Non specific small and large bowel fluid collections c/f possible enteritis on imaging.   Presented at 3 weeks post partum, uncomplicated vaginal delivery of her second baby with induction; 1st degree laceration w/o need for repair, complicated by post partum anemia Hgb 10.7, and gestational diabetes. Does have a history of pre-eclampsia and treated for adequately during this pregnant.     Differential includes dyspepsia/gastric ulcer in setting of prior H pylori, association with meals prior to this, and recurrence after triple therapy, pancreatitis, less likely endometritis given location of pain and time after delivery.Workup with lipase wnl, CMP wnl, Lipid profile elevated as above, CT abdomen with nonspecific fluid collections in small and large bowel c/f possible enteritis, H pylori pending. Symptoms in the interval has improved. So will monitor, and follow up h pylori when patient gets it done.     - monitor symptoms as before.     Iron deficiency anemia, unspecified iron deficiency anemia type  -     2/16/2023 CBC with Hgb wnl; monitor.     Sensation of Dysequilibrium, intermittent in the post-partum state      - encouraged hydration      - patient prefers to monitor for now      - follow up in women's clinic in a week.     #Healthcare Maintenance:   - discuss chronic leukocytosis - f/u CRP ESR in 3 months.    - appear to be due for Tdap, and covid bivalent booster, patient would like to defer these for today(get these done  at next follow up visit with Dr. Robertson)    - discuss preventative screening; family history, social history, nutrition, activity, and patient care goals.   - PHQ -9,2/27 on 3/2/2023; complete.     Return to clinic: in 3 months.     Patient care plan discussed with attending physician, Dr. Gustafson, who agreed with above.    Fozia Robertson MD  Internal Medicine, PGY-2  [phone]    While the patient was in clinic, I reviewed the pertinent medical history and results.  I discussed the current findings on physical examination, as well as the patient s diagnosis and treatment plan with the resident and agree with the information as documented with the following exceptions: none.  Tonio Gustafson MD

## 2023-03-22 ENCOUNTER — OFFICE VISIT (OUTPATIENT)
Dept: OBGYN | Facility: CLINIC | Age: 33
End: 2023-03-22
Attending: ADVANCED PRACTICE MIDWIFE
Payer: COMMERCIAL

## 2023-03-22 VITALS
BODY MASS INDEX: 32.47 KG/M2 | HEIGHT: 65 IN | WEIGHT: 194.9 LBS | DIASTOLIC BLOOD PRESSURE: 71 MMHG | HEART RATE: 68 BPM | SYSTOLIC BLOOD PRESSURE: 118 MMHG

## 2023-03-22 DIAGNOSIS — O24.414 INSULIN CONTROLLED GESTATIONAL DIABETES MELLITUS (GDM) IN THIRD TRIMESTER: ICD-10-CM

## 2023-03-22 DIAGNOSIS — N76.0 VAGINITIS AND VULVOVAGINITIS: ICD-10-CM

## 2023-03-22 DIAGNOSIS — R30.0 DYSURIA: ICD-10-CM

## 2023-03-22 PROBLEM — L29.9 ITCHING: Status: RESOLVED | Noted: 2022-09-26 | Resolved: 2023-03-22

## 2023-03-22 PROBLEM — Z86.59 HX OF POSTPARTUM DEPRESSION, CURRENTLY PREGNANT: Status: RESOLVED | Noted: 2022-07-20 | Resolved: 2023-03-22

## 2023-03-22 PROBLEM — Z91.89 AT RISK FOR INEFFECTIVE BREASTFEEDING: Status: RESOLVED | Noted: 2022-12-06 | Resolved: 2023-03-22

## 2023-03-22 PROBLEM — O99.891 HX OF POSTPARTUM DEPRESSION, CURRENTLY PREGNANT: Status: RESOLVED | Noted: 2022-07-20 | Resolved: 2023-03-22

## 2023-03-22 PROBLEM — R06.09 DYSPNEA ON EXERTION: Status: RESOLVED | Noted: 2022-10-05 | Resolved: 2023-03-22

## 2023-03-22 PROBLEM — O09.92 HIGH-RISK PREGNANCY, SECOND TRIMESTER: Status: RESOLVED | Noted: 2022-07-14 | Resolved: 2023-03-22

## 2023-03-22 PROBLEM — B95.1 POSITIVE GBS TEST: Status: RESOLVED | Noted: 2022-11-15 | Resolved: 2023-03-22

## 2023-03-22 PROBLEM — R03.0 ELEVATED BLOOD PRESSURE READING WITHOUT DIAGNOSIS OF HYPERTENSION: Status: RESOLVED | Noted: 2023-01-09 | Resolved: 2023-03-22

## 2023-03-22 PROBLEM — Z34.90 ENCOUNTER FOR INDUCTION OF LABOR: Status: RESOLVED | Noted: 2023-01-21 | Resolved: 2023-03-22

## 2023-03-22 PROBLEM — O09.299 HX OF PREECLAMPSIA, PRIOR PREGNANCY, CURRENTLY PREGNANT: Status: RESOLVED | Noted: 2022-07-14 | Resolved: 2023-03-22

## 2023-03-22 PROCEDURE — G0463 HOSPITAL OUTPT CLINIC VISIT: HCPCS | Performed by: ADVANCED PRACTICE MIDWIFE

## 2023-03-22 PROCEDURE — 87106 FUNGI IDENTIFICATION YEAST: CPT | Performed by: ADVANCED PRACTICE MIDWIFE

## 2023-03-22 PROCEDURE — 99212 OFFICE O/P EST SF 10 MIN: CPT | Mod: 24 | Performed by: ADVANCED PRACTICE MIDWIFE

## 2023-03-22 PROCEDURE — 99207 PR POST PARTUM EXAM: CPT | Performed by: ADVANCED PRACTICE MIDWIFE

## 2023-03-22 PROCEDURE — 87086 URINE CULTURE/COLONY COUNT: CPT | Performed by: ADVANCED PRACTICE MIDWIFE

## 2023-03-22 RX ORDER — FLUCONAZOLE 150 MG/1
TABLET ORAL
COMMUNITY
Start: 2023-03-02 | End: 2023-04-04

## 2023-03-22 RX ORDER — FLUCONAZOLE 150 MG/1
150 TABLET ORAL ONCE
Qty: 2 TABLET | Refills: 0 | Status: SHIPPED | OUTPATIENT
Start: 2023-03-22 | End: 2023-03-22

## 2023-03-22 ASSESSMENT — ANXIETY QUESTIONNAIRES
7. FEELING AFRAID AS IF SOMETHING AWFUL MIGHT HAPPEN: SEVERAL DAYS
GAD7 TOTAL SCORE: 9
3. WORRYING TOO MUCH ABOUT DIFFERENT THINGS: MORE THAN HALF THE DAYS
5. BEING SO RESTLESS THAT IT IS HARD TO SIT STILL: NOT AT ALL
1. FEELING NERVOUS, ANXIOUS, OR ON EDGE: SEVERAL DAYS
6. BECOMING EASILY ANNOYED OR IRRITABLE: SEVERAL DAYS
GAD7 TOTAL SCORE: 9
2. NOT BEING ABLE TO STOP OR CONTROL WORRYING: MORE THAN HALF THE DAYS

## 2023-03-22 ASSESSMENT — PATIENT HEALTH QUESTIONNAIRE - PHQ9
SUM OF ALL RESPONSES TO PHQ QUESTIONS 1-9: 7
5. POOR APPETITE OR OVEREATING: MORE THAN HALF THE DAYS

## 2023-03-22 NOTE — LETTER
"3/22/2023       RE: Briana Newman  [address]  [place] [address]     Dear Colleague,    Thank you for referring your patient, Briana Newman, to the [facility] [place] at [facility]. Please see a copy of my visit note below.    Nursing Notes:   Fran Ogie  3/22/2023 10:58 AM  Signed  Chief Complaint   Patient presents with     Postpartum Care     6 week PP   SUBJECTIVE:   Briana Newman is here for her 6-week postpartum checkup.     PHQ-9 score:   Hx of Abuse:  No    Delivery Date: 2023.    Delivering provider: Marge Cummins CNM    Type of delivery:  .  Perineum:  tear, no stitches.     Delivery complications: None  Infant gender:  girl, weight 6 pounds 8 oz.  Feeding Method:  Bottlefed.  Complications reported with feeding:  none, infant thriving .    Bleeding:  None.  Duration:  2 weeks.  Menses resumed:  Yes   Bowel/Urinary problems:  No    Contrace[facility] Planned:  Abst[facility]   She  has not had intercourse since delivery..         ================================================================  ROS: 10 point ROS neg other than the symptoms noted above in the HPI.   Patient has a history of postpartum depression/anxiety.  Has been seeking help from family which is helpful.  Desires to start therapy at this time.  PHQ-9 score today 7.  CHRIS-7 score 9.    Was treated for UTI on 23 then developed a vaginal yeast infection.  Was treated with one dose of oral diflucan which has lessened symptoms, but continues to feel vaginal itching with thick discharge.  Feels like vagina is edematous.  Not sure if she has urinary symptoms, continues to have dysuria.  Would like UC today    Will consider contraception when her  is able to return to [place].  Planning 2hr glucose.     EXAM:  /71   Pulse 68   Ht 1.64 m (5' 4.57\")   Wt 88.4 kg (194 lb 14.4 oz)   LMP 2022 (Exact Date)   BMI 32.87 " kg/m      General: healthy, alert and no distress  Psych: NEGATIVE  Breasts:  Exam deferred  Abdomen: Benign, Soft, flat, non-tender, No masses, organomegaly and Diastasis less than 1-2 FB  Incision:  None   Vulva:  Normal genitalia and Bartholin's, Urethra, [place]'s normal  Vagina:  normal with good muscle tone and moderate erythema and thick white discharge  Cervix:  Multiparous, and closed, no motion tenderness.    Uterus:  fully involuted and non-tender    Adnexa:  Within normal limits and No masses, nodularity, tenderness  Recto-vaginal:   anus normal      ASSESSMENT:   Encounter Diagnoses   Name Primary?     Insulin controlled gestational diabetes mellitus (GDM) in third trimester      Routine postpartum follow-up Yes     Vaginitis and vulvovaginitis      Dysuria       Normal postpartum exam after   Pregnancy was complicated by:  Gestational Diabetes - insulin controlled.      PLAN:  No orders of the defined types were placed in this encounter.     Orders Placed This Encounter   Medications     fluconazole (DIFLUCAN) 150 MG tablet        Risks and benefits of prescribed medications discussed.  Medication instructions reviewed.  Contraception methods discussed  Signs and symptoms of postpartum depression/anxiety discussed and resources offered  Discussed calcium intake, vitamins and supplements including Vitamin D  Exercise encouraged  Rx sent for Fluconazole x2  Will wait for UC results to determine need for treatment.  If dysuria persists and UC negative, patient to follow up with uro/gyn.  Follow up in 1 year  ERICA Mcfarland CNM

## 2023-03-22 NOTE — NURSING NOTE
Chief Complaint   Patient presents with     Postpartum Care     6 week PP   SUBJECTIVE:   Briana Newman is here for her 6-week postpartum checkup.     PHQ-9 score:   Hx of Abuse:  No    Delivery Date: 2023.    Delivering provider: Marge Cummins CNM    Type of delivery:  .  Perineum:  tear, no stitches.     Delivery complications: None  Infant gender:  girl, weight 6 pounds 8 oz.  Feeding Method:  Bottlefed.  Complications reported with feeding:  none, infant thriving .    Bleeding:  None.  Duration:  2 weeks.  Menses resumed:  Yes   Bowel/Urinary problems:  No    Contraception Planned:  abstinence  She  has not had intercourse since delivery..

## 2023-03-22 NOTE — PROGRESS NOTES
"Nursing Notes:   Jackeline Og  3/22/2023 10:58 AM  Signed  Chief Complaint   Patient presents with     Postpartum Care     6 week PP   SUBJECTIVE:   Briana Newman is here for her 6-week postpartum checkup.     PHQ-9 score:   Hx of Abuse:  No    Delivery Date: 2023.    Delivering provider: Marge Cummins CNM    Type of delivery:  .  Perineum:  tear, no stitches.     Delivery complications: None  Infant gender:  girl, weight 6 pounds 8 oz.  Feeding Method:  Bottlefed.  Complications reported with feeding:  none, infant thriving .    Bleeding:  None.  Duration:  2 weeks.  Menses resumed:  Yes   Bowel/Urinary problems:  No    Contraception Planned:  Abstinence,  is in [facility].   She  has not had intercourse since delivery..         ================================================================  ROS: 10 point ROS neg other than the symptoms noted above in the HPI.   Patient has a history of postpartum depression/anxiety.  Has been seeking help from family which is helpful.  Desires to start therapy at this time.  PHQ-9 score today 7.  CHRIS-7 score 9.    Was treated for UTI on 23 then developed a vaginal yeast infection.  Was treated with one dose of oral diflucan which has lessened symptoms, but continues to feel vaginal itching with thick discharge.  Feels like vagina is edematous.  Not sure if she has urinary symptoms, continues to have dysuria.  Would like UC today    Will consider contraception when her  is able to return to [place].  Planning 2hr glucose.     EXAM:  /71   Pulse 68   Ht 1.64 m (5' 4.57\")   Wt 88.4 kg (194 lb 14.4 oz)   LMP 2022 (Exact Date)   BMI 32.87 kg/m      General: healthy, alert and no distress  Psych: NEGATIVE  Breasts:  Exam deferred  Abdomen: Benign, Soft, flat, non-tender, No masses, organomegaly and Diastasis less than 1-2 FB  Incision:  None   Vulva:  Normal genitalia and Bartholin's, Urethra, Gordon's normal  Vagina:  normal with good muscle " tone and moderate erythema and thick white discharge  Cervix:  Multiparous, and closed, no motion tenderness.    Uterus:  fully involuted and non-tender    Adnexa:  Within normal limits and No masses, nodularity, tenderness  Recto-vaginal:   anus normal      ASSESSMENT:   Encounter Diagnoses   Name Primary?     Insulin controlled gestational diabetes mellitus (GDM) in third trimester      Routine postpartum follow-up Yes     Vaginitis and vulvovaginitis      Dysuria       Normal postpartum exam after   Pregnancy was complicated by:  Gestational Diabetes - insulin controlled.      PLAN:  No orders of the defined types were placed in this encounter.     Orders Placed This Encounter   Medications     fluconazole (DIFLUCAN) 150 MG tablet        Risks and benefits of prescribed medications discussed.  Medication instructions reviewed.  Contraception methods discussed  Signs and symptoms of postpartum depression/anxiety discussed and resources offered  Discussed calcium intake, vitamins and supplements including Vitamin D  Exercise encouraged  Rx sent for Fluconazole x2  Will wait for UC results to determine need for treatment.  If dysuria persists and UC negative, patient to follow up with uro/gyn.  Follow up in 1 year  ERICA Mcfarland CNM

## 2023-03-22 NOTE — PATIENT INSTRUCTIONS
Thank you for trusting us with your care!     If you need to contact us for questions about:  Symptoms, Scheduling & Medical Questions; Non-urgent (2-3 day response) Jeanette message, Urgent (needing response today) [phone] (if after 3:30pm next day response)   Prescriptions: Please call your Pharmacy   Billing: [facility] [phone] or [facility]:[phone]

## 2023-03-24 LAB
BACTERIA UR CULT: ABNORMAL
BACTERIA UR CULT: ABNORMAL

## 2023-03-29 DIAGNOSIS — B37.31 YEAST INFECTION OF THE VAGINA: Primary | ICD-10-CM

## 2023-03-29 RX ORDER — AMOXICILLIN 250 MG
1 CAPSULE ORAL DAILY
Qty: 60 TABLET | Refills: 1 | Status: SHIPPED | OUTPATIENT
Start: 2023-03-29 | End: 2023-04-04

## 2023-03-29 RX ORDER — IBUPROFEN 600 MG/1
600 TABLET, FILM COATED ORAL EVERY 6 HOURS PRN
Qty: 100 TABLET | Refills: 1 | Status: SHIPPED | OUTPATIENT
Start: 2023-03-29 | End: 2023-04-04

## 2023-03-29 RX ORDER — FLUCONAZOLE 150 MG/1
150 TABLET ORAL
Qty: 3 TABLET | Refills: 1 | Status: SHIPPED | OUTPATIENT
Start: 2023-03-29 | End: 2023-04-05

## 2023-03-31 ENCOUNTER — TELEPHONE (OUTPATIENT)
Dept: EDUCATION SERVICES | Facility: CLINIC | Age: 33
End: 2023-03-31
Payer: COMMERCIAL

## 2023-03-31 NOTE — TELEPHONE ENCOUNTER
Called Briana.  Left VM.  Per CNMs, patient should be scheduled with next available MD visit to discuss persistent yeast infection.

## 2023-03-31 NOTE — TELEPHONE ENCOUNTER
Kary,    Pt contacted our diabetes educator triage line this morning to ask about results. She reports she is still not feeling well. It looks like this is meant for you as we are not following pt.    Thanks!    Winsome Monahan RN, [facility]

## 2023-03-31 NOTE — TELEPHONE ENCOUNTER
Patient called back.  She is not calling about yeast infection symptoms- those have been resolved.      She is calling because of heart palpitations and numbness/tingling of her hands and fingers.  She is fasting all day for Ramadan and wondering if the dizziness/heart palpitations are related to fasting.  She states she has been unable to get the 2 hour glucose test done because she is the only one caring for her baby, but states she will do it as soon as she can.     The symptom she feels more concerned about is numbness and tingling in her hands.  She reports that whenever she wakes up, both of her hands are tingly and weak, and it is hard to hold her baby.  This goes away with time.      Scheduled for visit 4/4 at 1:40pm with Dr. Pérez to discuss numbness/tingling.  Patient plans to do 2 hour glucose test that day, as she is fasting for Ramadan anyway.

## 2023-04-04 ENCOUNTER — LAB (OUTPATIENT)
Dept: LAB | Facility: CLINIC | Age: 33
End: 2023-04-04
Attending: FAMILY MEDICINE
Payer: COMMERCIAL

## 2023-04-04 ENCOUNTER — OFFICE VISIT (OUTPATIENT)
Dept: FAMILY MEDICINE | Facility: CLINIC | Age: 33
End: 2023-04-04
Attending: FAMILY MEDICINE
Payer: COMMERCIAL

## 2023-04-04 VITALS
DIASTOLIC BLOOD PRESSURE: 84 MMHG | HEIGHT: 65 IN | BODY MASS INDEX: 31.77 KG/M2 | HEART RATE: 60 BPM | WEIGHT: 190.7 LBS | SYSTOLIC BLOOD PRESSURE: 124 MMHG

## 2023-04-04 DIAGNOSIS — O24.414 INSULIN CONTROLLED GESTATIONAL DIABETES MELLITUS (GDM) IN THIRD TRIMESTER: ICD-10-CM

## 2023-04-04 DIAGNOSIS — G62.9 PERIPHERAL POLYNEUROPATHY: Primary | ICD-10-CM

## 2023-04-04 DIAGNOSIS — G62.9 PERIPHERAL POLYNEUROPATHY: ICD-10-CM

## 2023-04-04 LAB
FOLATE SERPL-MCNC: 9 NG/ML (ref 4.6–34.8)
GLUCOSE 1H P 75 G GLC PO SERPL-MCNC: 133 MG/DL (ref 60–179)
GLUCOSE P FAST SERPL-MCNC: 100 MG/DL (ref 60–91)
TSH SERPL DL<=0.005 MIU/L-ACNC: 1.97 UIU/ML (ref 0.3–4.2)
VIT B12 SERPL-MCNC: 924 PG/ML (ref 232–1245)

## 2023-04-04 PROCEDURE — 84443 ASSAY THYROID STIM HORMONE: CPT

## 2023-04-04 PROCEDURE — G0463 HOSPITAL OUTPT CLINIC VISIT: HCPCS | Performed by: FAMILY MEDICINE

## 2023-04-04 PROCEDURE — 82607 VITAMIN B-12: CPT

## 2023-04-04 PROCEDURE — 82947 ASSAY GLUCOSE BLOOD QUANT: CPT

## 2023-04-04 PROCEDURE — 82950 GLUCOSE TEST: CPT

## 2023-04-04 PROCEDURE — 82746 ASSAY OF FOLIC ACID SERUM: CPT

## 2023-04-04 PROCEDURE — 99214 OFFICE O/P EST MOD 30 MIN: CPT | Performed by: FAMILY MEDICINE

## 2023-04-04 PROCEDURE — 36415 COLL VENOUS BLD VENIPUNCTURE: CPT

## 2023-04-04 NOTE — PATIENT INSTRUCTIONS
Thank you for trusting us with your care!     If you need to contact us for questions about:  Symptoms, Scheduling & Medical Questions; Non-urgent (2-3 day response) Jeanette message, Urgent (needing response today) [phone] (if after 3:30pm next day response)   Prescriptions: Please call your Pharmacy   Billing: [facility] [phone] or [facility]:[phone]

## 2023-04-04 NOTE — PROGRESS NOTES
"Assessment:     1. Numbness and tingling in bilateral upper and lower extremities - likely multifactorial (Carpal Tunnel, Dequervain's, h/o GDM)     Plan:      1. Utilize OTC wrist brace at night to reduce tendon exacerbation  2. Labs: Folate, TSH, B12 to r/o other contributing factors  3. 2hr GTT pending for post-partum DM screening w/ h/o GDM  4. Monitor and RTC if symptoms persist or worsen    Subjective:     Briana Newman is a 33 year old female who presents for numbness in extremities.   Starting around two weeks ago Briana began noticing numbness in hands and feet as she was dozing off to sleep. Feels strength deficit with these events, has to wait for it to pass before she can  her baby. It occurs equally across palms and soles of both feet. \"Pins and needles\" feeling that lasts several seconds and resolves spontaneously. Is made better by massaging. No noticeable visual changes occur in hands or feet with the sensation. Has not tried taking medications or any other relieving strategies. Denies tension in back and shoulders, notes some musculoskeletal tension in anterior chest. 4/10, \"discomfort\", \"not painful, but annoying\".  Associated symptoms: Noticing a lot of visual floaters and \"seeing stars\" that started arounnd the same time    Briana had a history of GDM in pregnancy requiring insulin. She is currently in process of 2 hr OGTT to screen for DM.     Review of Systems  Negative unless otherwise noted in HPI    Objective:      Vitals:    04/04/23 1001   BP: 124/84   Pulse: 60   Weight: 86.5 kg (190 lb 11.2 oz)   Height: 1.64 m (5' 4.57\")       Physical Exam:  General Appearance: Alert, cooperative, no distress, appears stated age  Skin: Skin color, texture, turgor normal, no rashes or lesions  Musculoskeletal: extremity strength & ROM grossly normal. Radial pulses 2+ and symmetric. Positive Phalen & Finkelstein tests bilaterally. senasation intact to Monofilament in hands and feet bilaterally. "       Patient was seen with student, BETHANY Bueno who was present for learning. I personally assessed, examined and made clinical decisions reflected in the documentation.     Veronica Pérez MD  Family Medicine

## 2023-04-04 NOTE — NURSING NOTE
Chief Complaint   Patient presents with     RECHECK     Numbness and tingling sensation in hands

## 2023-04-06 ENCOUNTER — MYC MEDICAL ADVICE (OUTPATIENT)
Dept: FAMILY MEDICINE | Facility: CLINIC | Age: 33
End: 2023-04-06
Payer: COMMERCIAL

## 2023-04-07 NOTE — RESULT ENCOUNTER NOTE
Called patient to inform that 2 hour was slightly elevated and that midwives would like her to follow up with Dr. Pérez.  Patient was seen in clinic on 4/4 by Dr. Pérez, so routing to her to advise next steps.

## 2023-04-25 ENCOUNTER — VIRTUAL VISIT (OUTPATIENT)
Dept: PSYCHOLOGY | Facility: CLINIC | Age: 33
End: 2023-04-25
Payer: COMMERCIAL

## 2023-04-25 DIAGNOSIS — Z53.20 PROCEDURE NOT CARRIED OUT BECAUSE OF PATIENT'S DECISION: Primary | ICD-10-CM

## 2023-04-25 PROCEDURE — 99207 PR NO BILLABLE SERVICE THIS VISIT: CPT

## 2023-04-25 NOTE — PROGRESS NOTES
Called pt for visit.  She stated she had forgotten this visit and was driving her child to .  She is unavailable thus visit was not conducted.  She has a visit on 5/2 and she confirmed she would be available for this visit.

## 2023-04-26 ENCOUNTER — OFFICE VISIT (OUTPATIENT)
Dept: OPTOMETRY | Facility: CLINIC | Age: 33
End: 2023-04-26
Payer: COMMERCIAL

## 2023-04-26 DIAGNOSIS — H43.393 VITREOUS FLOATER, BILATERAL: ICD-10-CM

## 2023-04-26 DIAGNOSIS — H04.123 DRY EYES: Primary | ICD-10-CM

## 2023-04-26 DIAGNOSIS — H52.13 MYOPIA OF BOTH EYES: ICD-10-CM

## 2023-04-26 DIAGNOSIS — H52.223 REGULAR ASTIGMATISM OF BOTH EYES: ICD-10-CM

## 2023-04-26 PROCEDURE — 92004 COMPRE OPH EXAM NEW PT 1/>: CPT | Performed by: OPTOMETRIST

## 2023-04-26 ASSESSMENT — CUP TO DISC RATIO
OS_RATIO: 0.35
OD_RATIO: 0.35

## 2023-04-26 ASSESSMENT — TONOMETRY
OS_IOP_MMHG: 17
OD_IOP_MMHG: 17
IOP_METHOD: APPLANATION

## 2023-04-26 ASSESSMENT — REFRACTION_MANIFEST
OD_CYLINDER: +0.50
OD_AXIS: 095
OS_SPHERE: -0.75
OS_CYLINDER: +0.75
OD_SPHERE: -0.50
OS_AXIS: 080

## 2023-04-26 ASSESSMENT — VISUAL ACUITY
OD_SC: 20/25+
OS_SC: 20/25+
METHOD: SNELLEN - LINEAR

## 2023-04-26 ASSESSMENT — EXTERNAL EXAM - LEFT EYE: OS_EXAM: NORMAL

## 2023-04-26 ASSESSMENT — CONF VISUAL FIELD
OS_INFERIOR_TEMPORAL_RESTRICTION: 0
OD_INFERIOR_TEMPORAL_RESTRICTION: 0
OD_INFERIOR_NASAL_RESTRICTION: 0
OS_SUPERIOR_NASAL_RESTRICTION: 0
OD_NORMAL: 1
OS_NORMAL: 1
OS_SUPERIOR_TEMPORAL_RESTRICTION: 0
OS_INFERIOR_NASAL_RESTRICTION: 0
OD_SUPERIOR_TEMPORAL_RESTRICTION: 0
OD_SUPERIOR_NASAL_RESTRICTION: 0

## 2023-04-26 ASSESSMENT — SLIT LAMP EXAM - LIDS
COMMENTS: NORMAL
COMMENTS: NORMAL

## 2023-04-26 ASSESSMENT — EXTERNAL EXAM - RIGHT EYE: OD_EXAM: NORMAL

## 2023-04-26 NOTE — PATIENT INSTRUCTIONS
DRY EYE TREATMENT    I recommend using artificial tears for your dry eye. There are over the counter drops that work well and may be used up to 4x daily. (Systane Balance, Refresh Optive, Soothe XP)   If you need more than 4 drops daily, use a preservative free product which come in individual vials which may be used for 24 hours and discarded.     Artificial tears work best as a preventative and not as well after your eyes are starting to bother you.  It may take 4- 6 weeks of using the drops before you notice improvement.  If after that time you are still having problems schedule an appointment for an evaluation and discussion of different treatments.  Dry eyes are a chronic condition and you may have more symptoms at certain times of the year.      Additional recommended treatment:  Warm compresses once to twice daily for 5-10 minutes    Directions for warm compresses:  Moisten a washcloth with hot water or microwave for 10 seconds, being careful to not get the cloth too hot. Then put the washcloth onto your eyelids for 5 minutes. It may cool rather quickly so a rice pack or eyemask that can be heated and laid on top of the washcloth will help retain the heat for longer periods.     Floaters are small specks or clouds that move in your vision. They may appear to dart away when you try to look directly at them. They are most noticeable in bright light when looking at a blank wall, a solid colored surface, or the dayanara.    These happen with age as the vitreous gel ( the fluid that fills the eyeball), starts to become more liquified and shrinks to form clumps or strands. The gel then pulls away from the back of the retina leaving a clump of tissue where the vitreous was previously attached, causing a posterior vitreous detachment.   This is more common with people that are nearsighted or have undergone certain ocular surgeries, inflammatory conditions or experienced trauma.  You should always be checked by an eye  doctor right away if you have a sudden change in floaters, flashes, or change/ loss in peripheral vision. This could indicate a retinal tear, hole or detachment that could lead to permanent vision loss if not treated.    Briana was advised of today's exam findings.  Fill glasses prescription  Allow 2 weeks to adapt to change in glasses  Wear glasses for driving  Copy of glasses Rx provided today.    Return in 1 year for eye exam, or sooner if needed.    The effects of the dilating drops last for 4- 6 hours.  You will be more sensitive to light and vision will be blurry up close.  Mydriatic sunglasses were given if needed.       Casey Peacock O.D.  [facility] [place]  [address] [place] [address]. [place]  [id number]    [phone]

## 2023-04-26 NOTE — LETTER
4/26/2023         RE: Briana Newman  [address]  [place] [address]        Dear Colleague,    Thank you for referring your patient, Briana Newman, to the [facility]. Please see a copy of my visit note below.    Chief Complaint   Patient presents with     Vitreous Floaters Evaluation     Dry Eye(s)     Blurred Vision Evaluation       Chief Complaint(s) and History of Present Illness(es)     Annual Eye Exam           Vitreous Floaters Evaluation                Lab Results   Component Value Date    A1C 5.2 01/21/2023    A1C 5.4 07/29/2022       Last Eye Exam: 3 years  Dilated Previously: Yes    What are you currently using to see?  does not use glasses or contacts; used to have a pair but they stopped working well for her so she discontinued use    Distance Vision Acuity: Noticed gradual change in both eyes; has had 2 pregnancies and she feels her vision has changed with each one    **notices floaters in each eye; has noticed for several years but they seemed to have changed recently in appearance. Used to be a dot, now it is more string-like    Near Vision Acuity: Not satisfied     Eye Comfort: dry  Do you use eye drops? 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       OBJECTIVE: See Ophthalmology exam    ASSESSMENT:    ICD-10-CM    1. Dry eyes  H04.123 EYE EXAM (SIMPLE-NONBILLABLE)      2. Vitreous floater, bilateral  H43.393 EYE EXAM (SIMPLE-NONBILLABLE)      3. Myopia of both eyes  H52.13 EYE EXAM (SIMPLE-NONBILLABLE)     Refraction      4. Regular astigmatism of both eyes  H52.223 EYE EXAM (SIMPLE-NONBILLABLE)     Refraction          PLAN:    Briana Newman aware  eye exam results will be sent to [facility] Robertson.  Patient Instructions    DRY EYE TREATMENT    I recommend using artificial tears for your dry eye. There are over  the counter drops that work well and may be used up to 4x daily. (Systane Balance, Refresh Optive, Soothe XP)   If you need more than 4 drops daily, use a preservative free product which come in individual vials which may be used for 24 hours and discarded.     Artificial tears work best as a preventative and not as well after your eyes are starting to bother you.  It may take 4- 6 weeks of using the drops before you notice improvement.  If after that time you are still having problems schedule an appointment for an evaluation and discussion of different treatments.  Dry eyes are a chronic condition and you may have more symptoms at certain times of the year.      Additional recommended treatment:  Warm compresses once to twice daily for 5-10 minutes    Directions for warm compresses:  Moisten a washcloth with hot water or microwave for 10 seconds, being careful to not get the cloth too hot. Then put the washcloth onto your eyelids for 5 minutes. It may cool rather quickly so a rice pack or eyemask that can be heated and laid on top of the washcloth will help retain the heat for longer periods.     Floaters are small specks or clouds that move in your vision. They may appear to dart away when you try to look directly at them. They are most noticeable in bright light when looking at a blank wall, a solid colored surface, or the dayanara.    These happen with age as the vitreous gel ( the fluid that fills the eyeball), starts to become more liquified and shrinks to form clumps or strands. The gel then pulls away from the back of the retina leaving a clump of tissue where the vitreous was previously attached, causing a posterior vitreous detachment.   This is more common with people that are nearsighted or have undergone certain ocular surgeries, inflammatory conditions or experienced trauma.  You should always be checked by an eye doctor right away if you have a sudden change in floaters, flashes, or change/ loss in  peripheral vision. This could indicate a retinal tear, hole or detachment that could lead to permanent vision loss if not treated.    Briana was advised of today's exam findings.  Fill glasses prescription  Allow 2 weeks to adapt to change in glasses  Wear glasses for driving  Copy of glasses Rx provided today.    Return in 1 year for eye exam, or sooner if needed.    The effects of the dilating drops last for 4- 6 hours.  You will be more sensitive to light and vision will be blurry up close.  Mydriatic sunglasses were given if needed.       Casey Peacock O.D.  [address] [place]  [address]    [phone]                   Again, thank you for allowing me to participate in the care of your patient.        Sincerely,        Casey Peacock, OD

## 2023-04-26 NOTE — PROGRESS NOTES
Chief Complaint   Patient presents with     Vitreous Floaters Evaluation     Dry Eye(s)     Blurred Vision Evaluation       Chief Complaint(s) and History of Present Illness(es)     Annual Eye Exam           Vitreous Floaters Evaluation                Lab Results   Component Value Date    A1C 5.2 01/21/2023    A1C 5.4 07/29/2022       Last Eye Exam: 3 years  Dilated Previously: Yes    What are you currently using to see?  does not use glasses or contacts; used to have a pair but they stopped working well for her so she discontinued use    Distance Vision Acuity: Noticed gradual change in both eyes; has had 2 pregnancies and she feels her vision has changed with each one    **notices floaters in each eye; has noticed for several years but they seemed to have changed recently in appearance. Used to be a dot, now it is more string-like    Near Vision Acuity: Not satisfied     Eye Comfort: dry  Do you use eye drops? 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       OBJECTIVE: See Ophthalmology exam    ASSESSMENT:    ICD-10-CM    1. Dry eyes  H04.123 EYE EXAM (SIMPLE-NONBILLABLE)      2. Vitreous floater, bilateral  H43.393 EYE EXAM (SIMPLE-NONBILLABLE)      3. Myopia of both eyes  H52.13 EYE EXAM (SIMPLE-NONBILLABLE)     Refraction      4. Regular astigmatism of both eyes  H52.223 EYE EXAM (SIMPLE-NONBILLABLE)     Refraction          PLAN:    Briana Newman aware  eye exam results will be sent to [facility] Robertson.  Patient Instructions    DRY EYE TREATMENT    I recommend using artificial tears for your dry eye. There are over the counter drops that work well and may be used up to 4x daily. (Systane Balance, Refresh Optive, Soothe XP)   If you need more than 4 drops daily, use a preservative free product which come in individual vials which may be used for 24 hours and  discarded.     Artificial tears work best as a preventative and not as well after your eyes are starting to bother you.  It may take 4- 6 weeks of using the drops before you notice improvement.  If after that time you are still having problems schedule an appointment for an evaluation and discussion of different treatments.  Dry eyes are a chronic condition and you may have more symptoms at certain times of the year.      Additional recommended treatment:  Warm compresses once to twice daily for 5-10 minutes    Directions for warm compresses:  Moisten a washcloth with hot water or microwave for 10 seconds, being careful to not get the cloth too hot. Then put the washcloth onto your eyelids for 5 minutes. It may cool rather quickly so a rice pack or eyemask that can be heated and laid on top of the washcloth will help retain the heat for longer periods.     Floaters are small specks or clouds that move in your vision. They may appear to dart away when you try to look directly at them. They are most noticeable in bright light when looking at a blank wall, a solid colored surface, or the dayanara.    These happen with age as the vitreous gel ( the fluid that fills the eyeball), starts to become more liquified and shrinks to form clumps or strands. The gel then pulls away from the back of the retina leaving a clump of tissue where the vitreous was previously attached, causing a posterior vitreous detachment.   This is more common with people that are nearsighted or have undergone certain ocular surgeries, inflammatory conditions or experienced trauma.  You should always be checked by an eye doctor right away if you have a sudden change in floaters, flashes, or change/ loss in peripheral vision. This could indicate a retinal tear, hole or detachment that could lead to permanent vision loss if not treated.    Briana was advised of today's exam findings.  Fill glasses prescription  Allow 2 weeks to adapt to change in  glasses  Wear glasses for driving  Copy of glasses Rx provided today.    Return in 1 year for eye exam, or sooner if needed.    The effects of the dilating drops last for 4- 6 hours.  You will be more sensitive to light and vision will be blurry up close.  Mydriatic sunglasses were given if needed.       Casey Peacock O.D.  [address]  [place]  [address]    [phone]

## 2023-05-02 ENCOUNTER — VIRTUAL VISIT (OUTPATIENT)
Dept: PSYCHOLOGY | Facility: CLINIC | Age: 33
End: 2023-05-02
Payer: COMMERCIAL

## 2023-05-02 DIAGNOSIS — Z53.8 PROCEDURE NOT CARRIED OUT FOR OTHER REASONS: Primary | ICD-10-CM

## 2023-05-02 PROCEDURE — 99207 PR NO BILLABLE SERVICE THIS VISIT: CPT

## 2023-05-02 NOTE — PROGRESS NOTES
Called pt multiple times until 8:27am.  Unable to connect for visit.  Left v/m letting her know she can reschedule and/or send me a mychart msg.

## 2023-05-12 ENCOUNTER — NURSE TRIAGE (OUTPATIENT)
Dept: NURSING | Facility: CLINIC | Age: 33
End: 2023-05-12
Payer: COMMERCIAL

## 2023-05-12 NOTE — TELEPHONE ENCOUNTER
"I spoke with Briana to discuss her symptoms. She reports that this morning she woke up and noted left sided pain after standing. She denies injury, though notes it feels like her side was hit. She notes that pain seem to be under a left rib anteriorly and continues to flank, feels sore. Pain is constant. Pain is worse with bending and twisting, feels more sore if she touches the area. She has not tried any medications, heat, or ice. She notes that the pain is not overly worrisome, not excruciating, though is new, prompting her to call. She denies numbness, tingling, dyspnea, SOB, and chest pain.     She also describes dizziness and nausea that started about two days ago. Dizziness is intermittent, brought on by turning her head or body. Feels like the room spins slowly. She recovers quickly when this occurs. Does not experience at rest. She denies feeling lightheaded or as though she might pass out. She notes she did have this during pregnancy as well.     Nausea intermittently. Seems worse after eating. No vomiting. No melena or hematochezia. She notes overall regular bowel movements, though reports stool yesterday appeared \"foamy.\"    She had a cold last week, nasal congestion and sore throat. Feels recovered from cold symptoms.    Discussed evaluation. No appointments today. Will schedule for Monday. Will also send a message to PCP. Discussed urgent care evaluation for new or worsening symptoms, or if pain persists. She voiced understanding.     Elena Elizalde RN ([facility])  "

## 2023-05-12 NOTE — TELEPHONE ENCOUNTER
"Nurse Triage SBAR    Is this a 2nd Level Triage? YES, LICENSED PRACTITIONER REVIEW IS REQUIRED    Situation:   Patient calling to report waking up w/ L sided abd pain under her ribs that radiates to the L side of her back.    Background:   Of note, the patient reports getting a UTI back in January after giving birth.  She reports finishing abx for UTI in Feb.    Assessment:   Patient reports her back is constant & achy.  The patient would rate her pain a 7/10.  Also, the patient reports feeling fatigued & dizzy for the past two days.  The patient reports her stools \"look like there is soap in it\" & her urine has been dark, but she denies seeing any blood in her urine.  The patient also reports her L arm \"feels funny,\" but she is able to do her usual activities.    The patient denies having a fever, frequency, or urgency.  Patient reports stinging when voiding, but reports this has been going on since completing her course of abx for her previous UTI.    Protocol Recommended Disposition:   Go To Office Now    Recommendation:   Per protocol, go to office now.       Routed to provider  Please review & advise patient w/ further recommendations.    Patient's call back # [phone].    Verónica Valiente RN on 5/12/2023 at 9:16 AM    Reason for Disposition    Pain or burning with passing urine (urination)    Additional Information    Negative: Passed out (i.e., lost consciousness, collapsed and was not responding)    Negative: Shock suspected (e.g., cold/pale/clammy skin, too weak to stand, low BP, rapid pulse)    Negative: Sounds like a life-threatening emergency to the triager    Negative: SEVERE pain (e.g., excruciating, scale 8-10) and present > 1 hour    Negative: Sudden onset of severe flank pain and age > 60 years    Negative: Abdominal pain and age > 60 years    Negative: Unable to urinate (or only a few drops) > 4 hours and bladder feels very full (e.g., palpable bladder or strong urge to urinate)    Negative: " Pain radiates into groin, scrotum    Negative: Blood in urine (red, pink, or tea-colored)    Negative: Vomiting    Negative: Weakness of a leg or foot (e.g., unable to bear weight, dragging foot)    Negative: Patient sounds very sick or weak to the triager    Negative: Fever > 100.4 F (38.0 C)    Protocols used: FLANK PAIN-A-OH

## 2023-06-26 ENCOUNTER — MEDICAL CORRESPONDENCE (OUTPATIENT)
Dept: HEALTH INFORMATION MANAGEMENT | Facility: CLINIC | Age: 33
End: 2023-06-26
Payer: COMMERCIAL

## 2023-07-24 ENCOUNTER — MYC MEDICAL ADVICE (OUTPATIENT)
Dept: INTERNAL MEDICINE | Facility: CLINIC | Age: 33
End: 2023-07-24
Payer: COMMERCIAL

## 2023-08-15 ENCOUNTER — TELEPHONE (OUTPATIENT)
Dept: INTERNAL MEDICINE | Facility: CLINIC | Age: 33
End: 2023-08-15
Payer: COMMERCIAL

## 2023-08-15 ENCOUNTER — OFFICE VISIT (OUTPATIENT)
Dept: FAMILY MEDICINE | Facility: CLINIC | Age: 33
End: 2023-08-15
Payer: COMMERCIAL

## 2023-08-15 VITALS
HEART RATE: 60 BPM | RESPIRATION RATE: 16 BRPM | DIASTOLIC BLOOD PRESSURE: 65 MMHG | TEMPERATURE: 98.2 F | OXYGEN SATURATION: 100 % | WEIGHT: 201 LBS | BODY MASS INDEX: 33.9 KG/M2 | SYSTOLIC BLOOD PRESSURE: 114 MMHG

## 2023-08-15 DIAGNOSIS — R11.0 NAUSEA: ICD-10-CM

## 2023-08-15 DIAGNOSIS — N64.52 NIPPLE DISCHARGE IN FEMALE: Primary | ICD-10-CM

## 2023-08-15 LAB
ERYTHROCYTE [DISTWIDTH] IN BLOOD BY AUTOMATED COUNT: 13.1 % (ref 10–15)
HCG UR QL: NEGATIVE
HCT VFR BLD AUTO: 39.6 % (ref 35–47)
HGB BLD-MCNC: 13.5 G/DL (ref 11.7–15.7)
MCH RBC QN AUTO: 30.3 PG (ref 26.5–33)
MCHC RBC AUTO-ENTMCNC: 34.1 G/DL (ref 31.5–36.5)
MCV RBC AUTO: 89 FL (ref 78–100)
PLATELET # BLD AUTO: 322 10E3/UL (ref 150–450)
RBC # BLD AUTO: 4.46 10E6/UL (ref 3.8–5.2)
WBC # BLD AUTO: 11.5 10E3/UL (ref 4–11)

## 2023-08-15 PROCEDURE — 99214 OFFICE O/P EST MOD 30 MIN: CPT | Performed by: PHYSICIAN ASSISTANT

## 2023-08-15 PROCEDURE — 84146 ASSAY OF PROLACTIN: CPT | Performed by: PHYSICIAN ASSISTANT

## 2023-08-15 PROCEDURE — 36415 COLL VENOUS BLD VENIPUNCTURE: CPT | Performed by: PHYSICIAN ASSISTANT

## 2023-08-15 PROCEDURE — 81025 URINE PREGNANCY TEST: CPT | Performed by: PHYSICIAN ASSISTANT

## 2023-08-15 PROCEDURE — 85027 COMPLETE CBC AUTOMATED: CPT | Performed by: PHYSICIAN ASSISTANT

## 2023-08-15 RX ORDER — CEPHALEXIN 500 MG/1
500 CAPSULE ORAL 3 TIMES DAILY
Qty: 30 CAPSULE | Refills: 0 | Status: SHIPPED | OUTPATIENT
Start: 2023-08-15 | End: 2023-08-25

## 2023-08-16 LAB — PROLACTIN SERPL 3RD IS-MCNC: 15 NG/ML (ref 5–23)
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
## 2023-08-16 NOTE — PATIENT INSTRUCTIONS
Begin taking Keflex according to bottle instructions.  Take this medication with food.  You will be notified of your prolactin level and your complete blood count once they are back.  If you are still having any symptoms of discharge after completing the Keflex please complete the mammogram as ordered.

## 2023-08-22 ASSESSMENT — ENCOUNTER SYMPTOMS
CONSTIPATION: 1
ABDOMINAL PAIN: 1
BREAST MASS: 0

## 2023-08-23 ENCOUNTER — OFFICE VISIT (OUTPATIENT)
Dept: FAMILY MEDICINE | Facility: CLINIC | Age: 33
End: 2023-08-23
Payer: COMMERCIAL

## 2023-08-23 VITALS
DIASTOLIC BLOOD PRESSURE: 60 MMHG | RESPIRATION RATE: 17 BRPM | OXYGEN SATURATION: 97 % | HEART RATE: 59 BPM | BODY MASS INDEX: 34.42 KG/M2 | HEIGHT: 64 IN | WEIGHT: 201.6 LBS | SYSTOLIC BLOOD PRESSURE: 104 MMHG | TEMPERATURE: 98 F

## 2023-08-23 DIAGNOSIS — N64.52 NIPPLE DISCHARGE: ICD-10-CM

## 2023-08-23 DIAGNOSIS — E78.2 MIXED HYPERLIPIDEMIA: ICD-10-CM

## 2023-08-23 DIAGNOSIS — R21 RASH: ICD-10-CM

## 2023-08-23 DIAGNOSIS — Z00.00 ROUTINE HISTORY AND PHYSICAL EXAMINATION OF ADULT: Primary | ICD-10-CM

## 2023-08-23 DIAGNOSIS — R19.8 ALTERNATING CONSTIPATION AND DIARRHEA: ICD-10-CM

## 2023-08-23 LAB
ALBUMIN SERPL BCG-MCNC: 4.3 G/DL (ref 3.5–5.2)
ALP SERPL-CCNC: 53 U/L (ref 35–104)
ALT SERPL W P-5'-P-CCNC: 8 U/L (ref 0–50)
ANION GAP SERPL CALCULATED.3IONS-SCNC: 11 MMOL/L (ref 7–15)
AST SERPL W P-5'-P-CCNC: 22 U/L (ref 0–45)
BILIRUB SERPL-MCNC: 0.3 MG/DL
BUN SERPL-MCNC: 10.1 MG/DL (ref 6–20)
CALCIUM SERPL-MCNC: 9.9 MG/DL (ref 8.6–10)
CHLORIDE SERPL-SCNC: 104 MMOL/L (ref 98–107)
CHOLEST SERPL-MCNC: 212 MG/DL
CREAT SERPL-MCNC: 0.6 MG/DL (ref 0.51–0.95)
DEPRECATED HCO3 PLAS-SCNC: 20 MMOL/L (ref 22–29)
ERYTHROCYTE [DISTWIDTH] IN BLOOD BY AUTOMATED COUNT: 12.8 % (ref 10–15)
GFR SERPL CREATININE-BSD FRML MDRD: >90 ML/MIN/1.73M2
GLUCOSE SERPL-MCNC: 83 MG/DL (ref 70–99)
HCT VFR BLD AUTO: 39.3 % (ref 35–47)
HDLC SERPL-MCNC: 52 MG/DL
HGB BLD-MCNC: 13.2 G/DL (ref 11.7–15.7)
LDLC SERPL CALC-MCNC: 141 MG/DL
MCH RBC QN AUTO: 29.3 PG (ref 26.5–33)
MCHC RBC AUTO-ENTMCNC: 33.6 G/DL (ref 31.5–36.5)
MCV RBC AUTO: 87 FL (ref 78–100)
NONHDLC SERPL-MCNC: 160 MG/DL
PLATELET # BLD AUTO: 300 10E3/UL (ref 150–450)
POTASSIUM SERPL-SCNC: 4.3 MMOL/L (ref 3.4–5.3)
PROT SERPL-MCNC: 7.8 G/DL (ref 6.4–8.3)
RBC # BLD AUTO: 4.51 10E6/UL (ref 3.8–5.2)
SODIUM SERPL-SCNC: 135 MMOL/L (ref 136–145)
TRIGL SERPL-MCNC: 97 MG/DL
TSH SERPL DL<=0.005 MIU/L-ACNC: 2.02 UIU/ML (ref 0.3–4.2)
WBC # BLD AUTO: 10 10E3/UL (ref 4–11)

## 2023-08-23 PROCEDURE — 99214 OFFICE O/P EST MOD 30 MIN: CPT | Mod: 25

## 2023-08-23 PROCEDURE — 80053 COMPREHEN METABOLIC PANEL: CPT

## 2023-08-23 PROCEDURE — 85027 COMPLETE CBC AUTOMATED: CPT

## 2023-08-23 PROCEDURE — 80061 LIPID PANEL: CPT

## 2023-08-23 PROCEDURE — 36415 COLL VENOUS BLD VENIPUNCTURE: CPT

## 2023-08-23 PROCEDURE — 84443 ASSAY THYROID STIM HORMONE: CPT

## 2023-08-23 PROCEDURE — 99395 PREV VISIT EST AGE 18-39: CPT

## 2023-08-23 RX ORDER — TRIAMCINOLONE ACETONIDE 1 MG/G
CREAM TOPICAL 2 TIMES DAILY
Qty: 45 G | Refills: 0 | Status: SHIPPED | OUTPATIENT
Start: 2023-08-23

## 2023-08-23 ASSESSMENT — ENCOUNTER SYMPTOMS
ABDOMINAL PAIN: 1
CONSTIPATION: 1
BREAST MASS: 0

## 2023-08-23 NOTE — PROGRESS NOTES
Answers submitted by the patient for this visit:  Annual Preventive Visit (Submitted on 8/22/2023)  Chief Complaint: Annual Exam:  Frequency of exercise:: None  Getting at least 3 servings of Calcium per day:: Yes  Diet:: Regular (no restrictions)  Taking medications regularly:: Not Applicable  Medication side effects:: Not applicable  Bi-annual eye exam:: Yes  Dental care twice a year:: NO  Sleep apnea or symptoms of sleep apnea:: None  abdominal pain: Yes  constipation: Yes  rash: Yes  visual disturbance: Yes  pelvic pain: No  vaginal bleeding: No  vaginal discharge: No  tenderness: Yes  breast mass: No  breast discharge: Yes  Additional concerns today:: No

## 2023-08-23 NOTE — PROGRESS NOTES
SUBJECTIVE:   CC: Briana is an 33 year old who presents for preventive health visit.       8/23/2023     1:16 PM   Additional Questions   Roomed by Radha   Accompanied by N/A         8/23/2023     1:16 PM   Patient Reported Additional Medications   Patient reports taking the following new medications N/A       Healthy Habits:     Getting at least 3 servings of Calcium per day:  Yes    Bi-annual eye exam:  Yes    Dental care twice a year:  NO    Sleep apnea or symptoms of sleep apnea:  None    Diet:  Regular (no restrictions)    Frequency of exercise:  None    Taking medications regularly:  Not Applicable    Medication side effects:  Not applicable    Additional concerns today:  No      Today's PHQ-2 Score:       8/22/2023     2:28 PM   PHQ-2 ( 1999 [employer])   Q1: Little interest or pleasure in doing things 1   Q2: Feeling down, depressed or hopeless 1   PHQ-2 Score 2   Q1: Little interest or pleasure in doing things Several days   Q2: Feeling down, depressed or hopeless Several days   PHQ-2 Score 2     Briana presents for an annual physical with additional concerns. Moved here from [place]. 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 She has a 3 year old and 7 month old.  is living in [place] currently. Has been under some increased stress taking care of the kids alone and working full time. Feels like she stress eats/binges at times especially high sugar foods.      Complaining of abdominal pain, bloating, gas for the past 2 months. She alternates between loose stools and constipation. Takes docusate for constipation. Unknown if certain foods will trigger her episodes. No blood in her stool. Stays hydrated. No fever, chills    Reports she had a pap completed post partum.     Was seen in clinic for nipple discharge 8/15. She is not currently breast feeing. Was given keflex for possible skin infection and order for mammography. She reports no nipple discharge today and improvement  in symptoms with antibiotic.     Complaining of pruritic rash on bilateral hands for the past few months. Aquaphor helps. No new lotions, soaps, body products. She does have an appointment with dermatology regarding this but would like something to help until she can see them.     Have you ever done Advance Care Planning? (For example, a Health Directive, POLST, or a discussion with a medical provider or your loved ones about your wishes): No, advance care planning information given to patient to review.  Patient plans to discuss their wishes with loved ones or provider.      Social History     Tobacco Use     Smoking status: Never     Smokeless tobacco: Never   Substance Use Topics     Alcohol use: Never           8/22/2023     2:28 PM   Alcohol Use   Prescreen: >3 drinks/day or >7 drinks/week? Not Applicable     Reviewed orders with patient.  Reviewed health maintenance and updated orders accordingly - Yes  Labs reviewed in EPIC  BP Readings from Last 3 Encounters:   08/23/23 104/60   08/15/23 114/65   04/04/23 124/84    Wt Readings from Last 3 Encounters:   08/23/23 91.4 kg (201 lb 9.6 oz)   08/15/23 91.2 kg (201 lb)   04/04/23 86.5 kg (190 lb 11.2 oz)           FHS-7:       8/23/2023    12:52 PM   Breast CA Risk Assessment (FHS-7)   Did any of your first-degree relatives have breast or ovarian cancer? Yes   Did any of your relatives have bilateral breast cancer? No   Did any man in your family have breast cancer? No   Did any woman in your family have breast and ovarian cancer? Yes   Did any woman in your family have breast cancer before age 50 y? No   Do you have 2 or more relatives with breast and/or ovarian cancer? No   Do you have 2 or more relatives with breast and/or bowel cancer? No       Pertinent mammograms are reviewed under the imaging tab.    History of abnormal Pap smear: NO - age 30-65 PAP every 5 years with negative HPV co-testing recommended     Reviewed and updated as needed this visit by  "clinical staff   Tobacco  Allergies  Meds              Reviewed and updated as needed this visit by Provider                   Review of Systems   Eyes:  Positive for visual disturbance.   Gastrointestinal:  Positive for abdominal pain and constipation.   Breasts:  Positive for tenderness and discharge. Negative for breast mass.   Genitourinary:  Negative for pelvic pain, vaginal bleeding and vaginal discharge.   Skin:  Positive for rash.          OBJECTIVE:   /60 (BP Location: Left arm, Patient Position: Sitting, Cuff Size: Adult Regular)   Pulse 59   Temp 98  F (36.7  C) (Tympanic)   Resp 17   Ht 1.626 m (5' 4\")   Wt 91.4 kg (201 lb 9.6 oz)   LMP 07/24/2023 (Exact Date)   SpO2 97%   Breastfeeding No   BMI 34.60 kg/m    Physical Exam  GENERAL: healthy, alert and no distress  EYES: Eyes grossly normal to inspection, PERRL and conjunctivae and sclerae normal  HENT: ear canals and TM's normal, nose and mouth without ulcers or lesions  NECK: no adenopathy, no asymmetry, masses, or scars and thyroid normal to palpation  RESP: lungs clear to auscultation - no rales, rhonchi or wheezes  CV: regular rate and rhythm, normal S1 S2, no S3 or S4, no murmur, click or rub, no peripheral edema and peripheral pulses strong  ABDOMEN: soft, nontender, no hepatosplenomegaly, no masses and bowel sounds normal  MS: no gross musculoskeletal defects noted, no edema  SKIN: Dry patches, minimal scaling on palms, wrist, web of fingers.   NEURO: Normal strength and tone, mentation intact and speech normal  PSYCH: mentation appears normal, affect normal/bright    Labs reviewed in Epic    ASSESSMENT/PLAN:   Briana was seen today for abdominal pain, weight management, infection and physical.    Diagnoses and all orders for this visit:    1. Routine history and physical examination of adult  Preventative exam completed today. Discussed healthy lifestyle recommendations of getting 150 minutes of exercise weekly and eating a " "healthy diet. Reviewed and updated health maintenance. Labs completed today. Follow up in one year.    - REVIEW OF HEALTH MAINTENANCE PROTOCOL ORDERS  - CBC with platelets  - Comprehensive metabolic panel (BMP + Alb, Alk Phos, ALT, AST, Total. Bili, TP)    2. Alternating constipation and diarrhea  Bloating, occasional cramping with alternating constipation and diarrhea. Most likely irritable bowel. Check thyroid and CMP today. Counseled on identifying triggers and symptomatic treatment.  - TSH with free T4 reflex  - Comprehensive metabolic panel (BMP + Alb, Alk Phos, ALT, AST, Total. Bili, TP)    3. BMI 33.0-33.9,adult  Difficulty with weight loss, stress/binge eating. Referral for weight management.   - Adult Comprehensive Weight Management  Referral; Future    4. Rash  Pruritic dry nonscaling patches bilateral hands, webs between fingers. Try topical steroid until can see dermatology.   - triamcinolone (KENALOG) 0.1 % external cream; Apply topically 2 times daily  Dispense: 45 g; Refill: 0    5. Nipple discharge  Improved with antibiotics. Does have a mammogram ordered.    6. Mixed hyperlipidemia  History of elevated lipid panel post partum period. Was prescribed atorvastatin but did not take. Check today.   - Lipid panel reflex to direct LDL Non-fasting       Patient has been advised of split billing requirements and indicates understanding: Yes      COUNSELING:  Reviewed preventive health counseling, as reflected in patient instructions       Regular exercise       Healthy diet/nutrition      BMI:   Estimated body mass index is 34.6 kg/m  as calculated from the following:    Height as of this encounter: 1.626 m (5' 4\").    Weight as of this encounter: 91.4 kg (201 lb 9.6 oz).   Weight management plan: Patient referred to endocrine and/or weight management specialty      She reports that she has never smoked. She has never used smokeless tobacco.      ERICA Palm [facility]. " LM [place]

## 2023-09-14 DIAGNOSIS — L23.89 ALLERGIC CONTACT DERMATITIS DUE TO OTHER AGENTS: Primary | ICD-10-CM

## 2023-09-14 RX ORDER — CLOBETASOL PROPIONATE 0.5 MG/G
CREAM TOPICAL 2 TIMES DAILY
Qty: 60 G | Refills: 3 | Status: SHIPPED | OUTPATIENT
Start: 2023-09-14

## 2023-10-11 ENCOUNTER — TELEPHONE (OUTPATIENT)
Dept: DERMATOLOGY | Facility: CLINIC | Age: 33
End: 2023-10-11

## 2023-10-11 ENCOUNTER — OFFICE VISIT (OUTPATIENT)
Dept: DERMATOLOGY | Facility: CLINIC | Age: 33
End: 2023-10-11
Payer: COMMERCIAL

## 2023-10-11 ENCOUNTER — MYC MEDICAL ADVICE (OUTPATIENT)
Dept: INTERNAL MEDICINE | Facility: CLINIC | Age: 33
End: 2023-10-11

## 2023-10-11 DIAGNOSIS — H10.32 ACUTE BACTERIAL CONJUNCTIVITIS OF LEFT EYE: Primary | ICD-10-CM

## 2023-10-11 DIAGNOSIS — L20.89 OTHER ATOPIC DERMATITIS: Primary | ICD-10-CM

## 2023-10-11 DIAGNOSIS — L70.8 OTHER ACNE: ICD-10-CM

## 2023-10-11 PROCEDURE — 99214 OFFICE O/P EST MOD 30 MIN: CPT | Performed by: STUDENT IN AN ORGANIZED HEALTH CARE EDUCATION/TRAINING PROGRAM

## 2023-10-11 RX ORDER — CLINDAMYCIN PHOSPHATE 10 UG/ML
LOTION TOPICAL 2 TIMES DAILY
Qty: 60 ML | Refills: 4 | Status: SHIPPED | OUTPATIENT
Start: 2023-10-11

## 2023-10-11 RX ORDER — SPIRONOLACTONE 100 MG/1
100 TABLET, FILM COATED ORAL DAILY
Qty: 30 TABLET | Refills: 4 | Status: SHIPPED | OUTPATIENT
Start: 2023-10-11 | End: 2023-11-29

## 2023-10-11 RX ORDER — TRETINOIN 0.25 MG/G
GEL TOPICAL AT BEDTIME
Qty: 135 G | Refills: 4 | Status: SHIPPED | OUTPATIENT
Start: 2023-10-11 | End: 2024-03-20

## 2023-10-11 NOTE — PATIENT INSTRUCTIONS
Use clobetasol oint twice daily to hands for eczema  Look to hear from medication specialists for approval of dupixent    Apply clindamycin every morning for acne3  Apply tretinoin at night time for acne (see below for specific information on how to use tretinoin)    Take spironolactone once daily (start with 1/2 pill for 1 week to make sure you tolerate it well)    RETINOIDS AND RELATED PRODUCTS   Tazorac - Tazarotene   Differin - Adapalene   Retin A / Retin A Micro / [employer] - Tretinoin     This topical medication helps treat and prevent acne. Your acne may become worse before it gets better.  This is normal and will stabilize over time.   - Use only a pea size amount for the entire face.    - Do not use only for spot treatment.   - Stop all other toners, cleansers, scrubs, and acne products that are not prescribed by your doctor.   - Wash your face with a mild soap such as Dove for sensitive skin, Cetaphil, Purpose, or Oil of Olay.      It is best if you wait until the skin is completely dry before applying the medication.     WHEN TO USE:   - Week 1 use only Monday and Friday   - Week 2 use every other day   - Week 3 use every day only if you are able to tolerate the medication.      Otherwise continue using every other day. You can expect some peeling / flaking but your skin should not be irritated. If irritation occurs use the medication less often.  These medications are strong.Using them as often as possible is ideal, but even three times per week  can be sufficient. Use a non-comedogenic moisturizer (Cetaphil, Purpose, Oil of Olay, or Neutrogena) in the morning and / or night.     Stop the medication if you become pregnant.     Be cautious with waxing (upper lip / eyebrows) when on these products - skin may be more sensitive.  You may need to discontinue them a week or more  before such treatments.       Be patient.  It may take as long as 3 months before you notice improvement. Do not stop medication unless  "instructed to do so. Stopping the medication because \"it doesn't work\" will end up delaying treatment in the long run.    "
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
## 2023-10-11 NOTE — TELEPHONE ENCOUNTER
Prior Authorization Specialty Medication Request    Medication/Dose: Tretinoin 0.025 gel 45gm   ICD code (if different than what is on RX):    Previously Tried and Failed:      Important Lab Values:   Rationale:     Insurance Name:   Insurance ID:   Insurance Phone Number:     Pharmacy Information (if different than what is on RX)  Name:    Phone:
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
## 2023-10-12 RX ORDER — POLYMYXIN B SULFATE AND TRIMETHOPRIM 1; 10000 MG/ML; [USP'U]/ML
1-2 SOLUTION OPHTHALMIC EVERY 6 HOURS
Qty: 10 ML | Refills: 0 | Status: SHIPPED | OUTPATIENT
Start: 2023-10-12 | End: 2023-11-29

## 2023-10-12 NOTE — TELEPHONE ENCOUNTER
Called patient- given symptoms she would qualify for nurse protocol of abx eye drops. Gave instructions:    Both bacterial and viral are highly contagious and should stay home from work or school for 24 hours after beginning treatment.      Symptoms usually improve in 2 days, if not see MD      Do not wear contacts      Do not share make up, towels, bedding or pillows      Wash eyes to remove pus or crust with warm wash cloth      Wash hands frequently      Will send to Christiano Schneider RN on 10/12/2023 at 10:11 AM

## 2023-10-25 ENCOUNTER — HOSPITAL ENCOUNTER (OUTPATIENT)
Dept: ULTRASOUND IMAGING | Facility: CLINIC | Age: 33
Discharge: HOME | End: 2023-10-25
Attending: PHYSICIAN ASSISTANT
Payer: COMMERCIAL

## 2023-10-25 ENCOUNTER — HOSPITAL ENCOUNTER (OUTPATIENT)
Dept: MAMMOGRAPHY | Facility: CLINIC | Age: 33
Discharge: HOME | End: 2023-10-25
Attending: PHYSICIAN ASSISTANT
Payer: COMMERCIAL

## 2023-10-25 DIAGNOSIS — N64.52 NIPPLE DISCHARGE IN FEMALE: ICD-10-CM

## 2023-10-25 PROCEDURE — 77062 BREAST TOMOSYNTHESIS BI: CPT

## 2023-10-25 PROCEDURE — 76642 ULTRASOUND BREAST LIMITED: CPT | Mod: LT

## 2023-10-26 ENCOUNTER — TELEPHONE (OUTPATIENT)
Dept: DERMATOLOGY | Facility: CLINIC | Age: 33
End: 2023-10-26
Payer: COMMERCIAL

## 2023-10-26 NOTE — TELEPHONE ENCOUNTER
MTM appointment no showed, we made one more attempt to reschedule, pt states they will call us when they are ready to schedule an appt    Routing back to referring provider and [facility] pharmacist.       Robert Bernal, [facility]

## 2023-10-27 ENCOUNTER — TELEPHONE (OUTPATIENT)
Dept: OBGYN | Facility: CLINIC | Age: 33
End: 2023-10-27
Payer: COMMERCIAL

## 2023-10-27 NOTE — TELEPHONE ENCOUNTER
This patient was scheduled with us on 11/8 - never been seen with us and appointment not scheduled appropriately.    mycharted patient and advised we would need to cancel appointment with us and forward to her care team.    Please call patient to schedule with you.    Denisse Brownlee RN

## 2023-10-27 NOTE — TELEPHONE ENCOUNTER
[facility] Call Center    Phone Message    May a detailed message be left on voicemail: yes     Reason for Call: Other: Pt has some concerns about her breast per her mammogram. Pt was told she should make an appointment to be seen for drainage. The soonest available that worked for the patient was November 8th, but the patient would like to be seen sooner. Please call the patient to discuss.      Action Taken: Other: OBGYN    Travel Screening: Not Applicable

## 2023-10-30 ENCOUNTER — TELEPHONE (OUTPATIENT)
Dept: DERMATOLOGY | Facility: CLINIC | Age: 33
End: 2023-10-30
Payer: COMMERCIAL

## 2023-10-30 NOTE — NURSING NOTE
Chief Complaint   Patient presents with     Derm Problem     Patient is here today for rash on arms, chest, back. Has gotten better.     Brigid HOWARD CMA    
Detail Level: Detailed
Quality 130: Documentation Of Current Medications In The Medical Record: Current Medications Documented

## 2023-10-30 NOTE — TELEPHONE ENCOUNTER
MTM appointment no showed, we made one more attempt to reschedule.     Routing back to referring provider and [facility] pharmacist.     I spoke with the patient this morning, she stated that she will call back to schedule an appointment at a later time. She was having difficulties keeping her appointment with her schedule at her job. She said she will have to coordinate with her work and call us back to reschedule.     Bailee Sgiala, [facility]

## 2023-11-12 ENCOUNTER — OFFICE VISIT (OUTPATIENT)
Dept: FAMILY MEDICINE | Facility: CLINIC | Age: 33
End: 2023-11-12
Payer: COMMERCIAL

## 2023-11-12 VITALS
RESPIRATION RATE: 16 BRPM | WEIGHT: 200 LBS | OXYGEN SATURATION: 100 % | DIASTOLIC BLOOD PRESSURE: 88 MMHG | BODY MASS INDEX: 34.33 KG/M2 | SYSTOLIC BLOOD PRESSURE: 129 MMHG | HEART RATE: 76 BPM | TEMPERATURE: 98.2 F

## 2023-11-12 DIAGNOSIS — Z11.3 SCREEN FOR STD (SEXUALLY TRANSMITTED DISEASE): Primary | ICD-10-CM

## 2023-11-12 LAB
CLUE CELLS: ABNORMAL
TRICHOMONAS, WET PREP: ABNORMAL
WBC'S/HIGH POWER FIELD, WET PREP: ABNORMAL
YEAST, WET PREP: ABNORMAL

## 2023-11-12 PROCEDURE — 99213 OFFICE O/P EST LOW 20 MIN: CPT | Performed by: STUDENT IN AN ORGANIZED HEALTH CARE EDUCATION/TRAINING PROGRAM

## 2023-11-12 PROCEDURE — 87210 SMEAR WET MOUNT SALINE/INK: CPT | Performed by: STUDENT IN AN ORGANIZED HEALTH CARE EDUCATION/TRAINING PROGRAM

## 2023-11-12 PROCEDURE — 36415 COLL VENOUS BLD VENIPUNCTURE: CPT | Performed by: STUDENT IN AN ORGANIZED HEALTH CARE EDUCATION/TRAINING PROGRAM

## 2023-11-12 PROCEDURE — 87389 HIV-1 AG W/HIV-1&-2 AB AG IA: CPT | Performed by: STUDENT IN AN ORGANIZED HEALTH CARE EDUCATION/TRAINING PROGRAM

## 2023-11-12 PROCEDURE — 86803 HEPATITIS C AB TEST: CPT | Performed by: STUDENT IN AN ORGANIZED HEALTH CARE EDUCATION/TRAINING PROGRAM

## 2023-11-12 PROCEDURE — 86780 TREPONEMA PALLIDUM: CPT | Performed by: STUDENT IN AN ORGANIZED HEALTH CARE EDUCATION/TRAINING PROGRAM

## 2023-11-12 NOTE — PROGRESS NOTES
ASSESSMENT & PLAN:   Diagnoses and all orders for this visit:  Screen for STD (sexually transmitted disease)  -     Wet preparation  -     Treponema Abs w Reflex to RPR and Titer  -     Hepatitis C antibody  -     HIV Antigen Antibody Combo [place]  -     NEISSERIA GONORRHOEA PCR; Future  -     CHLAMYDIA TRACHOMATIS PCR; Future    Patient here for STD testing, symptomatic with increased discharge. Wet prep negative. STD panel pending. No known STD exposure indicating need for empiric treatment.    At the end of the encounter, I discussed results, diagnosis, medications. Discussed red flags for immediate return to clinic/ER, as well as indications for follow up if no improvement. Patient and/or caregiver understood and agreed to plan. Patient was stable for discharge.    There are no Patient Instructions on file for this visit.    ------------------------------------------------------------------------  SUBJECTIVE  History was obtained from patient.    Patient presents with:  STD    HPI  Briana Newman is a(n) 33 year old female presenting to urgent care for STD testing. No known STD exposure. Reports she has increased vaginal discharge.    Review of Systems    Current Outpatient Medications   Medication Sig Dispense Refill    clindamycin (CLEOCIN T) 1 % external lotion Apply topically 2 times daily 60 mL 4    clobetasol (TEMOVATE) 0.05 % external cream Apply topically 2 times daily 60 g 3    spironolactone (ALDACTONE) 100 MG tablet Take 1 tablet (100 mg) by mouth daily 30 tablet 4    tretinoin (RETIN-A) 0.025 % external gel Apply topically at bedtime 135 g 4    triamcinolone (KENALOG) 0.1 % external cream Apply topically 2 times daily 45 g 0    trimethoprim-polymyxin b (POLYTRIM) 56394-8.1 UNIT/ML-% ophthalmic solution Place 1-2 drops Into the left eye every 6 hours to affected eye/eyes 4 times daily for 7 days 10 mL 0    atorvastatin (LIPITOR) 20 MG tablet Take 1 tablet (20 mg) by mouth daily for 180 days (Patient  not taking: Reported on 8/15/2023) 90 tablet 1     Problem List:  2023:  (normal spontaneous vaginal delivery)  2023: First degree perineal laceration  2023: Encounter for induction of labor  2023: Elevated blood pressure reading without diagnosis of   hypertension  2022: At risk for ineffective breastfeeding - Lactation consult on   PP unit  2022: Insulin controlled gestational diabetes mellitus (GDM) in   third trimester  2022: Positive GBS test  2022-10: Rash and nonspecific skin eruption  2022-10: Dyspnea on exertion  2022: Atopic eruption of pregnancy  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   PSYCH: mentation appears normal. Normal affect    Results for orders placed or performed in visit on 23   Wet preparation     Status: Abnormal    Specimen: Vagina; Swab   Result Value Ref Range    Trichomonas Absent Absent    Yeast Absent Absent    Clue Cells Absent Absent    WBCs/high power field 1+ (A) None

## 2023-11-13 LAB
HCV AB SERPL QL IA: NONREACTIVE
HIV 1+2 AB+HIV1 P24 AG SERPL QL IA: NONREACTIVE
T PALLIDUM AB SER QL: NONREACTIVE

## 2023-11-28 NOTE — PROGRESS NOTES
Medication Therapy Management (MTM) Encounter    ASSESSMENT:                            Medication Adherence/Access: No issues identified    Atopic dermatitis:  Provided education on Dupixent today including dosing, general administration, side effects (both common/serious), precautions, monitoring and time to efficacy. Discussed data on malignancy and risk of serious infection. Encouraged indicated non-live vaccines and avoidance of live vaccines. Encouraged patient to contact the Dermatology clinic in the event they have questions on this. Would benefit from starting Dupixent once it arrives.    Due to patient's fear of being allergic to medications, reassured her this is low risk, and she could give her first few injections in the presence of another person to assure help is there to seek medical assistance if needed.     Acne:    Patient may benefit from assistance in receiving tretinoin coverage in the future if she decides to focus on this again after receiving access to Dupixent.     Hyperlipidemia:   Would benefit from starting her atorvastatin prescription for blood cholesterol management. Since patient would prefer continued trial of non-pharm management, could consider checking lipids in the next 1-3 months to see if further reduction with dietary changes from last value and reassess at that time if starting the statin is still appropriate.     PLAN:                            Order sent for Dupixent to [facility] (#[phone]). They will contact patient pending insurance coverage.   Consider receiving the following vaccinations: COVID-19 booster.   Share link of video for Dupixent injection via [employer].    Follow-up: As needed via [employer] regarding coverage/filling status, and again once starting the medication (between the pharmacy and myself, we will check in at 7 days, 1 month, and 3 months). Then going forward we will touch base approximately every 6 months.       SUBJECTIVE/OBJECTIVE:                          Briana Newman is a 33 year old female contacted via secure video for an initial visit. She was referred to me from Dr. Jose Miguel Arizmendi MD.      Reason for visit: Dupixent for atopic dermatitis.    Allergies/ADRs: Reviewed in chart and with patient.  Past Medical History: Reviewed in chart.  Tobacco: She reports that she has never smoked. She has never used smokeless tobacco.  Alcohol: never used.    Medication Adherence/Access: no issues reported    Atopic dermatitis:   Dupilumab (Dupixent) loading + maintenance dose (autoinjector): Inject 600 mg loading, then 300 mg subcutaneously every 14 days (patient > 60 kg). *New start; Ordered for coverage assessment 11/29/23 *   Dupilumab (Dupixent) maintenance dose (autoinjector): Inject 300 mg subcutaneously every 14 days (patient > 60 kg). *New start; Ordered for coverage assessment 11/29/23 *   Clobetasol 0.05% cream: apply twice daily as needed (frequency: not currently using; has home supply)  Triamcinolone 0.1% cream: twice daily as needed (frequency: once daily on back at bedtime); fill history shows cream, patient reports ointment.  Hydrocortisone 0.1% cream: Apply topically on hands as needed (frequency: once daily on hands).  Fluocinolone 0.01 % external oil: Apply topically once daily as needed to affected area(s) on scalp (frequency: once weekly).    Side effects: None at this time; topical treatments not effective at controlling symptoms. Patient noted a fear of being potentially allergic to medications.    Symptoms: Back, scalp, and hands are raw, itchy, and rough. She is noticing some itchiness starting on her thighs now. Clothes are bothersome on her skin. When she wakes up, she will notice blood spots on her sheets where her back was.     Specialist: Dr. Jose Miguel Arizmendi MD, Dermatology. Last visit on 10/11/23. The following was recommended:   - Greater than 30% body surface area involved, disruption to life, and  failure of topical steroids to control disease.  - Clindamycin lotion, tretinoin gel, and spironolactone started for acne.  - Combination OCPs recommended; patient planned to follow up with women's clinic.  - Follow up in 3 months.  - Itchy persistent for years, consistent with atopic dermatitis.    Previous treatment: Tried a few other lotions/ointments when she was back home in [place], but no other prescriptions tried.     All patients on biologics should avoid live vaccines (varicella/VZV, intranasal influenza, MMR, or yellow fever vaccine (if traveling))      Immunization History   Administered Status   Covid-19 Booster Due to receive   Influenza (annual, 1245-3154) Complete, avoid live FluMist   Tetanus/Tdap  Up-to-date     Acne:    Clindamycin 1% lotion: apply twice daily (frequency: twice daily on face).  Tretinoin 0.025% gel: Apply to face at bedtime *not started/coverage barrier to assess in future appoinmtnets*    Patient reports this regimen is helping with acne; no cystic/hormonal acne at this time. Was previously prescribed spironolactone 100 mg tablet once daily; did not start due to fears it would reduce her blood pressure when she typically runs at ~95-98/50 when not pregnant. Was also previously prescribed Tretinoin 0.025% gel to apply at bedtime; reports insurance did not coverage it, but she is interested in having access to this in the future, although focused first on receiving Dupixent.    Hyperlipidemia:   - Atorvastatin (Lipitor) 20 mg tablet: Take 1 tablet by mouth once daily. *not taking*    Patient reports never starting this after she gave birth but working on dietary changes first.     Recent Labs   Lab Test 08/23/23  1416 02/16/23  1028   CHOL 212* 293*   HDL 52 49*   * 207*   TRIG 97 183*     Today's Vitals: There were no vitals taken for this visit.  ----------------  I spent 35 minutes with this patient today. All changes were made via collaborative practice agreement with   Jose Miguel Arizmendi MD. A copy of the visit note was provided to the patient's provider(s).    A summary of these recommendations was sent via [employer].    Marta Britt, Pharm.D.  Medication Therapy Management Pharmacist   [facility]     Telemedicine Visit Details  Type of service:  Video Conference via [employer]  Start Time: 9:30 AM  End Time: 10:05 AM     Medication Therapy Recommendations  No medication therapy recommendations to display

## 2023-11-29 ENCOUNTER — VIRTUAL VISIT (OUTPATIENT)
Dept: RHEUMATOLOGY | Facility: CLINIC | Age: 33
End: 2023-11-29
Attending: STUDENT IN AN ORGANIZED HEALTH CARE EDUCATION/TRAINING PROGRAM
Payer: COMMERCIAL

## 2023-11-29 ENCOUNTER — TELEPHONE (OUTPATIENT)
Dept: DERMATOLOGY | Facility: CLINIC | Age: 33
End: 2023-11-29
Payer: COMMERCIAL

## 2023-11-29 DIAGNOSIS — E78.5 HYPERLIPIDEMIA: ICD-10-CM

## 2023-11-29 DIAGNOSIS — L20.9 ATOPIC DERMATITIS: Primary | ICD-10-CM

## 2023-11-29 DIAGNOSIS — L70.9 ACNE: ICD-10-CM

## 2023-11-29 RX ORDER — HYDROCORTISONE BUTYRATE 0.1 %
CREAM (GRAM) TOPICAL DAILY
COMMUNITY

## 2023-11-29 NOTE — PATIENT INSTRUCTIONS
"Recommendations from today's MTM visit:                                                    MTM (medication therapy management) is a service provided by a clinical pharmacist designed to help you get the most of out of your medicines.      I have sent the orders forward for Dupixent to be considered by your insurance.  Here is a video showing Dupixent administration: [email or web address]    Follow-up: As needed via [employer] regarding coverage/filling status, and again once starting the medication (between the pharmacy and myself, we will check in at 7 days, 1 month, and 3 months). Then going forward we will touch base approximately every 6 months.      It was great speaking with you today.  I value your experience and would be very thankful for your time in providing feedback in our clinic survey. In the next few days, you may receive an email or text message from [employer] with a link to a survey related to your  clinical pharmacist.\"     To schedule another MTM appointment, please call the clinic directly or you may call the MTM scheduling line at [phone] or toll-free at [phone].     My Clinical Pharmacist's contact information:                                                      Please feel free to contact me with any questions or concerns you have.      Marta Britt, Pharm.D.  Medication Therapy Management Pharmacist   [facility]  MTM Scheduling Line: [phone]    "

## 2023-11-29 NOTE — Clinical Note
It took a while for Briana to connect with me, but we are moving forward on her Dupixent coverage check now. Feel free to see my note for details. -- LO

## 2023-11-29 NOTE — TELEPHONE ENCOUNTER
PA Initiation    Medication: DUPIXENT 300 MG/2ML SC SOPN  Insurance Company: [employer] - Phone [phone] Fax [phone]  Pharmacy Filling the Rx:    Filling Pharmacy Phone:    Filling Pharmacy Fax:    Start Date: 11/29/2023    Key: [id number]

## 2023-11-30 NOTE — TELEPHONE ENCOUNTER
Prior Authorization Approval    Medication: DUPIXENT 300 MG/2ML SC SOPN  Authorization Effective Date: 11/29/2023  Authorization Expiration Date: 11/29/2024  Approved Dose/Quantity: 6ml per 28 days  Reference #: Key: [id number]   Insurance Company: [employer] - Phone [phone] Fax [phone]  Expected CoPay: $    CoPay Card Available: No    Financial Assistance Needed: no  Which Pharmacy is filling the prescription: [facility]  Pharmacy Notified: yes  Patient Notified: yes

## 2023-12-04 ENCOUNTER — MYC MEDICAL ADVICE (OUTPATIENT)
Dept: INTERNAL MEDICINE | Facility: CLINIC | Age: 33
End: 2023-12-04
Payer: COMMERCIAL

## 2023-12-06 ENCOUNTER — OFFICE VISIT (OUTPATIENT)
Dept: FAMILY MEDICINE | Facility: CLINIC | Age: 33
End: 2023-12-06
Payer: COMMERCIAL

## 2023-12-06 VITALS
BODY MASS INDEX: 36.07 KG/M2 | HEIGHT: 63 IN | SYSTOLIC BLOOD PRESSURE: 114 MMHG | HEART RATE: 66 BPM | RESPIRATION RATE: 16 BRPM | OXYGEN SATURATION: 100 % | DIASTOLIC BLOOD PRESSURE: 70 MMHG | TEMPERATURE: 97.5 F | WEIGHT: 203.6 LBS

## 2023-12-06 DIAGNOSIS — F41.9 ANXIETY: Primary | ICD-10-CM

## 2023-12-06 PROCEDURE — 99204 OFFICE O/P NEW MOD 45 MIN: CPT | Performed by: PHYSICIAN ASSISTANT

## 2023-12-06 RX ORDER — ESCITALOPRAM OXALATE 10 MG/1
10 TABLET ORAL DAILY
Qty: 90 TABLET | Refills: 1 | Status: SHIPPED | OUTPATIENT
Start: 2023-12-06 | End: 2024-01-31

## 2023-12-06 ASSESSMENT — ANXIETY QUESTIONNAIRES
GAD7 TOTAL SCORE: 11
GAD7 TOTAL SCORE: 11
IF YOU CHECKED OFF ANY PROBLEMS ON THIS QUESTIONNAIRE, HOW DIFFICULT HAVE THESE PROBLEMS MADE IT FOR YOU TO DO YOUR WORK, TAKE CARE OF THINGS AT HOME, OR GET ALONG WITH OTHER PEOPLE: VERY DIFFICULT
6. BECOMING EASILY ANNOYED OR IRRITABLE: MORE THAN HALF THE DAYS
7. FEELING AFRAID AS IF SOMETHING AWFUL MIGHT HAPPEN: SEVERAL DAYS
5. BEING SO RESTLESS THAT IT IS HARD TO SIT STILL: NOT AT ALL
1. FEELING NERVOUS, ANXIOUS, OR ON EDGE: MORE THAN HALF THE DAYS
3. WORRYING TOO MUCH ABOUT DIFFERENT THINGS: MORE THAN HALF THE DAYS
2. NOT BEING ABLE TO STOP OR CONTROL WORRYING: MORE THAN HALF THE DAYS

## 2023-12-06 ASSESSMENT — ENCOUNTER SYMPTOMS
BREAST MASS: 0
NERVOUS/ANXIOUS: 1
HEADACHES: 1
DIZZINESS: 1

## 2023-12-06 ASSESSMENT — PATIENT HEALTH QUESTIONNAIRE - PHQ9
5. POOR APPETITE OR OVEREATING: MORE THAN HALF THE DAYS
SUM OF ALL RESPONSES TO PHQ QUESTIONS 1-9: 13

## 2023-12-06 ASSESSMENT — PAIN SCALES - GENERAL: PAINLEVEL: NO PAIN (0)

## 2023-12-06 NOTE — PROGRESS NOTES
Don Warren is a 33 year old, presenting for the following health issues:   Follow Up  Patient indicates that she has been having a significant amount of stress recently as she is going through a divorce.  Her  lives in [place] and she has lived here for the last 2 years.  They have 2 children a 3-year-old and a 10-month-old.  The father has not had custody of the children at any time.  She indicates that approximately a month ago he came to visit and within 2 days they had very significant fights and he left with the intent to divorce.  She does not know his whereabouts at this time.  She indicates that she is safe and has good family support but generally is feeling very unsettled.  She is not sleeping well she describes brain fog as well as being very agitated easily.  She indicates that her 3-year-old daughter has been acting out a bit and that has caused several disruptions where she needed to leave work to go get her from .  She is very distressed by a sensation that she feels like she does not have enough oxygen in her brain.  She knows that that is not actually the case it is just the sensation that she is experiencing with all of the stress.  She did experience postpartum depression after both of her children but did not take medication for those circumstances.  She is interested in discussing how best to manage her current symptoms and to ensure that she is not in danger with the sensation she has experienced with the pressure in her chest and feeling brain fog.      12/6/2023     1:56 PM   Additional Questions   Roomed by Italia CASAS       Abnormal Mood Symptoms  Onset/Duration: one month  Description: Anxiety   Depression (if yes, do PHQ-9): YES  Anxiety (if yes, do CHRIS-7): YES  Accompanying Signs & Symptoms:  Still participating in activities that you used to enjoy: No  Fatigue: YES  Irritability: YES- a lot  Difficulty concentrating: YES  Changes in appetite:  "YES  Problems with sleep: YES  Heart racing/beating fast: YES- chest pains as well   Abnormally elevated, expansive, or irritable mood: YES  Persistently increased activity or energy: No  Thoughts of hurting yourself or others: No  History:  Recent stress or major life event: YES  Prior depression or anxiety: Post partum   Family history of depression or anxiety: No  Alcohol/drug use: No  Difficulty sleeping: No  Precipitating or alleviating factors: None  Therapies tried and outcome: none      1/12/2023    11:27 AM 3/22/2023    11:52 AM 12/6/2023     1:54 PM   PHQ   PHQ-9 Total Score 3 7 13   Q9: Thoughts of better off dead/self-harm past 2 weeks Not at all Not at all Not at all         3/22/2023    11:52 AM 11/8/2023     5:18 AM 12/6/2023     1:54 PM   CHRIS-7 SCORE   Total Score  9 (mild anxiety)    Total Score 9 9 11           Review of Systems         Objective    /70 (BP Location: Left arm, Patient Position: Chair, Cuff Size: Adult Large)   Pulse 66   Temp 97.5  F (36.4  C) (Oral)   Resp 16   Ht 1.612 m (5' 3.47\")   Wt 92.4 kg (203 lb 9.6 oz)   LMP 11/27/2023 (Exact Date)   SpO2 100%   BMI 35.54 kg/m    Body mass index is 35.54 kg/m .  Physical Exam   GENERAL: healthy, alert and no distress  NECK: no adenopathy, no asymmetry, masses, or scars and thyroid normal to palpation  NECK: no adenopathy, no asymmetry, masses, or scars, and thyroid normal to palpation  RESP: lungs clear to auscultation - no rales, rhonchi or wheezes  CV: regular rate and rhythm, normal S1 S2, no S3 or S4, no murmur, click or rub, no peripheral edema and peripheral pulses strong  MS: no gross musculoskeletal defects noted, no edema              ASSESSMENT AND PLAN   Briana was seen today for  follow up.    Diagnoses and all orders for this visit:    Anxiety  -     escitalopram (LEXAPRO) 10 MG tablet; Take 1 tablet (10 mg) by mouth daily Take half tab daily for 14 days then increase to a full tablet daily  -     Adult Mental " [facility] Referral; Future       We had a detailed discussion about the effects of anxiety and depression including stress on sleep patterns as well as general sense of wellness.  There is no clinical evidence of any physical concerns although her symptoms are very real in relationship to her recent stressors.  Encouraged her to focus on reestablishing a regular routine for the benefit of her and her children including a sleep pattern.  We discussed the importance and sleep with regards to mood regulation and a general sense of wellbeing.  We considered several management options including sleep medication or antianxiety and depression medication and she would like to at this time start with medications for the anxiety.  I discussed the benefits and common side effects of this medication and instructed that we will start with a slow taper.  She is recommended to follow-up in about 4 to 6 weeks with regular primary care provider in order to determine if this dose is effective and that she is receiving the needed benefit.  Referral for counseling is also placed.   she should be seen sooner if any of her symptoms worsen.  Also encouraged her to focus very specifically on sleep hygiene for her and her children.  All questions were answered.      Patient is recommended to follow-up in the near future for preventative care needs as well.    Jenelle Crandall PA-C

## 2024-01-02 ENCOUNTER — VIRTUAL VISIT (OUTPATIENT)
Dept: FAMILY MEDICINE | Facility: CLINIC | Age: 34
End: 2024-01-02
Payer: COMMERCIAL

## 2024-01-02 DIAGNOSIS — F32.1 CURRENT MODERATE EPISODE OF MAJOR DEPRESSIVE DISORDER WITHOUT PRIOR EPISODE (H): Primary | ICD-10-CM

## 2024-01-02 PROCEDURE — 99213 OFFICE O/P EST LOW 20 MIN: CPT | Mod: 95 | Performed by: FAMILY MEDICINE

## 2024-01-02 RX ORDER — BUPROPION HYDROCHLORIDE 150 MG/1
150 TABLET ORAL EVERY MORNING
Qty: 14 TABLET | Refills: 0 | Status: SHIPPED | OUTPATIENT
Start: 2024-01-02 | End: 2024-02-01

## 2024-01-02 RX ORDER — BUPROPION HYDROCHLORIDE 300 MG/1
300 TABLET ORAL EVERY MORNING
Qty: 30 TABLET | Refills: 1 | Status: SHIPPED | OUTPATIENT
Start: 2024-01-02 | End: 2024-02-28

## 2024-01-02 ASSESSMENT — PATIENT HEALTH QUESTIONNAIRE - PHQ9
10. IF YOU CHECKED OFF ANY PROBLEMS, HOW DIFFICULT HAVE THESE PROBLEMS MADE IT FOR YOU TO DO YOUR WORK, TAKE CARE OF THINGS AT HOME, OR GET ALONG WITH OTHER PEOPLE: VERY DIFFICULT
SUM OF ALL RESPONSES TO PHQ QUESTIONS 1-9: 16
SUM OF ALL RESPONSES TO PHQ QUESTIONS 1-9: 16

## 2024-01-02 ASSESSMENT — ANXIETY QUESTIONNAIRES
7. FEELING AFRAID AS IF SOMETHING AWFUL MIGHT HAPPEN: SEVERAL DAYS
4. TROUBLE RELAXING: MORE THAN HALF THE DAYS
1. FEELING NERVOUS, ANXIOUS, OR ON EDGE: MORE THAN HALF THE DAYS
6. BECOMING EASILY ANNOYED OR IRRITABLE: MORE THAN HALF THE DAYS
GAD7 TOTAL SCORE: 14
3. WORRYING TOO MUCH ABOUT DIFFERENT THINGS: NEARLY EVERY DAY
IF YOU CHECKED OFF ANY PROBLEMS ON THIS QUESTIONNAIRE, HOW DIFFICULT HAVE THESE PROBLEMS MADE IT FOR YOU TO DO YOUR WORK, TAKE CARE OF THINGS AT HOME, OR GET ALONG WITH OTHER PEOPLE: VERY DIFFICULT
8. IF YOU CHECKED OFF ANY PROBLEMS, HOW DIFFICULT HAVE THESE MADE IT FOR YOU TO DO YOUR WORK, TAKE CARE OF THINGS AT HOME, OR GET ALONG WITH OTHER PEOPLE?: VERY DIFFICULT
GAD7 TOTAL SCORE: 14
7. FEELING AFRAID AS IF SOMETHING AWFUL MIGHT HAPPEN: SEVERAL DAYS
2. NOT BEING ABLE TO STOP OR CONTROL WORRYING: NEARLY EVERY DAY
GAD7 TOTAL SCORE: 14
5. BEING SO RESTLESS THAT IT IS HARD TO SIT STILL: SEVERAL DAYS

## 2024-01-02 ASSESSMENT — ENCOUNTER SYMPTOMS: NERVOUS/ANXIOUS: 1

## 2024-01-02 NOTE — PROGRESS NOTES
Briana is a 33 year old who is being evaluated via a billable video visit.      How would you like to obtain your AVS? MyChart  If the video visit is dropped, the invitation should be resent by: Text to cell phone: [phone]  Will anyone else be joining your video visit? No          Assessment & Plan     Current moderate episode of major depressive disorder without prior episode (H)    Taper off Lexapro by taking a half a tab for 3 days then stopping.  Then start Wellbutrin 150 mg daily for 2 weeks then increase to 300 mg daily follow-up in 6 weeks    - buPROPion (WELLBUTRIN XL) 150 MG 24 hr tablet; Take 1 tablet (150 mg) by mouth every morning  - buPROPion (WELLBUTRIN XL) 300 MG 24 hr tablet; Take 1 tablet (300 mg) by mouth every morning      20 minutes spent by me on the date of the encounter doing chart review, history and exam, documentation and further activities per the note       Depression Screening Follow Up        1/2/2024    12:13 PM   PHQ   PHQ-9 Total Score 16   Q9: Thoughts of better off dead/self-harm past 2 weeks Not at all         Follow Up Actions Taken      Cheryl High DO  [facility]    Don Warren is a 33 year old, presenting for the following health issues:  Anxiety        1/2/2024    12:17 PM   Additional Questions   Roomed by Danuta GRANADOS         1/2/2024    12:17 PM   Patient Reported Additional Medications   Patient reports taking the following new medications none       Anxiety    History of Present Illness       Mental Health Follow-up:  Patient presents to follow-up on Depression & Anxiety.Patient's depression since last visit has been:  No change  The patient is not having other symptoms associated with depression.  Patient's anxiety since last visit has been:  No change  The patient is not having other symptoms associated with anxiety.  Any significant life events: relationship concerns  Patient is feeling anxious or having panic attacks.  Patient has  no concerns about alcohol or drug use.    Reason for visit:  Change depression medication and Weightloss  Symptom onset:  More than a month  Symptom intensity:  Mild  Symptom progression:  Staying the same  Had these symptoms before:  No    She eats 2-3 servings of fruits and vegetables daily.She consumes 0 sweetened beverage(s) daily.She exercises with enough effort to increase her heart rate 10 to 19 minutes per day.  She exercises with enough effort to increase her heart rate 4 days per week.   She is taking medications regularly.       The patient was seen approximately 1 month ago for stress and anxiety and started on Lexapro at that time.  She is going through divorce and has 2 young children she is raising on her own.      Social History     Tobacco Use    Smoking status: Never     Passive exposure: Never    Smokeless tobacco: Never   Vaping Use    Vaping Use: Never used   Substance Use Topics    Alcohol use: Never    Drug use: Never         11/8/2023     5:18 AM 12/6/2023     1:54 PM 1/2/2024    12:14 PM   CHRIS-7 SCORE   Total Score 9 (mild anxiety)  14 (moderate anxiety)   Total Score 9 11 14         3/22/2023    11:52 AM 12/6/2023     1:54 PM 1/2/2024    12:13 PM   PHQ   PHQ-9 Total Score 7 13 16   Q9: Thoughts of better off dead/self-harm past 2 weeks Not at all Not at all Not at all         1/2/2024    12:13 PM   Last PHQ-9   1.  Little interest or pleasure in doing things 3   2.  Feeling down, depressed, or hopeless 3   3.  Trouble falling or staying asleep, or sleeping too much 1   4.  Feeling tired or having little energy 3   5.  Poor appetite or overeating 1   6.  Feeling bad about yourself 2   7.  Trouble concentrating 3   8.  Moving slowly or restless 0   Q9: Thoughts of better off dead/self-harm past 2 weeks 0   PHQ-9 Total Score 16         1/2/2024    12:14 PM   CHRIS-7    1. Feeling nervous, anxious, or on edge 2   2. Not being able to stop or control worrying 3   3. Worrying too much about  different things 3   4. Trouble relaxing 2   5. Being so restless that it is hard to sit still 1   6. Becoming easily annoyed or irritable 2   7. Feeling afraid, as if something awful might happen 1   CHRIS-7 Total Score 14   If you checked any problems, how difficult have they made it for you to do your work, take care of things at home, or get along with other people? Very difficult         Review of Systems   Psychiatric/Behavioral:  The patient is nervous/anxious.       Constitutional, HEENT, cardiovascular, pulmonary, gi and gu systems are negative, except as otherwise noted.      Objective           Vitals:  No vitals were obtained today due to virtual visit.    Physical Exam   GENERAL: Healthy, alert and no distress  EYES: Eyes grossly normal to inspection.  No discharge or erythema, or obvious scleral/conjunctival abnormalities.  RESP: No audible wheeze, cough, or visible cyanosis.  No visible retractions or increased work of breathing.    SKIN: Visible skin clear. No significant rash, abnormal pigmentation or lesions.  NEURO: Cranial nerves grossly intact.  Mentation and speech appropriate for age.  PSYCH: Mentation appears normal, affect normal/bright, judgement and insight intact, normal speech and appearance well-groomed.                Video-Visit Details    Type of service:  Video Visit   Video Start Time:   Video End Time:    Originating Location (pt. Location): Home    Distant Location (provider location):  Off-site  Platform used for Video Visit: 5 CUPS and some sugar

## 2024-01-14 DIAGNOSIS — F32.1 CURRENT MODERATE EPISODE OF MAJOR DEPRESSIVE DISORDER WITHOUT PRIOR EPISODE (H): ICD-10-CM

## 2024-01-15 RX ORDER — BUPROPION HYDROCHLORIDE 150 MG/1
150 TABLET ORAL EVERY MORNING
Qty: 14 TABLET | Refills: 0 | OUTPATIENT
Start: 2024-01-15

## 2024-01-17 ENCOUNTER — OFFICE VISIT (OUTPATIENT)
Dept: DERMATOLOGY | Facility: CLINIC | Age: 34
End: 2024-01-17
Payer: COMMERCIAL

## 2024-01-17 DIAGNOSIS — L20.89 OTHER ATOPIC DERMATITIS: Primary | ICD-10-CM

## 2024-01-17 DIAGNOSIS — L29.9 PRURITUS: ICD-10-CM

## 2024-01-17 PROCEDURE — 99214 OFFICE O/P EST MOD 30 MIN: CPT | Performed by: STUDENT IN AN ORGANIZED HEALTH CARE EDUCATION/TRAINING PROGRAM
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
## 2024-01-25 ENCOUNTER — TELEPHONE (OUTPATIENT)
Dept: DERMATOLOGY | Facility: CLINIC | Age: 34
End: 2024-01-25
Payer: COMMERCIAL

## 2024-01-25 NOTE — LETTER
Date: 02/06/24   ATTN: Appeals & Grievances -- URGENT                                      Re: Prior Authorization Request   Plan: [employer]                             Patient: Briana Newman  Member ID: [id number]           YOB: 1990    To whom it may concern:     I am contacting you as a dermatologist caring for Briana Newman with regard to the patient's diagnosis of Atopic Dermatitis (L20.9).      I prescribed this patient Dupixent, which required a prior authorization. Unfortunately, the prior authorization was denied and the patient is now unable to fill their prescription. I have reviewed the patient's diagnosis, care plan and clinical guidelines for treatment and request a formal appeal of your denial for Dupixent.    Atopic dermatitis, a form of eczema, is a chronic inflammatory disease with symptoms often appearing as a rash on the skin. Moderate-to-severe atopic dermatitis is characterized by rashes often covering much of the body, and can include intense, persistent itching and skin dryness, cracking, redness, crusting, and oozing. Itch is one of the most burdensome symptoms for patients and can be debilitating. In addition, people with moderate-to-severe atopic dermatitis experience impaired quality of life, including disrupted sleep, and increased anxiety and depression symptoms along with their disease.    When treating a patient with Atopic Dermatitis (L20.9) it is necessary to have access to the full spectrum of accepted treatments as patients may not be able to use one particular treatment due to lack of response, the potential for side effects or even an allergic reaction. It can become a serious safety issue for the patient if I am not able to prescribe a wide variety of treatments for this condition.    I strongly believe this patient needs access to Dupixent.  Dupixent is approved for the treatment of moderate to severe atopic dermatitis and achieves degrees of improvement  "not achieved by other comparably safe therapies. In the published phase 3 trials, over 1/3 of patients achieved the endpoint of \"clear or almost clear\" and 2/3 of patients achieved EASI 50, an endpoint which results in marked improvement in quality of life.      The patient has been on Dupixent for over a month with improvement in her symtoms already occuring. Your denial noted needing to first try a calcineurin inhibitor, but this is not appropraite for this patient due to the extent of her atopic dermatitis and general need for long term systemic therapy (greater than 30% body surface area involved, high disruption to life, and failure of topical steroids to control disease). Patient does have clobetasol topical, triamcinilone topical, hydrocortisone topical, and flucoinolone topical oil to use as needed but is not effective enough to control disease state. Oral coritocsteroids are inapproparieta due to long term risks of use including weight gain, mood disturbances, cardiovascular risk, impaired wound healing, and increased risk of infection.    Additionally, I request that you review the following evidence showing how this medication can be effectively utilized to treat Atopic Dermatitis (L20.9):    American Academy of Dermatology guideline on atopic dermatitis available at https://www.aad.org/guidelines/ad  Bert EL, Jose T, Purnima E, Beck LA, Gaye A, Prachi MJ, Beni JI, Kameron M, Kike Y, Mile CARIDAD, Estefania K, Julianne M, Cici Y, Sachin A, Paty Y, Quentin NM, Maureen G, Kyaw B, Johnson H, Steffanie V, Zayra L, Rose A, Amanda N, Denice LLANES, Crystal MÉNDEZ; SOLO 1 and SOLO 2 Investigators. Two Phase 3 Trials of Dupilumab versus Placebo in Atopic Dermatitis. N Engl J Med. 2016 Sep 30. [Epub ahead of print]    On behalf of Briana, I would appreciate your prompt reconsideration of this denial. Please feel free to contact me below for any additional information you may require. I look " forward to receiving your response and approval of coverage for this medication.    Sincerely,     Dr. Jose Miguel Arizmendi MD   [facility] [address] [address]  Phone: [phone]  Fax: [phone]    Office Contact:   Zoe Kerr [facility]  Pharmacy Liaison Dermatology  Phone: [phone]   Fax: [phone]  [email or web address]

## 2024-01-25 NOTE — TELEPHONE ENCOUNTER
Prior Authorization Retail Medication Request    Medication/Dose: Dupixent  Diagnosis and ICD code (if different than what is on RX):  see chart  New/renewal/insurance change PA/secondary ins. PA:  Previously Tried and Failed:  see chart  Rationale:  see chart    Insurance   Primary: [facility]  Insurance ID:  [id number]

## 2024-01-30 ENCOUNTER — MYC MEDICAL ADVICE (OUTPATIENT)
Dept: RHEUMATOLOGY | Facility: CLINIC | Age: 34
End: 2024-01-30
Payer: COMMERCIAL

## 2024-01-31 ENCOUNTER — OFFICE VISIT (OUTPATIENT)
Dept: FAMILY MEDICINE | Facility: CLINIC | Age: 34
End: 2024-01-31
Payer: COMMERCIAL

## 2024-01-31 VITALS
OXYGEN SATURATION: 100 % | RESPIRATION RATE: 16 BRPM | BODY MASS INDEX: 35.13 KG/M2 | SYSTOLIC BLOOD PRESSURE: 130 MMHG | HEART RATE: 70 BPM | DIASTOLIC BLOOD PRESSURE: 86 MMHG | WEIGHT: 205.8 LBS | TEMPERATURE: 97.9 F | HEIGHT: 64 IN

## 2024-01-31 DIAGNOSIS — R30.0 DYSURIA: ICD-10-CM

## 2024-01-31 DIAGNOSIS — R19.7 DIARRHEA, UNSPECIFIED TYPE: ICD-10-CM

## 2024-01-31 DIAGNOSIS — R53.83 FATIGUE, UNSPECIFIED TYPE: ICD-10-CM

## 2024-01-31 DIAGNOSIS — F32.1 CURRENT MODERATE EPISODE OF MAJOR DEPRESSIVE DISORDER WITHOUT PRIOR EPISODE (H): ICD-10-CM

## 2024-01-31 DIAGNOSIS — F41.1 GAD (GENERALIZED ANXIETY DISORDER): ICD-10-CM

## 2024-01-31 DIAGNOSIS — M54.50 ACUTE LOW BACK PAIN WITHOUT SCIATICA, UNSPECIFIED BACK PAIN LATERALITY: Primary | ICD-10-CM

## 2024-01-31 DIAGNOSIS — N89.8 VAGINAL DISCHARGE: ICD-10-CM

## 2024-01-31 LAB
ALBUMIN UR-MCNC: NEGATIVE MG/DL
APPEARANCE UR: CLEAR
BASOPHILS # BLD AUTO: 0 10E3/UL (ref 0–0.2)
BASOPHILS NFR BLD AUTO: 0 %
BILIRUB UR QL STRIP: NEGATIVE
CLUE CELLS: ABNORMAL
COLOR UR AUTO: YELLOW
EOSINOPHIL # BLD AUTO: 0.3 10E3/UL (ref 0–0.7)
EOSINOPHIL NFR BLD AUTO: 3 %
ERYTHROCYTE [DISTWIDTH] IN BLOOD BY AUTOMATED COUNT: 12.9 % (ref 10–15)
GLUCOSE UR STRIP-MCNC: NEGATIVE MG/DL
HCT VFR BLD AUTO: 38.9 % (ref 35–47)
HGB BLD-MCNC: 13 G/DL (ref 11.7–15.7)
HGB UR QL STRIP: NEGATIVE
IMM GRANULOCYTES # BLD: 0 10E3/UL
IMM GRANULOCYTES NFR BLD: 0 %
KETONES UR STRIP-MCNC: NEGATIVE MG/DL
LEUKOCYTE ESTERASE UR QL STRIP: NEGATIVE
LYMPHOCYTES # BLD AUTO: 1.4 10E3/UL (ref 0.8–5.3)
LYMPHOCYTES NFR BLD AUTO: 15 %
MCH RBC QN AUTO: 29.6 PG (ref 26.5–33)
MCHC RBC AUTO-ENTMCNC: 33.4 G/DL (ref 31.5–36.5)
MCV RBC AUTO: 89 FL (ref 78–100)
MONOCYTES # BLD AUTO: 0.4 10E3/UL (ref 0–1.3)
MONOCYTES NFR BLD AUTO: 5 %
NEUTROPHILS # BLD AUTO: 6.9 10E3/UL (ref 1.6–8.3)
NEUTROPHILS NFR BLD AUTO: 76 %
NITRATE UR QL: NEGATIVE
PH UR STRIP: 5.5 [PH] (ref 5–7)
PLATELET # BLD AUTO: 343 10E3/UL (ref 150–450)
RBC # BLD AUTO: 4.39 10E6/UL (ref 3.8–5.2)
SP GR UR STRIP: >=1.03 (ref 1–1.03)
TRICHOMONAS, WET PREP: ABNORMAL
UROBILINOGEN UR STRIP-ACNC: 0.2 E.U./DL
WBC # BLD AUTO: 9.1 10E3/UL (ref 4–11)
WBC'S/HIGH POWER FIELD, WET PREP: ABNORMAL
YEAST, WET PREP: ABNORMAL

## 2024-01-31 PROCEDURE — 99215 OFFICE O/P EST HI 40 MIN: CPT | Performed by: NURSE PRACTITIONER

## 2024-01-31 PROCEDURE — 85025 COMPLETE CBC W/AUTO DIFF WBC: CPT | Performed by: NURSE PRACTITIONER

## 2024-01-31 PROCEDURE — 84443 ASSAY THYROID STIM HORMONE: CPT | Performed by: NURSE PRACTITIONER

## 2024-01-31 PROCEDURE — 96127 BRIEF EMOTIONAL/BEHAV ASSMT: CPT | Performed by: NURSE PRACTITIONER

## 2024-01-31 PROCEDURE — 81003 URINALYSIS AUTO W/O SCOPE: CPT | Performed by: NURSE PRACTITIONER

## 2024-01-31 PROCEDURE — 36415 COLL VENOUS BLD VENIPUNCTURE: CPT | Performed by: NURSE PRACTITIONER

## 2024-01-31 PROCEDURE — 87210 SMEAR WET MOUNT SALINE/INK: CPT | Performed by: NURSE PRACTITIONER

## 2024-01-31 RX ORDER — CYCLOBENZAPRINE HCL 5 MG
5 TABLET ORAL
Qty: 10 TABLET | Refills: 0 | Status: SHIPPED | OUTPATIENT
Start: 2024-01-31 | End: 2024-07-17

## 2024-01-31 RX ORDER — IBUPROFEN 400 MG/1
400 TABLET, FILM COATED ORAL EVERY 12 HOURS PRN
Qty: 60 TABLET | Refills: 0 | Status: SHIPPED | OUTPATIENT
Start: 2024-01-31 | End: 2024-01-31

## 2024-01-31 RX ORDER — IBUPROFEN 400 MG/1
400 TABLET, FILM COATED ORAL EVERY 12 HOURS PRN
Qty: 60 TABLET | Refills: 0 | Status: SHIPPED | OUTPATIENT
Start: 2024-01-31 | End: 2024-07-17

## 2024-01-31 RX ORDER — CYCLOBENZAPRINE HCL 5 MG
5 TABLET ORAL
Qty: 10 TABLET | Refills: 0 | Status: SHIPPED | OUTPATIENT
Start: 2024-01-31 | End: 2024-01-31

## 2024-01-31 ASSESSMENT — PAIN SCALES - GENERAL: PAINLEVEL: NO PAIN (0)

## 2024-01-31 ASSESSMENT — PATIENT HEALTH QUESTIONNAIRE - PHQ9: SUM OF ALL RESPONSES TO PHQ QUESTIONS 1-9: 17

## 2024-01-31 NOTE — LETTER
My Depression Action Plan  Name: Briana Newman   Date of Birth 1990  Date: 1/31/2024    My doctor: System, Provider Not In   My clinic: [facility]  [address]  [place] [address]  [phone]            GREEN    ZONE   Good Control    What it looks like:   Things are going generally well. You have normal ups and downs. You may even feel depressed from time to time, but bad moods usually last less than a day.   What you need to do:  Continue to care for yourself (see self care plan)  Check your depression survival kit and update it as needed  Follow your physician s recommendations including any medication.  Do not stop taking medication unless you consult with your physician first.             YELLOW         ZONE Getting Worse    What it looks like:   Depression is starting to interfere with your life.   It may be hard to get out of bed; you may be starting to isolate yourself from others.  Symptoms of depression are starting to last most all day and this has happened for several days.   You may have suicidal thoughts but they are not constant.   What you need to do:     Call your care team. Your response to treatment will improve if you keep your care team informed of your progress. Yellow periods are signs an adjustment may need to be made.     Continue your self-care.  Just get dressed and ready for the day.  Don't give yourself time to talk yourself out of it.    Talk to someone in your support network.    Open up your Depression Self-Care Plan/Wellness Kit.             RED    ZONE Medical Alert - Get Help    What it looks like:   Depression is seriously interfering with your life.   You may experience these or other symptoms: You can t get out of bed most days, can t work or engage in other necessary activities, you have trouble taking care of basic hygiene, or basic responsibilities, thoughts of suicide or death that will not go away, self-injurious  behavior.     What you need to do:  Call your care team and request a same-day appointment. If they are not available (weekends or after hours) call your local crisis line, emergency room or 911.          Depression Self-Care Plan / Wellness Kit    Many people find that medication and therapy are helpful treatments for managing depression. In addition, making small changes to your everyday life can help to boost your mood and improve your wellbeing. Below are some tips for you to consider. Be sure to talk with your medical provider and/or behavioral health consultant if your symptoms are worsening or not improving.     Sleep   Sleep hygiene  means all of the habits that support good, restful sleep. It includes maintaining a consistent bedtime and wake time, using your bedroom only for sleeping or sex, and keeping the bedroom dark and free of distractions like a computer, smartphone, or television.     Develop a Healthy Routine  Maintain good hygiene. Get out of bed in the morning, make your bed, brush your teeth, take a shower, and get dressed. Don t spend too much time viewing media that makes you feel stressed. Find time to relax each day.    Exercise  Get some form of exercise every day. This will help reduce pain and release endorphins, the  feel good  chemicals in your brain. It can be as simple as just going for a walk or doing some gardening, anything that will get you moving.      Diet  Strive to eat healthy foods, including fruits and vegetables. Drink plenty of water. Avoid excessive sugar, caffeine, alcohol, and other mood-altering substances.     Stay Connected with Others  Stay in touch with friends and family members.    Manage Your Mood  Try deep breathing, massage therapy, biofeedback, or meditation. Take part in fun activities when you can. Try to find something to smile about each day.     Psychotherapy  Be open to working with a therapist if your provider recommends it.     Medication  Be sure to  take your medication as prescribed. Most anti-depressants need to be taken every day. It usually takes several weeks for medications to work. Not all medicines work for all people. It is important to follow-up with your provider to make sure you have a treatment plan that is working for you. Do not stop your medication abruptly without first discussing it with your provider.    Crisis Resources   These hotlines are for both adults and children. They and are open 24 hours a day, 7 days a week unless noted otherwise.    National Suicide Prevention Lifeline   988 or 2-517-149-BFJF (9142)    Crisis Text Line    www.crisistextline.org  Text HOME to 630810 from anywhere in the [place], anytime, about any type of crisis. A live, trained crisis counselor will receive the text and respond quickly.    [employer]emi Lifeline for LGBTQ Youth  A national crisis intervention and suicide lifeline for LGBTQ youth under 25. Provides a safe place to talk without judgement. Call 1-118.881.8440; text START to 908264 or visit www.thetrevorproject.org to talk to a trained counselor.    For [facility] crisis numbers, visit the [facility] website at:  https://[place].gov/dhs/people-we-serve/adults/health-care/mental-health/resources/crisis-contacts.jsp

## 2024-01-31 NOTE — TELEPHONE ENCOUNTER
PRIOR AUTHORIZATION DENIED    Medication: DUPIXENT 300 MG/2ML SC SOPN  Insurance Company: [employer] ([facility]) - Phone [phone] Fax [phone]  Denial Date: 1/31/2024  Denial Reason(s): must try either oral steroid or topical calcineurin inhibitor  Appeal Information:   Mailing Address:       Urgent Appeal Fax: [phone]    Patient Notified: reaching out to provider about alternatives                      [facility]  [email or web address]  Phone: [phone]  Fax: [phone]

## 2024-01-31 NOTE — LETTER
January 31, 2024    Briana Newman  [address] [address]          Dear ,    We are writing to inform you of your test results.  Wet prep and urine labs: This is  normal.  Blood work is pending and we will let you know.       Resulted Orders   UA Macroscopic with reflex to Microscopic and Culture - Lab Collect   Result Value Ref Range    Color Urine Yellow Colorless, Straw, Light Yellow, Yellow    Appearance Urine Clear Clear    Glucose Urine Negative Negative mg/dL    Bilirubin Urine Negative Negative    Ketones Urine Negative Negative mg/dL    Specific Gravity Urine >=1.030 1.003 - 1.035    Blood Urine Negative Negative    pH Urine 5.5 5.0 - 7.0    Protein Albumin Urine Negative Negative mg/dL    Urobilinogen Urine 0.2 0.2, 1.0 E.U./dL    Nitrite Urine Negative Negative    Leukocyte Esterase Urine Negative Negative    Narrative    Microscopic not indicated   Wet prep - lab collect   Result Value Ref Range    Trichomonas Absent Absent    Yeast Absent Absent    Clue Cells Absent Absent    WBCs/high power field 2+ (A) None   CBC with platelets and differential   Result Value Ref Range    WBC Count 9.1 4.0 - 11.0 10e3/uL    RBC Count 4.39 3.80 - 5.20 10e6/uL    Hemoglobin 13.0 11.7 - 15.7 g/dL    Hematocrit 38.9 35.0 - 47.0 %    MCV 89 78 - 100 fL    MCH 29.6 26.5 - 33.0 pg    MCHC 33.4 31.5 - 36.5 g/dL    RDW 12.9 10.0 - 15.0 %    Platelet Count 343 150 - 450 10e3/uL    % Neutrophils 76 %    % Lymphocytes 15 %    % Monocytes 5 %    % Eosinophils 3 %    % Basophils 0 %    % Immature Granulocytes 0 %    Absolute Neutrophils 6.9 1.6 - 8.3 10e3/uL    Absolute Lymphocytes 1.4 0.8 - 5.3 10e3/uL    Absolute Monocytes 0.4 0.0 - 1.3 10e3/uL    Absolute Eosinophils 0.3 0.0 - 0.7 10e3/uL    Absolute Basophils 0.0 0.0 - 0.2 10e3/uL    Absolute Immature Granulocytes 0.0 <=0.4 10e3/uL       If you have any questions or concerns, please call the clinic at the number listed above.       Sincerely,      Richard  ERICA Isaac CNP

## 2024-01-31 NOTE — PROGRESS NOTES
Assessment & Plan     (M54.50) Acute low back pain without sciatica, unspecified back pain laterality  (primary encounter diagnosis)  Comment: Acute low back pain  Plan: ibuprofen (ADVIL/MOTRIN) 400 MG tablet,         cyclobenzaprine (FLEXERIL) 5 MG tablet,         DISCONTINUED: ibuprofen (ADVIL/MOTRIN) 400 MG         tablet, DISCONTINUED: cyclobenzaprine         (FLEXERIL) 5 MG tablet.  - Recommend heat therapy and gentle stretching exercises.  - Consider physical therapy referral if no improvement.    (N89.8) Vaginal discharge  Comment: negative wet prep  Plan: Wet prep - lab collect    (R19.7) Diarrhea, unspecified type  PLAN:   - Advised to maintain hydration and electrolyte balance.  - Recommend BRAT diet (bananas, rice, applesauce, toast).  - If symptoms persist or worsen, consider stool studies and further evaluation.    (R30.0) Dysuria  Comment: negative LE and nitrate  Plan: UA Macroscopic with reflex to Microscopic and         Culture - Lab Collect    (F41.1) CHRIS (generalized anxiety disorder)  Plan: Adult Mental Health  Referral  - Continue current antidepressant medication.  - Encourage patient to engage in regular physical activity and social interactions    (R53.83) Fatigue, unspecified type  Plan: CBC with platelets and differential, TSH with         free T4 reflex, CBC with platelets and         differential    (F32.1) Current moderate episode of major depressive disorder without prior episode (H)  Plan: Adult Mental Health  Referral  - Continue current antidepressant medication.  - Encourage patient to engage in regular physical activity and social interactio             42  minutes of the appointment were spent evaluating and developing a treatment plan for her additional concern(s).    Depression Screening Follow Up        1/31/2024    12:07 PM   PHQ   PHQ-9 Total Score 17   Q9: Thoughts of better off dead/self-harm past 2 weeks Not at all         Follow Up Actions  Taken  Crisis resource information provided in After Visit Summary  Mental Health Referral placed           Subjective   Briana is a 33 year old, presenting for the following health issues:  Pain        1/31/2024    11:51 AM   Additional Questions   Roomed by Chelsea     Musculoskeletal Problem     Briana Newman is a 33 year old female presents with acute low back pain that started 7 days ago.   The patient denies any recent injury or trauma.   The patient describes no associated radicular symptoms.   The pain is not exacerbated by bending.   The patient denies fever, bowel/bladder incontinence, neurologic deficits, or saddle anesthesia.     Briana also reports vaginal discharge.  Pt did not endorse vaginal odor.  Denies vaginal itching/burning/irritation.  She reports dysuria, and frequency. Pt denies  urgency or hematuria.  Denies abdominal/pelvic pain.  No recent use of abx.  No new partners.    Pt louise complains of abdominal pain, cramping, watery, non-bloody diarrhea, multiple episodes started this morning.  No fever or chills.   Pt denies decreased appetite, and stated that she is tolerating fluids.   No recent traveling, hospitalization, or use of antibiotics.   No new or street foods. No sick contacts.        Concern - Cramping in lower back and abdomin, Frequent urination, no odor or pain with urination. Diarrhea started this morning.   Onset: one week  Progression of Symptoms:  worsening  Previous history of similar problem: none  Therapies tried and outcome: Ibuprofen did not help.   Depression Followup  How are you doing with your depression since your last visit? Improved but not that much  Are you having other symptoms that might be associated with depression? Yes. She presents with symptoms of depression, including a lack of interest or pleasure in activities, feelings of sadness or hopelessness, changes in sleep patterns, fatigue, changes in appetite, feelings of excessive guilt or low self-esteem,  difficulty concentrating, and restlessness. The patient denies any current suicidal thoughts. The patient reports previous use of antidepressant medications and counseling.   Have you had a significant life event?  Relationship Concerns   Are you feeling anxious or having panic attacks?   No  Do you have any concerns with your use of alcohol or other drugs? No    Social History     Tobacco Use    Smoking status: Never     Passive exposure: Never    Smokeless tobacco: Never   Vaping Use    Vaping Use: Never used   Substance Use Topics    Alcohol use: Never    Drug use: Never         12/6/2023     1:54 PM 1/2/2024    12:13 PM 1/31/2024    12:07 PM   PHQ   PHQ-9 Total Score 13 16 17   Q9: Thoughts of better off dead/self-harm past 2 weeks Not at all Not at all Not at all         11/8/2023     5:18 AM 12/6/2023     1:54 PM 1/2/2024    12:14 PM   CHRIS-7 SCORE   Total Score 9 (mild anxiety)  14 (moderate anxiety)   Total Score 9 11 14         1/31/2024    12:07 PM   Last PHQ-9   1.  Little interest or pleasure in doing things 3   2.  Feeling down, depressed, or hopeless 3   3.  Trouble falling or staying asleep, or sleeping too much 2   4.  Feeling tired or having little energy 3   5.  Poor appetite or overeating 1   6.  Feeling bad about yourself 2   7.  Trouble concentrating 3   8.  Moving slowly or restless 0   Q9: Thoughts of better off dead/self-harm past 2 weeks 0   PHQ-9 Total Score 17         1/2/2024    12:14 PM   CHRIS-7    1. Feeling nervous, anxious, or on edge 2   2. Not being able to stop or control worrying 3   3. Worrying too much about different things 3   4. Trouble relaxing 2   5. Being so restless that it is hard to sit still 1   6. Becoming easily annoyed or irritable 2   7. Feeling afraid, as if something awful might happen 1   CHRIS-7 Total Score 14   If you checked any problems, how difficult have they made it for you to do your work, take care of things at home, or get along with other people? Very  "difficult       Suicide Assessment Five-step Evaluation and Treatment (SAFE-T)    How many servings of fruits and vegetables do you eat daily?  0-1  On average, how many sweetened beverages do you drink each day (Examples: soda, juice, sweet tea, etc.  Do NOT count diet or artificially sweetened beverages)?   0  How many days per week do you exercise enough to make your heart beat faster? No  How many minutes a day do you exercise enough to make your heart beat faster? Not exercising  How many days per week do you miss taking your medication? 0        Review of Systems  Constitutional, HEENT, cardiovascular, pulmonary, gi and gu systems are negative, except as otherwise noted.e      Objective    /86   Pulse 70   Temp 97.9  F (36.6  C) (Oral)   Resp 16   Ht 1.621 m (5' 3.82\")   Wt 93.4 kg (205 lb 12.8 oz)   LMP 11/27/2023 (Exact Date)   SpO2 100%   BMI 35.53 kg/m    Body mass index is 35.53 kg/m .  Physical Exam  Constitutional:       Appearance: Normal appearance.   HENT:      Head: Normocephalic and atraumatic.      Nose: Nose normal.      Mouth/Throat:      Mouth: Mucous membranes are moist.   Eyes:      Extraocular Movements: Extraocular movements intact.   Cardiovascular:      Rate and Rhythm: Normal rate and regular rhythm.      Pulses: Normal pulses.      Heart sounds: Normal heart sounds.   Pulmonary:      Effort: Pulmonary effort is normal.      Breath sounds: Normal breath sounds.   Abdominal:      General: Bowel sounds are normal.      Palpations: Abdomen is soft.   Musculoskeletal:      Cervical back: Normal range of motion and neck supple.      Lumbar back: Spasms, tenderness and bony tenderness present. No swelling or deformity. Decreased range of motion. Negative right straight leg raise test and negative left straight leg raise test.      Right lower leg: No edema.      Left lower leg: No edema.   Skin:     General: Skin is warm.      Capillary Refill: Capillary refill takes less than 2 " seconds.      Findings: No lesion or rash.   Neurological:      Mental Status: She is alert and oriented to person, place, and time.   Psychiatric:         Mood and Affect: Mood normal.         Behavior: Behavior normal.         Thought Content: Thought content normal.         Judgment: Judgment normal.                    Signed Electronically by: ERICA Vanegas CNP

## 2024-01-31 NOTE — TELEPHONE ENCOUNTER
PA Initiation    Medication: DUPIXENT 300 MG/2ML SC SOPN  Insurance Company: [employer] ([facility]) - Phone [phone] Fax [phone]  Pharmacy Filling the Rx:    Filling Pharmacy Phone:    Filling Pharmacy Fax:    Start Date: 1/31/2024        [facility]  [email or web address]  Phone: [phone]  Fax: [phone]

## 2024-02-01 PROBLEM — F32.1 CURRENT MODERATE EPISODE OF MAJOR DEPRESSIVE DISORDER WITHOUT PRIOR EPISODE (H): Status: ACTIVE | Noted: 2024-02-01

## 2024-02-01 PROBLEM — N89.8 VAGINAL DISCHARGE: Status: ACTIVE | Noted: 2024-02-01

## 2024-02-01 PROBLEM — F41.1 GAD (GENERALIZED ANXIETY DISORDER): Status: ACTIVE | Noted: 2024-02-01

## 2024-02-01 PROBLEM — R30.0 DYSURIA: Status: ACTIVE | Noted: 2024-02-01

## 2024-02-01 PROBLEM — R19.7 DIARRHEA, UNSPECIFIED TYPE: Status: ACTIVE | Noted: 2024-02-01

## 2024-02-01 PROBLEM — R53.83 FATIGUE, UNSPECIFIED TYPE: Status: ACTIVE | Noted: 2024-02-01

## 2024-02-01 PROBLEM — M54.50 ACUTE LOW BACK PAIN WITHOUT SCIATICA, UNSPECIFIED BACK PAIN LATERALITY: Status: ACTIVE | Noted: 2024-02-01

## 2024-02-01 LAB — TSH SERPL DL<=0.005 MIU/L-ACNC: 1.72 UIU/ML (ref 0.3–4.2)

## 2024-02-01 RX ORDER — BUPROPION HYDROCHLORIDE 150 MG/1
150 TABLET ORAL EVERY MORNING
Qty: 30 TABLET | Refills: 0 | Status: SHIPPED | OUTPATIENT
Start: 2024-02-01 | End: 2024-02-02

## 2024-02-02 RX ORDER — BUPROPION HYDROCHLORIDE 150 MG/1
300 TABLET ORAL EVERY MORNING
Qty: 60 TABLET | Refills: 0 | Status: SHIPPED | OUTPATIENT
Start: 2024-02-02 | End: 2024-03-20

## 2024-02-06 NOTE — TELEPHONE ENCOUNTER
Attempted to appeal via phone number below; was told it needed to be via fax or mail.     Prior authorization appeal for Dupixent pended; ready to be reviewed and sent to insurance company. Will route to dermatology liaison for processing.     Marta Britt [facility]

## 2024-02-06 NOTE — TELEPHONE ENCOUNTER
Medication Appeal Initiation    Medication: DUPIXENT 300 MG/2ML SC SOPN  Appeal Start Date:  2/6/2024  Insurance Company: [facility]  Insurance Phone: [phone]  Insurance Fax: [phone]  Comments:       [facility]  [email or web address]  Phone: [phone]  Fax: [phone]

## 2024-02-08 DIAGNOSIS — L20.9 ATOPIC DERMATITIS: ICD-10-CM

## 2024-02-08 NOTE — PROGRESS NOTES
Sending Dupixent orders to [facility] per patient's request.     Marta Britt, Pharm.D.

## 2024-02-08 NOTE — TELEPHONE ENCOUNTER
MEDICATION APPEAL APPROVED    Medication: DUPIXENT 300 MG/2ML SC SOPN  Authorization Effective Date: 2/7/2024  Authorization Expiration Date: 8/7/2024  Approved Dose/Quantity: 4ml per 28 days  Reference #: [id number]   Appeal Insurance Company: [facility]  Expected CoPay: $ 0     CoPay Card Available:    Financial Assistance Needed: N/A  [email or web address] [place], [place] - [address]  Patient Notified: Yes, called and spoke with Briana. Explained approval and pharmacy choices as [employer] had called her to have her fill there. Offered [facility] as another option and patient was agreeable as she lives close to them. Sent message to [facility] Estrella Lozano for new RX to be sent.  Comments:

## 2024-02-28 DIAGNOSIS — F32.1 CURRENT MODERATE EPISODE OF MAJOR DEPRESSIVE DISORDER WITHOUT PRIOR EPISODE (H): ICD-10-CM

## 2024-02-28 RX ORDER — BUPROPION HYDROCHLORIDE 300 MG/1
300 TABLET ORAL EVERY MORNING
Qty: 30 TABLET | Refills: 1 | Status: SHIPPED | OUTPATIENT
Start: 2024-02-28 | End: 2024-06-14

## 2024-03-04 ENCOUNTER — MYC REFILL (OUTPATIENT)
Dept: FAMILY MEDICINE | Facility: CLINIC | Age: 34
End: 2024-03-04
Payer: COMMERCIAL

## 2024-03-04 DIAGNOSIS — F32.1 CURRENT MODERATE EPISODE OF MAJOR DEPRESSIVE DISORDER WITHOUT PRIOR EPISODE (H): ICD-10-CM

## 2024-03-04 RX ORDER — BUPROPION HYDROCHLORIDE 300 MG/1
300 TABLET ORAL EVERY MORNING
Qty: 30 TABLET | Refills: 1 | OUTPATIENT
Start: 2024-03-04

## 2024-03-04 NOTE — ED TRIAGE NOTES
Triage Assessment     Row Name 07/21/22 1849       Triage Assessment (Adult)    Airway WDL WDL       Respiratory WDL    Respiratory WDL X       Skin Circulation/Temperature WDL    Skin Circulation/Temperature WDL WDL       Cardiac WDL    Cardiac WDL X       Cognitive/Neuro/Behavioral WDL    Cognitive/Neuro/Behavioral WDL WDL            Pt presents to the ED with c/o being hypertensive and 11 weeks pregnant, feeling weak, SOB, and has chest tightness. Pt had preeclampsia with her daughter so is concerned. BP at home 140/101, she is usually 95/60.   
ED Triage Provider Note  [facility]  Encounter Date: Jul 21, 2022    History:  Chief Complaint   Patient presents with     Shortness of Breath     Chest Pain     Briana Newman is a 32 year old female with a PMH significant for pre-eclampsia who presents to the ED with weakness, shortness of breath, elevated blood pressure and chest pressure. Currently 11 weeks pregnant. Patient reports she's been feeling unwell for the past few days. Purchased a home BP cuff today and had BPs of 140/101 at home (normally 90s/60s since delivering 2 years ago).     Review of Systems:  CV: positive for chest pressure  Resp: positive for shortness of breath     Exam:  BP (!) 160/90   Pulse 108   Temp 98.7  F (37.1  C) (Temporal)   Resp 16   LMP 05/02/2022 (Approximate)   SpO2 100%   General: No acute distress. Appears stated age.   Cardio: Regular rate, extremities well perfused. Hypertensive.  Resp: Normal work of breathing, grossly normal respiratory rate  Neuro: Alert. CN II-XII grossly intact. Grossly intact strength.       Medical Decision Making:  Patient arriving to the ED with problem as above. A medical screening exam was performed. EKG, CBC, CMP, UA orders initiated from Triage. The patient is appropriate to wait in triage.    Discussed with OBGyn resident who advised less likely preeclampsia given patient only 11 weeks pregnant.      ERICA Reyes CNP on 7/21/2022 at 7:01 PM    
equal bilaterally

## 2024-03-20 ENCOUNTER — MYC MEDICAL ADVICE (OUTPATIENT)
Dept: RHEUMATOLOGY | Facility: CLINIC | Age: 34
End: 2024-03-20
Payer: COMMERCIAL

## 2024-03-20 ENCOUNTER — VIRTUAL VISIT (OUTPATIENT)
Dept: RHEUMATOLOGY | Facility: CLINIC | Age: 34
End: 2024-03-20
Attending: STUDENT IN AN ORGANIZED HEALTH CARE EDUCATION/TRAINING PROGRAM
Payer: COMMERCIAL

## 2024-03-20 DIAGNOSIS — L20.9 ATOPIC DERMATITIS: Primary | ICD-10-CM

## 2024-03-20 DIAGNOSIS — L70.9 ACNE: ICD-10-CM

## 2024-03-20 NOTE — Clinical Note
3/20/2024       RE: Briana Newman  [address] [facility] [address]     Dear Colleague,    Thank you for referring your patient, Briana Newman, to the [facility] RHEUMATOLOGY CLINIC [place] at [facility]. Please see a copy of my visit note below.    Medication Therapy Management (MTM) Encounter    ASSESSMENT:                            Medication Adherence/Access: No issues identified.    ***: ***.    PLAN:                            ***    Follow-up: every 6 months and as needed.    SUBJECTIVE/OBJECTIVE:                          Briana Newman is a 34 year old female called for an initial visit. She was referred to me from Dr. Jose Miguel Arizmendi MD.      Reason for visit: Dupixent ~90 day check in.    Allergies/ADRs: Reviewed in chart ***.  Past Medical History: Reviewed in chart.  Tobacco: She reports that she has never smoked. She has never been exposed to tobacco smoke. She has never used smokeless tobacco.  Alcohol: ***.    Notes for future: follow up on lipid levels after next lab to assess if lifestyle changes were enough to bring values to goal.      Medication Adherence/Access: no issues reported     Atopic dermatitis:   Dupilumab (Dupixent) maintenance dose (autoinjector): Inject 300 mg subcutaneously every 14 days (patient > 60 kg).  Clobetasol 0.05% cream: apply twice daily as needed.  Triamcinolone 0.1% cream: twice daily as needed.  Hydrocortisone 0.1% cream: Apply topically on hands as needed.  Fluocinolone 0.01 % external oil: Apply topically once daily as needed to affected area(s) on scalp.     03/20/24: ***  [facility] for about a month without the medicine. Notice it but go about her day. Interested in allergy testing. Diet/clothes. Feels like she is allergic to certain things. Interested in allergy testing.     Still having flare ups just right before injection.   Dry eyes.  Eye doctor last week and they said all is good. Systane Ultra.     10:27 AM == No topicals in about a month.   About 5 days before it starts with hand then goes to back.     11/29/23: No side effects at this time; topical treatments not effective at controlling symptoms. Patient noted a fear of being potentially allergic to medications. Symptoms on back, scalp, and hands; are raw, itchy, and rough. She is noticing some itchiness starting on her thighs now. Clothes are bothersome on her skin. When she wakes up, she will notice blood spots on her sheets where her back was. Per Dr. Jose Miguel Arizmendi MD note on 10/11/23, greater than 30% BSA involved, disruption to life, and failure of topical steroids to control disease. Right now, not currently using clobetasol 0.05% cream, uses triamcinolone 0.1% once daily on her back at bedtime, hydrocortisone 0.1% once daily on hands, and fluocinolone 0.01% oil once weekly on scalp.     Specialist: Dr. Jose Miguel Arizmendi MD, Dermatology. Last visit on 1/17/24. The following was recommended:   - Significantly improved after 3 shots of Dupixent  - Overall itching has steadily improved.  Initially she got return of inching several days prior to her next injection, with this most recent third injection she only had itching on the day of that her injection was due, other than that her itching is totally resolved she is very happy with the results not needing to use her topicals as much  - Some persistent dermatitis in bilateral palms but not using topicals very often,  - Discussed we expect things to continue to improve given her response thus far, additionally if should her symptoms were to be not fully controlled to her satisfaction on Dupixent we could progress to something like a Franky inhibitor although I do not think that will be necessary  - Additionally she has some trouble recalling the names of her topicals given that she has been prescribed several different ones over the months.   Recommended that she go home identify which topical she is using and send this information to me along with how often she is using each of them and we can leverage this in the future to tweak her topical regimen as needed  - Follow-up in 3 months to check progress, at that time if everything is going well we can space out follow-ups to 1 year  - Prior authorization expires on 11/29/2024     Previous treatment: Tried a few other lotions/ointments when she was back home in [place], but no other prescriptions tried.      All patients on biologics should avoid live vaccines (varicella/VZV, intranasal influenza, MMR, or yellow fever vaccine (if traveling))       Acne:    Clindamycin 1% lotion: apply twice daily (frequency: twice daily on face).  Tretinoin 0.025% gel: Apply to face at bedtime *not started/coverage barrier to assess in future appoinmtnets*     03/20/24: Clinda . If we try tretinoin in the future, needs prior auth.     1/17/24: Patient reports this regimen is helping with acne; no cystic/hormonal acne at this time. Was previously prescribed spironolactone 100 mg tablet once daily; did not start due to fears it would reduce her blood pressure when she typically runs at ~95-98/50 when not pregnant. Was also previously prescribed Tretinoin 0.025% gel to apply at bedtime; reports insurance did not coverage it, but she is interested in having access to this in the future, although focused first on receiving Dupixent.     Today's Vitals: There were no vitals taken for this visit.  ----------------  I spent 10 minutes with this patient today. All changes were made via collaborative practice agreement with Dr. Jose Miguel Arizmendi MD. A copy of the visit note was provided to the patient's provider(s).    A summary of these recommendations was sent via [employer].    Marta Britt, Pharm.D.  Medication Therapy Management Pharmacist   [facility] Dermatology    Telemedicine Visit Details  Type of service:  Telephone  visit  Start Time: 10:25 AM - did not answer first call;   End Time: {video/phone visit end time:867737}     Medication Therapy Recommendations  No medication therapy recommendations to display      Medication Therapy Management (MTM) Encounter    ASSESSMENT:                            Medication Adherence/Access: No issues identified.    Atopic dermatitis: Progressing towards goals; continue current therapy. Reasonable to continue current dosing. At next follow-up with Dr. Jose Miguel Arizmendi MD, could consider increasing dose of Dupixent to 300 mg every 7 days to reduce return of mild itching/discomfort. Would benefit from from using Systane Utra as needed for dry eye symptoms.    Acne: Stable, controlled.     PLAN:                            Continue current regimen as prescribed.   Try Systane Ultra eye drops for dry eyes as needed.  Follow up with Dr. Jose Miguel Arizmendi MD as scheduled early May to assess effect of dose of current Dupixent. Also bring up possibility for allergy testing if still interested.     Follow-up: every 6 months and as needed.    SUBJECTIVE/OBJECTIVE:                          Briana Newman is a 34 year old female called for an initial visit. She was referred to me from Dr. Jose Miguel Arizmendi MD.      Reason for visit: Dupixent ~90 day check in.    Allergies/ADRs: Reviewed in chart.  Past Medical History: Reviewed in chart.  Tobacco: She reports that she has never smoked. She has never been exposed to tobacco smoke. She has never used smokeless tobacco.  Alcohol: Reviewed in chart (none).    Notes for future: follow up on lipid levels after next lab to assess if lifestyle changes were enough to bring values to goal.      Medication Adherence/Access: no issues reported     Atopic dermatitis:   Dupilumab (Dupixent) maintenance dose (autoinjector): Inject 300 mg subcutaneously every 14 days (patient > 60 kg).  Clobetasol 0.05% cream: apply twice daily as needed.  Triamcinolone 0.1% cream: twice daily as  needed.  Hydrocortisone 0.1% cream: Apply topically on hands as needed.  Fluocinolone 0.01 % external oil: Apply topically once daily as needed to affected area(s) on scalp.     03/20/24: Has not needed any topicals in about one month. Started Dupixent 12/20/23, but ended up running into an insurance issue and changed pharmacies, so now gets from [facility] and had about 1 month where she lost access to the medication; now been back on for about 2 months. Has some itching/discomfort that she notices about 5 days prior to her next injection; open to continuing current regimen for now and seeing if that improves over time; did discuss we could consider increasing to 300 mg every 7 days if needed in the future. Notices some mild dry eye; went to an eye doctor last week and no concerns were shared by that provider; open to trying Systane Ultra as needed. She feels like she may have allergies that she is unaware of and is interested in discussing allergy testing at next appt with Dr. Jose Miguel Arizmendi MD.     11/29/23: No side effects at this time; topical treatments not effective at controlling symptoms. Patient noted a fear of being potentially allergic to medications. Symptoms on back, scalp, and hands; are raw, itchy, and rough. She is noticing some itchiness starting on her thighs now. Clothes are bothersome on her skin. When she wakes up, she will notice blood spots on her sheets where her back was. Per Dr. Jose Miguel Arizmendi MD note on 10/11/23, greater than 30% BSA involved, disruption to life, and failure of topical steroids to control disease. Right now, not currently using clobetasol 0.05% cream, uses triamcinolone 0.1% once daily on her back at bedtime, hydrocortisone 0.1% once daily on hands, and fluocinolone 0.01% oil once weekly on scalp.     Specialist: Dr. Jose Miguel Arizmendi MD, Dermatology. Last visit on 1/17/24. The following was recommended:   - Significantly improved after 3 shots of Dupixent  -  Overall itching has steadily improved.  Initially she got return of inching several days prior to her next injection, with this most recent third injection she only had itching on the day of that her injection was due, other than that her itching is totally resolved she is very happy with the results not needing to use her topicals as much  - Some persistent dermatitis in bilateral palms but not using topicals very often,  - Discussed we expect things to continue to improve given her response thus far, additionally if should her symptoms were to be not fully controlled to her satisfaction on Dupixent we could progress to something like a Franky inhibitor although I do not think that will be necessary  - Additionally she has some trouble recalling the names of her topicals given that she has been prescribed several different ones over the months.  Recommended that she go home identify which topical she is using and send this information to me along with how often she is using each of them and we can leverage this in the future to tweak her topical regimen as needed  - Follow-up in 3 months to check progress, at that time if everything is going well we can space out follow-ups to 1 year  - Prior authorization expires on 11/29/2024     Previous treatment: Tried a few other lotions/ointments when she was back home in [place], but no other prescriptions tried.      All patients on biologics should avoid live vaccines (varicella/VZV, intranasal influenza, MMR, or yellow fever vaccine (if traveling))       Acne:    Clindamycin 1% lotion: apply twice daily (frequency: twice daily on face).     03/20/24: Clindamycin topical is effective; never did start tretinoin topical due to need for a prior authorization; does not feel like she needs this additional medication at this time.     1/17/24: Patient reports this regimen is helping with acne; no cystic/hormonal acne at this time. Was previously prescribed spironolactone 100 mg  tablet once daily; did not start due to fears it would reduce her blood pressure when she typically runs at ~95-98/50 when not pregnant. Was also previously prescribed Tretinoin 0.025% gel to apply at bedtime; reports insurance did not coverage it, but she is interested in having access to this in the future, although focused first on receiving Dupixent.    Referred, Arrived, CC, Nicotine/Allergies, Meds, Diagnoses, Carrier, Follow-up, Remove letter, AVS/MyChart, Flag, LOS  Note, MTPs, Orders ( MTM ), Route     Today's Vitals: There were no vitals taken for this visit.  ----------------  I spent 10 minutes with this patient today. All changes were made via collaborative practice agreement with Dr. Jose Miguel Arizmendi MD. A copy of the visit note was provided to the patient's provider(s).    A summary of these recommendations was sent via [employer].    Marta Britt, Pharm.D.  Medication Therapy Management Pharmacist   [facility] Dermatology    Telemedicine Visit Details  Type of service:  Telephone visit  Start Time: 10:25 AM - did not answer first call;   End Time: 10:35 AM     Medication Therapy Recommendations  No medication therapy recommendations to display        Again, thank you for allowing me to participate in the care of your patient.      Sincerely,    Marta Britt [facility]

## 2024-03-20 NOTE — Clinical Note
FYI -- Briana progresses towards her goals on Dupixent without adverse effects outside of mild dry eyes. She is interested in allergy testing. She said she is okay with awaiting your appt with her in May to discuss this more but wanted to give you an FYI in case you wanted to place that referral sooner. Thanks!

## 2024-03-20 NOTE — PROGRESS NOTES
Medication Therapy Management (MTM) Encounter    ASSESSMENT:                            Medication Adherence/Access: No issues identified.    Atopic dermatitis: Progressing towards goals; continue current therapy. Reasonable to continue current dosing. At next follow-up with Dr. Jose Miguel Arizmendi MD, could consider increasing dose of Dupixent to 300 mg every 7 days to reduce return of mild itching/discomfort. Would benefit from from using Systane Utra as needed for dry eye symptoms.    Acne: Stable, controlled.     PLAN:                            Continue current regimen as prescribed.   Try Systane Ultra eye drops for dry eyes as needed.  Follow up with Dr. Jose Miguel Arizmendi MD as scheduled early May to assess effect of dose of current Dupixent. Also bring up possibility for allergy testing if still interested.     Follow-up: every 6 months and as needed.    SUBJECTIVE/OBJECTIVE:                          Briana Newman is a 34 year old female called for an initial visit. She was referred to me from Dr. Jose Miguel Arizmendi MD.      Reason for visit: Dupixent ~90 day check in.    Allergies/ADRs: Reviewed in chart.  Past Medical History: Reviewed in chart.  Tobacco: She reports that she has never smoked. She has never been exposed to tobacco smoke. She has never used smokeless tobacco.  Alcohol: Reviewed in chart (none).    Notes for future: follow up on lipid levels after next lab to assess if lifestyle changes were enough to bring values to goal.      Medication Adherence/Access: no issues reported     Atopic dermatitis:   Dupilumab (Dupixent) maintenance dose (autoinjector): Inject 300 mg subcutaneously every 14 days (patient > 60 kg).  Clobetasol 0.05% cream: apply twice daily as needed.  Triamcinolone 0.1% cream: twice daily as needed.  Hydrocortisone 0.1% cream: Apply topically on hands as needed.  Fluocinolone 0.01 % external oil: Apply topically once daily as needed to affected area(s) on scalp.     03/20/24: Has not needed  any topicals in about one month. Started Dupixent 12/20/23, but ended up running into an insurance issue and changed pharmacies, so now gets from [facility] and had about 1 month where she lost access to the medication; now been back on for about 2 months. Has some itching/discomfort that she notices about 5 days prior to her next injection; open to continuing current regimen for now and seeing if that improves over time; did discuss we could consider increasing to 300 mg every 7 days if needed in the future. Notices some mild dry eye; went to an eye doctor last week and no concerns were shared by that provider; open to trying Systane Ultra as needed. She feels like she may have allergies that she is unaware of and is interested in discussing allergy testing at next appt with Dr. Jose Miguel Arizmendi MD.     11/29/23: No side effects at this time; topical treatments not effective at controlling symptoms. Patient noted a fear of being potentially allergic to medications. Symptoms on back, scalp, and hands; are raw, itchy, and rough. She is noticing some itchiness starting on her thighs now. Clothes are bothersome on her skin. When she wakes up, she will notice blood spots on her sheets where her back was. Per Dr. Jose Miguel Arizmendi MD note on 10/11/23, greater than 30% BSA involved, disruption to life, and failure of topical steroids to control disease. Right now, not currently using clobetasol 0.05% cream, uses triamcinolone 0.1% once daily on her back at bedtime, hydrocortisone 0.1% once daily on hands, and fluocinolone 0.01% oil once weekly on scalp.     Specialist: Dr. Jose Miguel Arizmendi MD, Dermatology. Last visit on 1/17/24. The following was recommended:   - Significantly improved after 3 shots of Dupixent  - Overall itching has steadily improved.  Initially she got return of inching several days prior to her next injection, with this most recent third injection she only had itching on the day of that her  injection was due, other than that her itching is totally resolved she is very happy with the results not needing to use her topicals as much  - Some persistent dermatitis in bilateral palms but not using topicals very often,  - Discussed we expect things to continue to improve given her response thus far, additionally if should her symptoms were to be not fully controlled to her satisfaction on Dupixent we could progress to something like a Franky inhibitor although I do not think that will be necessary  - Additionally she has some trouble recalling the names of her topicals given that she has been prescribed several different ones over the months.  Recommended that she go home identify which topical she is using and send this information to me along with how often she is using each of them and we can leverage this in the future to tweak her topical regimen as needed  - Follow-up in 3 months to check progress, at that time if everything is going well we can space out follow-ups to 1 year  - Prior authorization expires on 11/29/2024     Previous treatment: Tried a few other lotions/ointments when she was back home in [place], but no other prescriptions tried.      All patients on biologics should avoid live vaccines (varicella/VZV, intranasal influenza, MMR, or yellow fever vaccine (if traveling))       Acne:    Clindamycin 1% lotion: apply twice daily (frequency: twice daily on face).     03/20/24: Clindamycin topical is effective; never did start tretinoin topical due to need for a prior authorization; does not feel like she needs this additional medication at this time.     1/17/24: Patient reports this regimen is helping with acne; no cystic/hormonal acne at this time. Was previously prescribed spironolactone 100 mg tablet once daily; did not start due to fears it would reduce her blood pressure when she typically runs at ~95-98/50 when not pregnant. Was also previously prescribed Tretinoin 0.025% gel to apply at  bedtime; reports insurance did not coverage it, but she is interested in having access to this in the future, although focused first on receiving Dupixent.     Today's Vitals: There were no vitals taken for this visit.  ----------------  I spent 10 minutes with this patient today. All changes were made via collaborative practice agreement with Dr. Jose Miguel Arizmendi MD. A copy of the visit note was provided to the patient's provider(s).    A summary of these recommendations was sent via [employer].    Marta Britt, Pharm.D.  Medication Therapy Management Pharmacist   [facility] Dermatology    Telemedicine Visit Details  Type of service:  Telephone visit  Start Time: 10:25 AM - did not answer first call;   End Time: 10:35 AM     Medication Therapy Recommendations  No medication therapy recommendations to display

## 2024-03-20 NOTE — PATIENT INSTRUCTIONS
"Recommendations from today's MTM visit:                                                    MTM (medication therapy management) is a service provided by a clinical pharmacist designed to help you get the most of out of your medicines.      Continue current regimen as prescribed.   Try Systane Ultra eye drops for dry eyes as needed.  Follow up with Dr. Jose Miguel Arizmendi MD as scheduled early May to assess effect of dose of current Dupixent. Also bring up possibility for allergy testing if still interested.     Follow-up: every 6 months and as needed.    It was great speaking with you today.  I value your experience and would be very thankful for your time in providing feedback in our clinic survey. In the next few days, you may receive an email or text message from [employer] with a link to a survey related to your  clinical pharmacist.\"     To schedule another MTM appointment, please call the clinic directly or you may call the MTM scheduling line at [phone].    My Clinical Pharmacist's contact information:                                                      Please feel free to contact me with any questions or concerns you have.      Marta Britt, Pharm.D.  Medication Therapy Management Pharmacist   [facility] Dermatology  MTM Scheduling Line: [phone]    "

## 2024-03-26 ENCOUNTER — OFFICE VISIT (OUTPATIENT)
Dept: FAMILY MEDICINE | Facility: CLINIC | Age: 34
End: 2024-03-26
Payer: COMMERCIAL

## 2024-03-26 VITALS
BODY MASS INDEX: 36.46 KG/M2 | DIASTOLIC BLOOD PRESSURE: 60 MMHG | TEMPERATURE: 97.7 F | HEART RATE: 67 BPM | OXYGEN SATURATION: 99 % | SYSTOLIC BLOOD PRESSURE: 124 MMHG | HEIGHT: 63 IN | RESPIRATION RATE: 16 BRPM

## 2024-03-26 DIAGNOSIS — J01.90 ACUTE SINUSITIS, RECURRENCE NOT SPECIFIED, UNSPECIFIED LOCATION: Primary | ICD-10-CM

## 2024-03-26 DIAGNOSIS — R12 HEARTBURN: ICD-10-CM

## 2024-03-26 LAB
DEPRECATED S PYO AG THROAT QL EIA: NEGATIVE
FLUAV RNA SPEC QL NAA+PROBE: NEGATIVE
FLUBV RNA RESP QL NAA+PROBE: NEGATIVE
GROUP A STREP BY PCR: NOT DETECTED
RSV RNA SPEC NAA+PROBE: NEGATIVE
SARS-COV-2 RNA RESP QL NAA+PROBE: NEGATIVE

## 2024-03-26 PROCEDURE — 87637 SARSCOV2&INF A&B&RSV AMP PRB: CPT | Performed by: FAMILY MEDICINE

## 2024-03-26 PROCEDURE — 87651 STREP A DNA AMP PROBE: CPT | Performed by: FAMILY MEDICINE

## 2024-03-26 PROCEDURE — 99213 OFFICE O/P EST LOW 20 MIN: CPT | Performed by: FAMILY MEDICINE

## 2024-03-26 RX ORDER — AMOXICILLIN 875 MG
875 TABLET ORAL 2 TIMES DAILY
Qty: 20 TABLET | Refills: 0 | Status: SHIPPED | OUTPATIENT
Start: 2024-03-26 | End: 2024-04-05

## 2024-03-26 RX ORDER — FAMOTIDINE 20 MG/1
20 TABLET, FILM COATED ORAL 2 TIMES DAILY PRN
Qty: 90 TABLET | Refills: 0 | Status: SHIPPED | OUTPATIENT
Start: 2024-03-26 | End: 2024-06-21

## 2024-03-26 ASSESSMENT — PATIENT HEALTH QUESTIONNAIRE - PHQ9
SUM OF ALL RESPONSES TO PHQ QUESTIONS 1-9: 5
SUM OF ALL RESPONSES TO PHQ QUESTIONS 1-9: 5
10. IF YOU CHECKED OFF ANY PROBLEMS, HOW DIFFICULT HAVE THESE PROBLEMS MADE IT FOR YOU TO DO YOUR WORK, TAKE CARE OF THINGS AT HOME, OR GET ALONG WITH OTHER PEOPLE: NOT DIFFICULT AT ALL

## 2024-03-26 ASSESSMENT — ENCOUNTER SYMPTOMS: SORE THROAT: 1
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
## 2024-04-24 ENCOUNTER — OFFICE VISIT (OUTPATIENT)
Dept: MIDWIFE SERVICES | Facility: CLINIC | Age: 34
End: 2024-04-24
Payer: COMMERCIAL

## 2024-04-24 VITALS
SYSTOLIC BLOOD PRESSURE: 133 MMHG | DIASTOLIC BLOOD PRESSURE: 82 MMHG | BODY MASS INDEX: 35.61 KG/M2 | WEIGHT: 201 LBS | TEMPERATURE: 97.2 F | HEART RATE: 64 BPM

## 2024-04-24 DIAGNOSIS — Z12.4 SCREENING FOR CERVICAL CANCER: Primary | ICD-10-CM

## 2024-04-24 PROCEDURE — 99212 OFFICE O/P EST SF 10 MIN: CPT | Performed by: ADVANCED PRACTICE MIDWIFE

## 2024-04-24 PROCEDURE — 87624 HPV HI-RISK TYP POOLED RSLT: CPT | Performed by: ADVANCED PRACTICE MIDWIFE

## 2024-04-24 PROCEDURE — G0145 SCR C/V CYTO,THINLAYER,RESCR: HCPCS | Performed by: ADVANCED PRACTICE MIDWIFE

## 2024-04-24 NOTE — PROGRESS NOTES
S: Patient presents for pap and breast exam today. She states she hasn't had a pap since living in [place] and is due. Reports that she was having intercourse with her  and felt like something tore. It was uncomfortable but not painful, no bleeding. Pt felt like maybe the area of vaginal repair from her first delivery opened up. She is considering pregnancy soon and would like to make sure she is healthy. Would also like a breast exam today, no specific concerns.     O:   /82   Pulse 64   Temp 97.2  F (36.2  C)   Wt 91.2 kg (201 lb)   LMP 04/19/2024 (Approximate)   BMI 35.61 kg/m      Physical Exam  Genitourinary:      Vulva normal.   Cardiovascular:      Rate and Rhythm: Normal rate and regular rhythm.   Pulmonary:      Effort: Pulmonary effort is normal.   Abdominal:      Palpations: Abdomen is soft.   Musculoskeletal:         General: Normal range of motion.   Neurological:      Mental Status: She is alert and oriented to person, place, and time.   Skin:     General: Skin is warm and dry.   Psychiatric:         Mood and Affect: Mood normal.      Breast:  symmetrical, no palpable mass, no nipple discharge             A/P:   (Z12.4) Screening for cervical cancer  (primary encounter diagnosis)  Comment:   Plan: Pap imaged thin layer screen with HPV -         recommended age 30 - 65 years (select HPV order        below)          Encouraged to start prenatal vitamin  RTC with positive pregnancy test   Julia Agee CNM

## 2024-04-27 LAB
BKR LAB AP GYN ADEQUACY: NORMAL
BKR LAB AP GYN INTERPRETATION: NORMAL
BKR LAB AP HPV REFLEX: NORMAL
BKR LAB AP LMP: NORMAL
BKR LAB AP PREVIOUS ABNORMAL: NORMAL
PATH REPORT.COMMENTS IMP SPEC: NORMAL
PATH REPORT.COMMENTS IMP SPEC: NORMAL
PATH REPORT.RELEVANT HX SPEC: NORMAL

## 2024-04-29 LAB
HUMAN PAPILLOMA VIRUS 16 DNA: NEGATIVE
HUMAN PAPILLOMA VIRUS 18 DNA: NEGATIVE
HUMAN PAPILLOMA VIRUS FINAL DIAGNOSIS: ABNORMAL
HUMAN PAPILLOMA VIRUS OTHER HR: POSITIVE

## 2024-04-30 ENCOUNTER — PATIENT OUTREACH (OUTPATIENT)
Dept: FAMILY MEDICINE | Facility: CLINIC | Age: 34
End: 2024-04-30
Payer: COMMERCIAL

## 2024-04-30 PROBLEM — R87.810 CERVICAL HIGH RISK HPV (HUMAN PAPILLOMAVIRUS) TEST POSITIVE: Status: ACTIVE | Noted: 2024-04-24

## 2024-05-01 ENCOUNTER — OFFICE VISIT (OUTPATIENT)
Dept: DERMATOLOGY | Facility: CLINIC | Age: 34
End: 2024-05-01
Payer: COMMERCIAL

## 2024-05-01 DIAGNOSIS — D48.9 NEOPLASM OF UNCERTAIN BEHAVIOR: ICD-10-CM

## 2024-05-01 DIAGNOSIS — L20.89 OTHER ATOPIC DERMATITIS: Primary | ICD-10-CM

## 2024-05-01 DIAGNOSIS — L29.9 PRURITUS: ICD-10-CM

## 2024-05-01 PROCEDURE — 11311 SHAVE SKIN LESION 0.6-1.0 CM: CPT | Performed by: STUDENT IN AN ORGANIZED HEALTH CARE EDUCATION/TRAINING PROGRAM

## 2024-05-01 PROCEDURE — 99214 OFFICE O/P EST MOD 30 MIN: CPT | Mod: 25 | Performed by: STUDENT IN AN ORGANIZED HEALTH CARE EDUCATION/TRAINING PROGRAM

## 2024-05-01 PROCEDURE — 88305 TISSUE EXAM BY PATHOLOGIST: CPT | Performed by: DERMATOLOGY
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
## 2024-05-01 NOTE — PATIENT INSTRUCTIONS
Wound Care After a Biopsy    What is a skin biopsy?  A skin biopsy allows the doctor to examine a very small piece of tissue under the microscope to determine the diagnosis and the best treatment for the skin condition. A local anesthetic (numbing medicine) is injected with a very small needle into the skin area to be tested. A small piece of skin is taken from the area. Sometimes a suture (stitch) is used.     What are the risks of a skin biopsy?  I will experience scar, bleeding, swelling, pain, crusting and redness. I may experience incomplete removal or recurrence. Risks of this procedure are excessive bleeding, bruising, infection, nerve damage, numbness, thick (hypertrophic or keloidal) scar and non-diagnostic biopsy.    How should I care for my wound for the first 24 hours?  Keep the wound dry and covered for 24 hours  If it bleeds, hold direct pressure on the area for 15 minutes. If bleeding does not stop, call us or go to the emergency room  Avoid strenuous exercise the first 1-2 days or as your doctor instructs you    How should I care for the wound after 24 hours?  After 24 hours, remove the bandage  You may bathe or shower as normal  If you had a scalp biopsy, you can shampoo as usual and can use shower water to clean the biopsy site daily  Clean the wound once a day with gentle soap and water  Do not scrub, be gentle  Apply white petroleum/Vaseline after cleaning the wound with a cotton swab or a clean finger, and keep the site covered with a Bandaid /bandage. Bandages are not necessary with a scalp biopsy  If you are unable to cover the site with a Bandaid /bandage, re-apply ointment 2-3 times a day to keep the site moist. Moisture will help with healing  Avoid strenuous activity for first 1-2 days  Avoid lakes, rivers, pools, and oceans until the stitches are removed or the site is healed    How do I clean my wound?  Wash hands thoroughly with soap or use hand  before all wound care  Clean  the wound with gentle soap and water  Apply white petroleum/Vaseline  to wound after it is clean  Replace the Bandaid /bandage to keep the wound covered for the first few days or as instructed by your doctor  If you had a scalp biopsy, warm shower water to the area on a daily basis should suffice    What should I use to clean my wound?   Cotton-tipped applicators (Qtips )  White petroleum jelly (Vaseline ). Use a clean new container and use Q-tips to apply.  Bandaids  as needed  Gentle soap     How should I care for my wound long term?  Do not get your wound dirty  Keep up with wound care for one week or until the area is healed.  A small scab will form and fall off by itself when the area is completely healed. The area will be red and will become pink in color as it heals. Sun protection is very important for how your scar will turn out. Sunscreen with an SPF 30 or greater is recommended once the area is healed.  You should have some soreness but it should be mild and slowly go away over several days. Talk to your doctor about using tylenol for pain,    When should I call my doctor?  If you have increased:   Pain or swelling  Pus or drainage (clear or slightly yellow drainage is ok)  Temperature over 100F  Spreading redness or warmth around wound    When will I hear about my results?  The biopsy results can take 2 weeks to come back.  Your results will automatically release to [employer] before your provider has even reviewed them.  The clinic will call you with the results, send you a [employer] message, or have you schedule a follow-up clinic or phone time to discuss the results.  Contact our clinics if you do not hear from us in 2 weeks.    Who should I call with questions?  [facility]: [phone]  [facility]: [phone]  For urgent needs outside of business hours call the [facility] at [phone] and ask for the dermatology resident on call

## 2024-05-03 LAB
PATH REPORT.COMMENTS IMP SPEC: NORMAL
PATH REPORT.COMMENTS IMP SPEC: NORMAL
PATH REPORT.FINAL DX SPEC: NORMAL
PATH REPORT.GROSS SPEC: NORMAL
PATH REPORT.MICROSCOPIC SPEC OTHER STN: NORMAL
PATH REPORT.RELEVANT HX SPEC: NORMAL

## 2024-05-15 ENCOUNTER — OFFICE VISIT (OUTPATIENT)
Dept: FAMILY MEDICINE | Facility: CLINIC | Age: 34
End: 2024-05-15
Payer: COMMERCIAL

## 2024-05-15 VITALS
HEART RATE: 75 BPM | TEMPERATURE: 98.6 F | BODY MASS INDEX: 34.31 KG/M2 | RESPIRATION RATE: 16 BRPM | DIASTOLIC BLOOD PRESSURE: 86 MMHG | WEIGHT: 201 LBS | OXYGEN SATURATION: 100 % | SYSTOLIC BLOOD PRESSURE: 120 MMHG | HEIGHT: 64 IN

## 2024-05-15 DIAGNOSIS — G43.109 MIGRAINE WITH AURA AND WITHOUT STATUS MIGRAINOSUS, NOT INTRACTABLE: Primary | ICD-10-CM

## 2024-05-15 DIAGNOSIS — R55 VASOVAGAL SYNCOPE: ICD-10-CM

## 2024-05-15 LAB
ALBUMIN SERPL BCG-MCNC: 4.3 G/DL (ref 3.5–5.2)
ALP SERPL-CCNC: 58 U/L (ref 40–150)
ALT SERPL W P-5'-P-CCNC: 10 U/L (ref 0–50)
ANION GAP SERPL CALCULATED.3IONS-SCNC: 10 MMOL/L (ref 7–15)
AST SERPL W P-5'-P-CCNC: 14 U/L (ref 0–45)
BASOPHILS # BLD AUTO: 0 10E3/UL (ref 0–0.2)
BASOPHILS NFR BLD AUTO: 0 %
BILIRUB SERPL-MCNC: 0.4 MG/DL
BUN SERPL-MCNC: 6 MG/DL (ref 6–20)
CALCIUM SERPL-MCNC: 9.6 MG/DL (ref 8.6–10)
CHLORIDE SERPL-SCNC: 106 MMOL/L (ref 98–107)
CREAT SERPL-MCNC: 0.63 MG/DL (ref 0.51–0.95)
DEPRECATED HCO3 PLAS-SCNC: 22 MMOL/L (ref 22–29)
EGFRCR SERPLBLD CKD-EPI 2021: >90 ML/MIN/1.73M2
EOSINOPHIL # BLD AUTO: 0.1 10E3/UL (ref 0–0.7)
EOSINOPHIL NFR BLD AUTO: 1 %
ERYTHROCYTE [DISTWIDTH] IN BLOOD BY AUTOMATED COUNT: 12.8 % (ref 10–15)
GLUCOSE SERPL-MCNC: 93 MG/DL (ref 70–99)
HBA1C MFR BLD: 5.3 % (ref 0–5.6)
HCG INTACT+B SERPL-ACNC: <1 MIU/ML
HCT VFR BLD AUTO: 37.7 % (ref 35–47)
HGB BLD-MCNC: 12.9 G/DL (ref 11.7–15.7)
IMM GRANULOCYTES # BLD: 0 10E3/UL
IMM GRANULOCYTES NFR BLD: 0 %
LYMPHOCYTES # BLD AUTO: 1.5 10E3/UL (ref 0.8–5.3)
LYMPHOCYTES NFR BLD AUTO: 16 %
MCH RBC QN AUTO: 29.6 PG (ref 26.5–33)
MCHC RBC AUTO-ENTMCNC: 34.2 G/DL (ref 31.5–36.5)
MCV RBC AUTO: 87 FL (ref 78–100)
MONOCYTES # BLD AUTO: 0.5 10E3/UL (ref 0–1.3)
MONOCYTES NFR BLD AUTO: 5 %
NEUTROPHILS # BLD AUTO: 7.1 10E3/UL (ref 1.6–8.3)
NEUTROPHILS NFR BLD AUTO: 77 %
PLATELET # BLD AUTO: 334 10E3/UL (ref 150–450)
POTASSIUM SERPL-SCNC: 4.1 MMOL/L (ref 3.4–5.3)
PROT SERPL-MCNC: 7.9 G/DL (ref 6.4–8.3)
RBC # BLD AUTO: 4.36 10E6/UL (ref 3.8–5.2)
SODIUM SERPL-SCNC: 138 MMOL/L (ref 135–145)
WBC # BLD AUTO: 9.2 10E3/UL (ref 4–11)

## 2024-05-15 PROCEDURE — 84702 CHORIONIC GONADOTROPIN TEST: CPT | Performed by: STUDENT IN AN ORGANIZED HEALTH CARE EDUCATION/TRAINING PROGRAM

## 2024-05-15 PROCEDURE — G2211 COMPLEX E/M VISIT ADD ON: HCPCS | Performed by: STUDENT IN AN ORGANIZED HEALTH CARE EDUCATION/TRAINING PROGRAM

## 2024-05-15 PROCEDURE — 83036 HEMOGLOBIN GLYCOSYLATED A1C: CPT | Performed by: STUDENT IN AN ORGANIZED HEALTH CARE EDUCATION/TRAINING PROGRAM

## 2024-05-15 PROCEDURE — 36415 COLL VENOUS BLD VENIPUNCTURE: CPT | Performed by: STUDENT IN AN ORGANIZED HEALTH CARE EDUCATION/TRAINING PROGRAM

## 2024-05-15 PROCEDURE — 80053 COMPREHEN METABOLIC PANEL: CPT | Performed by: STUDENT IN AN ORGANIZED HEALTH CARE EDUCATION/TRAINING PROGRAM

## 2024-05-15 PROCEDURE — 85025 COMPLETE CBC W/AUTO DIFF WBC: CPT | Performed by: STUDENT IN AN ORGANIZED HEALTH CARE EDUCATION/TRAINING PROGRAM

## 2024-05-15 PROCEDURE — 99214 OFFICE O/P EST MOD 30 MIN: CPT | Performed by: STUDENT IN AN ORGANIZED HEALTH CARE EDUCATION/TRAINING PROGRAM

## 2024-05-15 RX ORDER — BUTALBITAL, ACETAMINOPHEN AND CAFFEINE 50; 325; 40 MG/1; MG/1; MG/1
TABLET ORAL
Qty: 30 TABLET | Refills: 0 | Status: SHIPPED | OUTPATIENT
Start: 2024-05-15 | End: 2024-07-17

## 2024-05-15 RX ORDER — PROPRANOLOL HYDROCHLORIDE 40 MG/1
40 TABLET ORAL DAILY
Qty: 60 TABLET | Refills: 1 | Status: SHIPPED | OUTPATIENT
Start: 2024-05-15 | End: 2024-07-17

## 2024-05-15 ASSESSMENT — PATIENT HEALTH QUESTIONNAIRE - PHQ9
SUM OF ALL RESPONSES TO PHQ QUESTIONS 1-9: 10
SUM OF ALL RESPONSES TO PHQ QUESTIONS 1-9: 10
10. IF YOU CHECKED OFF ANY PROBLEMS, HOW DIFFICULT HAVE THESE PROBLEMS MADE IT FOR YOU TO DO YOUR WORK, TAKE CARE OF THINGS AT HOME, OR GET ALONG WITH OTHER PEOPLE: SOMEWHAT DIFFICULT

## 2024-05-15 ASSESSMENT — ENCOUNTER SYMPTOMS: HEADACHES: 1

## 2024-05-15 NOTE — PROGRESS NOTES
"  Assessment & Plan     (G43.109) Migraine with aura and without status migrainosus, not intractable  (primary encounter diagnosis)  Comment: Chronic, uncontrolled. Will do baseline blood work. Since pt is trying for pregnancy, uptodate reviewed on preventative and abortive medication that can be used. Will send prescription and warning and risks discussed with pt. Pt to follow up with PCP. Will also place neurology referral  Plan: Hemoglobin A1c, CBC with platelets and         differential, Comprehensive metabolic panel         (BMP + Alb, Alk Phos, ALT, AST, Total. Bili,         TP), propranolol (INDERAL) 40 MG tablet,         butalbital-acetaminophen-caffeine (ESGIC)         -40 MG tablet, HCG quantitative         pregnancy, Adult Neurology  Referral            (R55) Postural tachycardia with syncope  Comment: Chronic, stable. Evident during pregnancy however currently experiencing symptoms of tachycardia. Pt wanting to rule out pregnancy at this time  Plan: CBC with platelets and differential,         Comprehensive metabolic panel (BMP + Alb, Alk         Phos, ALT, AST, Total. Bili, TP), HCG         quantitative pregnancy, CANCELED: HCG         qualitative urine            Dictation Disclaimer: Some of this Note has been completed with voice-recognition dictation software. Although errors are generally corrected real-time, there is the potential for a rare error to be present in the completed chart.     The longitudinal plan of care for the diagnosis(es)/condition(s) as documented were addressed during this visit. Due to the added complexity in care, I will continue to support Briana in the subsequent management and with ongoing continuity of care.            BMI  Estimated body mass index is 34.74 kg/m  as calculated from the following:    Height as of this encounter: 1.62 m (5' 3.78\").    Weight as of this encounter: 91.2 kg (201 lb).             Subjective   Briana is a 34 year old, presenting " for the following health issues:  Headache (2 months)      5/15/2024    10:30 AM   Additional Questions   Roomed by Juli POST CMA   Accompanied by Self     Headache     History of Present Illness       Reason for visit:  Migraines  Symptom onset:  More than a month  Symptoms include:  Migraine that causes nausea, sensitivity to light.  Symptom intensity:  Severe  Symptom progression:  Staying the same  Had these symptoms before:  No    She eats 0-1 servings of fruits and vegetables daily.She consumes 0 sweetened beverage(s) daily.She exercises with enough effort to increase her heart rate 60 or more minutes per day.  She exercises with enough effort to increase her heart rate 3 or less days per week.   She is taking medications regularly.     History of Present Illness  The patient presents for evaluation of multiple medical concerns.    The patient has been experiencing daily migraines for a duration of 2 months. The severity of these migraines has escalated to the point of necessitating early leave and closure of the blinds. The migraines are currently localized to her head, beneath her eyebrows, neck, and shoulders. She has a history of biannual migraines, but currently, they are severe, occurring 6 days per week. She reports light sensitivity, a sensation of blockage in her ears, and increased squinting. Despite daily use of glasses for astigmatism, her symptoms persist. She has not been prescribed any migraine medications due to her impending pregnancy. Her current treatment regimen includes ibuprofen and Tylenol, which have not been effective.    The patient has a history of gestational diabetes. Despite not having undergone further testing, her condition has not resolved.    The patient suspects she may have developed Postural Orthostatic Tachycardia Syndrome (POTS) during her pregnancies. She has been experiencing symptoms since yesterday, including a heart rate of 134.               ROS: 10 point ROS neg  "other than the symptoms noted above in the HPI.        Objective    /86 (BP Location: Left arm, Patient Position: Sitting, Cuff Size: Adult Large)   Pulse 75   Temp 98.6  F (37  C) (Temporal)   Resp 16   Ht 1.62 m (5' 3.78\")   Wt 91.2 kg (201 lb)   LMP 04/19/2024 (Exact Date)   SpO2 100%   BMI 34.74 kg/m    Body mass index is 34.74 kg/m .  Physical Exam   GENERAL: healthy, alert and no distress  EYES: Eyes grossly normal to inspection, PERRL and conjunctivae and sclerae normal  Neck: No visible JVD or lymphadenopathy   RESP: symmetrical rise in chest   CV: No peripheral edema notable   MS: no gross musculoskeletal defects noted  SKIN: no suspicious lesions or rashes  PSYCH: mentation appears normal, affect normal/bright              Signed Electronically by: ANISH DAMON MD    "

## 2024-06-13 DIAGNOSIS — F32.1 CURRENT MODERATE EPISODE OF MAJOR DEPRESSIVE DISORDER WITHOUT PRIOR EPISODE (H): ICD-10-CM

## 2024-06-14 RX ORDER — BUPROPION HYDROCHLORIDE 300 MG/1
300 TABLET ORAL EVERY MORNING
Qty: 30 TABLET | Refills: 1 | Status: SHIPPED | OUTPATIENT
Start: 2024-06-14 | End: 2024-07-17

## 2024-06-21 DIAGNOSIS — R12 HEARTBURN: ICD-10-CM

## 2024-06-21 RX ORDER — FAMOTIDINE 20 MG/1
TABLET, FILM COATED ORAL
Qty: 90 TABLET | Refills: 0 | Status: SHIPPED | OUTPATIENT
Start: 2024-06-21 | End: 2024-07-17

## 2024-07-10 ENCOUNTER — OFFICE VISIT (OUTPATIENT)
Dept: FAMILY MEDICINE | Facility: CLINIC | Age: 34
End: 2024-07-10
Payer: COMMERCIAL

## 2024-07-10 VITALS
HEIGHT: 64 IN | RESPIRATION RATE: 12 BRPM | SYSTOLIC BLOOD PRESSURE: 111 MMHG | WEIGHT: 202.6 LBS | TEMPERATURE: 97.7 F | BODY MASS INDEX: 34.59 KG/M2 | HEART RATE: 74 BPM | DIASTOLIC BLOOD PRESSURE: 74 MMHG | OXYGEN SATURATION: 100 %

## 2024-07-10 DIAGNOSIS — R09.82 POST-NASAL DRIP: ICD-10-CM

## 2024-07-10 DIAGNOSIS — Z00.00 ROUTINE GENERAL MEDICAL EXAMINATION AT A HEALTH CARE FACILITY: Primary | ICD-10-CM

## 2024-07-10 DIAGNOSIS — H61.23 BILATERAL IMPACTED CERUMEN: ICD-10-CM

## 2024-07-10 DIAGNOSIS — Z32.01 PREGNANCY TEST POSITIVE: ICD-10-CM

## 2024-07-10 DIAGNOSIS — O21.9 NAUSEA AND VOMITING DURING PREGNANCY: ICD-10-CM

## 2024-07-10 LAB — HCG INTACT+B SERPL-ACNC: 7 MIU/ML

## 2024-07-10 PROCEDURE — 36415 COLL VENOUS BLD VENIPUNCTURE: CPT | Performed by: NURSE PRACTITIONER

## 2024-07-10 PROCEDURE — 99214 OFFICE O/P EST MOD 30 MIN: CPT | Mod: 25 | Performed by: NURSE PRACTITIONER

## 2024-07-10 PROCEDURE — 99395 PREV VISIT EST AGE 18-39: CPT | Performed by: NURSE PRACTITIONER

## 2024-07-10 PROCEDURE — 84702 CHORIONIC GONADOTROPIN TEST: CPT | Performed by: NURSE PRACTITIONER

## 2024-07-10 RX ORDER — ACETAMINOPHEN 160 MG
1 TABLET,DISINTEGRATING ORAL SEE ADMIN INSTRUCTIONS
Status: SHIPPED | OUTPATIENT
Start: 2024-07-17

## 2024-07-10 RX ORDER — PYRIDOXINE HCL (VITAMIN B6) 25 MG
25 TABLET ORAL 3 TIMES DAILY PRN
Qty: 90 TABLET | Refills: 0 | Status: SHIPPED | OUTPATIENT
Start: 2024-07-10 | End: 2024-07-31

## 2024-07-10 RX ORDER — FLUTICASONE PROPIONATE 50 MCG
2 SPRAY, SUSPENSION (ML) NASAL DAILY
Qty: 9.9 ML | Refills: 0 | Status: SHIPPED | OUTPATIENT
Start: 2024-07-10 | End: 2024-08-13

## 2024-07-10 RX ORDER — ONDANSETRON 4 MG/1
4 TABLET, ORALLY DISINTEGRATING ORAL EVERY 8 HOURS PRN
Qty: 10 TABLET | Refills: 0 | Status: SHIPPED | OUTPATIENT
Start: 2024-07-10 | End: 2024-07-17

## 2024-07-10 SDOH — HEALTH STABILITY: PHYSICAL HEALTH: ON AVERAGE, HOW MANY DAYS PER WEEK DO YOU ENGAGE IN MODERATE TO STRENUOUS EXERCISE (LIKE A BRISK WALK)?: 5 DAYS

## 2024-07-10 ASSESSMENT — SOCIAL DETERMINANTS OF HEALTH (SDOH): HOW OFTEN DO YOU GET TOGETHER WITH FRIENDS OR RELATIVES?: TWICE A WEEK

## 2024-07-10 ASSESSMENT — PATIENT HEALTH QUESTIONNAIRE - PHQ9
SUM OF ALL RESPONSES TO PHQ QUESTIONS 1-9: 8
SUM OF ALL RESPONSES TO PHQ QUESTIONS 1-9: 8
10. IF YOU CHECKED OFF ANY PROBLEMS, HOW DIFFICULT HAVE THESE PROBLEMS MADE IT FOR YOU TO DO YOUR WORK, TAKE CARE OF THINGS AT HOME, OR GET ALONG WITH OTHER PEOPLE: NOT DIFFICULT AT ALL
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
## 2024-07-10 NOTE — NURSING NOTE
Patient identified using two patient identifiers.  Ear exam showing wax occlusion completed by provider.  Solution: warm water was placed in the bilateral ear(s) via irrigation tool: elephant ear. Wax was removed with no complications.  Christiano Galan ma

## 2024-07-10 NOTE — LETTER
July 11, 2024    Briana Newman  [address] [facility] [address] [facility] [address]          Dear MsJeremyTrent,    We are writing to inform you of your test results.    We have received your recent lab results, and I wanted to share the findings with you.       Your hCG Quantitative level is 7 mIU/mL. For reference, the normal range for a healthy non-pregnant adult is 0-5 mIU/mL.       If you have any questions or would like to discuss these results further, please do not hesitate to contact our office. We are here to assist you.       Resulted Orders   hCG Quantitative Pregnancy   Result Value Ref Range    hCG Quantitative 7 (H) <5 mIU/mL      Comment:      Adult: 0-5 mIU/mL for healthy non-pregnant person  Neonates: Should be within normal ranges by 2 days after birth         If you have any questions or concerns, please call the clinic at the number listed above.       Sincerely,      ERICA Vanegas CNP

## 2024-07-10 NOTE — PATIENT INSTRUCTIONS
Patient Education   Preventive Care Advice   This is general advice given by our system to help you stay healthy. However, your care team may have specific advice just for you. Please talk to your care team about your preventive care needs.  Nutrition  Eat 5 or more servings of fruits and vegetables each day.  Try wheat bread, brown rice and whole grain pasta (instead of white bread, rice, and pasta).  Get enough calcium and vitamin D. Check the label on foods and aim for 100% of the RDA (recommended daily allowance).  Lifestyle  Exercise at least 150 minutes each week  (30 minutes a day, 5 days a week).  Do muscle strengthening activities 2 days a week. These help control your weight and prevent disease.  No smoking.  Wear sunscreen to prevent skin cancer.  Have a dental exam and cleaning every 6 months.  Yearly exams  See your health care team every year to talk about:  Any changes in your health.  Any medicines your care team has prescribed.  Preventive care, family planning, and ways to prevent chronic diseases.  Shots (vaccines)   HPV shots (up to age 26), if you've never had them before.  Hepatitis B shots (up to age 59), if you've never had them before.  COVID-19 shot: Get this shot when it's due.  Flu shot: Get a flu shot every year.  Tetanus shot: Get a tetanus shot every 10 years.  Pneumococcal, hepatitis A, and RSV shots: Ask your care team if you need these based on your risk.  Shingles shot (for age 50 and up)  General health tests  Diabetes screening:  Starting at age 35, Get screened for diabetes at least every 3 years.  If you are younger than age 35, ask your care team if you should be screened for diabetes.  Cholesterol test: At age 39, start having a cholesterol test every 5 years, or more often if advised.  Bone density scan (DEXA): At age 50, ask your care team if you should have this scan for osteoporosis (brittle bones).  Hepatitis C: Get tested at least once in your life.  STIs (sexually  transmitted infections)  Before age 24: Ask your care team if you should be screened for STIs.  After age 24: Get screened for STIs if you're at risk. You are at risk for STIs (including HIV) if:  You are sexually active with more than one person.  You don't use condoms every time.  You or a partner was diagnosed with a sexually transmitted infection.  If you are at risk for HIV, ask about PrEP medicine to prevent HIV.  Get tested for HIV at least once in your life, whether you are at risk for HIV or not.  Cancer screening tests  Cervical cancer screening: If you have a cervix, begin getting regular cervical cancer screening tests starting at age 21.  Breast cancer scan (mammogram): If you've ever had breasts, begin having regular mammograms starting at age 40. This is a scan to check for breast cancer.  Colon cancer screening: It is important to start screening for colon cancer at age 45.  Have a colonoscopy test every 10 years (or more often if you're at risk) Or, ask your provider about stool tests like a FIT test every year or Cologuard test every 3 years.  To learn more about your testing options, visit:   .  For help making a decision, visit:   https://bit.ly/mr72752.  Prostate cancer screening test: If you have a prostate, ask your care team if a prostate cancer screening test (PSA) at age 55 is right for you.  Lung cancer screening: If you are a current or former smoker ages 50 to 80, ask your care team if ongoing lung cancer screenings are right for you.  For informational purposes only. Not to replace the advice of your health care provider. Copyright   2023 [facility]. All rights reserved. Clinically reviewed by the [facility] Transitions Program. [employer] 222095 - REV 01/24.  Learning About Depression Screening  What is depression screening?  Depression screening is a way to see if you have depression symptoms. It may be done by a doctor or counselor. It's often part of a routine  "checkup. That's because your mental health is just as important as your physical health.  Depression is a mental health condition that affects how you feel, think, and act. You may:  Have less energy.  Lose interest in your daily activities.  Feel sad and grouchy for a long time.  Depression is very common. It affects people of all ages.  Many things can lead to depression. Some people become depressed after they have a stroke or find out they have a major illness like cancer or heart disease. The death of a loved one or a breakup may lead to depression. It can run in families. Most experts believe that a combination of inherited genes and stressful life events can cause it.  What happens during screening?  You may be asked to fill out a form about your depression symptoms. You and the doctor will discuss your answers. The doctor may ask you more questions to learn more about how you think, act, and feel.  What happens after screening?  If you have symptoms of depression, your doctor will talk to you about your options.  Doctors usually treat depression with medicines or counseling. Often, combining the two works best. Many people don't get help because they think that they'll get over the depression on their own. But people with depression may not get better unless they get treatment.  The cause of depression is not well understood. There may be many factors involved. But if you have depression, it's not your fault.  A serious symptom of depression is thinking about death or suicide. If you or someone you care about talks about this or about feeling hopeless, get help right away.  It's important to know that depression can be treated. Medicine, counseling, and self-care may help.  Where can you learn more?  Go to https://www.healthwise.net/patiented  Enter T185 in the search box to learn more about \"Learning About Depression Screening.\"  Current as of: June 24, 2023               Content Version: 14.0    5506-2088 " [email or web address].   Care instructions adapted under license by your healthcare professional. If you have questions about a medical condition or this instruction, always ask your healthcare professional. [email or web address] disclaims any warranty or liability for your use of this information.

## 2024-07-17 ENCOUNTER — PRENATAL OFFICE VISIT (OUTPATIENT)
Dept: OBGYN | Facility: CLINIC | Age: 34
End: 2024-07-17
Attending: ADVANCED PRACTICE MIDWIFE
Payer: COMMERCIAL

## 2024-07-17 ENCOUNTER — LAB (OUTPATIENT)
Dept: LAB | Facility: CLINIC | Age: 34
End: 2024-07-17
Attending: ADVANCED PRACTICE MIDWIFE
Payer: COMMERCIAL

## 2024-07-17 VITALS
BODY MASS INDEX: 35.12 KG/M2 | SYSTOLIC BLOOD PRESSURE: 110 MMHG | HEART RATE: 74 BPM | DIASTOLIC BLOOD PRESSURE: 71 MMHG | WEIGHT: 203 LBS

## 2024-07-17 DIAGNOSIS — O21.9 NAUSEA AND VOMITING DURING PREGNANCY: ICD-10-CM

## 2024-07-17 DIAGNOSIS — Z34.90 EARLY STAGE OF PREGNANCY: Primary | ICD-10-CM

## 2024-07-17 DIAGNOSIS — Z34.90 EARLY STAGE OF PREGNANCY: ICD-10-CM

## 2024-07-17 LAB — HCG INTACT+B SERPL-ACNC: 394 MIU/ML

## 2024-07-17 PROCEDURE — 99213 OFFICE O/P EST LOW 20 MIN: CPT | Performed by: ADVANCED PRACTICE MIDWIFE

## 2024-07-17 PROCEDURE — G0463 HOSPITAL OUTPT CLINIC VISIT: HCPCS | Performed by: ADVANCED PRACTICE MIDWIFE

## 2024-07-17 PROCEDURE — 84702 CHORIONIC GONADOTROPIN TEST: CPT

## 2024-07-17 PROCEDURE — 36415 COLL VENOUS BLD VENIPUNCTURE: CPT

## 2024-07-17 RX ORDER — PNV NO.118/IRON FUMARATE/FA 29 MG-1 MG
1 TABLET,CHEWABLE ORAL DAILY
COMMUNITY

## 2024-07-17 RX ORDER — CALCIUM CARBONATE 500 MG/1
1 TABLET, CHEWABLE ORAL 2 TIMES DAILY
COMMUNITY

## 2024-07-17 RX ORDER — PYRIDOXINE HCL (VITAMIN B6) 25 MG
25 TABLET ORAL 3 TIMES DAILY PRN
Qty: 90 TABLET | Refills: 0 | Status: CANCELLED | OUTPATIENT
Start: 2024-07-17

## 2024-07-17 RX ORDER — ONDANSETRON 4 MG/1
4 TABLET, ORALLY DISINTEGRATING ORAL EVERY 8 HOURS PRN
Qty: 60 TABLET | Refills: 0 | Status: SHIPPED | OUTPATIENT
Start: 2024-07-17

## 2024-07-17 RX ORDER — SENNA AND DOCUSATE SODIUM 50; 8.6 MG/1; MG/1
1 TABLET, FILM COATED ORAL 2 TIMES DAILY PRN
Qty: 90 TABLET | Refills: 3 | Status: SHIPPED | OUTPATIENT
Start: 2024-07-17

## 2024-07-17 ASSESSMENT — PAIN SCALES - GENERAL: PAINLEVEL: MODERATE PAIN (5)

## 2024-07-17 NOTE — PROGRESS NOTES
Clinical concern: Early Pregnancy concerns.    Patient is a  with LMP 6/15/24 making her 4w 4d gestation today.  Positive pregnancy test on 7/10/24 with quant of 7 at outside clinic.    The patient presents with the following concerns:   Constipation with abdominal cramping, has tried Miralax without much success.  Open to trying stool softeners. Would like repeat Hcg today to make sure cramping is related to GI distress and not miscarriage.  History of POTS in last pregnancy, having near syncopal episodes this pregnancy, would like a referral to see cardiology again.    Nausea, would like prescription for Zofran.  Reflux, taking 3 tums tablets three times daily.  Willing to try Pepcid, reviewed warning signs of excessive calcium use in pregnancy.    Objective:  /71   Pulse 74   Wt 92.1 kg (203 lb)   LMP 2024 (Exact Date)   BMI 35.12 kg/m    Physical exam not indicated    Assessment:  Early pregnancy at 4w 4d gestation based on LMP    Plan:  1.  Rx sent for Senna and instructed on use  2.  Hcg ordered at patient request  3.  Patient has Pepcid at home, will start taking that instead of TUMS  4.  Referral to Cardiology placed.  5.  Patient to schedule nurse intake, dating ultrasound and initial OB visit with midwives.  ERICA Mcfarland CNM

## 2024-07-17 NOTE — NURSING NOTE
"Hx of two high risk pregnancies  would like  hcg quants drawn  lmp 6-16-24  4w1d today   sharp pain-  also has bad constipation and nausea-  heartburn  talking tums \"like candy\"    also has a history of lyman with pregnancies  and has started to flar up now-  symptoms incllude heart heart shoots up to the 150 s  with standing and walking and gets dizzy spells where she has to sit down   episodes last for a few seconds   hx of passing out.  "

## 2024-07-31 ENCOUNTER — VIRTUAL VISIT (OUTPATIENT)
Dept: OBGYN | Facility: CLINIC | Age: 34
End: 2024-07-31
Attending: ADVANCED PRACTICE MIDWIFE
Payer: COMMERCIAL

## 2024-07-31 DIAGNOSIS — Z32.01 PREGNANCY TEST POSITIVE: ICD-10-CM

## 2024-07-31 DIAGNOSIS — M54.50 ACUTE LOW BACK PAIN WITHOUT SCIATICA, UNSPECIFIED BACK PAIN LATERALITY: Primary | ICD-10-CM

## 2024-07-31 DIAGNOSIS — G43.109 MIGRAINE WITH AURA AND WITHOUT STATUS MIGRAINOSUS, NOT INTRACTABLE: ICD-10-CM

## 2024-07-31 DIAGNOSIS — E55.9 VITAMIN D DEFICIENCY: ICD-10-CM

## 2024-07-31 DIAGNOSIS — N90.810 FEMALE GENITAL MUTILATION: ICD-10-CM

## 2024-07-31 DIAGNOSIS — R30.0 DYSURIA: ICD-10-CM

## 2024-07-31 DIAGNOSIS — F41.1 GAD (GENERALIZED ANXIETY DISORDER): ICD-10-CM

## 2024-07-31 DIAGNOSIS — R55 VASOVAGAL SYNCOPE: ICD-10-CM

## 2024-07-31 DIAGNOSIS — R87.810 CERVICAL HIGH RISK HPV (HUMAN PAPILLOMAVIRUS) TEST POSITIVE: ICD-10-CM

## 2024-07-31 DIAGNOSIS — R53.83 FATIGUE, UNSPECIFIED TYPE: ICD-10-CM

## 2024-07-31 DIAGNOSIS — N89.8 VAGINAL DISCHARGE: ICD-10-CM

## 2024-07-31 DIAGNOSIS — R19.7 DIARRHEA, UNSPECIFIED TYPE: ICD-10-CM

## 2024-07-31 DIAGNOSIS — F32.1 CURRENT MODERATE EPISODE OF MAJOR DEPRESSIVE DISORDER WITHOUT PRIOR EPISODE (H): ICD-10-CM
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
## 2024-07-31 NOTE — PATIENT INSTRUCTIONS
Thank you for trusting us with your care!   Please be aware, if you are on Mychart, you may see your results prior to your providers review. If labs are abnormal, we will call or message you on Mychart with a follow up plan.    If you need to contact us for questions about:  Symptoms, Scheduling & Medical Questions; Non-urgent (2-3 day response) Mychart message, Urgent (needing response today) [phone] (if after 3:30pm next day response)   Prescriptions: Please call your Pharmacy   Billing: Neli [phone]        or                            [facility]:[phone]               [facility] Number: [phone]    APPOINTMENTS:  8-28 weeks every 4 weeks  28-36 weeks every 2 weeks  36-Delivery every week    ULTRASOUNDS AND TESTS:  You will have a 20 week ultrasound to check the growth and anatomy of your baby. Additional ultrasounds will be done if medically indicated.       The [place] Cold:  Rest and drink plenty of fluids.  A vaporizer may help.    For aches and fever  Acetaminophen (Tylenol)    For Sore Throat  Gargle with warm salt water. Suck on hard candy, ice or popsicles. Eat soft, soothing foods. Drink cool or warm liquids.  You may also take:    Cough drops, such as Halls, Ricola, Cepacol or Chloraseptic.  Acetaminophen    For Cough  Avoid products that contain alcohol.  You may take:    Cough drops, such as Halls, Ricola, Cepacol or Chloraseptic.  Guaifenesin (Mucinex, plain Robitussin) for dry cough.   Dextromethorphan (plain Robitussin, Delsym) to suppress a cough.    For nasal congestion (stuffy head)  You may take:  Saline nasal spray  Afrin nasal spray. Do not use this for more than 3 or 4 days.  Pseudoephedrine (plain Sudafed). Use with caution and only after consulting your care provider.  Do not use this if you have high blood pressure.    Allergies (such as runny nose or sneezing)  Diphenhydramine (plain Benadryl)  Chlorpheniramine (Chlor-Trimeton)  Second  generation antihistamines such as Claritin (loratadine), or Zyrtec (cetirizine).    Flu (influenza) prevention  Pregnant women should be vaccinated early in the flu season (October through May) as soon as the vaccine is available regardless how far along you are in your pregnancy, (Do not use the FluMist nasal inhalation).    Headaches, pain and inflammation  Do not take ibuprofen (Advil or Motrin), naproxen sodium (Aleve), aspirin or salicylates without first speaking with your provider.  These may not be safe during all stages or pregnancy.  Instead, you may use:  Acetaminophen (Tylenol).  If Tylenol does not help your headache, call your care provider.  This could be a sign of high blood pressure.    Constipation  (hard dry stools that are hard to pass)    Eat more fiber such as whole grains, fruits, and vegetables. Drink 10-12 glasses of fluids each day. Avoid caffeine.  You may also take:  Docusate sodium (Colace) to soften your stools. This takes a day or two to have an effect.  Metamucil (plain), Effersyllium, Citrucel, or Fibercon to increase the fiber in your diet. This takes a day or two to have an effect  Miralax. This may take a day or two to have an effect.  Senna (Senokot) or bisacodyl (Dulcolax). This will produce a bowel movement soon after use. Do not use this regularly  Glycerin suppository. This will produce a bowel movement soon after use.  Milk of Magnesia for easier bowel movements. This works overnight. Do not use it regularly.     WIC is a nutrition and breastfeeding program that helps young families eat well and be healthy by assisting with obtaining food and teaching about nutritious foods during/after pregnancy.  Here's where you can find more information about WIC and apply if interested: https://www.health.[facility].[place]/people/wic/ppthome.html    [employer] provides education during all three trimesters of pregnancy, reviewing the stages of prenatal development so parents know what to  expect and feel empowered in their journey. They also continue support after birth, with [employer] modeling techniques for playing and learning. Here's the link: https://CargoS[employer].org/enroll/    Patient Education     First Trimester Screening   What is  screening ?  A screening test tells us the chances that a fetus (unborn baby) has a certain disease or condition. There may be a high risk or a low risk. This is different from a diagnostic test, which tells us if a fetus actually has that condition.   For birth defects, the most common screening tests are for neural tube defects (like spina bifida), Down syndrome, trisomy 18 and trisomy 13. If a screening test shows a high risk for one of these problems, you may choose to have a diagnostic test.   What is first trimester screening?  First trimester screening estimates the risk for Down syndrome, trisomy 18 and trisomy 13. It does not tell us the risk for other birth defects or mental retardation. This test requires:  A blood test. This measures two chemicals in your blood, called hCG and ELISSA-A. These chemicals are made by   the pregnancy.  An ultrasound. This will measure the amount of fluid at the back of the fetus s neck (an area called the nuchal translucency).  Your age.  All of this will help us estimate the risk for certain problems.  Down syndrome is more likely with higher levels of hCG and lower levels of ELISSA-A.  Trisomy 18 and trisomy 13 are more likely with lower levels of both hCG and ELISSA-A.  If there is a large amount of fluid at the back of the neck, there may be a greater risk for Down syndrome, trisomy 18, trisomy 13, heart problems or other conditions.  We can add another blood test later to get a better estimate of your risk. This is called a modified screen. We would be happy to discuss this with you.  Who should consider first trimester screening?  All women can have first trimester screening during the early part of their pregnancy.  This test is usually done between the start of the 11th week and the end of the 13th week.   Before you decide to have this test, talk to your care provider or genetic counselor. He or she will explain the details and answer your questions.   What happens during the test?  You will meet with a genetic counselor at [facility]. He or she will explain the testing in detail.   You will need to sign a consent form. This states that you know the risks and benefits of the screening tests. When you sign the form, you tell us it is okay to do the tests. Do not sign unless you understand what will happen during and after the tests.   We will do a blood test and an ultrasound. Results from the blood test will be ready in about seven days.   How well does the test work?  Most tests show a low risk for Down syndrome, trisomy 18 and trisomy 13. Even if the risk is low, there is still a small chance that your baby will have problems.   About 5 percent of tests show an  increased risk  for problems. This does not mean that a baby will have problems. It means that more tests are needed. Your genetic counselor will talk with you about the risks and benefits of further testing.   Of fetuses that have Down syndrome, trisomy 18 or trisomy 13, most will show an  increased risk  on this test. But the test is not perfect. Some fetuses with these conditions will have a normal test result.   Should I consider second trimester screening as well?  Yes. First trimester screening does not tell you the risk for spina bifida and other birth defects that involve an opening on the fetus s skin. To screen for these problems, you may wish to have MSAFP screening.   The MSAFP test is a blood test. It is usually done at 15 to 20 weeks of pregnancy. The test measures the amount of a certain protein (called AFP) in your blood. High levels of AFP often suggest a greater risk for certain birth defects or pregnancy  problems.   At around 18 weeks or later, your care provider may also use an ultrasound to screen for birth defects or other problems.   Before you decide to have these tests, talk to your care provider or genetic counselor. He or she will explain the details and answer your questions.   Will my insurance pay for first trimester screening?  You will need to call your insurance company to find out. Most health plans will cover this test if you have certain medical problems. Some health plans do not cover this screening test.   If insurance won t pay, you may choose to pay for this test yourself. Or you may choose to have different screening tests in the second trimester. These are almost as accurate as first trimester screening, and insurance is more likely to cover them.  Where can I find more information?  If you have any questions, or if you would like to discuss prenatal testing, please call one of our Maternal[facility]Fetal Medicine Centers.   [facility], [address]  [address] [place] [id number] [phone]   [facility]  [address] [place] [address] [address], [place] [address] [phone]   [facility] [address], [address], [place] [id number] [phone]   [facility]  [address] [place] [address], [address], [place] [id number] [phone]               Learning About Pregnancy  Your Care Instructions     Your health in the early weeks of your pregnancy is particularly important for your baby's health. Take good care of yourself. Anything you do that harms your body can also harm your baby.  Make sure to go to all of your doctor appointments. Regular checkups will help keep you and your baby healthy.  How can you care for yourself at home?  Diet    Choose healthy foods like fruits, vegetables, whole grains, lean proteins, and healthy fats.      Choose foods that are good sources of calcium, iron, and folate. You can try dairy products, dark leafy greens, fortified orange juice and cereals, almonds, broccoli, dried fruit, and beans.     Do not skip meals or go for many hours without eating. If you are nauseated, try to eat a small, healthy snack every 2 to 3 hours.     Avoid fish that are high in mercury. These include shark, swordfish, isac mackerel, marlin, orange roughy, and bigeye tuna, as well as tilefish from the [facility].     It's okay to eat up to 8 to 12 ounces a week of fish that are low in mercury or up to 4 ounces a week of fish that have medium levels of mercury. Some fish that are low in mercury are salmon, shrimp, canned light tuna, cod, and tilapia. Some fish that have medium levels of mercury are halibut and white albacore tuna.     Drink plenty of fluids. If you have kidney, heart, or liver disease and have to limit fluids, talk with your doctor before you increase the amount of fluids you drink.     Limit caffeine to about 200 to 300 mg per day. On average, a cup of brewed coffee has around 80 to 100 mg of caffeine.     Do not drink alcohol, such as beer, wine, or hard liquor.     Take a multivitamin that contains at least 400 micrograms (mcg) of folic acid to help prevent birth defects. Fortified cereal and whole wheat bread are good additional sources of folic acid.     Increase the calcium in your diet. Try to drink a quart of skim milk each day. You may also take calcium supplements and choose foods such as cheese and yogurt.   Lifestyle    Make sure you go to your follow-up appointments.     Get plenty of rest. You may be unusually tired while you are pregnant.     Get at least 30 minutes of exercise on most days of the week. Walking is a good choice. If you have not exercised in the past, start out slowly. Take several short walks each day.     Do not smoke. If you need help quitting, talk to your doctor about stop-smoking  programs. These can increase your chances of quitting for good.     Do not touch cat feces or litter boxes. Also, wash your hands after you handle raw meat, and fully cook all meat before you eat it. Wear gloves when you work in the yard or garden, and wash your hands well when you are done. Cat feces, raw or undercooked meat, and contaminated dirt can cause an infection that may harm your baby or lead to a miscarriage.     Avoid things that can make your body too hot and may be harmful to your baby, such as a hot tub or sauna. Or talk with your doctor before doing anything that raises your body temperature. Your doctor can tell you if it's safe.     Avoid chemical fumes, paint fumes, or poisons.     Do not use illegal drugs, marijuana, or alcohol.   Medicines    Review all of your medicines with your doctor. Some of your routine medicines may need to be changed to protect your baby.     Use acetaminophen (Tylenol) to relieve minor problems, such as a mild headache or backache or a mild fever with cold symptoms. Do not use nonsteroidal anti-inflammatory drugs (NSAIDs), such as ibuprofen (Advil, Motrin) or naproxen (Aleve), unless your doctor says it is okay.     Do not take two or more pain medicines at the same time unless the doctor told you to. Many pain medicines have acetaminophen, which is Tylenol. Too much acetaminophen (Tylenol) can be harmful.     Take your medicines exactly as prescribed. Call your doctor if you think you are having a problem with your medicine.   To manage morning sickness    Keep food in your stomach, but not too much at once. Try eating five or six small meals a day instead of three large meals.     For nausea when you wake up, eat a small snack, such as a couple of crackers or pretzels, before rising. Allow a few minutes for your stomach to settle before you slowly get up.     Try to avoid smells and foods that make you feel nauseated. High-fat or greasy foods, milk, and coffee may  "make nausea worse. Some foods that may be easier to tolerate include cold, spicy, sour, and salty foods.     Drink enough fluids. Water and other caffeine-free drinks are good choices.     Take your prenatal vitamins at night on a full stomach.     Try foods and drinks made with kaur. Kaur may help with nausea.     Get lots of rest. Morning sickness may be worse when you are tired.     Talk to your doctor about over-the-counter products, such as vitamin B6 or doxylamine, to help relieve symptoms.     Try a P6 acupressure wrist band. These anti-nausea wristbands help some people.   Follow-up care is a key part of your treatment and safety. Be sure to make and go to all appointments, and call your doctor if you are having problems. It's also a good idea to know your test results and keep a list of the medicines you take.  Where can you learn more?  Go to https://www.[employer].net/patiented  Enter E868 in the search box to learn more about \"Learning About Pregnancy.\"  Current as of: July 10, 2023               Content Version: 14.0    9317-9033 [employer].   Care instructions adapted under license by your healthcare professional. If you have questions about a medical condition or this instruction, always ask your healthcare professional. [employer] disclaims any warranty or liability for your use of this information.      Weeks 6 to 10 of Your Pregnancy: Care Instructions  During these weeks of pregnancy, your body goes through many changes. You may start to feel different, both in your body and your emotions. Each pregnancy is different, so there's no \"right\" way to feel. These early weeks are a time to make healthy choices for you and your pregnancy.    Take a daily prenatal vitamin. Choose one with folic acid in it.    Avoid alcohol, tobacco, and drugs (including marijuana). If you need help quitting, talk to your doctor.    Drink plenty of liquids.  Be sure to drink enough water. " "And limit sodas, other sweetened drinks, and caffeine.     Choose foods that are good sources of calcium, iron, and folate.  You can try dairy products, dark leafy greens, fortified orange juice and cereals, almonds, broccoli, dried fruit, and beans.     Avoid foods that may be harmful.  Don't eat raw meat, deli meat, raw seafood, or raw eggs. Avoid soft cheese and unpasteurized dairy, like Brie and blue cheese. And don't eat fish that contains a lot of mercury, like shark and swordfish.     Don't touch titi litter or cat poop.  They can cause an infection that could be harmful during pregnancy.     Avoid things that can make your body too hot.  For example, avoid hot tubs and saunas.     Soothe morning sickness.  Try eating 5 or 6 small meals a day, getting some fresh air, or using carol to control symptoms.     Ask your doctor about flu and COVID-19 shots.  Getting them can help protect against infection.   Follow-up care is a key part of your treatment and safety. Be sure to make and go to all appointments, and call your doctor if you are having problems. It's also a good idea to know your test results and keep a list of the medicines you take.  Where can you learn more?  Go to https://www.[employer].net/patiented  Enter G112 in the search box to learn more about \"Weeks 6 to 10 of Your Pregnancy: Care Instructions.\"  Current as of: July 10, 2023               Content Version: 14.0    7930-9232 [employer].   Care instructions adapted under license by your healthcare professional. If you have questions about a medical condition or this instruction, always ask your healthcare professional. [employer] disclaims any warranty or liability for your use of this information.         Pregnancy: Managing Morning Sickness (01:48)  Your health professional recommends that you watch this short online health video.  Learn how to manage morning sickness during pregnancy.   Purpose:  Goal: Learn how " to manage morning sickness during pregnancy.    Watch: Scan the QR code or visit the link to view video       https://hwi.se/r/[email or web address]  Current as of: July 10, 2023  Content Version: 14.1 2006-2024 [employer].   Care instructions adapted under license by your healthcare professional. If you have questions about a medical condition or this instruction, always ask your healthcare professional. [employer] disclaims any warranty or liability for your use of this information.    Pregnancy and Heartburn: Care Instructions  Overview     Heartburn is a common problem during pregnancy.  Heartburn happens when stomach acid backs up into the tube that carries food to the stomach. This tube is called the esophagus. Early in pregnancy, heartburn is caused by hormone changes that slow down digestion. Later on, it's also caused by the large uterus pushing up on the stomach.  Even though you can't fix the cause, there are things you can do to get relief. Treating heartburn during pregnancy focuses first on making lifestyle changes, like changing what and how you eat, and on taking medicines.  Heartburn usually improves or goes away after childbirth.  Follow-up care is a key part of your treatment and safety. Be sure to make and go to all appointments, and call your doctor if you are having problems. It's also a good idea to know your test results and keep a list of the medicines you take.  How can you care for yourself at home?  Eat small, frequent meals.  Avoid foods that make your symptoms worse, such as chocolate, peppermint, and spicy foods. Avoid drinks with caffeine, such as coffee, tea, and sodas.  Avoid bending over or lying down after meals.  Take a short walk after you eat.  If heartburn is a problem at night, do not eat for 2 hours before bedtime.  Take antacids like Mylanta, Maalox, Rolaids, or Tums. Do not take antacids that have sodium bicarbonate, magnesium trisilicate, or  "aspirin. Be careful when you take over-the-counter antacid medicines. Many of these medicines have aspirin in them. While you are pregnant, do not take aspirin or medicines that contain aspirin unless your doctor says it is okay.  If you're not getting relief, talk to your doctor. You may be able to take a stronger acid-reducing medicine.  When should you call for help?   Call your doctor now or seek immediate medical care if:    You have new or worse belly pain.     You are vomiting.   Watch closely for changes in your health, and be sure to contact your doctor if:    You have new or worse symptoms of reflux.     You are losing weight.     You have trouble or pain swallowing.     You do not get better as expected.   Where can you learn more?  Go to https://www.[employer].net/patiented  Enter U946 in the search box to learn more about \"Pregnancy and Heartburn: Care Instructions.\"  Current as of: July 10, 2023  Content Version: 14.1 2006-2024 [employer].   Care instructions adapted under license by your healthcare professional. If you have questions about a medical condition or this instruction, always ask your healthcare professional. [employer] disclaims any warranty or liability for your use of this information.    Constipation: Care Instructions  Overview     Constipation means that you have a hard time passing stools (bowel movements). People pass stools from 3 times a day to once every 3 days. What is normal for you may be different. Constipation may occur with pain in the rectum and cramping. The pain may get worse when you try to pass stools. Sometimes there are small amounts of bright red blood on toilet paper or the surface of stools. This is because of enlarged veins near the rectum (hemorrhoids).  A few changes in your diet and lifestyle may help you avoid ongoing constipation. Your doctor may also prescribe medicine to help loosen your stool.  Some medicines can cause " constipation. These include pain medicines and antidepressants. Tell your doctor about all the medicines you take. Your doctor may want to make a medicine change to ease your symptoms.  Follow-up care is a key part of your treatment and safety. Be sure to make and go to all appointments, and call your doctor if you are having problems. It's also a good idea to know your test results and keep a list of the medicines you take.  How can you care for yourself at home?  Drink plenty of fluids. If you have kidney, heart, or liver disease and have to limit fluids, talk with your doctor before you increase the amount of fluids you drink.  Include high-fiber foods in your diet each day. These include fruits, vegetables, beans, and whole grains.  Get at least 30 minutes of exercise on most days of the week. Walking is a good choice. You also may want to do other activities, such as running, swimming, cycling, or playing tennis or team sports.  Take a fiber supplement, such as Citrucel or Metamucil, every day. Read and follow all instructions on the label.  Schedule time each day for a bowel movement. A daily routine may help. Take your time having a bowel movement, but don't sit for more than 10 minutes at a time. And don't strain too much.  Support your feet with a small step stool when you sit on the toilet. This helps flex your hips and places your pelvis in a squatting position.  Your doctor may recommend an over-the-counter laxative to relieve your constipation. Examples are Milk of Magnesia and MiraLax. Read and follow all instructions on the label. Do not use laxatives on a long-term basis.  When should you call for help?   Call your doctor now or seek immediate medical care if:    You have new or worse belly pain.     You have new or worse nausea or vomiting.     You have blood in your stools.   Watch closely for changes in your health, and be sure to contact your doctor if:    Your constipation is getting worse.      "You do not get better as expected.   Where can you learn more?  Go to https://www.[employer].net/patiented  Enter P343 in the search box to learn more about \"Constipation: Care Instructions.\"  Current as of: October 19, 2023               Content Version: 14.0    8062-3322 [employer].   Care instructions adapted under license by your healthcare professional. If you have questions about a medical condition or this instruction, always ask your healthcare professional. [employer] disclaims any warranty or liability for your use of this information.      Learning About High-Iron Foods  What foods are high in iron?     The foods you eat contain nutrients, such as vitamins and minerals. Iron is a nutrient. Your body needs the right amount to stay healthy and work as it should. You can use the list below to help you make choices about which foods to eat.  Here are some foods that contain iron. They have 1 to 2 milligrams of iron per serving.  Fruits  Figs (dried), 5 figs  Vegetables  Asparagus (canned), 6 foley  Sona, beet, Swiss chard, or turnip greens, 1 cup  Dried peas, cooked,   cup  Seaweed, spirulina (dried),   cup  Spinach, (cooked)   cup or (raw) 1 cup  Grains  Cereals, fortified with iron, 1 cup  Grits (instant, cooked), fortified with iron,   cup  Meats and other protein foods  Beans (kidney, lima, navy, white), canned or cooked,   cup  Beef or lamb, 3 oz  Chicken giblets, 3 oz  Chickpeas (garbanzo beans),   cup  Liver of beef, lamb, or pork, 3 oz  Oysters (cooked), 3 oz  Sardines (canned), 3 oz  Soybeans (boiled),   cup  Tofu (firm),   cup  Work with your doctor to find out how much of this nutrient you need. Depending on your health, you may need more or less of it in your diet.  Where can you learn more?  Go to https://www.Knotch.net/patiented  Enter R005 in the search box to learn more about \"Learning About High-Iron Foods.\"  Current as of: September 20, 2023  Content " Version: 14.1 2006-2024 [email or web address].   Care instructions adapted under license by your healthcare professional. If you have questions about a medical condition or this instruction, always ask your healthcare professional. [email or web address] disclaims any warranty or liability for your use of this information.    Rh Antibodies Screening During Pregnancy: About This Test  What is it?     The Rh antibodies screening test is a blood test. It checks your blood for Rh antibodies. If you have Rh-negative blood and have been exposed to Rh-positive blood, your immune system may make antibodies to attack the Rh-positive blood. When a pregnant woman has these antibodies, it is called Rh sensitization.  Why is this test done?  The Rh antibodies screening test is done during pregnancy to find out if your baby is at risk for Rh disease. This can happen if you have Rh-negative blood and your baby has Rh-positive blood. If your Rh-negative blood mixes with Rh-positive blood, your immune system will make antibodies to attack the Rh-positive blood.  During pregnancy, these antibodies could attach to the baby's red blood cells. This can cause your baby to have serious health problems. The results of this test will help your doctor know how to best care for you and your baby during your pregnancy.  How do you prepare for the test?  In general, there's nothing you have to do before this test, unless your doctor tells you to.  How is the test done?  A health professional uses a needle to take a blood sample, usually from the arm.  What happens after the test?  You will probably be able to go home right away. It depends on the reason for the test.  You can go back to your usual activities right away.  Follow-up care is a key part of your treatment and safety. Be sure to make and go to all appointments, and call your doctor if you are having problems. It's also a good idea to keep a list of the medicines you take. Ask  "your doctor when you can expect to have your test results.  Where can you learn more?  Go to https://www.Nuzzel.net/patiented  Enter P722 in the search box to learn more about \"Rh Antibodies Screening During Pregnancy: About This Test.\"  Current as of: July 10, 2023  Content Version: 14. [employer].   Care instructions adapted under license by your healthcare professional. If you have questions about a medical condition or this instruction, always ask your healthcare professional. [employer] disclaims any warranty or liability for your use of this information.    Learning About Preventing Rh Disease  What is Rh disease?     Rh disease can be a serious problem in pregnancy. It happens when substances called antibodies in the mother's blood cause red blood cells in her baby's blood to be destroyed. This can occur when the blood types of a mother and her baby do not match.  All blood has an Rh factor. This is what makes a blood type positive or negative. When you are Rh-negative, your baby may be Rh-negative or Rh-positive. If your baby has Rh-positive blood and it mixes with yours, your body will make antibodies. This is called Rh sensitization.  Most of the time, this is not a problem in a first pregnancy. But in future pregnancies, it could cause Rh disease.  A  with Rh disease has mild anemia and may have jaundice. In severe cases, anemia, jaundice, and swelling can be very dangerous or fatal. Some babies need to be delivered early. Some need special care in the NICU. A very sick baby will need a blood transfusion before or after birth.  Fortunately, Rh sensitization is usually easy to prevent.  That's why it's important to get your Rh status checked in your first trimester. It doesn't cause any warning signs. A blood test is the only way to know if you are Rh-sensitive or are at risk for it.  How can you prevent Rh disease?  If you are Rh-negative, your doctor " "gives you an Rh immune globulin shot (such as RhoGAM). It helps prevent your body from making the antibodies that attack your baby's red blood cells.  Timing is important. You need the shot at certain times during your pregnancy. And you need one anytime there is a chance that your baby's blood might mix with yours. That can happen with certain prenatal tests or when you have pregnancy bleeding, such as:  Right after any pregnancy loss, amniocentesis, or CVS testing.  After turning of a breech baby.  Before and maybe after childbirth. Your doctor gives you a shot around week 28. If your  is Rh-positive, you will have another shot.  Follow-up care is a key part of your treatment and safety. Be sure to make and go to all appointments, and call your doctor if you are having problems. It's also a good idea to know your test results and keep a list of the medicines you take.  Where can you learn more?  Go to [email or web address]  Enter W177 in the search box to learn more about \"Learning About Preventing Rh Disease.\"  Current as of: July 10, 2023  Content Version: 14.1    8327-2618 [employer].   Care instructions adapted under license by your healthcare professional. If you have questions about a medical condition or this instruction, always ask your healthcare professional. [employer] disclaims any warranty or liability for your use of this information.    Learning About Rh Immunoglobulin Shots  Introduction     An Rh immunoglobulin shot is given to pregnant women who have Rh-negative blood.  You may have Rh-negative blood, and your baby may have Rh-positive blood. If the two types of blood mix, your body will make antibodies. This is called Rh sensitization. Most of the time, this is not a problem the first time you're pregnant. But it could cause problems in future pregnancies.  This shot keeps your body from making the antibodies. You get the shot around 28 weeks of " "pregnancy. After the birth, your baby's blood is tested. If the blood is Rh positive, you will get another shot. You may also get the shot if you have vaginal bleeding while you are pregnant or if you have a miscarriage. These shots protect future pregnancies.  Women with Rh negative blood will need this shot each time they get pregnant.  Example  Rh immunoglobulin (HypRho-D, MICRhoGAM, and RhoGAM)  Possible side effects  Rare side effects may include:  Some mild pain where you got the shot.  A slight fever.  An allergic reaction.  You may have other side effects not listed here. Check the information that comes with your medicine.  What to know about taking this medicine  You may need more than one shot. You may need the shot again:  After amniocentesis, fetal blood sampling, or chorionic villus sampling tests.  If you have bleeding in your second or third trimester.  After turning of a breech baby.  After an injury to the belly while you are pregnant.  After a miscarriage or an .  Before or right after treatment for an ectopic or a partial molar pregnancy.  Tell your doctor if you have any allergies or have had a bad response to medicines in the past.  If you get this shot within 3 months of getting a live-virus vaccine, the vaccine may not work. Your doctor will tell you if you need more vaccine.  Check with your doctor or pharmacist before you use any other medicines. This includes over-the-counter medicines. Make sure your doctor knows all of the medicines, vitamins, herbs, and supplements you take. Taking some medicines at the same time can cause problems.  Where can you learn more?  Go to https://www.Aspire.net/patiented  Enter V615 in the search box to learn more about \"Learning About Rh Immunoglobulin Shots.\"  Current as of: July 10, 2023               Content Version: 14.0    9687-1247 [employer].   Care instructions adapted under license by your healthcare professional. If you " have questions about a medical condition or this instruction, always ask your healthcare professional. Healthwise, [facility] disclaims any warranty or liability for your use of this information.      Rubella (Sinhala Measles): Care Instructions  Overview  Rubella, also called Sinhala measles or 3-day measles, is a disease caused by a virus. It spreads by coughs, sneezes, and close contact. Rubella usually is mild and does not cause long-term problems. But if you are pregnant and get it, you can give the disease to your unborn baby. This can cause serious birth defects.  While you have rubella, you may get a rash and a mild fever, and the lymph glands in your neck may swell. Older children often have a fever, eye pain, a sore throat, and body aches. You can relieve most symptoms with care at home. Avoid being around others, especially pregnant people, until your rash has been gone for at least 4 days. People who have not had this disease before or have not had the vaccine have the greatest chance of getting the virus.  Follow-up care is a key part of your treatment and safety. Be sure to make and go to all appointments, and call your doctor if you are having problems. It's also a good idea to know your test results and keep a list of the medicines you take.  How can you care for yourself at home?  Drink plenty of fluids. If you have kidney, heart, or liver disease and have to limit fluids, talk with your doctor before you increase the amount of fluids you drink.  Get plenty of rest to help your body heal.  Take an over-the-counter pain medicine, such as acetaminophen (Tylenol), ibuprofen (Advil, Motrin), or naproxen (Aleve), to reduce fever and discomfort. Read and follow all instructions on the label. Do not give aspirin to anyone younger than 20. It has been linked to Reye syndrome, a serious illness.  Do not take two or more pain medicines at the same time unless the doctor told you to. Many pain medicines have  "acetaminophen, which is Tylenol. Too much acetaminophen (Tylenol) can be harmful.  Try not to scratch the rash. Put cold, wet cloths on the rash to reduce itching.  Do not smoke. Smoking can make your symptoms worse. If you need help quitting, talk to your doctor about stop-smoking programs and medicines. These can increase your chances of quitting for good.  Avoid contact with people who have never had rubella and who have not been immunized.  When should you call for help?   Call your doctor now or seek immediate medical care if:    You have a fever with a stiff neck or a severe headache.     You are sensitive to light or feel very sleepy or confused.   Watch closely for changes in your health, and be sure to contact your doctor if:    You do not get better as expected.   Where can you learn more?  Go to https://www.Moonshado.net/patiented  Enter B812 in the search box to learn more about \"Rubella (Indonesian Measles): Care Instructions.\"  Current as of: 2023               Content Version: 14.0    2387-3314 [employer].   Care instructions adapted under license by your healthcare professional. If you have questions about a medical condition or this instruction, always ask your healthcare professional. [employer] disclaims any warranty or liability for your use of this information.      Gonorrhea and Chlamydia: About These Tests  What is it?  These tests use a sample of urine or other body fluid to look for the bacteria that cause these sexually transmitted infections (STIs). The fluid sample can come from the cervix, vagina, rectum, throat, or eyes.  Why is this test done?  These tests may be done to:  Find out if symptoms are caused by gonorrhea or chlamydia.  Check people who are at high risk of being infected with gonorrhea or chlamydia.  Retest people several months after they have been treated for gonorrhea or chlamydia.  Check for infection in your  if you had a " "gonorrhea or chlamydia infection at the time of delivery.  How can you prepare for the test?  If you are going to have a urine test, do not urinate for at least 1 hour before the test.  If you think you may have chlamydia or gonorrhea, don't have sexual intercourse until you get your test results. And you may want to have tests for other STIs, such as HIV.  How is the test done?  For a direct sample, a swab is used to collect body fluid from the cervix, vagina, rectum, throat, or eyes. Your doctor may collect the sample. Or you may be given instructions on how to collect your own sample.  For a urine sample, you will collect the urine that comes out when you first start to urinate. Don't wipe the genital area clean before you urinate.  How long does the test take?  The test will take a few minutes.  What happens after the test?  You will be able to go home right away.  You can go back to your usual activities right away.  If you do have an infection, don't have sexual intercourse for 7 days after you start treatment. And your sex partner(s) should also be treated.  Follow-up care is a key part of your treatment and safety. Be sure to make and go to all appointments, and call your doctor if you are having problems. It's also a good idea to keep a list of the medicines you take. Ask your doctor when you can expect to have your test results.  Where can you learn more?  Go to https://[email or web address].net/patiented  Enter K976 in the search box to learn more about \"Gonorrhea and Chlamydia: About These Tests.\"  Current as of: November 27, 2023  Content Version: 14.1    4171-8454 [employer].   Care instructions adapted under license by your healthcare professional. If you have questions about a medical condition or this instruction, always ask your healthcare professional. [employer] disclaims any warranty or liability for your use of this information.    Trichomoniasis: About This Test  What is " it?     This test uses a sample of urine or other body fluid to look for the tiny parasite that causes trichomoniasis (also called trich). The fluid sample can come from the vagina, cervix, or urethra. Your doctor may choose to use one or more of many available tests.  Why is it done?  A trich test may be done to:  Find out if symptoms are caused by trich.  Check people who are at high risk for being infected with trich.  Check after treatment to make sure that the infection is gone.  How do you prepare for the test?  If you are going to have a urine test, do not urinate for at least 1 hour before the test.  How is the test done?  For a direct sample, a swab is used to collect body fluid from the cervix, vagina, or urethra. Your doctor may collect the sample. Or you may be given instructions on how to collect your own sample.  For a urine sample, you will collect the urine that comes out when you first start to urinate. Don't wipe the area clean before you urinate.  How long does the test take?  It will take a few minutes to collect a sample.  What happens after the test?  You can go home right away.  You can go back to your usual activities right away.  You may get the test results the same day or several days later. It depends on the test used.  If you do have an infection, don't have sexual intercourse for 7 days after you start treatment. Your sex partner or partners should also be treated.  Follow-up care is a key part of your treatment and safety. Be sure to make and go to all appointments, and call your doctor if you are having problems. Ask your doctor when you can expect to have your test results.  Current as of: November 27, 2023  Content Version: 14.1    1229-0563 [employer].   Care instructions adapted under license by your healthcare professional. If you have questions about a medical condition or this instruction, always ask your healthcare professional. [employer] disclaims  "any warranty or liability for your use of this information.    HIV Testing: Care Instructions  Overview  You can get tested for the human immunodeficiency virus (HIV). Most doctors use a blood test to check for HIV antibodies and antigens in your blood. It may also check for the genetic material (RNA) of HIV. Some tests use saliva to check for HIV antibodies. But these aren't as accurate. For example, they may give a false result if you've just been infected.  What do the results mean?    Normal (negative)    No HIV antibodies, antigens, or RNA were found.  You may need more testing. It can make sure your test results are correct.    Uncertain (indeterminate)    Test results didn't clearly show if you have an HIV infection.  HIV antibodies or antigens may not have formed yet.  Some other type of antibody or antigen may have affected the results.  You will need another test to be sure.    Abnormal (positive)    HIV antibodies, antigens, or RNA were found.  If you haven't had an RNA test yet, one will be done. If it's positive, you have HIV.  If your test result is positive, your doctor will talk to you. You will discuss starting treatment.  Follow-up care is a key part of your treatment and safety. Be sure to make and go to all appointments, and call your doctor if you are having problems. It's also a good idea to know your test results and keep a list of the medicines you take.  Where can you learn more?  Go to https://www.Polyheal.net/patiented  Enter T792 in the search box to learn more about \"HIV Testing: Care Instructions.\"  Current as of: June 12, 2023               Content Version: 14.0    0108-1579 [employer].   Care instructions adapted under license by your healthcare professional. If you have questions about a medical condition or this instruction, always ask your healthcare professional. [employer] disclaims any warranty or liability for your use of this " "information.      Hepatitis C Virus Tests: About These Tests  What are they?     Hepatitis C virus tests are blood tests that check for substances in the blood that show whether you have hepatitis C now or had it in the past. The tests can also tell you what type of hepatitis C you have and how severe the disease is. This can help your doctor with treatment.  If the tests show that you have long-term hepatitis C, you need to take steps to prevent spreading the disease.  Why are these tests done?  You may need these tests if:  You have symptoms of hepatitis.  You may have been exposed to the virus. You are more likely to have been exposed to the virus if you inject drugs or are exposed to body fluids (such as if you are a health care worker).  You've had other tests that show you have liver problems.  You are 18 to 79 years old.  You have an HIV infection.  The tests also are done to help your doctor decide about your treatment and see how well it works.  How do you prepare for the test?  In general, there's nothing you have to do before this test, unless your doctor tells you to.  How is the test done?  A health professional uses a needle to take a blood sample, usually from the arm.  What happens after these tests?  You will probably be able to go home right away.  You can go back to your usual activities right away.  Follow-up care is a key part of your treatment and safety. Be sure to make and go to all appointments, and call your doctor if you are having problems. It's also a good idea to keep a list of the medicines you take. Ask your doctor when you can expect to have your test results.  Where can you learn more?  Go to https://www.[email or web address].net/patiented  Enter W551 in the search box to learn more about \"Hepatitis C Virus Tests: About These Tests.\"  Current as of: June 12, 2023               Content Version: 14.0    9611-0193 [employer].   Care instructions adapted under license by your " healthcare professional. If you have questions about a medical condition or this instruction, always ask your healthcare professional. Healthwise, [facility] disclaims any warranty or liability for your use of this information.      Learning About Fetal Ultrasound Results  What is a fetal ultrasound?     Fetal ultrasound is a test that lets your doctor see an image of your baby. Your doctor learns information about your baby from this picture. You may find out, for example, if you are having a boy or a girl. But the main reason you have this test is to get information about your baby's health.  (You may hear your baby called a fetus. This is a common medical term for a baby that's growing in the mother's uterus.)  What kind of information can you learn from this test?  The findings of an ultrasound fall into two categories, normal and abnormal.  Normal  The fetus is the right size for its age.  The placenta is the expected size and does not cover the cervix.  There is enough amniotic fluid in the uterus.  No birth defects can be seen.  Abnormal  The fetus is small or large for its age.  The placenta covers the cervix.  There is too much or too little amniotic fluid in the uterus.  The fetus may have a birth defect.  What does an abnormal result mean?  Abnormal seems to imply that something is wrong with your baby. But what it means is that the test has shown something the doctor wants to take a closer look at.  And that's what happens next. Your doctor will talk to you about what further test or tests you may need.  What do the results mean?  Some of the things your doctor may see on an abnormal ultrasound include:  Echogenic bowel.  The bowel looks very bright on the screen. This could mean that there's blood in the bowel. Or it could mean that something is blocking the small bowel.  Increased nuchal translucency.  The ultrasound measures the thickness at the back of the baby's neck. An increase in thickness is  "sometimes an early sign of Down syndrome.  Increased or decreased amniotic fluid.  The doctor will look for a reason for the level of amniotic fluid and will watch the pregnancy closely as it progresses.  Large ventricles.  Ventricles in the brain look larger than they should. Your doctor may take a closer look at the brain.  Renal pyelectasis/hydronephrosis.  The ultrasound measures the fluid around the kidney. If there is more fluid than expected, there is a chance of urinary tract or kidney problems.  Short long bones.  The ultrasound measures certain arm and leg bones. A long bone (humerus or femur) that is shorter than average could be a sign of Down syndrome.  Subchorionic hemorrhage.  An ultrasound can show bleeding under one of the membranes that surrounds the fetus. Some women don't have symptoms of bleeding. The ultrasound can find this problem when women are not bleeding from their vagina. Women who have this condition have a slightly higher chance of miscarriage.  What do you do now?  Take a deep breath, and let it out. Keep in mind that an abnormal finding on an ultrasound, after it's coupled with more information, may:  Turn out to be nothing.  Turn out to be something mild that won't affect the baby.  Turn out to be something more serious. But if this happens, early diagnosis helps you and your doctor plan treatment options sooner rather than later.  Your medical team is there for you. So are your family and friends. Ask questions, and get the help and support you need.  Follow-up care is a key part of your treatment and safety. Be sure to make and go to all appointments, and call your doctor if you are having problems. It's also a good idea to know your test results and keep a list of the medicines you take.  Where can you learn more?  Go to https://www.healthwise.net/patiented  Enter K451 in the search box to learn more about \"Learning About Fetal Ultrasound Results.\"  Current as of: July 10, " 2023  Content Version: 14.1    [id number] [employer].   Care instructions adapted under license by your healthcare professional. If you have questions about a medical condition or this instruction, always ask your healthcare professional. [employer] disclaims any warranty or liability for your use of this information.    Learning About Prenatal Visits  Overview     Regular prenatal visits are very important during any pregnancy. These quick office visits may seem simple and routine. But they can help you have a safe and healthy pregnancy. Your doctor is watching for problems that can only be found through regular checkups. The visits also give you and your doctor time to build a good relationship.  After your first visit, you will most likely start on a schedule of monthly visits. In your third trimester, the visits will get more frequent. Based on your health, your age, and if you've had a normal, full-term pregnancy before, your doctor may want to see you more or less often.  At different times in your pregnancy, you will have exams and tests. Some are routine. Others are done only when there is a chance of a problem. Everything healthy you do for your body helps you have a healthy pregnancy. Rest when you need it. Eat well, drink plenty of water, and exercise regularly.  What happens during a prenatal visit?  You will have blood pressure checks, along with urine tests. You also may have blood tests. If you need to go to the bathroom while waiting for the doctor, tell the nurse. You will be given a sample cup so your urine can be tested.  You will be weighed and have your belly measured.  Your doctor may listen to the fetal heartbeat with a special device.  At about 24 weeks, and possibly earlier in your pregnancy, your doctor will check your blood sugar (glucose tolerance test) for diabetes that can occur during pregnancy. This is gestational diabetes, which can be harmful.  You will  have tests to check for infections that could harm your . These include group B streptococcus and hepatitis B.  Your doctor may do ultrasounds to check for problems. This also checks the position of the fetus. An ultrasound uses sound waves to produce a picture of the fetus.  You may get your vaccines updated.  Your doctor may ask you questions to check for signs of anxiety or depression. Tell your doctor if you feel sad, anxious, or hopeless for more than a few days.  You may have other tests at any time during your pregnancy.  Use your visits to discuss with your doctor any concerns you have.  How can you care for yourself at home?  Get plenty of rest.  Try to exercise every day, if your doctor says it is okay. If you have not exercised in the past, start out slowly. For example, you can take short walks each day.  Choose healthy foods, such as fruits, vegetables, whole grains, lean proteins, low-fat dairy, and healthy fats.  Drink plenty of fluids. Cut down on drinks with caffeine, such as coffee, tea, and cola. If you have kidney, heart, or liver disease and have to limit fluids, talk with your doctor before you increase the amount of fluids you drink.  Try to avoid chemical fumes, paint fumes, and poisons.  If you smoke, vape, or use alcohol, marijuana, or other drugs, quit or cut back as much as you can. Talk to your doctor if you need help quitting.  Review all of your medicines, including over-the-counter medicines and supplements, with your doctor. Some of your routine medicines may need to be changed. Do not stop or start taking any medicines without talking to your doctor first.  Follow-up care is a key part of your treatment and safety. Be sure to make and go to all appointments, and call your doctor if you are having problems. It's also a good idea to know your test results and keep a list of the medicines you take.  Where can you learn more?  Go to https://www.healthwise.net/patiented  Enter  "J502 in the search box to learn more about \"Learning About Prenatal Visits.\"  Current as of: July 10, 2023               Content Version: 14.0    2662-5010 [employer].   Care instructions adapted under license by your healthcare professional. If you have questions about a medical condition or this instruction, always ask your healthcare professional. [employer] [facility] [employer] disclaims any warranty or liability for your use of this information.      Intimate Partner Violence: Care Instructions  Overview     If you want to save this information but don't think it is safe to take it home, see if a trusted friend can keep it for you. Plan ahead. Know who you can call for help, and memorize the phone number.   Be careful online too. Your online activity may be seen by others. Do not use your personal computer or device to read about this topic. Use a safe computer, such as one at work, a friend's home, or a library.    Intimate partner violence--a type of domestic abuse--is different from an argument now and then. It is a pattern of abuse that one person may use to control another person's behavior. It may start with threats and name-calling. Then, it may lead to more serious acts, like pushing and slapping. The abuse also may occur in other areas. For example, the abuser may withhold money or spend a partner's money without their knowledge.  Abuse can cause serious harm. You are more likely to have a long-term health problem from the injuries and stress of living in a violent relationship. People who are sexually abused by their partners have more sexually transmitted infections and unplanned pregnancies. Anyone who is abused also faces emotional pain. Anyone can be abused in relationships. In some relationships, both people use abusive behavior.  If you are pregnant, abuse can cause problems such as poor weight gain, infections, and bleeding. Abuse during this time may increase your baby's risk of low " "birth weight, premature birth, and death.  Follow-up care is a key part of your treatment and safety. Be sure to make and go to all appointments, and call your doctor if you are having problems. It's also a good idea to know your test results and keep a list of the medicines you take.  How can you care for yourself at home?  If you do not have a safe place to stay, discuss this with your doctor before you leave.  Have a plan for where to go, how to leave your home, and where to stay in case of an emergency. Do not tell your partner about your plan. Contact:  The National Domestic Violence Hotline toll-free at 1-666.592.4760. They can help you find resources in your area.  Your local police department, hospital, or clinic for information about shelters and safe homes near you.  Talk to a trusted friend or neighbor, a counselor, or a lm leader. Do not feel that you have to hide what happened.  Teach your children how to call for help in an emergency.  Be alert to warning signs, such as threats, heavy alcohol use, or drug use. This can help you avoid danger.  If you can, make sure that there are no guns or other weapons in your home.  When should you call for help?   Call 911 anytime you think you may need emergency care. For example, call if:    You or someone else has just been abused.     You think you or someone else is in danger of being abused.   Watch closely for changes in your health, and be sure to contact your doctor if you have any problems.  Where can you learn more?  Go to https://www.Rezzcard.net/patiented  Enter G282 in the search box to learn more about \"Intimate Partner Violence: Care Instructions.\"  Current as of: June 24, 2023               Content Version: 14.0    4870-2647 [employer].   Care instructions adapted under license by your healthcare professional. If you have questions about a medical condition or this instruction, always ask your healthcare professional. [employer] disclaims any warranty or liability for your use of this information.      Intimate Partner Violence Safety Instructions: Care Instructions  Overview     If you want to save this information but don't think it is safe to take it home, see if a trusted friend can keep it for you. Plan ahead. Know who you can call for help, and memorize the phone number.   Be careful online too. Your online activity may be seen by others. Do not use your personal computer or device to read about this topic. Use a safe computer, such as one at work, a friend's home, or a library.    When you are abused by a spouse or partner, you can take actions to protect yourself and your children.  You can increase your safety whether you decide to stay with your spouse or partner or you decide to leave. You may want to make a safety plan and pack a bag ahead of time. This will help you leave quickly when you decide to. Remember, you cannot change your partner's actions, but you can find help for you and your children. No one deserves to be abused.  Follow-up care is a key part of your treatment and safety. Be sure to make and go to all appointments, and call your doctor if you are having problems. It's also a good idea to know your test results and keep a list of the medicines you take.  How can you care for yourself at home?  Make a plan for your safety   If you decide to stay with your abusive spouse or partner, you can do the following to increase your safety:  Decide what works best to keep you safe in an emergency.  Know who you can call to help you in an emergency.  Decide if you will call the police if you get hurt again. If you can, agree on a signal with your children or neighbor to call the police for you if you need help. You can flash lights or hang something out of a window.  Choose a safe place to go for a short time if you need to leave home. Memorize the address and phone number.  Learn escape routes out of your home  in case you need to leave in a hurry. Teach your children different ways to get out of your home quickly if they need to.  If you can, hide or lock up things that can be used as weapons (guns, knives, hammers).  Learn the number of a domestic violence shelter. Talk to the people there about how they can help.  Find out about other community resources that can help you.  Take pictures of bruises or other injuries if you can. You can also take pictures of things your abuser has broken.  Teach your children that violence is never okay. Tell them that they do not deserve to be hurt.  Pack a bag   Prepare a bag with things you will need if you leave suddenly. Leave it with a friend, a relative, or someone else you trust. You could include the following items in the bag:  A set of keys to your home and car.  Emergency phone numbers and addresses.  Money such as cash or checks. You can also ask a friend, a relative, or someone else you trust to hold money for you.  Copies of legal documents such as house and car titles or rent receipts, birth certificates, Social Security card, voter registration, marriage and 's licenses, and your children's health records.  Personal items you would need for a few days, such as clothes, a toothbrush, toothpaste, and any medicines you or your children need.  A favorite toy or book for your child or children.  Diapers and bottles, if you have very young children.  Pictures that show signs of abuse and violence. You may also add pictures of your abuser.  If you leave   If you decide to leave, you can take the following steps:  Go to the emergency room at a hospital if you have been hurt.  Think about asking the police to be with you as you leave. They can protect you as you leave your home.  If you decide to leave secretly, remember that activities can be tracked. Your abuser may still have access to your cell phone, email, and credit cards. It may be possible for these to be traced.  "Always be aware of your surroundings.  If this is an emergency, do not worry about gathering up anything. Just leave--your safety is most important.  If your abuser moves out, change the locks on the doors. If you have a security system, change the access code.  When should you call for help?   Call 911 anytime you think you may need emergency care. For example, call if:    You or someone else has just been abused.     You think you or someone else is in danger of being abused.   Watch closely for changes in your health, and be sure to contact your doctor if you have any problems.  Where can you learn more?  Go to https://www.Dove[employer]ene.net/patiented  Enter A752 in the search box to learn more about \"Intimate Partner Violence Safety Instructions: Care Instructions.\"  Current as of: June 24, 2023               Content Version: 14.0    0684-4080 [employer].   Care instructions adapted under license by your healthcare professional. If you have questions about a medical condition or this instruction, always ask your healthcare professional. [employer] disclaims any warranty or liability for your use of this information.      Learning About Intimate Partner Violence  What is intimate partner violence?  Intimate partner violence is a type of domestic abuse. It's threatening, emotionally harmful, or violent behavior in a personal relationship. It can happen between past or current partners or spouses. In some relationships both people abuse each other. One partner may be more abusive. Or the abuse may be equal.  Abuse can affect people of any ethnic group, race, or Yarsanism. It can affect teens, adults, or the elderly. And it can happen to people of any sexual orientation, gender, or social status.  Abusers use fear, bullying, and threats to control their partners. They may control what their partners do. They may control where their partners go or who they see. They may act jealous, " controlling, or possessive. These early signs of abuse may happen soon after the start of the relationship. Sometimes it can be hard to notice abuse at first. But after the relationship becomes more serious, the abuse may get worse.  If you are being abused in your relationship, it's important to get help. The abuse is not your fault. You don't have to face it alone.  Be careful  It may not be safe to take home domestic abuse information like this handout. Some people ask a trusted friend to keep it for them. It's also important to plan ahead and to memorize the phone number of places you can go for help. If you are concerned about your safety, do not use your computer, smartphone, or tablet to read about domestic abuse.   What are the types of intimate partner violence?  Abuse can happen in different ways. Each type can happen on its own or in combination with others.  Emotional abuse  Emotional abuse is a pattern of threats, insults, or controlling behavior. It includes verbal abuse. It goes beyond healthy disagreements in a relationship. It's a sign of an unhealthy relationship.  Do you feel threatened, intimidated, or controlled?  Does your partner:  Threaten your children, other family members, or pets?  Use jokes meant to embarrass or shame you?  Call you names?  Tell you that you are a bad parent?  Threaten to take away your children?  Threaten to have you or your family members deported?  Control your access to money or other basic needs?  Control what you do, who you see or talk to, or where you go?  Another form of emotional abuse is denying that it is happening. Or the abuser may act like the abuse is no big deal or is your fault.  Sexual abuse  With sexual abuse, abusers may try to convince or force you to have sex. They may force you into sex acts you're not comfortable with. Or they may sexually assault you. Sexual abuse can happen even if you are in a committed relationship.  Physical abuse  Physical  abuse means that a partner hits, kicks, or does something else to physically hurt you. Physical abuse that starts with a slap might lead to kicking, shoving, and choking over time. The abuser may also threaten to hurt or kill you.  Stalking  Stalking means that an abuser gives you attention that you do not want and that causes you fear. Examples of stalking include:  Following you.  Showing up at places where the abuser isn't invited, such as at your work or school.  Constantly calling or texting you.  What problems can  to?  Intimate partner violence can be very dangerous. It can cause serious, repeated injury. It can even lead to death.  All forms of abuse can cause long-term health problems from the stress of a violent relationship. Verbal abuse can lead to sexual and physical abuse.  Abuse causes:  Emotional pain.  Depression.  Anxiety.  Post-traumatic stress.  Sexual abuse can lead to sexually transmitted infections (such as HIV/AIDS) and unplanned pregnancy.  Pregnancy can be a very dangerous time for people in abusive relationships. Abuse can cause or increase the risk of problems during pregnancy. These include low weight gain, anemia, infections, and bleeding. Abuse may also increase your baby's risk of low birth weight, premature birth, and death.  It can be hard for some victims of abuse to ask for help or to leave their relationship. You may feel scared, stuck, or not sure what steps to take. But it's important not to ignore abuse. Talking to someone you trust could be the first step to ending the abuse and taking care of your own health and happiness again. There are resources available that can help keep you safe.  Where can you get help?  Talk to a trusted friend. Find a local advocacy group, or talk to your doctor about the abuse.  Contact the National Domestic Violence Hotline at 3-917-657-SAFE (1-218.770.8364) for more safety tips. They can guide you to groups in your area that can help. Or  "go to the National Coalition Against Domestic Violence website at www.thehotline.org to learn more.  Domestic violence groups or a counselor in your area can help you make a safety plan for yourself and your children.  When to call for help  Call 911 anytime you think you may need emergency care. For example, call if:  You think that you or someone you know is in danger of being abused.  You have been hurt and can't have someone safely take you to emergency care.  You have just been abused.  A family member has just been abused.  Where can you learn more?  Go to https://www.HouseFix.net/patiented  Enter S665 in the search box to learn more about \"Learning About Intimate Partner Violence.\"  Current as of: June 24, 2023  Content Version: 14.1 2006-2024 [employer].   Care instructions adapted under license by your healthcare professional. If you have questions about a medical condition or this instruction, always ask your healthcare professional. [employer] disclaims any warranty or liability for your use of this information.    Vaginal Bleeding During Pregnancy: Care Instructions  Overview     It's common to have some vaginal spotting when you are pregnant. In some cases, the bleeding isn't serious. And there aren't any more problems with the pregnancy.  But sometimes bleeding is a sign of a more serious problem. This is more common if the bleeding is heavy or painful. Examples of more serious problems include miscarriage, an ectopic pregnancy, and a problem with the placenta.  You may have to see your doctor again to be sure everything is okay. You may also need more tests to find the cause of the bleeding.  Home treatment may be all you need. But it depends on what is causing the bleeding. Be sure to tell your doctor if you have any new symptoms or if your symptoms get worse.  The doctor has checked you carefully, but problems can develop later. If you notice any problems or new " symptoms, get medical treatment right away.  Follow-up care is a key part of your treatment and safety. Be sure to make and go to all appointments, and call your doctor if you are having problems. It's also a good idea to know your test results and keep a list of the medicines you take.  How can you care for yourself at home?  If your doctor prescribed medicines, take them exactly as directed. Call your doctor if you think you are having a problem with your medicine.  Do not have vaginal sex until your doctor says it's okay.  Do not put anything in your vagina until your doctor says it's okay.  Ask your doctor about other activities you can or can't do.  Get a lot of rest. Being pregnant can make you tired.  Do not use nonsteroidal anti-inflammatory drugs (NSAIDs), such as ibuprofen (Advil, Motrin), naproxen (Aleve), or aspirin, unless your doctor says it is okay.  When should you call for help?   Call 911 anytime you think you may need emergency care. For example, call if:    You passed out (lost consciousness).     You have severe vaginal bleeding. This means you are soaking through a pad each hour for 2 or more hours.     You have sudden, severe pain in your belly or pelvis.   Call your doctor now or seek immediate medical care if:    You have new or worse vaginal bleeding.     You are dizzy or lightheaded, or you feel like you may faint.     You have pain in your belly, pelvis, or lower back.     You think that you are in labor.     You have a sudden release of fluid from your vagina.     You've been having regular contractions for an hour. This means that you've had at least 8 contractions within 1 hour or at least 4 contractions within 20 minutes, even after you change your position and drink fluids.     You notice that your baby has stopped moving or is moving much less than normal.   Watch closely for changes in your health, and be sure to contact your doctor if you have any problems.  Where can you learn  "more?  Go to https://www.FlightStats.net/patiented  Enter N829 in the search box to learn more about \"Vaginal Bleeding During Pregnancy: Care Instructions.\"  Current as of: July 10, 2023               Content Version: 14.0    2187-6599 [employer].   Care instructions adapted under license by your healthcare professional. If you have questions about a medical condition or this instruction, always ask your healthcare professional. [employer] disclaims any warranty or liability for your use of this information.      Weeks 10 to 14 of Your Pregnancy: Care Instructions  It's now possible to hear the fetus's heartbeat with a special ultrasound device. And the fetus's organs are developing.    Decide about tests to check for birth defects. Think about your age, your chance of passing on a family disease, your need to know about any problems, and what you might do after you have the test results.    It's okay to exercise. Try activities such as walking or swimming. Check with your doctor before starting a new program.    You may feel more tired than usual.  Taking naps during the day may help.     You may feel emotional.  It might help to talk to someone.     You may have headaches.  Try lying down and putting a cool cloth over your forehead.     You can use acetaminophen (Tylenol) for pain relief.  Don't take any anti-inflammatory medicines (such as Advil, Motrin, Aleve), unless your doctor says it's okay.     You may feel a fullness or aching in your lower belly.  This can feel like the kind of cramps you might get before a period. A back rub may help.     You may need to urinate more.  Your growing uterus and changing hormones can affect your bladder.     You may feel sick to your stomach (morning sickness).  Try avoiding food and smells that make you feel sick.     Your breasts may feel different.  They may feel tender or get bigger. Your nipples may get darker. Try a bra that gives you good " "support.     Avoid alcohol, tobacco, and drugs (including marijuana).  If you need help quitting, talk to your doctor.     Take a daily prenatal vitamin.  Choose one with folic acid.   Follow-up care is a key part of your treatment and safety. Be sure to make and go to all appointments, and call your doctor if you are having problems. It's also a good idea to know your test results and keep a list of the medicines you take.  Where can you learn more?  Go to https://www.[employer]ie.net/patiented  Enter E090 in the search box to learn more about \"Weeks 10 to 14 of Your Pregnancy: Care Instructions.\"  Current as of: July 10, 2023               Content Version: 14.0    5218-3901 [employer].   Care instructions adapted under license by your healthcare professional. If you have questions about a medical condition or this instruction, always ask your healthcare professional. [employer] disclaims any warranty or liability for your use of this information.        Nutrition During Pregnancy: Care Instructions  Overview     Healthy eating when you are pregnant is important for you and your baby. It can help you feel well and have a successful pregnancy and delivery. During pregnancy your nutrition needs increase. Even if you have excellent eating habits, your doctor may recommend a multivitamin to make sure you get enough iron and folic acid.  You may wonder how much weight you should gain. In general, if you were at a healthy weight before you became pregnant, then you should gain between 25 and 35 pounds. If you were overweight before pregnancy, then you'll likely be advised to gain 15 to 25 pounds. If you were underweight before pregnancy, then you'll probably be advised to gain 28 to 40 pounds. Your doctor will work with you to set a weight goal that is right for you. Gaining a healthy amount of weight helps you have a healthy baby.  Follow-up care is a key part of your treatment and safety. Be " sure to make and go to all appointments, and call your doctor if you are having problems. It's also a good idea to know your test results and keep a list of the medicines you take.  How can you care for yourself at home?  Eat plenty of fruits and vegetables. Include a variety of orange, yellow, and leafy dark-green vegetables every day.  Choose whole-grain bread, cereal, and pasta. Good choices include whole wheat bread, whole wheat pasta, brown rice, and oatmeal.  Get 4 or more servings of milk and milk products each day. Good choices include nonfat or low-fat milk, yogurt, and cheese. If you cannot eat milk products, you can get calcium from calcium-fortified products such as orange juice, soy milk, and tofu. Other non-milk sources of calcium include leafy green vegetables, such as broccoli, kale, mustard greens, turnip greens, bok leigha, and brussels sprouts.  If you eat meat, pick lower-fat types. Good choices include lean cuts of meat and chicken or turkey without the skin.  Avoid fish that are high in mercury. These include shark, swordfish, isac mackerel, marlin, orange roughy, and bigeye tuna, as well as tilefish from the [facility].  It's okay to eat up to 8 to 12 ounces a week of fish that are low in mercury or up to 4 ounces a week of fish that have medium levels of mercury. Some fish that are low in mercury are salmon, shrimp, canned light tuna, cod, and tilapia. Some fish that have medium levels of mercury are halibut and white albacore tuna.  For more advice about eating fish, you can visit the U.S. Food and Drug Administration (FDA) or U.S. Environmental Protection Agency (EPA) website.  Heat lunch meats (such as turkey, ham, or bologna) to 165 F before you eat them. This reduces your risk of getting sick from a kind of bacteria that can be found in lunch meats.  Do not eat unpasteurized soft cheeses, such as brie, feta, fresh mozzarella, and blue cheese. They have a bacteria that could harm your  "baby.  Limit caffeine to about 200 to 300 mg per day. On average, a cup of brewed coffee has around 80 to 100 mg of caffeine.  Do not drink any alcohol. No amount of alcohol has been found to be safe during pregnancy.  Do not diet or try to lose weight. For example, do not follow a low-carbohydrate diet. If you are overweight at the start of your pregnancy, your doctor will work with you to manage your weight gain.  Tell your doctor about all vitamins and supplements you take.  When should you call for help?  Watch closely for changes in your health, and be sure to contact your doctor if you have any problems.  Where can you learn more?  Go to https://www.[employer].net/patiented  Enter Y785 in the search box to learn more about \"Nutrition During Pregnancy: Care Instructions.\"  Current as of: September 20, 2023               Content Version: 14.0    7253-7507 [employer].   Care instructions adapted under license by your healthcare professional. If you have questions about a medical condition or this instruction, always ask your healthcare professional. [employer] disclaims any warranty or liability for your use of this information.      Exercise During Pregnancy: Care Instructions  Overview     Exercise is good for you during a healthy pregnancy. It can relieve back pain, swelling, and other discomforts. It also prepares your muscles for childbirth. And exercise can improve your energy level and help you sleep better.  If your doctor advises it, get more exercise. For example, walking is a good choice. Bit by bit, increase the amount you walk every day. Try for at least 30 minutes on most days of the week. You could also try a prenatal exercise class. But if you do not already exercise, be sure to talk with your doctor before you start a new exercise program. Doctors do not recommend contact sports during pregnancy.  Follow-up care is a key part of your treatment and safety. Be sure to " "make and go to all appointments, and call your doctor if you are having problems. It's also a good idea to know your test results and keep a list of the medicines you take.  How can you care for yourself at home?  Talk with your doctor about the right kind of exercise for each stage of pregnancy.  Listen to your body to know if your exercise is at a safe level.  Do not become overheated while you exercise. High body temperature can be harmful. Avoid activities that can make your body too hot.  If you feel tired, take it easy. You might walk instead of run.  If you are used to strenuous exercise, ask your doctor how to know when it's time to slow down.  If you exercised before getting pregnant, you should be able to keep up your routine early in your pregnancy. Later in your pregnancy, you may want to switch to more gentle activities.  Drink plenty of fluids before, during, and after exercise.  Avoid contact sports, such as soccer and basketball. Also avoid risky activities. These include scuba diving, horseback riding, and exercising at a high altitude (above 6,000 feet). If you live in a place with a high altitude, talk to your doctor about how you can exercise safely.  Do not get overtired while you exercise. You should be able to talk while you work out.  After your fourth month of pregnancy, avoid exercises that require you to lie flat on your back on a hard surface. These include sit-ups and some yoga poses.  Get plenty of rest. You may be very tired while you are pregnant.  Where can you learn more?  Go to https://www.[employer].net/patiented  Enter S801 in the search box to learn more about \"Exercise During Pregnancy: Care Instructions.\"  Current as of: July 10, 2023               Content Version: 14.0    7242-7421 [employer].   Care instructions adapted under license by your healthcare professional. If you have questions about a medical condition or this instruction, always ask your healthcare " "professional. [employer] [facility] disclaims any warranty or liability for your use of this information.      Learning About Pregnancy and Obesity  How does your weight affect your pregnancy?     The basics of prenatal care are the same for everyone, regardless of size. You'll get what you need to have a healthy baby.  But your size can make a difference in a few things. You and your doctor will have to watch your pregnancy weight. Your weight may affect your labor and delivery.  You may have some extra doctor visits and tests. And you may have some tests earlier in your pregnancy. You'll need to pay close attention to things like blood pressure and the chance of getting gestational diabetes. (This is a type of diabetes that sometimes happens during pregnancy.) And close attention will be given to your developing baby.  Work with your doctor to get the care you need. Go to all your doctor visits, and follow your doctor's advice about what to do and what to avoid during pregnancy.  How much weight gain is healthy?  If you are very overweight (obese), experts recommend that you gain between 11 and 20 pounds. Your doctor will work with you to set a weight goal that's right for you. In some cases, your doctor may recommend that you not gain any weight.  How much extra food do you need to eat?  Although you may joke that you're \"eating for two\" during pregnancy, you don't need to eat twice as much food. How much you can eat depends on:  Your height.  How much you weigh when you get pregnant.  How active you are.  If you're carrying more than one fetus (multiple pregnancy).  In the first trimester, you'll probably need the same amount of calories as you did before you were pregnant. In general, in your second trimester, you need to eat about 340 extra calories a day. In your third trimester, you need to eat about 450 extra calories a day.  What can you do to have a healthy pregnancy?  The best things you can do for " "you and your baby are to eat healthy foods, get regular exercise, avoid alcohol and smoking, and go to your doctor visits.  Eat a variety of foods from all the food groups. Make sure to get enough calcium and folic acid.  You may want to work with a dietitian to help you plan healthy meals to get the right amount of calories for you.  If you didn't exercise much before you got pregnant, talk to your doctor about how you can slowly get more active. Your doctor may want to set up an exercise program with you.  Where can you learn more?  Go to https://www.[employer].net/patiented  Enter B644 in the search box to learn more about \"Learning About Pregnancy and Obesity.\"  Current as of: July 10, 2023  Content Version: 14.1    6963-0011 [employer].   Care instructions adapted under license by your healthcare professional. If you have questions about a medical condition or this instruction, always ask your healthcare professional. [employer] disclaims any warranty or liability for your use of this information.    You have been provided the CDC Warning Signs in Pregnancy document.    Additional copies can be found here: [email or web address].com/324532.pdf  "

## 2024-08-07 ENCOUNTER — TELEPHONE (OUTPATIENT)
Dept: OBGYN | Facility: CLINIC | Age: 34
End: 2024-08-07
Payer: COMMERCIAL

## 2024-08-07 ENCOUNTER — LAB (OUTPATIENT)
Dept: LAB | Facility: CLINIC | Age: 34
End: 2024-08-07
Payer: COMMERCIAL

## 2024-08-07 DIAGNOSIS — R39.15 URINARY URGENCY: ICD-10-CM

## 2024-08-07 DIAGNOSIS — R35.0 URINARY FREQUENCY: ICD-10-CM

## 2024-08-07 DIAGNOSIS — R35.0 URINARY FREQUENCY: Primary | ICD-10-CM

## 2024-08-07 LAB
ALBUMIN UR-MCNC: NEGATIVE MG/DL
APPEARANCE UR: CLEAR
BILIRUB UR QL STRIP: NEGATIVE
COLOR UR AUTO: ABNORMAL
GLUCOSE UR STRIP-MCNC: NEGATIVE MG/DL
HGB UR QL STRIP: NEGATIVE
KETONES UR STRIP-MCNC: NEGATIVE MG/DL
LEUKOCYTE ESTERASE UR QL STRIP: ABNORMAL
MUCOUS THREADS #/AREA URNS LPF: PRESENT /LPF
NITRATE UR QL: NEGATIVE
PH UR STRIP: 5.5 [PH] (ref 5–7)
RBC URINE: 2 /HPF
SP GR UR STRIP: 1.02 (ref 1–1.03)
SPERM #/AREA URNS HPF: ABNORMAL /HPF
SQUAMOUS EPITHELIAL: 1 /HPF
TRANSITIONAL EPI: <1 /HPF
UROBILINOGEN UR STRIP-MCNC: NORMAL MG/DL
WBC URINE: 10 /HPF

## 2024-08-07 PROCEDURE — 81001 URINALYSIS AUTO W/SCOPE: CPT

## 2024-08-07 PROCEDURE — 87086 URINE CULTURE/COLONY COUNT: CPT

## 2024-08-07 NOTE — TELEPHONE ENCOUNTER
"Patient is 7+3, reports she has been having \"some cramping for the past couple of days that comes and goes\". Pt denied spotting, bleeding, abdominal pain, headaches, vaginal discharge, SOB, and/or chest pain. Pt reports \"I feel like I am having difficulty emptying my bladder all the way and having a lot more urinary urgency and frequency\". Pt denied having burning sensation when urinating, no fever, chills and/or back pain. Pt reports she has a hx of UTI's but \"I don't remember what symptoms I had last time because I was pregnant and there was a lot going on at the same time\".     Writer wrote order for UA and UC for pt to have done at outpatient lab. Pt verbalized understanding and had no further questions at this time.           "

## 2024-08-08 LAB — BACTERIA UR CULT: NORMAL

## 2024-08-13 DIAGNOSIS — R09.82 POST-NASAL DRIP: ICD-10-CM

## 2024-08-13 LAB
ABO/RH(D): NORMAL
ANTIBODY SCREEN: NEGATIVE
SPECIMEN EXPIRATION DATE: NORMAL

## 2024-08-13 RX ORDER — FLUTICASONE PROPIONATE 50 MCG
2 SPRAY, SUSPENSION (ML) NASAL DAILY
Qty: 16 G | Refills: 0 | Status: SHIPPED | OUTPATIENT
Start: 2024-08-13

## 2024-08-14 ENCOUNTER — LAB (OUTPATIENT)
Dept: LAB | Facility: CLINIC | Age: 34
End: 2024-08-14
Attending: ADVANCED PRACTICE MIDWIFE
Payer: COMMERCIAL

## 2024-08-14 ENCOUNTER — PRENATAL OFFICE VISIT (OUTPATIENT)
Dept: OBGYN | Facility: CLINIC | Age: 34
End: 2024-08-14
Attending: ADVANCED PRACTICE MIDWIFE
Payer: COMMERCIAL

## 2024-08-14 ENCOUNTER — TRANSCRIBE ORDERS (OUTPATIENT)
Dept: MATERNAL FETAL MEDICINE | Facility: CLINIC | Age: 34
End: 2024-08-14

## 2024-08-14 ENCOUNTER — ANCILLARY PROCEDURE (OUTPATIENT)
Dept: ULTRASOUND IMAGING | Facility: CLINIC | Age: 34
End: 2024-08-14
Attending: ADVANCED PRACTICE MIDWIFE
Payer: COMMERCIAL

## 2024-08-14 VITALS
WEIGHT: 205.6 LBS | DIASTOLIC BLOOD PRESSURE: 66 MMHG | HEART RATE: 51 BPM | HEIGHT: 64 IN | BODY MASS INDEX: 35.1 KG/M2 | SYSTOLIC BLOOD PRESSURE: 100 MMHG

## 2024-08-14 DIAGNOSIS — O09.299 HX OF MATERNAL THIRD DEGREE PERINEAL LACERATION, CURRENTLY PREGNANT: ICD-10-CM

## 2024-08-14 DIAGNOSIS — O09.291 HX OF PREECLAMPSIA, PRIOR PREGNANCY, CURRENTLY PREGNANT, FIRST TRIMESTER: ICD-10-CM

## 2024-08-14 DIAGNOSIS — O09.891 SUPERVISION OF OTHER HIGH RISK PREGNANCIES, FIRST TRIMESTER: ICD-10-CM

## 2024-08-14 DIAGNOSIS — O26.90 PREGNANCY RELATED CONDITION, ANTEPARTUM: Primary | ICD-10-CM

## 2024-08-14 DIAGNOSIS — Z34.90 EARLY STAGE OF PREGNANCY: ICD-10-CM

## 2024-08-14 DIAGNOSIS — Z86.32 HISTORY OF INSULIN CONTROLLED GESTATIONAL DIABETES MELLITUS (GDM): ICD-10-CM

## 2024-08-14 DIAGNOSIS — K59.09 OTHER CONSTIPATION: ICD-10-CM

## 2024-08-14 DIAGNOSIS — O09.891 SUPERVISION OF OTHER HIGH RISK PREGNANCIES, FIRST TRIMESTER: Primary | ICD-10-CM

## 2024-08-14 LAB
ALBUMIN MFR UR ELPH: 8.5 MG/DL
ALBUMIN SERPL BCG-MCNC: 4.2 G/DL (ref 3.5–5.2)
ALP SERPL-CCNC: 52 U/L (ref 40–150)
ALT SERPL W P-5'-P-CCNC: 8 U/L (ref 0–50)
ANION GAP SERPL CALCULATED.3IONS-SCNC: 11 MMOL/L (ref 7–15)
AST SERPL W P-5'-P-CCNC: 11 U/L (ref 0–45)
BILIRUB SERPL-MCNC: 0.3 MG/DL
BUN SERPL-MCNC: 5.6 MG/DL (ref 6–20)
CALCIUM SERPL-MCNC: 9.3 MG/DL (ref 8.8–10.4)
CHLORIDE SERPL-SCNC: 102 MMOL/L (ref 98–107)
CREAT SERPL-MCNC: 0.56 MG/DL (ref 0.51–0.95)
CREAT UR-MCNC: 118.6 MG/DL
EGFRCR SERPLBLD CKD-EPI 2021: >90 ML/MIN/1.73M2
ERYTHROCYTE [DISTWIDTH] IN BLOOD BY AUTOMATED COUNT: 13.2 % (ref 10–15)
GLUCOSE SERPL-MCNC: 82 MG/DL (ref 70–99)
HBA1C MFR BLD: 5.4 %
HBV SURFACE AB SERPL IA-ACNC: 806 M[IU]/ML
HBV SURFACE AB SERPL IA-ACNC: REACTIVE M[IU]/ML
HBV SURFACE AG SERPL QL IA: NONREACTIVE
HCO3 SERPL-SCNC: 22 MMOL/L (ref 22–29)
HCT VFR BLD AUTO: 36.6 % (ref 35–47)
HCV AB SERPL QL IA: NONREACTIVE
HGB BLD-MCNC: 12.4 G/DL (ref 11.7–15.7)
MCH RBC QN AUTO: 29.7 PG (ref 26.5–33)
MCHC RBC AUTO-ENTMCNC: 33.9 G/DL (ref 31.5–36.5)
MCV RBC AUTO: 88 FL (ref 78–100)
PLATELET # BLD AUTO: 296 10E3/UL (ref 150–450)
POTASSIUM SERPL-SCNC: 3.9 MMOL/L (ref 3.4–5.3)
PROT SERPL-MCNC: 8.1 G/DL (ref 6.4–8.3)
PROT/CREAT 24H UR: 0.07 MG/MG CR (ref 0–0.2)
RBC # BLD AUTO: 4.18 10E6/UL (ref 3.8–5.2)
SODIUM SERPL-SCNC: 135 MMOL/L (ref 135–145)
T PALLIDUM AB SER QL: NONREACTIVE
T4 FREE SERPL-MCNC: 1.41 NG/DL (ref 0.9–1.7)
TSH SERPL DL<=0.005 MIU/L-ACNC: 1.86 UIU/ML (ref 0.3–4.2)
VIT D+METAB SERPL-MCNC: 23 NG/ML (ref 20–50)
VZV IGG SER QL IA: 416.3 INDEX
VZV IGG SER QL IA: POSITIVE
WBC # BLD AUTO: 11.1 10E3/UL (ref 4–11)

## 2024-08-14 PROCEDURE — 86803 HEPATITIS C AB TEST: CPT | Performed by: ADVANCED PRACTICE MIDWIFE

## 2024-08-14 PROCEDURE — 82306 VITAMIN D 25 HYDROXY: CPT | Performed by: ADVANCED PRACTICE MIDWIFE

## 2024-08-14 PROCEDURE — 86780 TREPONEMA PALLIDUM: CPT | Performed by: ADVANCED PRACTICE MIDWIFE

## 2024-08-14 PROCEDURE — 86787 VARICELLA-ZOSTER ANTIBODY: CPT | Performed by: ADVANCED PRACTICE MIDWIFE

## 2024-08-14 PROCEDURE — 83036 HEMOGLOBIN GLYCOSYLATED A1C: CPT | Performed by: ADVANCED PRACTICE MIDWIFE

## 2024-08-14 PROCEDURE — 87340 HEPATITIS B SURFACE AG IA: CPT | Performed by: ADVANCED PRACTICE MIDWIFE

## 2024-08-14 PROCEDURE — 36415 COLL VENOUS BLD VENIPUNCTURE: CPT | Performed by: ADVANCED PRACTICE MIDWIFE

## 2024-08-14 PROCEDURE — 76817 TRANSVAGINAL US OBSTETRIC: CPT | Mod: 26 | Performed by: OBSTETRICS & GYNECOLOGY

## 2024-08-14 PROCEDURE — 86706 HEP B SURFACE ANTIBODY: CPT | Performed by: ADVANCED PRACTICE MIDWIFE

## 2024-08-14 PROCEDURE — 87591 N.GONORRHOEAE DNA AMP PROB: CPT | Performed by: ADVANCED PRACTICE MIDWIFE

## 2024-08-14 PROCEDURE — 84156 ASSAY OF PROTEIN URINE: CPT | Performed by: ADVANCED PRACTICE MIDWIFE

## 2024-08-14 PROCEDURE — 87086 URINE CULTURE/COLONY COUNT: CPT | Performed by: ADVANCED PRACTICE MIDWIFE

## 2024-08-14 PROCEDURE — 80053 COMPREHEN METABOLIC PANEL: CPT | Performed by: ADVANCED PRACTICE MIDWIFE

## 2024-08-14 PROCEDURE — 86762 RUBELLA ANTIBODY: CPT | Performed by: ADVANCED PRACTICE MIDWIFE

## 2024-08-14 PROCEDURE — 76817 TRANSVAGINAL US OBSTETRIC: CPT

## 2024-08-14 PROCEDURE — 86900 BLOOD TYPING SEROLOGIC ABO: CPT | Performed by: ADVANCED PRACTICE MIDWIFE

## 2024-08-14 PROCEDURE — 87491 CHLMYD TRACH DNA AMP PROBE: CPT | Performed by: ADVANCED PRACTICE MIDWIFE

## 2024-08-14 PROCEDURE — 87389 HIV-1 AG W/HIV-1&-2 AB AG IA: CPT | Performed by: ADVANCED PRACTICE MIDWIFE

## 2024-08-14 PROCEDURE — 84439 ASSAY OF FREE THYROXINE: CPT

## 2024-08-14 PROCEDURE — 84443 ASSAY THYROID STIM HORMONE: CPT

## 2024-08-14 PROCEDURE — G0463 HOSPITAL OUTPT CLINIC VISIT: HCPCS | Performed by: ADVANCED PRACTICE MIDWIFE

## 2024-08-14 PROCEDURE — 85027 COMPLETE CBC AUTOMATED: CPT | Performed by: ADVANCED PRACTICE MIDWIFE

## 2024-08-14 PROCEDURE — 99207 PR PRENATAL VISIT: CPT | Performed by: ADVANCED PRACTICE MIDWIFE

## 2024-08-14 RX ORDER — ASPIRIN 81 MG/1
81 TABLET ORAL DAILY
Qty: 90 TABLET | Refills: 2 | Status: SHIPPED | OUTPATIENT
Start: 2024-08-14 | End: 2025-05-11

## 2024-08-14 RX ORDER — POLYETHYLENE GLYCOL 3350 17 G/17G
17 POWDER, FOR SOLUTION ORAL DAILY
Qty: 510 G | Refills: 0 | Status: SHIPPED | OUTPATIENT
Start: 2024-08-14

## 2024-08-14 NOTE — PATIENT INSTRUCTIONS
Learning About Pregnancy  Your Care Instructions     Your health in the early weeks of your pregnancy is particularly important for your baby's health. Take good care of yourself. Anything you do that harms your body can also harm your baby.  Make sure to go to all of your doctor appointments. Regular checkups will help keep you and your baby healthy.  How can you care for yourself at home?  Diet    Choose healthy foods like fruits, vegetables, whole grains, lean proteins, and healthy fats.     Choose foods that are good sources of calcium, iron, and folate. You can try dairy products, dark leafy greens, fortified orange juice and cereals, almonds, broccoli, dried fruit, and beans.     Do not skip meals or go for many hours without eating. If you are nauseated, try to eat a small, healthy snack every 2 to 3 hours.     Avoid fish that are high in mercury. These include shark, swordfish, isac mackerel, marlin, orange roughy, and bigeye tuna, as well as tilefish from the [facility].     It's okay to eat up to 8 to 12 ounces a week of fish that are low in mercury or up to 4 ounces a week of fish that have medium levels of mercury. Some fish that are low in mercury are salmon, shrimp, canned light tuna, cod, and tilapia. Some fish that have medium levels of mercury are halibut and white albacore tuna.     Drink plenty of fluids. If you have kidney, heart, or liver disease and have to limit fluids, talk with your doctor before you increase the amount of fluids you drink.     Limit caffeine to about 200 to 300 mg per day. On average, a cup of brewed coffee has around 80 to 100 mg of caffeine.     Do not drink alcohol, such as beer, wine, or hard liquor.     Take a multivitamin that contains at least 400 micrograms (mcg) of folic acid to help prevent birth defects. Fortified cereal and whole wheat bread are good additional sources of folic acid.     Increase the calcium in your diet. Try to drink a quart of skim milk  each day. You may also take calcium supplements and choose foods such as cheese and yogurt.   Lifestyle    Make sure you go to your follow-up appointments.     Get plenty of rest. You may be unusually tired while you are pregnant.     Get at least 30 minutes of exercise on most days of the week. Walking is a good choice. If you have not exercised in the past, start out slowly. Take several short walks each day.     Do not smoke. If you need help quitting, talk to your doctor about stop-smoking programs. These can increase your chances of quitting for good.     Do not touch cat feces or litter boxes. Also, wash your hands after you handle raw meat, and fully cook all meat before you eat it. Wear gloves when you work in the yard or garden, and wash your hands well when you are done. Cat feces, raw or undercooked meat, and contaminated dirt can cause an infection that may harm your baby or lead to a miscarriage.     Avoid things that can make your body too hot and may be harmful to your baby, such as a hot tub or sauna. Or talk with your doctor before doing anything that raises your body temperature. Your doctor can tell you if it's safe.     Avoid chemical fumes, paint fumes, or poisons.     Do not use illegal drugs, marijuana, or alcohol.   Medicines    Review all of your medicines with your doctor. Some of your routine medicines may need to be changed to protect your baby.     Use acetaminophen (Tylenol) to relieve minor problems, such as a mild headache or backache or a mild fever with cold symptoms. Do not use nonsteroidal anti-inflammatory drugs (NSAIDs), such as ibuprofen (Advil, Motrin) or naproxen (Aleve), unless your doctor says it is okay.     Do not take two or more pain medicines at the same time unless the doctor told you to. Many pain medicines have acetaminophen, which is Tylenol. Too much acetaminophen (Tylenol) can be harmful.     Take your medicines exactly as prescribed. Call your doctor if you  "think you are having a problem with your medicine.   To manage morning sickness    Keep food in your stomach, but not too much at once. Try eating five or six small meals a day instead of three large meals.     For nausea when you wake up, eat a small snack, such as a couple of crackers or pretzels, before rising. Allow a few minutes for your stomach to settle before you slowly get up.     Try to avoid smells and foods that make you feel nauseated. High-fat or greasy foods, milk, and coffee may make nausea worse. Some foods that may be easier to tolerate include cold, spicy, sour, and salty foods.     Drink enough fluids. Water and other caffeine-free drinks are good choices.     Take your prenatal vitamins at night on a full stomach.     Try foods and drinks made with kaur. Kaur may help with nausea.     Get lots of rest. Morning sickness may be worse when you are tired.     Talk to your doctor about over-the-counter products, such as vitamin B6 or doxylamine, to help relieve symptoms.     Try a P6 acupressure wrist band. These anti-nausea wristbands help some people.   Follow-up care is a key part of your treatment and safety. Be sure to make and go to all appointments, and call your doctor if you are having problems. It's also a good idea to know your test results and keep a list of the medicines you take.  Where can you learn more?  Go to https://www.SwapDrive.net/patiented  Enter E868 in the search box to learn more about \"Learning About Pregnancy.\"  Current as of: July 10, 2023               Content Version: 14.0    3513-6305 [employer].   Care instructions adapted under license by your healthcare professional. If you have questions about a medical condition or this instruction, always ask your healthcare professional. [employer] disclaims any warranty or liability for your use of this information.      Weeks 6 to 10 of Your Pregnancy: Care Instructions  During these weeks of " "pregnancy, your body goes through many changes. You may start to feel different, both in your body and your emotions. Each pregnancy is different, so there's no \"right\" way to feel. These early weeks are a time to make healthy choices for you and your pregnancy.    Take a daily prenatal vitamin. Choose one with folic acid in it.    Avoid alcohol, tobacco, and drugs (including marijuana). If you need help quitting, talk to your doctor.    Drink plenty of liquids.  Be sure to drink enough water. And limit sodas, other sweetened drinks, and caffeine.     Choose foods that are good sources of calcium, iron, and folate.  You can try dairy products, dark leafy greens, fortified orange juice and cereals, almonds, broccoli, dried fruit, and beans.     Avoid foods that may be harmful.  Don't eat raw meat, deli meat, raw seafood, or raw eggs. Avoid soft cheese and unpasteurized dairy, like Brie and blue cheese. And don't eat fish that contains a lot of mercury, like shark and swordfish.     Don't touch titi litter or cat poop.  They can cause an infection that could be harmful during pregnancy.     Avoid things that can make your body too hot.  For example, avoid hot tubs and saunas.     Soothe morning sickness.  Try eating 5 or 6 small meals a day, getting some fresh air, or using carol to control symptoms.     Ask your doctor about flu and COVID-19 shots.  Getting them can help protect against infection.   Follow-up care is a key part of your treatment and safety. Be sure to make and go to all appointments, and call your doctor if you are having problems. It's also a good idea to know your test results and keep a list of the medicines you take.  Where can you learn more?  Go to [email or web address]://[email or web address]/[email or web address]  Enter G112 in the search box to learn more about \"Weeks 6 to 10 of Your Pregnancy: Care Instructions.\"  Current as of: July 10, 2023               Content Version: 14.0    3277-7425 [employer]. "   Care instructions adapted under license by your healthcare professional. If you have questions about a medical condition or this instruction, always ask your healthcare professional. [employer] disclaims any warranty or liability for your use of this information.         Pregnancy: Managing Morning Sickness (01:48)  Your health professional recommends that you watch this short online health video.  Learn how to manage morning sickness during pregnancy.   Purpose:  Goal: Learn how to manage morning sickness during pregnancy.    Watch: Scan the QR code or visit the link to view video       [email or web address]  Current as of: July 10, 2023  Content Version: 14.1 2006-2024 [employer].   Care instructions adapted under license by your healthcare professional. If you have questions about a medical condition or this instruction, always ask your healthcare professional. [employer] disclaims any warranty or liability for your use of this information.    Pregnancy and Heartburn: Care Instructions  Overview     Heartburn is a common problem during pregnancy.  Heartburn happens when stomach acid backs up into the tube that carries food to the stomach. This tube is called the esophagus. Early in pregnancy, heartburn is caused by hormone changes that slow down digestion. Later on, it's also caused by the large uterus pushing up on the stomach.  Even though you can't fix the cause, there are things you can do to get relief. Treating heartburn during pregnancy focuses first on making lifestyle changes, like changing what and how you eat, and on taking medicines.  Heartburn usually improves or goes away after childbirth.  Follow-up care is a key part of your treatment and safety. Be sure to make and go to all appointments, and call your doctor if you are having problems. It's also a good idea to know your test results and keep a list of the medicines you take.  How can you  "care for yourself at home?  Eat small, frequent meals.  Avoid foods that make your symptoms worse, such as chocolate, peppermint, and spicy foods. Avoid drinks with caffeine, such as coffee, tea, and sodas.  Avoid bending over or lying down after meals.  Take a short walk after you eat.  If heartburn is a problem at night, do not eat for 2 hours before bedtime.  Take antacids like Mylanta, Maalox, Rolaids, or Tums. Do not take antacids that have sodium bicarbonate, magnesium trisilicate, or aspirin. Be careful when you take over-the-counter antacid medicines. Many of these medicines have aspirin in them. While you are pregnant, do not take aspirin or medicines that contain aspirin unless your doctor says it is okay.  If you're not getting relief, talk to your doctor. You may be able to take a stronger acid-reducing medicine.  When should you call for help?   Call your doctor now or seek immediate medical care if:    You have new or worse belly pain.     You are vomiting.   Watch closely for changes in your health, and be sure to contact your doctor if:    You have new or worse symptoms of reflux.     You are losing weight.     You have trouble or pain swallowing.     You do not get better as expected.   Where can you learn more?  Go to https://[email or web address] Smart [email or web address]/[email or web address]  Enter U946 in the search box to learn more about \"Pregnancy and Heartburn: Care Instructions.\"  Current as of: July 10, 2023  Content Version: 14.1 2006-2024 [employer]w.   Care instructions adapted under license by your healthcare professional. If you have questions about a medical condition or this instruction, always ask your healthcare professional. [employer] disclaims any warranty or liability for your use of this information.    Constipation: Care Instructions  Overview     Constipation means that you have a hard time passing stools (bowel movements). People pass stools from 3 times a day to once every 3 days. " What is normal for you may be different. Constipation may occur with pain in the rectum and cramping. The pain may get worse when you try to pass stools. Sometimes there are small amounts of bright red blood on toilet paper or the surface of stools. This is because of enlarged veins near the rectum (hemorrhoids).  A few changes in your diet and lifestyle may help you avoid ongoing constipation. Your doctor may also prescribe medicine to help loosen your stool.  Some medicines can cause constipation. These include pain medicines and antidepressants. Tell your doctor about all the medicines you take. Your doctor may want to make a medicine change to ease your symptoms.  Follow-up care is a key part of your treatment and safety. Be sure to make and go to all appointments, and call your doctor if you are having problems. It's also a good idea to know your test results and keep a list of the medicines you take.  How can you care for yourself at home?  Drink plenty of fluids. If you have kidney, heart, or liver disease and have to limit fluids, talk with your doctor before you increase the amount of fluids you drink.  Include high-fiber foods in your diet each day. These include fruits, vegetables, beans, and whole grains.  Get at least 30 minutes of exercise on most days of the week. Walking is a good choice. You also may want to do other activities, such as running, swimming, cycling, or playing tennis or team sports.  Take a fiber supplement, such as Citrucel or Metamucil, every day. Read and follow all instructions on the label.  Schedule time each day for a bowel movement. A daily routine may help. Take your time having a bowel movement, but don't sit for more than 10 minutes at a time. And don't strain too much.  Support your feet with a small step stool when you sit on the toilet. This helps flex your hips and places your pelvis in a squatting position.  Your doctor may recommend an over-the-counter laxative to  "relieve your constipation. Examples are Milk of Magnesia and MiraLax. Read and follow all instructions on the label. Do not use laxatives on a long-term basis.  When should you call for help?   Call your doctor now or seek immediate medical care if:    You have new or worse belly pain.     You have new or worse nausea or vomiting.     You have blood in your stools.   Watch closely for changes in your health, and be sure to contact your doctor if:    Your constipation is getting worse.     You do not get better as expected.   Where can you learn more?  Go to [email or web address]://[email or web address]  Enter P343 in the search box to learn more about \"Constipation: Care Instructions.\"  Current as of: October 19, 2023               Content Version: 14.0    8980-6496 [employer].   Care instructions adapted under license by your healthcare professional. If you have questions about a medical condition or this instruction, always ask your healthcare professional. [employer] disclaims any warranty or liability for your use of this information.      Learning About High-Iron Foods  What foods are high in iron?     The foods you eat contain nutrients, such as vitamins and minerals. Iron is a nutrient. Your body needs the right amount to stay healthy and work as it should. You can use the list below to help you make choices about which foods to eat.  Here are some foods that contain iron. They have 1 to 2 milligrams of iron per serving.  Fruits  Figs (dried), 5 figs  Vegetables  Asparagus (canned), 6 foley  Sona, beet, Swiss chard, or turnip greens, 1 cup  Dried peas, cooked,   cup  Seaweed, spirulina (dried),   cup  Spinach, (cooked)   cup or (raw) 1 cup  Grains  Cereals, fortified with iron, 1 cup  Grits (instant, cooked), fortified with iron,   cup  Meats and other protein foods  Beans (kidney, lima, navy, white), canned or cooked,   cup  Beef or lamb, 3 oz  Chicken giblets, 3 oz  Chickpeas " "(garbanzo beans),   cup  Liver of beef, lamb, or pork, 3 oz  Oysters (cooked), 3 oz  Sardines (canned), 3 oz  Soybeans (boiled),   cup  Tofu (firm),   cup  Work with your doctor to find out how much of this nutrient you need. Depending on your health, you may need more or less of it in your diet.  Where can you learn more?  Go to https://www.[email or web address].net/[email or web address]  Enter R005 in the search box to learn more about \"Learning About High-Iron Foods.\"  Current as of: September 20, 2023  Content Version: 14.1    7538-2383 [email or web address].   Care instructions adapted under license by your healthcare professional. If you have questions about a medical condition or this instruction, always ask your healthcare professional. [email or web address] disclaims any warranty or liability for your use of this information.    Rh Antibodies Screening During Pregnancy: About This Test  What is it?     The Rh antibodies screening test is a blood test. It checks your blood for Rh antibodies. If you have Rh-negative blood and have been exposed to Rh-positive blood, your immune system may make antibodies to attack the Rh-positive blood. When a pregnant woman has these antibodies, it is called Rh sensitization.  Why is this test done?  The Rh antibodies screening test is done during pregnancy to find out if your baby is at risk for Rh disease. This can happen if you have Rh-negative blood and your baby has Rh-positive blood. If your Rh-negative blood mixes with Rh-positive blood, your immune system will make antibodies to attack the Rh-positive blood.  During pregnancy, these antibodies could attach to the baby's red blood cells. This can cause your baby to have serious health problems. The results of this test will help your doctor know how to best care for you and your baby during your pregnancy.  How do you prepare for the test?  In general, there's nothing you have to do before this test, unless your doctor tells you " "to.  How is the test done?  A health professional uses a needle to take a blood sample, usually from the arm.  What happens after the test?  You will probably be able to go home right away. It depends on the reason for the test.  You can go back to your usual activities right away.  Follow-up care is a key part of your treatment and safety. Be sure to make and go to all appointments, and call your doctor if you are having problems. It's also a good idea to keep a list of the medicines you take. Ask your doctor when you can expect to have your test results.  Where can you learn more?  Go to https://[email or web address].net/[email or web address]  Enter P722 in the search box to learn more about \"Rh Antibodies Screening During Pregnancy: About This Test.\"  Current as of: July 10, 2023  Content Version: 14.1    7890-0271 [employer].   Care instructions adapted under license by your healthcare professional. If you have questions about a medical condition or this instruction, always ask your healthcare professional. [employer] disclaims any warranty or liability for your use of this information.    Learning About Preventing Rh Disease  What is Rh disease?     Rh disease can be a serious problem in pregnancy. It happens when substances called antibodies in the mother's blood cause red blood cells in her baby's blood to be destroyed. This can occur when the blood types of a mother and her baby do not match.  All blood has an Rh factor. This is what makes a blood type positive or negative. When you are Rh-negative, your baby may be Rh-negative or Rh-positive. If your baby has Rh-positive blood and it mixes with yours, your body will make antibodies. This is called Rh sensitization.  Most of the time, this is not a problem in a first pregnancy. But in future pregnancies, it could cause Rh disease.  A  with Rh disease has mild anemia and may have jaundice. In severe cases, anemia, jaundice, and swelling can be " "very dangerous or fatal. Some babies need to be delivered early. Some need special care in the NICU. A very sick baby will need a blood transfusion before or after birth.  Fortunately, Rh sensitization is usually easy to prevent.  That's why it's important to get your Rh status checked in your first trimester. It doesn't cause any warning signs. A blood test is the only way to know if you are Rh-sensitive or are at risk for it.  How can you prevent Rh disease?  If you are Rh-negative, your doctor gives you an Rh immune globulin shot (such as RhoGAM). It helps prevent your body from making the antibodies that attack your baby's red blood cells.  Timing is important. You need the shot at certain times during your pregnancy. And you need one anytime there is a chance that your baby's blood might mix with yours. That can happen with certain prenatal tests or when you have pregnancy bleeding, such as:  Right after any pregnancy loss, amniocentesis, or CVS testing.  After turning of a breech baby.  Before and maybe after childbirth. Your doctor gives you a shot around week 28. If your  is Rh-positive, you will have another shot.  Follow-up care is a key part of your treatment and safety. Be sure to make and go to all appointments, and call your doctor if you are having problems. It's also a good idea to know your test results and keep a list of the medicines you take.  Where can you learn more?  Go to https://www.Qloud.net/patiented  Enter W177 in the search box to learn more about \"Learning About Preventing Rh Disease.\"  Current as of: July 10, 2023  Content Version: 14. [employer].   Care instructions adapted under license by your healthcare professional. If you have questions about a medical condition or this instruction, always ask your healthcare professional. [employer] disclaims any warranty or liability for your use of this information.    Learning About Rh " Immunoglobulin Shots  Introduction     An Rh immunoglobulin shot is given to pregnant women who have Rh-negative blood.  You may have Rh-negative blood, and your baby may have Rh-positive blood. If the two types of blood mix, your body will make antibodies. This is called Rh sensitization. Most of the time, this is not a problem the first time you're pregnant. But it could cause problems in future pregnancies.  This shot keeps your body from making the antibodies. You get the shot around 28 weeks of pregnancy. After the birth, your baby's blood is tested. If the blood is Rh positive, you will get another shot. You may also get the shot if you have vaginal bleeding while you are pregnant or if you have a miscarriage. These shots protect future pregnancies.  Women with Rh negative blood will need this shot each time they get pregnant.  Example  Rh immunoglobulin (HypRho-D, MICRhoGAM, and RhoGAM)  Possible side effects  Rare side effects may include:  Some mild pain where you got the shot.  A slight fever.  An allergic reaction.  You may have other side effects not listed here. Check the information that comes with your medicine.  What to know about taking this medicine  You may need more than one shot. You may need the shot again:  After amniocentesis, fetal blood sampling, or chorionic villus sampling tests.  If you have bleeding in your second or third trimester.  After turning of a breech baby.  After an injury to the belly while you are pregnant.  After a miscarriage or an .  Before or right after treatment for an ectopic or a partial molar pregnancy.  Tell your doctor if you have any allergies or have had a bad response to medicines in the past.  If you get this shot within 3 months of getting a live-virus vaccine, the vaccine may not work. Your doctor will tell you if you need more vaccine.  Check with your doctor or pharmacist before you use any other medicines. This includes over-the-counter medicines.  "Make sure your doctor knows all of the medicines, vitamins, herbs, and supplements you take. Taking some medicines at the same time can cause problems.  Where can you learn more?  Go to https://www.GupShup.net/patiented  Enter V615 in the search box to learn more about \"Learning About Rh Immunoglobulin Shots.\"  Current as of: July 10, 2023               Content Version: 14.0    6212-5688 [employer]   Care instructions adapted under license by your healthcare professional. If you have questions about a medical condition or this instruction, always ask your healthcare professional. [employer] disclaims any warranty or liability for your use of this information.      Rubella ([place] Measles): Care Instructions  Overview  Rubella, also called [place] measles or 3-day measles, is a disease caused by a virus. It spreads by coughs, sneezes, and close contact. Rubella usually is mild and does not cause long-term problems. But if you are pregnant and get it, you can give the disease to your unborn baby. This can cause serious birth defects.  While you have rubella, you may get a rash and a mild fever, and the lymph glands in your neck may swell. Older children often have a fever, eye pain, a sore throat, and body aches. You can relieve most symptoms with care at home. Avoid being around others, especially pregnant people, until your rash has been gone for at least 4 days. People who have not had this disease before or have not had the vaccine have the greatest chance of getting the virus.  Follow-up care is a key part of your treatment and safety. Be sure to make and go to all appointments, and call your doctor if you are having problems. It's also a good idea to know your test results and keep a list of the medicines you take.  How can you care for yourself at home?  Drink plenty of fluids. If you have kidney, heart, or liver disease and have to limit fluids, talk with your doctor before you " "increase the amount of fluids you drink.  Get plenty of rest to help your body heal.  Take an over-the-counter pain medicine, such as acetaminophen (Tylenol), ibuprofen (Advil, Motrin), or naproxen (Aleve), to reduce fever and discomfort. Read and follow all instructions on the label. Do not give aspirin to anyone younger than 20. It has been linked to Reye syndrome, a serious illness.  Do not take two or more pain medicines at the same time unless the doctor told you to. Many pain medicines have acetaminophen, which is Tylenol. Too much acetaminophen (Tylenol) can be harmful.  Try not to scratch the rash. Put cold, wet cloths on the rash to reduce itching.  Do not smoke. Smoking can make your symptoms worse. If you need help quitting, talk to your doctor about stop-smoking programs and medicines. These can increase your chances of quitting for good.  Avoid contact with people who have never had rubella and who have not been immunized.  When should you call for help?   Call your doctor now or seek immediate medical care if:    You have a fever with a stiff neck or a severe headache.     You are sensitive to light or feel very sleepy or confused.   Watch closely for changes in your health, and be sure to contact your doctor if:    You do not get better as expected.   Where can you learn more?  Go to https://[email or web address].net/patiented  Enter B812 in the search box to learn more about \"Rubella ([place] Measles): Care Instructions.\"  Current as of: June 12, 2023               Content Version: 14.0    5952-0447 [employer].   Care instructions adapted under license by your healthcare professional. If you have questions about a medical condition or this instruction, always ask your healthcare professional. [employer] disclaims any warranty or liability for your use of this information.      Gonorrhea and Chlamydia: About These Tests  What is it?  These tests use a sample of urine or other body " fluid to look for the bacteria that cause these sexually transmitted infections (STIs). The fluid sample can come from the cervix, vagina, rectum, throat, or eyes.  Why is this test done?  These tests may be done to:  Find out if symptoms are caused by gonorrhea or chlamydia.  Check people who are at high risk of being infected with gonorrhea or chlamydia.  Retest people several months after they have been treated for gonorrhea or chlamydia.  Check for infection in your  if you had a gonorrhea or chlamydia infection at the time of delivery.  How can you prepare for the test?  If you are going to have a urine test, do not urinate for at least 1 hour before the test.  If you think you may have chlamydia or gonorrhea, don't have sexual intercourse until you get your test results. And you may want to have tests for other STIs, such as HIV.  How is the test done?  For a direct sample, a swab is used to collect body fluid from the cervix, vagina, rectum, throat, or eyes. Your doctor may collect the sample. Or you may be given instructions on how to collect your own sample.  For a urine sample, you will collect the urine that comes out when you first start to urinate. Don't wipe the genital area clean before you urinate.  How long does the test take?  The test will take a few minutes.  What happens after the test?  You will be able to go home right away.  You can go back to your usual activities right away.  If you do have an infection, don't have sexual intercourse for 7 days after you start treatment. And your sex partner(s) should also be treated.  Follow-up care is a key part of your treatment and safety. Be sure to make and go to all appointments, and call your doctor if you are having problems. It's also a good idea to keep a list of the medicines you take. Ask your doctor when you can expect to have your test results.  Where can you learn more?  Go to https://www.healthwise.net/patiented  Enter K976 in the  "search box to learn more about \"Gonorrhea and Chlamydia: About These Tests.\"  Current as of: November 27, 2023  Content Version: 14.1 2006-2024 [employer].   Care instructions adapted under license by your healthcare professional. If you have questions about a medical condition or this instruction, always ask your healthcare professional. [employer] disclaims any warranty or liability for your use of this information.    Trichomoniasis: About This Test  What is it?     This test uses a sample of urine or other body fluid to look for the tiny parasite that causes trichomoniasis (also called trich). The fluid sample can come from the vagina, cervix, or urethra. Your doctor may choose to use one or more of many available tests.  Why is it done?  A trich test may be done to:  Find out if symptoms are caused by trich.  Check people who are at high risk for being infected with trich.  Check after treatment to make sure that the infection is gone.  How do you prepare for the test?  If you are going to have a urine test, do not urinate for at least 1 hour before the test.  How is the test done?  For a direct sample, a swab is used to collect body fluid from the cervix, vagina, or urethra. Your doctor may collect the sample. Or you may be given instructions on how to collect your own sample.  For a urine sample, you will collect the urine that comes out when you first start to urinate. Don't wipe the area clean before you urinate.  How long does the test take?  It will take a few minutes to collect a sample.  What happens after the test?  You can go home right away.  You can go back to your usual activities right away.  You may get the test results the same day or several days later. It depends on the test used.  If you do have an infection, don't have sexual intercourse for 7 days after you start treatment. Your sex partner or partners should also be treated.  Follow-up care is a key part of " your treatment and safety. Be sure to make and go to all appointments, and call your doctor if you are having problems. Ask your doctor when you can expect to have your test results.  Current as of: November 27, 2023  Content Version: 14.1    2086-5486 [employer].   Care instructions adapted under license by your healthcare professional. If you have questions about a medical condition or this instruction, always ask your healthcare professional. [employer] disclaims any warranty or liability for your use of this information.    HIV Testing: Care Instructions  Overview  You can get tested for the human immunodeficiency virus (HIV). Most doctors use a blood test to check for HIV antibodies and antigens in your blood. It may also check for the genetic material (RNA) of HIV. Some tests use saliva to check for HIV antibodies. But these aren't as accurate. For example, they may give a false result if you've just been infected.  What do the results mean?    Normal (negative)    No HIV antibodies, antigens, or RNA were found.  You may need more testing. It can make sure your test results are correct.    Uncertain (indeterminate)    Test results didn't clearly show if you have an HIV infection.  HIV antibodies or antigens may not have formed yet.  Some other type of antibody or antigen may have affected the results.  You will need another test to be sure.    Abnormal (positive)    HIV antibodies, antigens, or RNA were found.  If you haven't had an RNA test yet, one will be done. If it's positive, you have HIV.  If your test result is positive, your doctor will talk to you. You will discuss starting treatment.  Follow-up care is a key part of your treatment and safety. Be sure to make and go to all appointments, and call your doctor if you are having problems. It's also a good idea to know your test results and keep a list of the medicines you take.  Where can you learn more?  Go to  "https://www.[employer].net/patiented  Enter T792 in the search box to learn more about \"HIV Testing: Care Instructions.\"  Current as of: June 12, 2023               Content Version: 14.0    6787-6080 [employer].   Care instructions adapted under license by your healthcare professional. If you have questions about a medical condition or this instruction, always ask your healthcare professional. [employer] disclaims any warranty or liability for your use of this information.      Hepatitis C Virus Tests: About These Tests  What are they?     Hepatitis C virus tests are blood tests that check for substances in the blood that show whether you have hepatitis C now or had it in the past. The tests can also tell you what type of hepatitis C you have and how severe the disease is. This can help your doctor with treatment.  If the tests show that you have long-term hepatitis C, you need to take steps to prevent spreading the disease.  Why are these tests done?  You may need these tests if:  You have symptoms of hepatitis.  You may have been exposed to the virus. You are more likely to have been exposed to the virus if you inject drugs or are exposed to body fluids (such as if you are a health care worker).  You've had other tests that show you have liver problems.  You are 18 to 79 years old.  You have an HIV infection.  The tests also are done to help your doctor decide about your treatment and see how well it works.  How do you prepare for the test?  In general, there's nothing you have to do before this test, unless your doctor tells you to.  How is the test done?  A health professional uses a needle to take a blood sample, usually from the arm.  What happens after these tests?  You will probably be able to go home right away.  You can go back to your usual activities right away.  Follow-up care is a key part of your treatment and safety. Be sure to make and go to all appointments, and call " "your doctor if you are having problems. It's also a good idea to keep a list of the medicines you take. Ask your doctor when you can expect to have your test results.  Where can you learn more?  Go to https://www.[employer]Us.org.net/patiented  Enter W551 in the search box to learn more about \"Hepatitis C Virus Tests: About These Tests.\"  Current as of: June 12, 2023               Content Version: 14.0    6140-1686 [employer].   Care instructions adapted under license by your healthcare professional. If you have questions about a medical condition or this instruction, always ask your healthcare professional. [employer] disclaims any warranty or liability for your use of this information.      Learning About Fetal Ultrasound Results  What is a fetal ultrasound?     Fetal ultrasound is a test that lets your doctor see an image of your baby. Your doctor learns information about your baby from this picture. You may find out, for example, if you are having a boy or a girl. But the main reason you have this test is to get information about your baby's health.  (You may hear your baby called a fetus. This is a common medical term for a baby that's growing in the mother's uterus.)  What kind of information can you learn from this test?  The findings of an ultrasound fall into two categories, normal and abnormal.  Normal  The fetus is the right size for its age.  The placenta is the expected size and does not cover the cervix.  There is enough amniotic fluid in the uterus.  No birth defects can be seen.  Abnormal  The fetus is small or large for its age.  The placenta covers the cervix.  There is too much or too little amniotic fluid in the uterus.  The fetus may have a birth defect.  What does an abnormal result mean?  Abnormal seems to imply that something is wrong with your baby. But what it means is that the test has shown something the doctor wants to take a closer look at.  And that's what happens " next. Your doctor will talk to you about what further test or tests you may need.  What do the results mean?  Some of the things your doctor may see on an abnormal ultrasound include:  Echogenic bowel.  The bowel looks very bright on the screen. This could mean that there's blood in the bowel. Or it could mean that something is blocking the small bowel.  Increased nuchal translucency.  The ultrasound measures the thickness at the back of the baby's neck. An increase in thickness is sometimes an early sign of Down syndrome.  Increased or decreased amniotic fluid.  The doctor will look for a reason for the level of amniotic fluid and will watch the pregnancy closely as it progresses.  Large ventricles.  Ventricles in the brain look larger than they should. Your doctor may take a closer look at the brain.  Renal pyelectasis/hydronephrosis.  The ultrasound measures the fluid around the kidney. If there is more fluid than expected, there is a chance of urinary tract or kidney problems.  Short long bones.  The ultrasound measures certain arm and leg bones. A long bone (humerus or femur) that is shorter than average could be a sign of Down syndrome.  Subchorionic hemorrhage.  An ultrasound can show bleeding under one of the membranes that surrounds the fetus. Some women don't have symptoms of bleeding. The ultrasound can find this problem when women are not bleeding from their vagina. Women who have this condition have a slightly higher chance of miscarriage.  What do you do now?  Take a deep breath, and let it out. Keep in mind that an abnormal finding on an ultrasound, after it's coupled with more information, may:  Turn out to be nothing.  Turn out to be something mild that won't affect the baby.  Turn out to be something more serious. But if this happens, early diagnosis helps you and your doctor plan treatment options sooner rather than later.  Your medical team is there for you. So are your family and friends. Ask  "questions, and get the help and support you need.  Follow-up care is a key part of your treatment and safety. Be sure to make and go to all appointments, and call your doctor if you are having problems. It's also a good idea to know your test results and keep a list of the medicines you take.  Where can you learn more?  Go to https://www.[employer].net/patiented  Enter K451 in the search box to learn more about \"Learning About Fetal Ultrasound Results.\"  Current as of: July 10, 2023  Content Version: 14.1    7756-5409 [employer].   Care instructions adapted under license by your healthcare professional. If you have questions about a medical condition or this instruction, always ask your healthcare professional. [employer] disclaims any warranty or liability for your use of this information.    Learning About Prenatal Visits  Overview     Regular prenatal visits are very important during any pregnancy. These quick office visits may seem simple and routine. But they can help you have a safe and healthy pregnancy. Your doctor is watching for problems that can only be found through regular checkups. The visits also give you and your doctor time to build a good relationship.  After your first visit, you will most likely start on a schedule of monthly visits. In your third trimester, the visits will get more frequent. Based on your health, your age, and if you've had a normal, full-term pregnancy before, your doctor may want to see you more or less often.  At different times in your pregnancy, you will have exams and tests. Some are routine. Others are done only when there is a chance of a problem. Everything healthy you do for your body helps you have a healthy pregnancy. Rest when you need it. Eat well, drink plenty of water, and exercise regularly.  What happens during a prenatal visit?  You will have blood pressure checks, along with urine tests. You also may have blood tests. If you need " to go to the bathroom while waiting for the doctor, tell the nurse. You will be given a sample cup so your urine can be tested.  You will be weighed and have your belly measured.  Your doctor may listen to the fetal heartbeat with a special device.  At about 24 weeks, and possibly earlier in your pregnancy, your doctor will check your blood sugar (glucose tolerance test) for diabetes that can occur during pregnancy. This is gestational diabetes, which can be harmful.  You will have tests to check for infections that could harm your . These include group B streptococcus and hepatitis B.  Your doctor may do ultrasounds to check for problems. This also checks the position of the fetus. An ultrasound uses sound waves to produce a picture of the fetus.  You may get your vaccines updated.  Your doctor may ask you questions to check for signs of anxiety or depression. Tell your doctor if you feel sad, anxious, or hopeless for more than a few days.  You may have other tests at any time during your pregnancy.  Use your visits to discuss with your doctor any concerns you have.  How can you care for yourself at home?  Get plenty of rest.  Try to exercise every day, if your doctor says it is okay. If you have not exercised in the past, start out slowly. For example, you can take short walks each day.  Choose healthy foods, such as fruits, vegetables, whole grains, lean proteins, low-fat dairy, and healthy fats.  Drink plenty of fluids. Cut down on drinks with caffeine, such as coffee, tea, and cola. If you have kidney, heart, or liver disease and have to limit fluids, talk with your doctor before you increase the amount of fluids you drink.  Try to avoid chemical fumes, paint fumes, and poisons.  If you smoke, vape, or use alcohol, marijuana, or other drugs, quit or cut back as much as you can. Talk to your doctor if you need help quitting.  Review all of your medicines, including over-the-counter medicines and  "supplements, with your doctor. Some of your routine medicines may need to be changed. Do not stop or start taking any medicines without talking to your doctor first.  Follow-up care is a key part of your treatment and safety. Be sure to make and go to all appointments, and call your doctor if you are having problems. It's also a good idea to know your test results and keep a list of the medicines you take.  Where can you learn more?  Go to https://www.[employer].net/patiented  Enter J502 in the search box to learn more about \"Learning About Prenatal Visits.\"  Current as of: July 10, 2023               Content Version: 14.0    2987-3916 [employer].   Care instructions adapted under license by your healthcare professional. If you have questions about a medical condition or this instruction, always ask your healthcare professional. [employer] disclaims any warranty or liability for your use of this information.      Intimate Partner Violence: Care Instructions  Overview     If you want to save this information but don't think it is safe to take it home, see if a trusted friend can keep it for you. Plan ahead. Know who you can call for help, and memorize the phone number.   Be careful online too. Your online activity may be seen by others. Do not use your personal computer or device to read about this topic. Use a safe computer, such as one at work, a friend's home, or a library.    Intimate partner violence--a type of domestic abuse--is different from an argument now and then. It is a pattern of abuse that one person may use to control another person's behavior. It may start with threats and name-calling. Then, it may lead to more serious acts, like pushing and slapping. The abuse also may occur in other areas. For example, the abuser may withhold money or spend a partner's money without their knowledge.  Abuse can cause serious harm. You are more likely to have a long-term health problem " from the injuries and stress of living in a violent relationship. People who are sexually abused by their partners have more sexually transmitted infections and unplanned pregnancies. Anyone who is abused also faces emotional pain. Anyone can be abused in relationships. In some relationships, both people use abusive behavior.  If you are pregnant, abuse can cause problems such as poor weight gain, infections, and bleeding. Abuse during this time may increase your baby's risk of low birth weight, premature birth, and death.  Follow-up care is a key part of your treatment and safety. Be sure to make and go to all appointments, and call your doctor if you are having problems. It's also a good idea to know your test results and keep a list of the medicines you take.  How can you care for yourself at home?  If you do not have a safe place to stay, discuss this with your doctor before you leave.  Have a plan for where to go, how to leave your home, and where to stay in case of an emergency. Do not tell your partner about your plan. Contact:  The National Domestic Violence Hotline toll-free at 1-393.186.8124. They can help you find resources in your area.  Your local police department, hospital, or clinic for information about shelters and safe homes near you.  Talk to a trusted friend or neighbor, a counselor, or a lm leader. Do not feel that you have to hide what happened.  Teach your children how to call for help in an emergency.  Be alert to warning signs, such as threats, heavy alcohol use, or drug use. This can help you avoid danger.  If you can, make sure that there are no guns or other weapons in your home.  When should you call for help?   Call 911 anytime you think you may need emergency care. For example, call if:    You or someone else has just been abused.     You think you or someone else is in danger of being abused.   Watch closely for changes in your health, and be sure to contact your doctor if you  "have any problems.  Where can you learn more?  Go to https://www.[employer].net/patiented  Enter G282 in the search box to learn more about \"Intimate Partner Violence: Care Instructions.\"  Current as of: June 24, 2023               Content Version: 14.0    0918-9850 [employer].   Care instructions adapted under license by your healthcare professional. If you have questions about a medical condition or this instruction, always ask your healthcare professional. [employer] disclaims any warranty or liability for your use of this information.      Intimate Partner Violence Safety Instructions: Care Instructions  Overview     If you want to save this information but don't think it is safe to take it home, see if a trusted friend can keep it for you. Plan ahead. Know who you can call for help, and memorize the phone number.   Be careful online too. Your online activity may be seen by others. Do not use your personal computer or device to read about this topic. Use a safe computer, such as one at work, a friend's home, or a library.    When you are abused by a spouse or partner, you can take actions to protect yourself and your children.  You can increase your safety whether you decide to stay with your spouse or partner or you decide to leave. You may want to make a safety plan and pack a bag ahead of time. This will help you leave quickly when you decide to. Remember, you cannot change your partner's actions, but you can find help for you and your children. No one deserves to be abused.  Follow-up care is a key part of your treatment and safety. Be sure to make and go to all appointments, and call your doctor if you are having problems. It's also a good idea to know your test results and keep a list of the medicines you take.  How can you care for yourself at home?  Make a plan for your safety   If you decide to stay with your abusive spouse or partner, you can do the following to increase " your safety:  Decide what works best to keep you safe in an emergency.  Know who you can call to help you in an emergency.  Decide if you will call the police if you get hurt again. If you can, agree on a signal with your children or neighbor to call the police for you if you need help. You can flash lights or hang something out of a window.  Choose a safe place to go for a short time if you need to leave home. Memorize the address and phone number.  Learn escape routes out of your home in case you need to leave in a hurry. Teach your children different ways to get out of your home quickly if they need to.  If you can, hide or lock up things that can be used as weapons (guns, knives, hammers).  Learn the number of a domestic violence shelter. Talk to the people there about how they can help.  Find out about other community resources that can help you.  Take pictures of bruises or other injuries if you can. You can also take pictures of things your abuser has broken.  Teach your children that violence is never okay. Tell them that they do not deserve to be hurt.  Pack a bag   Prepare a bag with things you will need if you leave suddenly. Leave it with a friend, a relative, or someone else you trust. You could include the following items in the bag:  A set of keys to your home and car.  Emergency phone numbers and addresses.  Money such as cash or checks. You can also ask a friend, a relative, or someone else you trust to hold money for you.  Copies of legal documents such as house and car titles or rent receipts, birth certificates, Social Security card, voter registration, marriage and 's licenses, and your children's health records.  Personal items you would need for a few days, such as clothes, a toothbrush, toothpaste, and any medicines you or your children need.  A favorite toy or book for your child or children.  Diapers and bottles, if you have very young children.  Pictures that show signs of abuse and  "violence. You may also add pictures of your abuser.  If you leave   If you decide to leave, you can take the following steps:  Go to the emergency room at a hospital if you have been hurt.  Think about asking the police to be with you as you leave. They can protect you as you leave your home.  If you decide to leave secretly, remember that activities can be tracked. Your abuser may still have access to your cell phone, email, and credit cards. It may be possible for these to be traced. Always be aware of your surroundings.  If this is an emergency, do not worry about gathering up anything. Just leave--your safety is most important.  If your abuser moves out, change the locks on the doors. If you have a security system, change the access code.  When should you call for help?   Call 911 anytime you think you may need emergency care. For example, call if:    You or someone else has just been abused.     You think you or someone else is in danger of being abused.   Watch closely for changes in your health, and be sure to contact your doctor if you have any problems.  Where can you learn more?  Go to https://www.[employer].com.net/patiented  Enter A752 in the search box to learn more about \"Intimate Partner Violence Safety Instructions: Care Instructions.\"  Current as of: June 24, 2023               Content Version: 14.0    0793-8796 [employer]   Care instructions adapted under license by your healthcare professional. If you have questions about a medical condition or this instruction, always ask your healthcare professional. [employer] disclaims any warranty or liability for your use of this information.      Learning About Intimate Partner Violence  What is intimate partner violence?  Intimate partner violence is a type of domestic abuse. It's threatening, emotionally harmful, or violent behavior in a personal relationship. It can happen between past or current partners or spouses. In some " relationships both people abuse each other. One partner may be more abusive. Or the abuse may be equal.  Abuse can affect people of any ethnic group, race, or Orthodoxy. It can affect teens, adults, or the elderly. And it can happen to people of any sexual orientation, gender, or social status.  Abusers use fear, bullying, and threats to control their partners. They may control what their partners do. They may control where their partners go or who they see. They may act jealous, controlling, or possessive. These early signs of abuse may happen soon after the start of the relationship. Sometimes it can be hard to notice abuse at first. But after the relationship becomes more serious, the abuse may get worse.  If you are being abused in your relationship, it's important to get help. The abuse is not your fault. You don't have to face it alone.  Be careful  It may not be safe to take home domestic abuse information like this handout. Some people ask a trusted friend to keep it for them. It's also important to plan ahead and to memorize the phone number of places you can go for help. If you are concerned about your safety, do not use your computer, smartphone, or tablet to read about domestic abuse.   What are the types of intimate partner violence?  Abuse can happen in different ways. Each type can happen on its own or in combination with others.  Emotional abuse  Emotional abuse is a pattern of threats, insults, or controlling behavior. It includes verbal abuse. It goes beyond healthy disagreements in a relationship. It's a sign of an unhealthy relationship.  Do you feel threatened, intimidated, or controlled?  Does your partner:  Threaten your children, other family members, or pets?  Use jokes meant to embarrass or shame you?  Call you names?  Tell you that you are a bad parent?  Threaten to take away your children?  Threaten to have you or your family members deported?  Control your access to money or other basic  needs?  Control what you do, who you see or talk to, or where you go?  Another form of emotional abuse is denying that it is happening. Or the abuser may act like the abuse is no big deal or is your fault.  Sexual abuse  With sexual abuse, abusers may try to convince or force you to have sex. They may force you into sex acts you're not comfortable with. Or they may sexually assault you. Sexual abuse can happen even if you are in a committed relationship.  Physical abuse  Physical abuse means that a partner hits, kicks, or does something else to physically hurt you. Physical abuse that starts with a slap might lead to kicking, shoving, and choking over time. The abuser may also threaten to hurt or kill you.  Stalking  Stalking means that an abuser gives you attention that you do not want and that causes you fear. Examples of stalking include:  Following you.  Showing up at places where the abuser isn't invited, such as at your work or school.  Constantly calling or texting you.  What problems can  to?  Intimate partner violence can be very dangerous. It can cause serious, repeated injury. It can even lead to death.  All forms of abuse can cause long-term health problems from the stress of a violent relationship. Verbal abuse can lead to sexual and physical abuse.  Abuse causes:  Emotional pain.  Depression.  Anxiety.  Post-traumatic stress.  Sexual abuse can lead to sexually transmitted infections (such as HIV/AIDS) and unplanned pregnancy.  Pregnancy can be a very dangerous time for people in abusive relationships. Abuse can cause or increase the risk of problems during pregnancy. These include low weight gain, anemia, infections, and bleeding. Abuse may also increase your baby's risk of low birth weight, premature birth, and death.  It can be hard for some victims of abuse to ask for help or to leave their relationship. You may feel scared, stuck, or not sure what steps to take. But it's important not to  "ignore abuse. Talking to someone you trust could be the first step to ending the abuse and taking care of your own health and happiness again. There are resources available that can help keep you safe.  Where can you get help?  Talk to a trusted friend. Find a local advocacy group, or talk to your doctor about the abuse.  Contact the National Domestic Violence Hotline at 8-525-629-SAFE (1-303.818.4780) for more safety tips. They can guide you to groups in your area that can help. Or go to the National Coalition Against Domestic Violence website at www.thehotlGazelle.org to learn more.  Domestic violence groups or a counselor in your area can help you make a safety plan for yourself and your children.  When to call for help  Call 911 anytime you think you may need emergency care. For example, call if:  You think that you or someone you know is in danger of being abused.  You have been hurt and can't have someone safely take you to emergency care.  You have just been abused.  A family member has just been abused.  Where can you learn more?  Go to https://www.[employer].net/patiented  Enter S665 in the search box to learn more about \"Learning About Intimate Partner Violence.\"  Current as of: June 24, 2023  Content Version: 14.1 2006-2024 [employer].   Care instructions adapted under license by your healthcare professional. If you have questions about a medical condition or this instruction, always ask your healthcare professional. [employer] disclaims any warranty or liability for your use of this information.    Vaginal Bleeding During Pregnancy: Care Instructions  Overview     It's common to have some vaginal spotting when you are pregnant. In some cases, the bleeding isn't serious. And there aren't any more problems with the pregnancy.  But sometimes bleeding is a sign of a more serious problem. This is more common if the bleeding is heavy or painful. Examples of more serious problems " include miscarriage, an ectopic pregnancy, and a problem with the placenta.  You may have to see your doctor again to be sure everything is okay. You may also need more tests to find the cause of the bleeding.  Home treatment may be all you need. But it depends on what is causing the bleeding. Be sure to tell your doctor if you have any new symptoms or if your symptoms get worse.  The doctor has checked you carefully, but problems can develop later. If you notice any problems or new symptoms, get medical treatment right away.  Follow-up care is a key part of your treatment and safety. Be sure to make and go to all appointments, and call your doctor if you are having problems. It's also a good idea to know your test results and keep a list of the medicines you take.  How can you care for yourself at home?  If your doctor prescribed medicines, take them exactly as directed. Call your doctor if you think you are having a problem with your medicine.  Do not have vaginal sex until your doctor says it's okay.  Do not put anything in your vagina until your doctor says it's okay.  Ask your doctor about other activities you can or can't do.  Get a lot of rest. Being pregnant can make you tired.  Do not use nonsteroidal anti-inflammatory drugs (NSAIDs), such as ibuprofen (Advil, Motrin), naproxen (Aleve), or aspirin, unless your doctor says it is okay.  When should you call for help?   Call 911 anytime you think you may need emergency care. For example, call if:    You passed out (lost consciousness).     You have severe vaginal bleeding. This means you are soaking through a pad each hour for 2 or more hours.     You have sudden, severe pain in your belly or pelvis.   Call your doctor now or seek immediate medical care if:    You have new or worse vaginal bleeding.     You are dizzy or lightheaded, or you feel like you may faint.     You have pain in your belly, pelvis, or lower back.     You think that you are in labor.      "You have a sudden release of fluid from your vagina.     You've been having regular contractions for an hour. This means that you've had at least 8 contractions within 1 hour or at least 4 contractions within 20 minutes, even after you change your position and drink fluids.     You notice that your baby has stopped moving or is moving much less than normal.   Watch closely for changes in your health, and be sure to contact your doctor if you have any problems.  Where can you learn more?  Go to https://www.[employer].net/patiented  Enter N829 in the search box to learn more about \"Vaginal Bleeding During Pregnancy: Care Instructions.\"  Current as of: July 10, 2023               Content Version: 14.0    9277-1257 [employer].   Care instructions adapted under license by your healthcare professional. If you have questions about a medical condition or this instruction, always ask your healthcare professional. [employer] disclaims any warranty or liability for your use of this information.    Thank you for trusting us with your care!   Please be aware, if you are on [employer], you may see your results prior to your providers review. If labs are abnormal, we will call or message you on [employer] with a follow up plan.    If you need to contact us for questions about:  Symptoms, Scheduling & Medical Questions; Non-urgent (2-3 day response) [employer] message, Urgent (needing response today) [phone] (if after 3:30pm next day response)   Prescriptions: Please call your Pharmacy   Billing: Neli [phone] or  Physicians:[phone]    "
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
## 2024-08-15 PROBLEM — O09.899 RUBELLA NON-IMMUNE STATUS, ANTEPARTUM: Status: ACTIVE | Noted: 2024-08-15

## 2024-08-15 PROBLEM — Z28.39 RUBELLA NON-IMMUNE STATUS, ANTEPARTUM: Status: ACTIVE | Noted: 2024-08-15

## 2024-08-15 LAB
BACTERIA UR CULT: NO GROWTH
C TRACH DNA SPEC QL NAA+PROBE: NEGATIVE
HIV 1+2 AB+HIV1 P24 AG SERPL QL IA: NONREACTIVE
N GONORRHOEA DNA SPEC QL NAA+PROBE: NEGATIVE
RUBV IGG SERPL QL IA: 0.92 INDEX
RUBV IGG SERPL QL IA: NORMAL

## 2024-09-13 DIAGNOSIS — L20.9 ATOPIC DERMATITIS, UNSPECIFIED TYPE: ICD-10-CM

## 2024-09-17 PROBLEM — N90.810 FEMALE GENITAL MUTILATION: Status: RESOLVED | Noted: 2022-09-26 | Resolved: 2024-09-17

## 2024-09-18 ENCOUNTER — PRENATAL OFFICE VISIT (OUTPATIENT)
Dept: OBGYN | Facility: CLINIC | Age: 34
End: 2024-09-18
Attending: REGISTERED NURSE
Payer: COMMERCIAL

## 2024-09-18 VITALS
DIASTOLIC BLOOD PRESSURE: 77 MMHG | SYSTOLIC BLOOD PRESSURE: 126 MMHG | HEART RATE: 75 BPM | WEIGHT: 202.4 LBS | BODY MASS INDEX: 35.03 KG/M2

## 2024-09-18 DIAGNOSIS — O09.899 RUBELLA NON-IMMUNE STATUS, ANTEPARTUM: ICD-10-CM

## 2024-09-18 DIAGNOSIS — O09.891 SUPERVISION OF OTHER HIGH RISK PREGNANCIES, FIRST TRIMESTER: Primary | ICD-10-CM

## 2024-09-18 DIAGNOSIS — Z28.39 RUBELLA NON-IMMUNE STATUS, ANTEPARTUM: ICD-10-CM

## 2024-09-18 PROCEDURE — G0463 HOSPITAL OUTPT CLINIC VISIT: HCPCS | Performed by: REGISTERED NURSE

## 2024-09-18 PROCEDURE — 99207 PR PRENATAL VISIT: CPT | Performed by: REGISTERED NURSE

## 2024-09-18 ASSESSMENT — PAIN SCALES - GENERAL: PAINLEVEL: NO PAIN (0)

## 2024-09-18 NOTE — TELEPHONE ENCOUNTER
FUTURE VISIT INFORMATION      FUTURE VISIT INFORMATION:  Date: 10/16/24  Time: 9:00 am  Location: [facility]  REFERRAL INFORMATION:  Referring provider:  Jose Miguel Arizmendi MD   Referring providers clinic:  [facility]  Reason for visit/diagnosis:  L20.89 (ICD-10-CM) - Other atopic dermatitis, Patch testing consult, (chronic hand dermatitis acd vs ad)     RECORDS REQUESTED FROM:       Clinic name Comments Records Status Imaging Status   [facility] 5/1/24 - OV, [place]  1/17/24 - OV, [place]  10/11/23 - OV, [place] Epic    [facility] 3/20/24 - VV, [place]  11/29/23 - VV, [place] Epic    [facility] 12/29/22 - OV, Andres Simon  11/3/22 - OV, Andres Simon Epic

## 2024-09-18 NOTE — PROGRESS NOTES
Subjective:      34 year old  at 13w3d presents for a routine prenatal appointment.      no vaginal bleeding or leakage of fluid.  no contractions or cramping.   No HA, visual changes, RUQ or epigastric pain.  Reviewed OBI labs: all WNL except rubella equivocal, low vit D (23)  Level II US scheduled 10/23/24    Concerns:  Feeling pretty constant L sharp groin pain for the past two weeks, worsens with activity. Fatigue and constipation improved from earlier in pregnancy. Taking Vitamin D supplements, hasn't started aspirin yet. Getting flu shot through work.      Objective:  Vitals:    24 0850   BP: 126/77   Pulse: 75   Weight: 91.8 kg (202 lb 6.4 oz)     See OB flowsheet    Assessment/Plan    Patient Active Problem List    Diagnosis Date Noted    Rubella non-immune status, antepartum 08/15/2024     Priority: Medium     Equivocal rubella       Supervision of other high risk pregnancies, first trimester 2024     Priority: Medium     [facility] Patient Provider Group choice: CNM group  Partner's name: Christianne Ca  Employment: part time MA  [x]NOB folder  [x]Dating  [x] 1st trimester screening: Patient declines 1st tri genetic screening  [x]Fetal anatomy US ordered  [x] recommended LD ASA after 12 wks for PRE-E risk  [x] GDM risk, recommended early GCT and hgb A1C  [x]No need for utox in labor  [x]COVID vaccine completed  [x]Pap UTD- due     12-23wks________________________  []Offer AFP after 15 wks  []Rubella equivocal  [x]Hep B immune   [x]Varicella immune  []FLU shot    24-28wk_________________________  []EOB folder  []Labor plans:  []Prenatal Ed  []:  []Infant feeding plan  [] care provider choice  []PP Contraception plan: If tubal,consent date:  []TDAP   []Rhogam if needed, date:    29-35 wk________________________  []TOLAC consent done  [] Water birth interest  []GCT nml results  []RSV    36-37 wks______________________   [] GBS/CBC  []OTC PP meds sent  []PP  recovery plans/support:  []Planning CS-ERAS pkt    38-42 wks______________________  []IOL reason/plans  []Postdates BPP       History of insulin controlled gestational diabetes mellitus (GDM) 08/14/2024     Priority: Medium     A1C today 8/14/24 and early 1hr      Hx of preeclampsia, prior pregnancy, currently pregnant, first trimester 08/14/2024     Priority: Medium     Baseline PreE labs 8/14/24, to start ASA @12wks      Hx of maternal third degree perineal laceration, currently pregnant 08/14/2024     Priority: Medium    Migraine with aura and without status migrainosus, not intractable 05/15/2024     Priority: Medium    Cervical high risk HPV (human papillomavirus) test positive 04/24/2024     Priority: Medium     7/7/20 NIL pap   4/24/24 NIL pap, + HR HPV (not 16 or 18). Plan: cotest in 1 yr.   4/30/24 Pt notified       Current moderate episode of major depressive disorder without prior episode (H) 02/01/2024     Priority: Medium    Hx of CHRIS (generalized anxiety disorder) 02/01/2024     Priority: Medium    Postural tachycardia with syncope 08/29/2022     Priority: Medium     In pregnancy.-Has had postural tachycardia with syncope, went to ED twice for concerns of lightheadedness   Heart rate has gone up to 150 during these episodes  Wore a Holter monitor for three days. She will ship the monitor today. Has an echo scheduled for early September and visit with cardiology in October 2022 9/26/22 Briana has continued to feel a racing heart all day long. She has had a normal echocardiogram and Holter monitor workup. Has appt with cardiology early October. Denies any syncope now that she is out of the first trimester  10/5/22 met with Cardiology, they recommended compression socks      BMI 35.0-35.9,adult 07/29/2022     Priority: Medium     BPP weekly @37wks      Vitamin D deficiency 07/15/2022     Priority: Medium     18 at intake. Rx sent for recommended supplementation of 4000IU daily.   11/16/22- Vit D 26-  review at next visit___           - Reviewed likely round ligament pain and encouraged comfort measures for L groin pain. Encouraged to let us know if symptoms worsen.     Updated individualized prenatal care plan checklist on problemlist, reviewed relevant lab results   Return to clinic in 4 weeks and prn if questions or concerns.     I, Marta Patel, am serving as a scribe; to document services personally performed by Zain Vaughn CNM, based on data collection and the provider's statements to me.     Marta Patel    I agree with the PFSH and ROS as completed by Marta except for changes made by me. The remainder of the encounter was performed by me and scribed by Marta Patel. The scribed note accurately reflects my personal services and decisions made by me.     ERICA Manuel CNM

## 2024-09-19 RX ORDER — FLUOCINOLONE ACETONIDE 0.11 MG/ML
OIL TOPICAL
Qty: 118.28 ML | Refills: 0 | Status: SHIPPED | OUTPATIENT
Start: 2024-09-19

## 2024-09-23 ENCOUNTER — MYC MEDICAL ADVICE (OUTPATIENT)
Dept: OBGYN | Facility: CLINIC | Age: 34
End: 2024-09-23
Payer: COMMERCIAL

## 2024-09-24 ENCOUNTER — TELEPHONE (OUTPATIENT)
Dept: CARDIOLOGY | Facility: CLINIC | Age: 34
End: 2024-09-24
Payer: COMMERCIAL

## 2024-09-24 DIAGNOSIS — R55 VASOVAGAL SYNCOPE: Primary | ICD-10-CM

## 2024-09-24 NOTE — TELEPHONE ENCOUNTER
9/24 Patient confirmed scheduled appointment:  Date: 10/16/2024  Time: 9:30 am  Visit type: Return Cardiology  Provider: Neal  [place] [facility] [place]imaging: n/a  Additional notes: n/a

## 2024-09-24 NOTE — TELEPHONE ENCOUNTER
Referral placed and work note created. Physical copy printed and signed if she would like to . Thanks!    Becca

## 2024-09-24 NOTE — TELEPHONE ENCOUNTER
Yes I'd be happy to write a work note. Can you ask her what she'd like it to say? If she needs more frequent breaks or a chair to sit in during longer procedures? Also please find out if she has a cardiologist to see during the pregnancy for POTS otherwise I will refer her. Thanks!    Becca

## 2024-10-02 ENCOUNTER — VIRTUAL VISIT (OUTPATIENT)
Dept: PHARMACY | Facility: CLINIC | Age: 34
End: 2024-10-02
Attending: STUDENT IN AN ORGANIZED HEALTH CARE EDUCATION/TRAINING PROGRAM
Payer: COMMERCIAL

## 2024-10-02 DIAGNOSIS — L20.9 ATOPIC DERMATITIS, UNSPECIFIED TYPE: ICD-10-CM

## 2024-10-02 NOTE — PROGRESS NOTES
Medication Therapy Management (MTM) Encounter    ASSESSMENT:                            Medication Adherence/Access: No issues identified.    Atopic dermatitis: Uncontrolled at this time. Would benefit from from further discussion of Dupixent risk vs benefit use in pregnancy at upcoming appointment with Dr. Arsh Childers MD. It would be reasonable to continue the discussion to ensure the patient understands the risks of leaving atopic dermatitis uncontrolled during pregnancy. At the same time, it s important to inform the patient about the limited evidence regarding use in pregnancy, while noting that case reports so far have not raised significant safety concerns.    PLAN:                            Fluocinolone oil for scalp refilled per patient request.   Consider receiving the following vaccination(s): COVID-19 booster and annual flu shot.    Follow-up: Liseth 10/16/24; Kyleigh 5/7/25; MTM not scheduled    SUBJECTIVE/OBJECTIVE:                          Briana Newman is a 34 year old female called for a follow-up visit.      Reason for visit: Dupixent check in.    Allergies/ADRs: Reviewed in chart.  Past Medical History: Reviewed in chart.  Tobacco: She reports that she has never smoked. She has never been exposed to tobacco smoke. She has never used smokeless tobacco.  Alcohol: Reviewed in chart.    Medication Adherence/Access: no issues reported.    Notes: follow up on lipid levels after next lab to assess if lifestyle changes were enough to bring values to goal.       Atopic dermatitis:   - Dupilumab (Dupixent) autoinjector: Inject 300 mg subcutaneously every 14 days. [Not currently taking; patient stopped due to pregnancy].   - Clobetasol 0.05% cream: apply twice daily as needed.  - Triamcinolone 0.1% cream: twice daily as needed.  - Hydrocortisone 0.1% cream: Apply topically on hands as needed.  - Fluocinolone 0.01 % external oil: Apply topically once daily as needed to affected area(s) on scalp. [requested  refill]      10/02/24: Patient reports she stopped Dupixent because she found out she was pregnant.  Notes that her entire back and scalp and arms and stomach are flaring.  Her itching is particularly significant on her back, where she reports her skin is missing from the areas she scratches.  Briefly discussed risks versus benefits of being on Dupixent during pregnancy, and patient notes she would rather avoid use since there is limited evidence of it being used in pregnancy.  Does note that she is losing chunks of her hair and would like to add some kind of medication to help with the symptoms on her head.    POEM (Patient-Oriented Eczema Measure)    Over the last week, on how many days has your:    ...skin been itchy because of the eczema? Every day (4 pts)     Intensity of itching:  Unbearable    ...sleep been disturbed because of the eczema?  Every day (4 pts); wakes up scracthing; scratching in sleep   ...skin been bleeding because of the eczema? Every day (4 pts)   ...skin been weeping or oozing clear fluid because of the eczema?  No days (0 pts)   ...skin been cracked because of the eczema?  Every day (4 pts); hands   ...skin been flaking off because of the eczema? Every day (4 pts); scalp   ...skin felt dry or rough because of the eczema? Every day (4 pts)   Total POEM Score (max of 28; each question 0-4 points)   24      03/20/24: Has not needed any topicals in about one month. Started Dupixent 12/20/23, but ended up running into an insurance issue and changed pharmacies, so now gets from [facility] and had about 1 month where she lost access to the medication; now been back on for about 2 months. Has some itching/discomfort that she notices about 5 days prior to her next injection; open to continuing current regimen for now and seeing if that improves over time; did discuss we could consider increasing to 300 mg every 7 days if needed in the future. Notices some mild dry eye; went to an  eye doctor last week and no concerns were shared by that provider; open to trying Systane Ultra as needed. She feels like she may have allergies that she is unaware of and is interested in discussing allergy testing at next appt with Dr. Jose Miguel Arizmendi MD.      11/29/23: No side effects at this time; topical treatments not effective at controlling symptoms. Patient noted a fear of being potentially allergic to medications. Symptoms on back, scalp, and hands; are raw, itchy, and rough. She is noticing some itchiness starting on her thighs now. Clothes are bothersome on her skin. When she wakes up, she will notice blood spots on her sheets where her back was. Per Dr. Jose Miguel Arizmendi MD note on 10/11/23, greater than 30% BSA involved, disruption to life, and failure of topical steroids to control disease. Right now, not currently using clobetasol 0.05% cream, uses triamcinolone 0.1% once daily on her back at bedtime, hydrocortisone 0.1% once daily on hands, and fluocinolone 0.01% oil once weekly on scalp.     Specialist: Dr. Jose Miguel Arizmendi MD, Dermatology. Last visit on 5/1/24. The following was recommended:   Pruritus / Other atopic dermatitis  -Continue Dupixent every 2 weeks, itching has significantly improved she still has slight itching on her palms before her next injection but is not bothersome enough to use her topicals  - She has triamcinolone and clobetasol at home if she needs, okay to use twice daily as needed  Follow-up in 1 year.     Previous treatment: Tried a few other lotions/ointments when she was back home in [place], but no other prescriptions tried.      Immunization History   Covid-19 vaccine  Due to receive   Influenza (annual) Due to receive, avoid live FluMist   Tetanus/Tdap Up-to-date   All patients on biologics should avoid live vaccines (varicella/VZV, intranasal influenza, MMR, or yellow fever vaccine (if traveling))     Today's Vitals: LMP 06/16/2024 (Exact Date)   ----------------  I spent 10  minutes with this patient today. All changes were made via collaborative practice agreement with Dr. Jose Miguel Arizmendi MD. A copy of the visit note was provided to the patient's provider(s).    A summary of these recommendations was sent via [employer].    Marta Britt, Pharm.D.  Medication Therapy Management Pharmacist   [facility]    Telemedicine Visit Details  Type of service:  Telephone visit  Start Time: 11:30 AM  End Time: 11:40 AM     Medication Therapy Recommendations  No medication therapy recommendations to display

## 2024-10-02 NOTE — Clinical Note
IVAN -- Briana has stopped her Dupixent due to finding out she is pregnant. Briefly discussed risk vs benefits of this choice, although more discussion at appointment with Dr. Childers on 10/16 may be helpful. Sounds like she is having significant flare symptoms but also does not feel comfortable with current level of evidence of use of Dupixent in pregnancy. Thanks!

## 2024-10-08 NOTE — PROGRESS NOTES
"  Date Fasting Post-Breakfast Post-Lunch Post-Dinner    100       100       92  128      94   132      89   168     1/3 90                     Subjective:      32 year old  at 34w6d presentst for a routine prenatal appointment.    n vaginal bleeding or leakage of fluid.  no contractions. pos fetal movement.       No HA, visual changes, RUQ or epigastric pain.   Patient concerns:    Feeling well overall.  Reviewed EOB labs with patient.  Reviewed TDAP Previously given   Recent growth US AGA  Objective:  Vitals:    23 1041   BP: 111/79   Pulse: 80   Weight: 93.2 kg (205 lb 6.4 oz)   Height: 1.626 m (5' 4\")   , see ob flowsheet  Assessment/Plan     Encounter Diagnoses   Name Primary?     High-risk pregnancy, second trimester Yes     Insulin controlled gestational diabetes mellitus (GDM) in third trimester      Orders Placed This Encounter   Procedures     US OB Fetal Biophys Prf wo NonStrs Singls Sgl     US OB Fetal Biophys Prf wo NonStrs Singls Sgl     US OB Fetal Biophys Prf wo NonStrs Singls Sgl     US OB Fetal Biophys Prf wo NonStrs Singls Sgl     US OB Fetal Biophys Prf wo NonStrs Singls Sgl     US OB Fetal Biophys Prf wo NonStrs Singls Sgl     US OB Follow Up >14 Weeks     US OB Fetal Biophys Prf wo NonStrs Singls Sgl     US OB Fetal Biophys Prf wo NonStrs Singls Sgl     No orders of the defined types were placed in this encounter.    Antibody Screen   Date Value Ref Range Status   2022 Negative Negative Final   , Rhogam  was notgiven.    - Reviewed total weight gain, encouraged continued healthy diet and exercise.  .  Reviewed importance of daily fetal kick count and why/how to contact provider.    - Reviewed why/how to contact provider if headache/visual changes/RUQ or epigastric pain, decreased fetal movement, vaginal bleeding, leakage of fluid or more than 4 contractions in an hour.     Patient education/orders or handouts today:  PTL signs/symptoms and BS values, " Speech Therapy        Relevant History/Co-morbidities: 10/8/24: Repeat MRI of brain demonstrates an increase in petechial blood products suspicious for an evolving infarct with adjacent vasogenic edema  10/7/24: MRI of brain remarkable for small evolving infarct in the left frontal lobe   10/6/24: Com/cog order only, eval informally. Swallow screened.   10/5/24: Admit confusion, stroke alert. CT head remarkable for left frontal subdural hematoma and scattered subarachnoid hemorrhage within the supratentorial brain     PMHx known meningioma, TIA (4/2023), [place]   ---------  4/10/23: MRI brain unremarkable. Clinical swallow eval.  4/9/23: Admit stroke/TIA alert    SUBJECTIVE  - Patient agreed to participate in therapy this date.    Okayed session with RNJose. Patient seen bedside. Patient quite lethargic and minimally able to participate as patient continued to fall back to sleep.     Pain at onset of session:   Patient does not demonstrate pain behaviors.      OBJECTIVE         Communication/Cognition  Behavior/Social Interaction:    - Maintains eye contact: severe impairment (25-49% accuracy) (pt continued to fall back to sleep)  Attention:      - Sustained attention: severe impairment (25-49% accuracy)    - Lethargy impacting  Reading Comprehension:      - Patient unable to point to her name within a field of 2.   Interfering Components: perserveration, cognition, attention, lethargy, processing time and language skills               Education:   - Present and not ready to learn: patient  Education provided during session:  - cognition, communication and role of SLP  - Results of above outlined education: Needs reinforcement and No evidence of learning    ASSESSMENT  Interfering components: acuity, cognitive deficits, current medical conditions, lethargy, severity of deficits, language skills and decreased activity tolerance    Discharge needs based on today's assessment:  - Current level of function: significantly  below baseline level of function  Dc needs: plan for physiatry consult post VEEG.  - ADL / IADLs (activities / instrumental activities of daily living) requiring support at discharge: community interaction, emergency responses, health/medication management, meal preparation, shopping, household care, financial management, compensatory strategies and communication  - Impairments that require further therapy intervention: cognition, executive functioning, communication, language and safety awareness    Patient seen for communication/cognition attempt at the bedside. However, session notably limited due to lethargy. Patient unable to consistently remain awake for session. Patient stating \"mmhmm\" for every question and/or statement from clinician. Patient unable to point to her name within a field of 2. Session discontinued due to patient unable to adequately participate. Will continue to further re-assess as able. Asked RN, Jose, re: if noting any swallow deficits with lethargy as well as MRI demonstrating new stroke. Per RN, he has not noted any deficits; however, patient also with minimal intake due to lethargy. Will continue to follow patient.     PLAN (while hospitalized)  Com/cog only was ordered. Monitor swallow needs especially with notable communication/cognition deficits. MRI from 10/7 demonstrated new left sided stroke in addition to subdural hematoma's. Further communication/cognition assessment as able pending lethargy. Suspect bedside WAB potentially as appropriate. Only informal assessment completed.  Complete more formal assessment as able. Per social work notes, patient lives alone at baseline and was independent in her IADL's including driving.     SLP Frequency: 3-5 x per week    SLP Identified Barriers to Discharge: com/cog deficits, medical status      RECOMMENDATIONS     -Communication Cognition:          *Patient continues to demonstrate acute communication and cognition deficits, will continue to  encouraged testing QID, ordered BPPs 2x wk and growth  Reviewed GBS screening at 35-36 wks.    Return to clinic in 1 weeks and prn if questions or concerns.     Luisana Pierson, APRN SMOOTHM       follow.  Patient will require 24/7 supervision at discharge.      GOALS  Review Date: 10/13/2024  Short Term Goals:   Short Term Communication/cognition Goals:    Communication/cognition Goal #1: patient will appropriately respond to questions/comments 60% of time    Communication/cognition Goal #2: patient will follow and/or answer basic 1-2 unit directions and/or yes/no questions at 60% and mod cues    Further goals pending further assessment as able.     Long Term Goals: (to be met by time of discharge from hospital)  Patient will improve overall cognitive communication goals to supervision.     Documented in the chart in the following areas: Prior Living.    Assessment.    Patient at End of Session:   Location: in bed  Safety measures: alarm system in place/re-engaged, call light within reach and lines intact  Handoff to: nurse      Therapy procedure time and total treatment time can be found documented on the Time Entry flowsheet

## 2024-10-09 RX ORDER — FLUOCINOLONE ACETONIDE 0.11 MG/ML
OIL TOPICAL
Qty: 118.28 ML | Refills: 0 | Status: SHIPPED | OUTPATIENT
Start: 2024-10-09

## 2024-10-09 NOTE — PATIENT INSTRUCTIONS
"Recommendations from today's MTM visit:                                                    MTM (medication therapy management) is a service provided by a clinical pharmacist designed to help you get the most of out of your medicines.      Refill sent for your fluocinolone oil to use for scalp symptoms to [facility] in [place].  Continue to discuss risks vs. benefits of Dupixent use during pregnancy with Dr. Arsh Childers MD at upcoming appointment.  Consider receiving the following vaccination(s): COVID-19 booster and annual flu shot.  We don't have a follow-up scheduled at this time, but please let me know if there ends up being ways I can support you with your medication journey.     Follow-up: Liseth 10/16/24; Kyleigh 5/7/25; MTM not scheduled    It was great speaking with you today.  I value your experience and would be very thankful for your time in providing feedback in our clinic survey. In the next few days, you may receive an email or text message from [employer] with a link to a survey related to your  clinical pharmacist.\"     To schedule another MTM appointment, please call the clinic directly or you may call the MTM scheduling line at [phone].    My Clinical Pharmacist's contact information:                                                      Please feel free to contact me with any questions or concerns you have.      Marta Britt, Pharm.D.  Medication Therapy Management Pharmacist   [facility]  MTM Scheduling Line: [phone]    "

## 2024-10-10 ENCOUNTER — TELEPHONE (OUTPATIENT)
Dept: DERMATOLOGY | Facility: CLINIC | Age: 34
End: 2024-10-10
Payer: COMMERCIAL

## 2024-10-16 ENCOUNTER — PRE VISIT (OUTPATIENT)
Dept: ALLERGY | Facility: CLINIC | Age: 34
End: 2024-10-16

## 2024-10-16 ENCOUNTER — OFFICE VISIT (OUTPATIENT)
Dept: ALLERGY | Facility: CLINIC | Age: 34
End: 2024-10-16
Payer: COMMERCIAL

## 2024-10-16 ENCOUNTER — OFFICE VISIT (OUTPATIENT)
Dept: CARDIOLOGY | Facility: CLINIC | Age: 34
End: 2024-10-16
Attending: ADVANCED PRACTICE MIDWIFE
Payer: COMMERCIAL

## 2024-10-16 VITALS
OXYGEN SATURATION: 100 % | HEART RATE: 75 BPM | BODY MASS INDEX: 35.63 KG/M2 | DIASTOLIC BLOOD PRESSURE: 78 MMHG | SYSTOLIC BLOOD PRESSURE: 121 MMHG | WEIGHT: 205.9 LBS

## 2024-10-16 DIAGNOSIS — L20.89 OTHER ATOPIC DERMATITIS: ICD-10-CM

## 2024-10-16 DIAGNOSIS — R00.2 PALPITATIONS: ICD-10-CM

## 2024-10-16 DIAGNOSIS — R06.09 DYSPNEA ON EXERTION: ICD-10-CM

## 2024-10-16 DIAGNOSIS — R55 VASOVAGAL SYNCOPE: ICD-10-CM

## 2024-10-16 DIAGNOSIS — L23.5 ALLERGIC DERMATITIS DUE TO OTHER CHEMICAL PRODUCT: Primary | ICD-10-CM

## 2024-10-16 DIAGNOSIS — Z33.1 PREGNANCY, INCIDENTAL: ICD-10-CM

## 2024-10-16 DIAGNOSIS — Z34.90 EARLY STAGE OF PREGNANCY: ICD-10-CM

## 2024-10-16 PROCEDURE — 93225 XTRNL ECG REC<48 HRS REC: CPT | Performed by: INTERNAL MEDICINE

## 2024-10-16 PROCEDURE — 99214 OFFICE O/P EST MOD 30 MIN: CPT | Performed by: INTERNAL MEDICINE

## 2024-10-16 PROCEDURE — 99214 OFFICE O/P EST MOD 30 MIN: CPT | Performed by: DERMATOLOGY

## 2024-10-16 PROCEDURE — G0463 HOSPITAL OUTPT CLINIC VISIT: HCPCS | Performed by: INTERNAL MEDICINE

## 2024-10-16 RX ORDER — EMOLLIENT BASE
CREAM (GRAM) TOPICAL 2 TIMES DAILY
Qty: 453 G | Refills: 4 | Status: SHIPPED | OUTPATIENT
Start: 2024-10-16

## 2024-10-16 RX ORDER — KETOCONAZOLE 20 MG/ML
SHAMPOO, SUSPENSION TOPICAL PRN
Qty: 120 ML | Refills: 3 | Status: SHIPPED | OUTPATIENT
Start: 2024-10-16

## 2024-10-16 RX ORDER — CLOBETASOL PROPIONATE 0.05 G/100ML
SHAMPOO TOPICAL
Qty: 118 ML | Refills: 3 | Status: SHIPPED | OUTPATIENT
Start: 2024-10-16

## 2024-10-16 ASSESSMENT — PAIN SCALES - GENERAL: PAINLEVEL: NO PAIN (0)
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
## 2024-10-16 NOTE — NURSING NOTE
Chief Complaint   Patient presents with    Follow Up     follow up for symptoms       Vitals were taken, medications reconciled.    Esteban Sewell, Clinic Assistant   9:45 AM

## 2024-10-16 NOTE — PATIENT INSTRUCTIONS
Ziopatch placed today in clinic - to be worn for 48 hours.  Please mail as soon as completed.    Complete an exercise stress echocardiogram (ultrasound of heart)    Follow up in one month with JOHNATHON

## 2024-10-16 NOTE — NURSING NOTE
Briana Newman arrived here on 10/16/2024 10:27 AM for 48 Hours  Zio monitor placement per ordering provider Neal for the diagnosis palpitations.  Dr. Garcia is the supervising MD. Patient s skin was prepped per protocol.  Zio monitor was placed.  Instructions were reviewed with and given to the patient.  Patient verbalized understanding of wear, troubleshooting and monitor return instructions.

## 2024-10-16 NOTE — NURSING NOTE
Chief Complaint   Patient presents with    Allergy Consult     Referral from Dr. Arizmendi, L20.89 (ICD-10-CM) - Other atopic dermatitis. Extra notes:chronic hand dermatitis acd vs ad.  Per pt, Was on dupixent and was improving, is now pregnant so stopped dupixent and is flaring (specifally on hands, back and scalp). Losing patches of hair from scratching so much.      María Burrows, RN

## 2024-10-18 ENCOUNTER — OFFICE VISIT (OUTPATIENT)
Dept: DERMATOLOGY | Facility: CLINIC | Age: 34
End: 2024-10-18
Payer: COMMERCIAL

## 2024-10-18 DIAGNOSIS — L72.0 EIC (EPIDERMAL INCLUSION CYST): Primary | ICD-10-CM

## 2024-10-18 PROCEDURE — 10040 EXTRACTION: CPT | Performed by: PHYSICIAN ASSISTANT

## 2024-10-18 ASSESSMENT — PAIN SCALES - GENERAL: PAINLEVEL: MILD PAIN (3)
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
## 2024-10-18 NOTE — NURSING NOTE
Dermatology Rooming Note    Briana Newman's goals for this visit include:   Chief Complaint   Patient presents with    Derm Problem     Spot of concern on R inner eye area, sensitive to touch. Started 2 weeks     YI Alejandro

## 2024-10-18 NOTE — PROGRESS NOTES
Lidocaine 1% injection   0.1 mL once for one use, starting 10/18/2024 ending 10/18/2024,  2mL disp, R-0, injection  Injected by Snow Murry PA-C
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
## 2024-10-19 ENCOUNTER — TELEPHONE (OUTPATIENT)
Dept: DERMATOLOGY | Facility: CLINIC | Age: 34
End: 2024-10-19
Payer: COMMERCIAL

## 2024-10-19 NOTE — TELEPHONE ENCOUNTER
Prior Authorization Retail Medication Request    Medication/Dose: clobetasol propionate (CLOBEX) 0.05 % external shampoo  Diagnosis and ICD code (if different than what is on RX):    New/renewal/insurance change PA/secondary ins. PA:  Previously Tried and Failed:  see chart  Rationale:  see chart    Insurance   Primary: [employer]  Insurance ID:  [id number]     Clinic Information  Preferred routing pool for dept communication: [facility]

## 2024-10-21 ENCOUNTER — MYC MEDICAL ADVICE (OUTPATIENT)
Dept: OBGYN | Facility: CLINIC | Age: 34
End: 2024-10-21
Payer: COMMERCIAL

## 2024-10-22 ENCOUNTER — PRE VISIT (OUTPATIENT)
Dept: MATERNAL FETAL MEDICINE | Facility: CLINIC | Age: 34
End: 2024-10-22
Payer: COMMERCIAL

## 2024-10-22 DIAGNOSIS — O09.299 HX OF MATERNAL THIRD DEGREE PERINEAL LACERATION, CURRENTLY PREGNANT: ICD-10-CM

## 2024-10-22 DIAGNOSIS — O09.891 SUPERVISION OF OTHER HIGH RISK PREGNANCIES, FIRST TRIMESTER: Primary | ICD-10-CM

## 2024-10-22 DIAGNOSIS — O09.291 HX OF PREECLAMPSIA, PRIOR PREGNANCY, CURRENTLY PREGNANT, FIRST TRIMESTER: ICD-10-CM

## 2024-10-22 NOTE — TELEPHONE ENCOUNTER
Retail Pharmacy Prior Authorization Team   Phone: [phone]        PA Initiation    Medication: CLOBETASOL PROPIONATE 0.05 % EX SHAM  Insurance Company: Other (see comments)  Pharmacy Filling the Rx: [id number] [place], [place] - [address] AT [facility]  Filling Pharmacy Phone: [phone]  Filling Pharmacy Fax:    Start Date: 10/22/2024

## 2024-10-22 NOTE — TELEPHONE ENCOUNTER
Pt has appt with [facility] 10/23 at 0845.   Appt with DEEPIKA ORDONEZ following at 0915.

## 2024-10-23 ENCOUNTER — OFFICE VISIT (OUTPATIENT)
Dept: MATERNAL FETAL MEDICINE | Facility: CLINIC | Age: 34
End: 2024-10-23
Attending: STUDENT IN AN ORGANIZED HEALTH CARE EDUCATION/TRAINING PROGRAM
Payer: COMMERCIAL

## 2024-10-23 ENCOUNTER — HOSPITAL ENCOUNTER (OUTPATIENT)
Dept: ULTRASOUND IMAGING | Facility: CLINIC | Age: 34
Discharge: HOME | End: 2024-10-23
Attending: STUDENT IN AN ORGANIZED HEALTH CARE EDUCATION/TRAINING PROGRAM
Payer: COMMERCIAL

## 2024-10-23 DIAGNOSIS — O99.210 OBESITY DURING PREGNANCY: Primary | ICD-10-CM

## 2024-10-23 DIAGNOSIS — O09.299 HISTORY OF PRE-ECLAMPSIA IN PRIOR PREGNANCY, CURRENTLY PREGNANT: ICD-10-CM

## 2024-10-23 DIAGNOSIS — O26.90 PREGNANCY RELATED CONDITION, ANTEPARTUM: ICD-10-CM

## 2024-10-23 DIAGNOSIS — Z86.32 HISTORY OF GESTATIONAL DIABETES MELLITUS (GDM): ICD-10-CM

## 2024-10-23 PROCEDURE — 76811 OB US DETAILED SNGL FETUS: CPT | Mod: 26 | Performed by: STUDENT IN AN ORGANIZED HEALTH CARE EDUCATION/TRAINING PROGRAM

## 2024-10-23 PROCEDURE — 99213 OFFICE O/P EST LOW 20 MIN: CPT | Mod: 25 | Performed by: STUDENT IN AN ORGANIZED HEALTH CARE EDUCATION/TRAINING PROGRAM

## 2024-10-23 PROCEDURE — 76811 OB US DETAILED SNGL FETUS: CPT

## 2024-10-23 NOTE — PROGRESS NOTES
The patient was seen for an ultrasound in the [facility] today.  For a detailed report of the ultrasound examination, please see the ultrasound report which can be found under the imaging tab.    If you have questions regarding today's evaluation or if we can be of further service, please contact the [facility].    Dahiana Stapleton MD  , OB/GYN  Maternal-Fetal Medicine

## 2024-10-23 NOTE — NURSING NOTE
Pt at [facility] for ultrasound- see detailed report under imaging tab.  Pt reports she is starting to feel fetal movement, denies bleeding, cramping or other concerns.  SBAR given to MD.

## 2024-10-24 NOTE — TELEPHONE ENCOUNTER
Retail Pharmacy Prior Authorization Team   Phone: [phone]        Prior Authorization Approval    Medication: CLOBETASOL PROPIONATE 0.05 % EX SHAM  Authorization Effective Date: 10/22/2024  Authorization Expiration Date: 10/22/2025  Approved Dose/Quantity:   Reference #:     Insurance Company: Other (see comments)  Expected CoPay: $    CoPay Card Available: No    Financial Assistance Needed:   Which Pharmacy is filling the prescription: [id number] [address] AT [facility]  Pharmacy Notified: Yes  Patient Notified: **Instructed pharmacy to notify patient when script is ready to /ship.**

## 2024-10-30 ENCOUNTER — PRENATAL OFFICE VISIT (OUTPATIENT)
Dept: OBGYN | Facility: CLINIC | Age: 34
End: 2024-10-30
Payer: COMMERCIAL

## 2024-10-30 VITALS
HEART RATE: 76 BPM | DIASTOLIC BLOOD PRESSURE: 79 MMHG | BODY MASS INDEX: 35.82 KG/M2 | WEIGHT: 207 LBS | SYSTOLIC BLOOD PRESSURE: 120 MMHG

## 2024-10-30 DIAGNOSIS — O09.291 HX OF PREECLAMPSIA, PRIOR PREGNANCY, CURRENTLY PREGNANT, FIRST TRIMESTER: ICD-10-CM

## 2024-10-30 DIAGNOSIS — O09.891 SUPERVISION OF OTHER HIGH RISK PREGNANCIES, FIRST TRIMESTER: Primary | ICD-10-CM

## 2024-10-30 DIAGNOSIS — O09.299 HX OF MATERNAL THIRD DEGREE PERINEAL LACERATION, CURRENTLY PREGNANT: ICD-10-CM

## 2024-10-30 PROCEDURE — 99207 PR PRENATAL VISIT: CPT | Mod: GE | Performed by: OBSTETRICS & GYNECOLOGY

## 2024-10-30 PROCEDURE — G0463 HOSPITAL OUTPT CLINIC VISIT: HCPCS

## 2024-10-30 NOTE — NURSING NOTE
Chief Complaint   Patient presents with    Prenatal Care     RICCI 19 weeks and 3 days    Dariana Fuentes LPN

## 2024-10-30 NOTE — PROGRESS NOTES
SUBJECTIVE   Doing well  denies ctx/LOF/vb/preE sx    OBJECTIVE   /79   Pulse 76   Wt 93.9 kg (207 lb)   LMP 2024 (Exact Date)   BMI 35.82 kg/m      See Ob Flowsheet        ASSESSMENT & PLAN   34 year old  at 19w3d  by LMP c/w 8w4d US, RICCI.    1. PNC: routine ob labs reviewed   Rh pos, Rubella equivocal, will get flu/covid vax @ work  2. Genetic screening/ diagnosis: Declines genetic screening,  Level II nl, return in 4 weeks for missing anatomy  3. Hx of GDMA2- will complete GCT at 28 weeks  4. Hx of FGC, OASIS with prior delivery  5. Hx of Pre E w/o SF, on ASA  6. Migraine w/ aura    RTC 4 weeks    Akiko Moe DO, MA  [facility]- PGY3  4:27 PM 2024     The Patient was seen in [facility] by AKIKO MOE.  I reviewed the history & exam. Assessment and plan were jointly made.    Michelle Warren MD

## 2024-11-01 PROCEDURE — 93244 EXT ECG>48HR<7D REV&INTERPJ: CPT | Performed by: INTERNAL MEDICINE

## 2024-11-12 ENCOUNTER — TELEPHONE (OUTPATIENT)
Dept: ALLERGY | Facility: CLINIC | Age: 34
End: 2024-11-12
Payer: COMMERCIAL

## 2024-11-12 NOTE — TELEPHONE ENCOUNTER
Spoke to pt, pt stated she didn't have much time to talk and to just please schedule patch testing appointments for May 2025.  Reminded pt it is 3 appts in one week (Monday, Wed. And Friday), pt acknowledged and stated she will check mychart.    Writer reminded pt to call and reschedule Dr. Arizmendi appt for a little bit after the testing so that Dr. Arizmendi can see the allergy testing results. Pt acknowledged and will call.    Writer will send MyChart message to remind pt to change Dr. Arizmendi appt and give patch testing appts update.

## 2024-11-18 ENCOUNTER — HOSPITAL ENCOUNTER (OUTPATIENT)
Facility: CLINIC | Age: 34
Discharge: HOME | End: 2024-11-18
Attending: MIDWIFE | Admitting: MIDWIFE
Payer: COMMERCIAL

## 2024-11-18 ENCOUNTER — NURSE TRIAGE (OUTPATIENT)
Dept: OBGYN | Facility: CLINIC | Age: 34
End: 2024-11-18
Payer: COMMERCIAL

## 2024-11-18 ENCOUNTER — HOSPITAL ENCOUNTER (OUTPATIENT)
Facility: CLINIC | Age: 34
End: 2024-11-18
Admitting: MIDWIFE
Payer: COMMERCIAL

## 2024-11-18 VITALS
HEIGHT: 63 IN | RESPIRATION RATE: 16 BRPM | SYSTOLIC BLOOD PRESSURE: 115 MMHG | BODY MASS INDEX: 36.67 KG/M2 | DIASTOLIC BLOOD PRESSURE: 74 MMHG | TEMPERATURE: 98.2 F

## 2024-11-18 DIAGNOSIS — O09.299 HX OF MATERNAL THIRD DEGREE PERINEAL LACERATION, CURRENTLY PREGNANT: ICD-10-CM

## 2024-11-18 DIAGNOSIS — O09.291 HX OF PREECLAMPSIA, PRIOR PREGNANCY, CURRENTLY PREGNANT, FIRST TRIMESTER: ICD-10-CM

## 2024-11-18 DIAGNOSIS — O09.891 SUPERVISION OF OTHER HIGH RISK PREGNANCIES, FIRST TRIMESTER: Primary | ICD-10-CM

## 2024-11-18 DIAGNOSIS — R39.15 URINARY URGENCY: Primary | ICD-10-CM

## 2024-11-18 DIAGNOSIS — O09.891 SUPERVISION OF OTHER HIGH RISK PREGNANCIES, FIRST TRIMESTER: ICD-10-CM

## 2024-11-18 LAB
ALBUMIN UR-MCNC: 30 MG/DL
APPEARANCE UR: ABNORMAL
BACTERIA #/AREA URNS HPF: ABNORMAL /HPF
BILIRUB UR QL STRIP: NEGATIVE
COLOR UR AUTO: YELLOW
GLUCOSE UR STRIP-MCNC: NEGATIVE MG/DL
HGB UR QL STRIP: ABNORMAL
KETONES UR STRIP-MCNC: NEGATIVE MG/DL
LEUKOCYTE ESTERASE UR QL STRIP: ABNORMAL
MUCOUS THREADS #/AREA URNS LPF: PRESENT /LPF
NITRATE UR QL: NEGATIVE
PH UR STRIP: 6 [PH] (ref 5–7)
RBC URINE: >182 /HPF
SP GR UR STRIP: 1.03 (ref 1–1.03)
SQUAMOUS EPITHELIAL: 2 /HPF
UROBILINOGEN UR STRIP-MCNC: NORMAL MG/DL
WBC URINE: <1 /HPF

## 2024-11-18 PROCEDURE — G0463 HOSPITAL OUTPT CLINIC VISIT: HCPCS

## 2024-11-18 PROCEDURE — 99213 OFFICE O/P EST LOW 20 MIN: CPT | Mod: 25 | Performed by: MIDWIFE

## 2024-11-18 PROCEDURE — 87086 URINE CULTURE/COLONY COUNT: CPT | Performed by: MIDWIFE

## 2024-11-18 PROCEDURE — 81001 URINALYSIS AUTO W/SCOPE: CPT | Performed by: MIDWIFE

## 2024-11-18 PROCEDURE — 59025 FETAL NON-STRESS TEST: CPT | Mod: 26 | Performed by: MIDWIFE

## 2024-11-18 ASSESSMENT — ACTIVITIES OF DAILY LIVING (ADL)
DEPENDENT_IADLS:: INDEPENDENT
ADLS_ACUITY_SCORE: 0
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
## 2024-11-18 NOTE — TELEPHONE ENCOUNTER
"S-(situation): Briana called to report cramping in pregnancy.    B-(background):  at 22w1d GA.     A-(assessment): See below for full assessment. Last night, pt's toddler hit her in the belly twice, hard enough to slightly hurt. This morning when waking up, pt has noticed cramping 4/10 in her groin area and sharper upper abdominal pains 8/10 that make her slightly out of breath/lightheaded. Had one episode of diarrhea this AM, none since then.     R-(recommendations): Send to L&D per Ayesha Nash. Called pt and let her know. Notified L&D charge RN.    Answer Assessment - Initial Assessment Questions  1. LOCATION: \"Where does it hurt?\"       Cramping is groin area, sharp pain is slightly above belly button, radiates to RUQ    2. RADIATION: \"Does the pain shoot anywhere else?\" (e.g., chest, back)      See above    3. ONSET: \"When did the pain begin?\" (Minutes, hours or days ago)       Started this morning when waking    4. SUDDEN: \"Gradual or sudden onset?\"      Gradual, getting worse    5. PATTERN: \"Does the pain come and go, or has it been constant since it started?\"       Comes and goes    6. SEVERITY: \"How bad is the pain?\" \"What does it keep you from doing?\"  (e.g., Scale 1-10; mild, moderate, or severe)    - MILD (1-3): Doesn't interfere with normal activities, abdomen soft and not tender to touch.     - MODERATE (4-7): Interferes with normal activities or awakens from sleep, abdomen tender to touch.     - SEVERE (8-10): Excruciating pain, doubled over, unable to do any normal activities.      Cramping 4/10, sharp pains are an 8/10    7. RECURRENT SYMPTOM: \"Have you ever had this type of stomach pain before?\" If Yes, ask: \"When was the last time?\" and \"What happened that time?\"       no    8. CAUSE: \"What do you think is causing the stomach pain?      unknown    9. RELIEVING/AGGRAVATING FACTORS: \"What makes it better or worse?\" (e.g., antacids, bowel movement, movement)      Nothing helps or harms, had " "morning BM    10. FETAL MOVEMENT: \"Has the baby's movement decreased or changed significantly from normal?\"        Fetal movement wnl    11. OTHER SYMPTOMS: \"Do you have any other symptoms?\" (e.g., back pain, contractions, diarrhea, fever, headache, urination pain, vaginal bleeding, vaginal discharge, vomiting)        Small amount of diarrhea this AM, normal vaginal discharge.    12. BARTOLOME: \"What date are you expecting to deliver?\"        3/23/25    Protocols used: Pregnancy - Abdominal Pain Greater Than 20 Weeks EGA-A-OH    "

## 2024-11-18 NOTE — TELEPHONE ENCOUNTER
Caller reporting the following red-flag symptom(s): todler hit her in her stomach last night and is cramping with sharp pains that come and go with tez and starting to feel a little light headed    Per the system red-flag symptom policy, patient was instructed to:  speak with a Registered Nurse    Action:  Patient warm transferred to a Registered Nurse

## 2024-11-18 NOTE — PLAN OF CARE
Data: Patient assessed in the [facility] for abdominal pain and fall/trauma. Membranes intact. Contractions are not present. See flowsheets for fetal assessment documentation.     Action: Presumed adequate fetal oxygenation documented. Discharge instructions reviewed. Patient instructed to report change in fetal movement, vaginal leaking of fluid or bleeding, abdominal pain, or any concerns related to the pregnancy to provider/clinic.      Response: Orders to discharge home per Ayesha Nash CNM. Patient verbalized understanding of education and agreement with plan. Discharged to home at 1545.

## 2024-11-20 ENCOUNTER — PRENATAL OFFICE VISIT (OUTPATIENT)
Dept: OBGYN | Facility: CLINIC | Age: 34
End: 2024-11-20
Attending: ADVANCED PRACTICE MIDWIFE
Payer: COMMERCIAL

## 2024-11-20 ENCOUNTER — OFFICE VISIT (OUTPATIENT)
Dept: MATERNAL FETAL MEDICINE | Facility: CLINIC | Age: 34
End: 2024-11-20
Attending: OBSTETRICS & GYNECOLOGY
Payer: COMMERCIAL

## 2024-11-20 ENCOUNTER — HOSPITAL ENCOUNTER (OUTPATIENT)
Dept: ULTRASOUND IMAGING | Facility: CLINIC | Age: 34
Discharge: HOME | End: 2024-11-20
Attending: OBSTETRICS & GYNECOLOGY
Payer: COMMERCIAL

## 2024-11-20 VITALS
SYSTOLIC BLOOD PRESSURE: 104 MMHG | BODY MASS INDEX: 36.67 KG/M2 | HEIGHT: 63 IN | HEART RATE: 75 BPM | DIASTOLIC BLOOD PRESSURE: 67 MMHG

## 2024-11-20 DIAGNOSIS — Z86.32 HISTORY OF INSULIN CONTROLLED GESTATIONAL DIABETES MELLITUS (GDM): ICD-10-CM

## 2024-11-20 DIAGNOSIS — O99.210 OBESITY DURING PREGNANCY: Primary | ICD-10-CM

## 2024-11-20 DIAGNOSIS — Z03.73 SUSPECTED FETAL ANOMALY NOT FOUND: ICD-10-CM

## 2024-11-20 DIAGNOSIS — O09.291 HX OF PREECLAMPSIA, PRIOR PREGNANCY, CURRENTLY PREGNANT, FIRST TRIMESTER: ICD-10-CM

## 2024-11-20 DIAGNOSIS — O09.299 HX OF MATERNAL THIRD DEGREE PERINEAL LACERATION, CURRENTLY PREGNANT: ICD-10-CM

## 2024-11-20 DIAGNOSIS — O09.892 SUPERVISION OF OTHER HIGH RISK PREGNANCIES, SECOND TRIMESTER: Primary | ICD-10-CM

## 2024-11-20 LAB — BACTERIA UR CULT: NORMAL

## 2024-11-20 PROCEDURE — 76816 OB US FOLLOW-UP PER FETUS: CPT

## 2024-11-20 PROCEDURE — G0463 HOSPITAL OUTPT CLINIC VISIT: HCPCS | Performed by: ADVANCED PRACTICE MIDWIFE

## 2024-11-20 NOTE — PROGRESS NOTES
Subjective:      34 year old  at 22w3d presents for a routine prenatal appointment.       Denies cramping/contractions, vaginal bleeding, discharge or leakage of fluid. Reports +fetal movement.  No HA, vision changes, ruq/epigastric pain.     Concerns:***     Objective:  There were no vitals filed for this visit.  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  [x]Dating  [x] 1st trimester screening: Patient declines 1st tri genetic screening  [x]Fetal anatomy US ordered  [x] recommended LD ASA after 12 wks for PRE-E risk  [x] GDM risk, recommended early GCT and hgb A1C  [x]No need for utox in labor  [x]COVID vaccine completed  [x]Pap UTD- due     12-23wks________________________  []Offer AFP after 15 wks  []Rubella equivocal  [x]Hep B immune   [x]Varicella immune  []FLU shot    24-28wk_________________________  []EOB folder  []Labor plans:  []Prenatal Ed  []:  []Infant feeding plan  [] care provider choice  []PP Contraception plan: If tubal,consent date:  []TDAP   []Rhogam if needed, date:    29-35 wk________________________  []TOLAC consent done  [] Water birth interest  []GCT nml results  []RSV    36-37 wks______________________   [] GBS/CBC  []OTC PP meds sent  []PP recovery plans/support:  []Planning CS-ERAS pkt    38-42 wks______________________  []IOL reason/plans  []Postdates BPP       History of insulin controlled gestational diabetes mellitus (GDM) 2024     Priority: Medium     A1C today 24 and  early 1hr      Hx of preeclampsia, prior pregnancy, currently pregnant, first trimester 08/14/2024     Priority: Medium     Baseline PreE labs 8/14/24, to start ASA @12wks      Hx of maternal third degree perineal laceration, currently pregnant 08/14/2024     Priority: Medium    Migraine with aura and without status migrainosus, not intractable 05/15/2024     Priority: Medium    Cervical high risk HPV (human papillomavirus) test positive 04/24/2024     Priority: Medium     7/7/20 NIL pap   4/24/24 NIL pap, + HR HPV (not 16 or 18). Plan: cotest in 1 yr.   4/30/24 Pt notified       Current moderate episode of major depressive disorder without prior episode (H) 02/01/2024     Priority: Medium    Hx of CHRIS (generalized anxiety disorder) 02/01/2024     Priority: Medium    Dyspnea on exertion 10/05/2022     Priority: Medium    Postural tachycardia with syncope 08/29/2022     Priority: Medium     In pregnancy.-Has had postural tachycardia with syncope, went to ED twice for concerns of lightheadedness   Heart rate has gone up to 150 during these episodes  Wore a Holter monitor for three days. She will ship the monitor today. Has an echo scheduled for early September and visit with cardiology in October 2022 9/26/22 Briana has continued to feel a racing heart all day long. She has had a normal echocardiogram and Holter monitor workup. Has appt with cardiology early October. Denies any syncope now that she is out of the first trimester  10/5/22 met with Cardiology, they recommended compression socks    10/16/24: Cardiology recommendations:  POTS - consider beta blocker with pregnancy  - will check zio first  2. Palpitations - as above  3. Dyspnea - check stress echo      BMI 35.0-35.9,adult 07/29/2022     Priority: Medium     BPP weekly @37wks      Vitamin D deficiency 07/15/2022     Priority: Medium     18 at intake. Rx sent for recommended supplementation of 4000IU daily.   11/16/22- Vit D 26- review at next visit___            No orders of the defined types were placed in this encounter.      Updated individualized prenatal care plan checklist on problemlist, reviewed relevant lab results   Return to clinic in *** weeks and prn if questions or concerns.   Caity Szymanski CNM

## 2024-11-20 NOTE — NURSING NOTE
Patient reports positive fetal movement, denies pain, denies contractions, leaking of fluid, or bleeding. Patient denies headache, visual changes, nausea/vomiting, epigastric pain related to preeclampsia. Is not checking BP's at home, but states clinic BP checks have all been WNL. Education provided to patient on today's US. SBAR given to DEEPIKA ORDONEZ, see their note in Epic.    Sole Kinsey RN on 11/20/2024 at 9:07 AM

## 2024-11-26 PROBLEM — Z33.1 PREGNANCY, INCIDENTAL: Status: RESOLVED | Noted: 2024-10-16 | Resolved: 2024-11-26

## 2024-12-04 ENCOUNTER — MYC MEDICAL ADVICE (OUTPATIENT)
Dept: OBGYN | Facility: CLINIC | Age: 34
End: 2024-12-04
Payer: COMMERCIAL

## 2024-12-05 ENCOUNTER — MYC REFILL (OUTPATIENT)
Dept: DERMATOLOGY | Facility: CLINIC | Age: 34
End: 2024-12-05
Payer: COMMERCIAL

## 2024-12-05 DIAGNOSIS — L70.8 OTHER ACNE: ICD-10-CM

## 2024-12-05 RX ORDER — CLINDAMYCIN PHOSPHATE 10 UG/ML
LOTION TOPICAL 2 TIMES DAILY
Qty: 60 ML | Refills: 4 | Status: SHIPPED | OUTPATIENT
Start: 2024-12-05

## 2024-12-05 NOTE — TELEPHONE ENCOUNTER
LVD:  10/18/2024  [facility] [place]     Snow Murry PA-C  Dermatology     Refilled per protocol # 1 DERM.

## 2024-12-17 NOTE — PROGRESS NOTES
34 year old  at 26w3d presents for an extended OB prenatal appointment.     Concerns: Feeling well overall. Continues to have sharp pain intermittently at top of belly, happens daily. Left lower abdominal pain, sharp/pulling in nature, happens with sitting or laying in certain positions. Happening every other day. Does not have maternity belt yet. Doesn't feel like she has time for physical therapy. Denies any other cramping/contractions, vaginal bleeding, discharge or leakage of fluid. Reports + fetal movement. No HA, vision changes, ruq/epigastric pain.     Worried about pre-e this pregnancy. Not taking Bps at home or taking daily ASA. States she took aspirin last pregnancy and still got pre-e.     Birth preferences reviewed: Un-Medicated preference; Endorses strong interest in water birth.  Labor support: Christianne (partner) and maybe her mom  [facility] Feeding plans : Breastfeeding. Has had issues with latching and supply in the past.  Contraception planned:  NFP  Water birth ed form was given.    Blood type: A POS    Antibody Screen   Date Value Ref Range Status   2024 Negative Negative Final   Rhogam  was not given.     Objective:  Vitals:    24 0910   BP: 124/85   Pulse: 79   Weight: 95.7 kg (211 lb)       See OB flowsheet        5/15/2024    10:21 AM 7/10/2024     7:56 AM 2024     8:52 AM   PHQ   PHQ-9 Total Score 10 8 7   Q9: Thoughts of better off dead/self-harm past 2 weeks Not at all  Not at all  Not at all        Patient-reported         A/P:  Patient Active Problem List    Diagnosis Date Noted    Palpitations 10/16/2024     Priority: Medium    Rubella non-immune status, antepartum 08/15/2024     Priority: Medium     Equivocal rubella       Supervision of other high risk pregnancies, first trimester 2024     Priority: Medium     [facility] Patient Provider Group choice: CNM group  Partner's name: Christianne Lanny  Employment: part time MA  [x]RUTHIE  folder  [x]Dating  [x] 1st trimester screening: Patient declines 1st tri genetic screening  [x]Fetal anatomy US ordered  [x] recommended LD ASA after 12 wks for PRE-E risk  [x] GDM risk, recommended early GCT and hgb A1C  [x]No need for utox in labor  [x]COVID vaccine completed  [x]Pap UTD- due     12-23wks________________________  []Offer AFP after 15 wks  []Rubella equivocal  [x]Hep B immune   [x]Varicella immune  []FLU shot    24-28wk_________________________  [x]EOB folder  [x]Labor plans: Un-Medicated preference  [x]Prenatal Ed  []: Declines  [x]Infant feeding plan: . Has had issues with latching and supply in the past.  [x][place] care provider choice: Galileo Leal with [facility]  [x]PP Contraception plan: NFP  []TDAP after 27 weeks  []Rhogam if needed, date: N/A    29-35 wk________________________  []TOLAC consent done NA  [x] Water birth interest- form given  []GCT nml results  []RSV    36-37 wks______________________   [] GBS/CBC  []OTC PP meds sent  []PP recovery plans/support:  []Planning CS-ERAS pkt    38-42 wks______________________  []IOL reason/plans  []Postdates BPP       History of insulin controlled gestational diabetes mellitus (GDM) 2024     Priority: Medium     A1C today 24 and early 1hr    - did not complete early 1 hour  Plan 1 hour at EOB    2024: 1 hour      Hx of preeclampsia, prior pregnancy, currently pregnant, first trimester 2024     Priority: Medium     Baseline PreE labs 24, to start ASA @12wks    : reiterated taking aspirin daily    2024: repeat labs today per pt preference      Hx of maternal third degree perineal laceration, currently pregnant 2024     Priority: Medium    Migraine with aura and without status migrainosus, not intractable 05/15/2024     Priority: Medium    Cervical high risk HPV (human papillomavirus) test positive 2024     Priority: Medium     20 NIL pap   24 NIL pap, + HR HPV (not  16 or 18). Plan: cotest in 1 yr.   4/30/24 Pt notified       Current moderate episode of major depressive disorder without prior episode (H) 02/01/2024     Priority: Medium    Hx of CHRIS (generalized anxiety disorder) 02/01/2024     Priority: Medium    Dyspnea on exertion 10/05/2022     Priority: Medium    Postural tachycardia with syncope 08/29/2022     Priority: Medium     In pregnancy.-Has had postural tachycardia with syncope, went to ED twice for concerns of lightheadedness   Heart rate has gone up to 150 during these episodes  Wore a Holter monitor for three days. She will ship the monitor today. Has an echo scheduled for early September and visit with cardiology in October 2022 9/26/22 Briana has continued to feel a racing heart all day long. She has had a normal echocardiogram and Holter monitor workup. Has appt with cardiology early October. Denies any syncope now that she is out of the first trimester  10/5/22 met with Cardiology, they recommended compression socks    10/16/24: Cardiology recommendations:  POTS - consider beta blocker with pregnancy  - will check zio first  2. Palpitations - as above  3. Dyspnea - check stress echo      BMI 35.0-35.9,adult 07/29/2022     Priority: Medium     BPP weekly @37wks  Growth ultrasound 32 weeks    12/18/2024: order placed for scheduling at 32 weeks      Vitamin D deficiency 07/15/2022     Priority: Medium     18 at intake. Rx sent for recommended supplementation of 4000IU daily.   11/16/22- Vit D 26- review at next visit___           Orders Placed This Encounter   Procedures    US OB >14 Weeks Follow Up    Glucose 1 Hour    CBC with Platelets    Treponema Abs w Reflex to RPR and Titer    25- OH-Vitamin D    Comprehensive metabolic panel    Protein  random urine    Physical Therapy  Referral    Home Blood Pressure Monitor Order for DME - ONLY FOR DME       Updated individualized prenatal care plan on the problem list for 24-28weeks    - HELLP labs  ordered today. Extensive education given on S/S and addressing patient concerns. Talked about getting an at home BP cuff if this would help with her anxiety. Prefers no aspirin.  - Offered PT consult for abdominal/round ligament pain. Declined maternity belt prescription.   - Home BP cuff ordered, prescription given. Reviewed normal values and when to notify provider.  - EOB education reviewed.  - EOB labs ordered-  1 hour glucose, cbc with plts, anti-trep, vitamin D today  - Tdap after 27 weeks.     Continue scheduled prenatal care, RTC 4 weeks.     I, Marta Patel, am serving as a scribe; to document services personally performed by  Caity Szymanski CNM, based on data collection and the provider's statements to me.     Marta Patel    The encounter was performed by me and scribed by the SNM. The scribed note accurately reflects my personal services and decisions made by me.     ERICA Farias CNM

## 2024-12-18 ENCOUNTER — PRENATAL OFFICE VISIT (OUTPATIENT)
Dept: OBGYN | Facility: CLINIC | Age: 34
End: 2024-12-18
Attending: ADVANCED PRACTICE MIDWIFE
Payer: COMMERCIAL

## 2024-12-18 ENCOUNTER — APPOINTMENT (OUTPATIENT)
Dept: LAB | Facility: CLINIC | Age: 34
End: 2024-12-18
Attending: ADVANCED PRACTICE MIDWIFE
Payer: COMMERCIAL

## 2024-12-18 VITALS
DIASTOLIC BLOOD PRESSURE: 85 MMHG | HEART RATE: 79 BPM | SYSTOLIC BLOOD PRESSURE: 124 MMHG | WEIGHT: 211 LBS | BODY MASS INDEX: 37.38 KG/M2

## 2024-12-18 DIAGNOSIS — O09.291 HX OF PREECLAMPSIA, PRIOR PREGNANCY, CURRENTLY PREGNANT, FIRST TRIMESTER: ICD-10-CM

## 2024-12-18 DIAGNOSIS — O09.892 SUPERVISION OF OTHER HIGH RISK PREGNANCIES, SECOND TRIMESTER: Primary | ICD-10-CM

## 2024-12-18 DIAGNOSIS — N94.9 ROUND LIGAMENT PAIN: ICD-10-CM

## 2024-12-18 DIAGNOSIS — Z86.32 HISTORY OF INSULIN CONTROLLED GESTATIONAL DIABETES MELLITUS (GDM): ICD-10-CM

## 2024-12-18 LAB
ALBUMIN MFR UR ELPH: 22.8 MG/DL
ALBUMIN SERPL BCG-MCNC: 3.7 G/DL (ref 3.5–5.2)
ALP SERPL-CCNC: 60 U/L (ref 40–150)
ALT SERPL W P-5'-P-CCNC: 5 U/L (ref 0–50)
ANION GAP SERPL CALCULATED.3IONS-SCNC: 14 MMOL/L (ref 7–15)
AST SERPL W P-5'-P-CCNC: 29 U/L (ref 0–45)
BILIRUB SERPL-MCNC: 0.2 MG/DL
BUN SERPL-MCNC: 5.2 MG/DL (ref 6–20)
CALCIUM SERPL-MCNC: 9 MG/DL (ref 8.8–10.4)
CHLORIDE SERPL-SCNC: 103 MMOL/L (ref 98–107)
CREAT SERPL-MCNC: 0.43 MG/DL (ref 0.51–0.95)
CREAT UR-MCNC: 183.8 MG/DL
EGFRCR SERPLBLD CKD-EPI 2021: >90 ML/MIN/1.73M2
ERYTHROCYTE [DISTWIDTH] IN BLOOD BY AUTOMATED COUNT: 13.2 % (ref 10–15)
GLUCOSE 1H P 50 G GLC PO SERPL-MCNC: 165 MG/DL (ref 70–129)
GLUCOSE SERPL-MCNC: 165 MG/DL (ref 70–99)
HCO3 SERPL-SCNC: 18 MMOL/L (ref 22–29)
HCT VFR BLD AUTO: 34.7 % (ref 35–47)
HGB BLD-MCNC: 11.9 G/DL (ref 11.7–15.7)
MCH RBC QN AUTO: 29.5 PG (ref 26.5–33)
MCHC RBC AUTO-ENTMCNC: 34.3 G/DL (ref 31.5–36.5)
MCV RBC AUTO: 86 FL (ref 78–100)
PLATELET # BLD AUTO: 283 10E3/UL (ref 150–450)
POTASSIUM SERPL-SCNC: 4.4 MMOL/L (ref 3.4–5.3)
PROT SERPL-MCNC: 7.1 G/DL (ref 6.4–8.3)
PROT/CREAT 24H UR: 0.12 MG/MG CR (ref 0–0.2)
RBC # BLD AUTO: 4.04 10E6/UL (ref 3.8–5.2)
SODIUM SERPL-SCNC: 135 MMOL/L (ref 135–145)
T PALLIDUM AB SER QL: NONREACTIVE
VIT D+METAB SERPL-MCNC: 25 NG/ML (ref 20–50)
WBC # BLD AUTO: 11.8 10E3/UL (ref 4–11)

## 2024-12-18 PROCEDURE — 82950 GLUCOSE TEST: CPT | Performed by: ADVANCED PRACTICE MIDWIFE

## 2024-12-18 PROCEDURE — 36415 COLL VENOUS BLD VENIPUNCTURE: CPT | Performed by: ADVANCED PRACTICE MIDWIFE

## 2024-12-18 PROCEDURE — 82306 VITAMIN D 25 HYDROXY: CPT | Performed by: ADVANCED PRACTICE MIDWIFE

## 2024-12-18 PROCEDURE — 85027 COMPLETE CBC AUTOMATED: CPT | Performed by: ADVANCED PRACTICE MIDWIFE

## 2024-12-18 PROCEDURE — 82310 ASSAY OF CALCIUM: CPT | Performed by: ADVANCED PRACTICE MIDWIFE

## 2024-12-18 PROCEDURE — G0463 HOSPITAL OUTPT CLINIC VISIT: HCPCS | Performed by: ADVANCED PRACTICE MIDWIFE

## 2024-12-18 PROCEDURE — 86780 TREPONEMA PALLIDUM: CPT | Performed by: ADVANCED PRACTICE MIDWIFE

## 2024-12-18 PROCEDURE — 84156 ASSAY OF PROTEIN URINE: CPT | Performed by: ADVANCED PRACTICE MIDWIFE

## 2024-12-18 ASSESSMENT — PAIN SCALES - GENERAL: PAINLEVEL_OUTOF10: NO PAIN (0)

## 2024-12-19 ENCOUNTER — MYC MEDICAL ADVICE (OUTPATIENT)
Dept: OBGYN | Facility: CLINIC | Age: 34
End: 2024-12-19
Payer: COMMERCIAL

## 2024-12-19 DIAGNOSIS — R73.09 ELEVATED GLUCOSE LEVEL: Primary | ICD-10-CM

## 2024-12-19 DIAGNOSIS — O09.891 SUPERVISION OF OTHER HIGH RISK PREGNANCIES, FIRST TRIMESTER: ICD-10-CM

## 2024-12-19 NOTE — TELEPHONE ENCOUNTER
Called Briana, discussed it is important to get 3hr glucose done to confirm gestational diabetes in this current pregnancy even if she has had it in the past. Patient is agreeable to get this done.

## 2024-12-24 ENCOUNTER — LAB (OUTPATIENT)
Dept: LAB | Facility: CLINIC | Age: 34
End: 2024-12-24
Payer: COMMERCIAL

## 2024-12-24 DIAGNOSIS — O09.891 SUPERVISION OF OTHER HIGH RISK PREGNANCIES, FIRST TRIMESTER: Primary | ICD-10-CM

## 2024-12-24 DIAGNOSIS — R73.09 ELEVATED GLUCOSE LEVEL: ICD-10-CM

## 2024-12-24 DIAGNOSIS — O09.891 SUPERVISION OF OTHER HIGH RISK PREGNANCIES, FIRST TRIMESTER: ICD-10-CM

## 2024-12-24 DIAGNOSIS — O24.419 GESTATIONAL DIABETES MELLITUS (GDM) IN THIRD TRIMESTER, GESTATIONAL DIABETES METHOD OF CONTROL UNSPECIFIED: ICD-10-CM

## 2024-12-24 LAB
GESTATIONAL GTT 1 HR POST DOSE: 180 MG/DL (ref 60–179)
GESTATIONAL GTT 2 HR POST DOSE: 166 MG/DL (ref 60–154)
GESTATIONAL GTT 3 HR POST DOSE: 123 MG/DL (ref 60–139)
GLUCOSE P FAST SERPL-MCNC: 93 MG/DL (ref 60–94)

## 2024-12-24 PROCEDURE — 82950 GLUCOSE TEST: CPT

## 2024-12-24 PROCEDURE — 82951 GLUCOSE TOLERANCE TEST (GTT): CPT

## 2024-12-24 PROCEDURE — 82952 GTT-ADDED SAMPLES: CPT

## 2024-12-24 PROCEDURE — 36415 COLL VENOUS BLD VENIPUNCTURE: CPT

## 2024-12-24 PROCEDURE — 82947 ASSAY GLUCOSE BLOOD QUANT: CPT

## 2025-01-07 ENCOUNTER — VIRTUAL VISIT (OUTPATIENT)
Dept: EDUCATION SERVICES | Facility: CLINIC | Age: 35
End: 2025-01-07

## 2025-01-07 DIAGNOSIS — O24.419 GDM (GESTATIONAL DIABETES MELLITUS): Primary | ICD-10-CM

## 2025-01-07 PROBLEM — O09.891 SUPERVISION OF OTHER HIGH RISK PREGNANCIES, FIRST TRIMESTER: Status: ACTIVE | Noted: 2024-08-14

## 2025-01-07 PROCEDURE — G0108 DIAB MANAGE TRN  PER INDIV: HCPCS | Mod: 93 | Performed by: DIETITIAN, REGISTERED

## 2025-01-07 RX ORDER — URINE GLUCOSE-ACET TEST STRIP
1 STRIP MISCELLANEOUS EVERY MORNING
Qty: 50 STRIP | Refills: 0 | Status: SHIPPED | OUTPATIENT
Start: 2025-01-07

## 2025-01-07 RX ORDER — HUMAN INSULIN 100 [IU]/ML
14 INJECTION, SUSPENSION SUBCUTANEOUS AT BEDTIME
Qty: 15 ML | Refills: 1 | Status: SHIPPED | OUTPATIENT
Start: 2025-01-07

## 2025-01-07 RX ORDER — PEN NEEDLE, DIABETIC 30 GX3/16"
1 NEEDLE, DISPOSABLE MISCELLANEOUS DAILY
Qty: 100 EACH | Refills: 1 | Status: SHIPPED | OUTPATIENT
Start: 2025-01-07

## 2025-01-07 NOTE — PATIENT INSTRUCTIONS
Follow up with diabetes education for blood glucose and ketone review on 1/13 (mychart) and 1/15 (telephone).    Plan to share your glucose and ketone information with diabetes education once a week, unless otherwise directed.     1. Check glucose 4 times daily, before breakfast daily and 1 hour after each meal, as recommended.    2. Check ketones daily or once a week after they have been negative for 7 days in a row. If ketones are elevated, let your diabetes educator know and continue to check daily until they are negative for 7 days in a row.    3. Continue with recommended physical activity.    4. Continue to follow recommended meal plan: 2-3 carbs at breakfast, 3-4 carbs at lunch, 3-4 carbs at supper, 1-2 carbs at snacks.  Follow consistent CHO meal plan, eat CHO and protein/fat at all meals/snacks.    5. Follow-up with OB doctor as recommended.    6. Call or MyChart message your diabetes educator if 3 or more blood sugars are above the goal in 1 week or if ketones are elevated (trace or above).       AFTER YOU DELIVER:  - Continue with healthy eating and physical activity to get back to your pre-pregnancy weight.   - Have a follow-up 2-hour Glucose Tolerance Test at your 6-week post-partum check-up.   - Have your fasting blood sugar checked once a year.  - Plan ahead for future pregnancies - eat healthy, keep active, work with your doctor to check for gestational diabetes early on in the pregnancy and check blood sugars as recommended by your doctor.

## 2025-01-07 NOTE — PATIENT INSTRUCTIONS
Weeks 26 to 30 of Your Pregnancy: Care Instructions  You're starting your last trimester. You'll probably feel your baby moving around more. Your back may ache as your body gets used to your baby's size and length. Take care of yourself, and pay attention to what your body needs.    Talk to your doctor about getting the Tdap shot. It will help protect your  against whooping cough (pertussis). Also ask your doctor about flu and COVID-19 shots if you haven't had them yet. If your blood type is Rh negative, you may be given a shot of Rh immune globulin (such as RhoGAM). It can help prevent problems for your baby.   You may have Randall-Smiley contractions. They are single or several strong contractions without a pattern. These are practice contractions but not the start of labor.   Be kind to yourself.       Take breaks when you're tired.  Change positions often. Don't sit for too long or stand for too long.  At work, rest during breaks if you can. If you don't get breaks, talk to your doctor about writing a letter to your employer to request them.  Avoid fumes, chemicals, and tobacco smoke.  Be sexual if you want to.       You may be interested in sex, or you may not. Everyone is different.  Sex is okay unless your doctor tells you not to.  Your belly can make it hard to find good positions for sex. [place] and explore.  Watch for signs of  labor.        These signs include:  Menstrual-like cramps. Or you may have pain or pressure in your pelvis that happens in a pattern.  About 6 or more contractions in an hour (even after rest and a glass of water).  A low, dull backache that doesn't go away when you change positions.  An increase or change in vaginal discharge.  Light vaginal bleeding or spotting.  Your water breaking.  Know what to do if you think you are having contractions.       Drink 1 or 2 glasses of water.  Lie down on your left side for at least an hour.  While on your side, feel the top of  "your belly to see if it's tight.  Write down your contractions for an hour. Time how long it is from the start of one contraction to the start of the next.  Call your doctor if you have regular contractions.  Follow-up care is a key part of your treatment and safety. Be sure to make and go to all appointments, and call your doctor if you are having problems. It's also a good idea to know your test results and keep a list of the medicines you take.  Where can you learn more?  Go to https://www.[employer].net/patiented  Enter S999 in the search box to learn more about \"Weeks 26 to 30 of Your Pregnancy: Care Instructions.\"  Current as of: April 30, 2024  Content Version: 14.3    2024 [employer].   Care instructions adapted under license by your healthcare professional. If you have questions about a medical condition or this instruction, always ask your healthcare professional. [employer] disclaims any warranty or liability for your use of this information.    Counting Your Baby's Kicks: Care Instructions  Overview     Counting your baby's kicks is one way your doctor can tell that your baby is healthy. You will probably feel your baby move for the first time between 16 and 22 weeks. The movement may feel like flutters rather than kicks. Your baby may move more at certain times of the day. When you are active, you may notice less kicking than when you are resting. At your prenatal visits, your doctor will ask whether the baby is active.  In your last trimester, your doctor may ask you to count the number of times you feel your baby move.  Follow-up care is a key part of your treatment and safety. Be sure to make and go to all appointments, and call your doctor if you are having problems. It's also a good idea to know your test results and keep a list of the medicines you take.  How do you count fetal kicks?  A common method of checking your baby's movement is to note the length of time it takes " "to count 10 movements (such as kicks, flutters, or rolls).  Pick your baby's most active time of day to count. This may be any time from morning to evening.  If you don't feel 10 movements in an hour, have something to eat or drink and count for another hour. If you don't feel at least 10 movements in the 2-hour period, call your doctor.  Do not use an at-home Doppler heart monitor in place of counting fetal movements.  When should you call for help?   Call your doctor now or seek immediate medical care if:    You feel fewer than 10 movements in a 2-hour period.     You noticed that your baby has stopped moving or is moving less than normal.   Watch closely for changes in your health, and be sure to contact your doctor if you have any problems.  Where can you learn more?  Go to https://www.[employer]/patiented  Enter U048 in the search box to learn more about \"Counting Your Baby's Kicks: Care Instructions.\"  Current as of: 2024  Content Version: 14.3    2024 [employer].   Care instructions adapted under license by your healthcare professional. If you have questions about a medical condition or this instruction, always ask your healthcare professional. [employer] disclaims any warranty or liability for your use of this information.      Weeks 26 to 30 of Your Pregnancy: Care Instructions  You're starting your last trimester. You'll probably feel your baby moving around more. Your back may ache as your body gets used to your baby's size and length. Take care of yourself, and pay attention to what your body needs.    Talk to your doctor about getting the Tdap shot. It will help protect your  against whooping cough (pertussis). Also ask your doctor about flu and COVID-19 shots if you haven't had them yet. If your blood type is Rh negative, you may be given a shot of Rh immune globulin (such as RhoGAM). It can help prevent problems for your baby.   You may have Hemanth-Smiley " "contractions. They are single or several strong contractions without a pattern. These are practice contractions but not the start of labor.   Be kind to yourself.       Take breaks when you're tired.  Change positions often. Don't sit for too long or stand for too long.  At work, rest during breaks if you can. If you don't get breaks, talk to your doctor about writing a letter to your employer to request them.  Avoid fumes, chemicals, and tobacco smoke.  Be sexual if you want to.       You may be interested in sex, or you may not. Everyone is different.  Sex is okay unless your doctor tells you not to.  Your belly can make it hard to find good positions for sex. [place] and explore.  Watch for signs of  labor.        These signs include:  Menstrual-like cramps. Or you may have pain or pressure in your pelvis that happens in a pattern.  About 6 or more contractions in an hour (even after rest and a glass of water).  A low, dull backache that doesn't go away when you change positions.  An increase or change in vaginal discharge.  Light vaginal bleeding or spotting.  Your water breaking.  Know what to do if you think you are having contractions.       Drink 1 or 2 glasses of water.  Lie down on your left side for at least an hour.  While on your side, feel the top of your belly to see if it's tight.  Write down your contractions for an hour. Time how long it is from the start of one contraction to the start of the next.  Call your doctor if you have regular contractions.  Follow-up care is a key part of your treatment and safety. Be sure to make and go to all appointments, and call your doctor if you are having problems. It's also a good idea to know your test results and keep a list of the medicines you take.  Where can you learn more?  Go to https://www.healthwise.net/patiented  Enter S999 in the search box to learn more about \"Weeks 26 to 30 of Your Pregnancy: Care Instructions.\"  Current as of:  " 2024  Content Version: 14.3    2024 [employer].   Care instructions adapted under license by your healthcare professional. If you have questions about a medical condition or this instruction, always ask your healthcare professional. [employer] disclaims any warranty or liability for your use of this information.    Weeks 30 to 32 of Your Pregnancy: Care Instructions  Your baby is growing more every day. Its eyes can open and close, and it may have hair on its head. Your baby may sleep 20 to 45 minutes at a time and is more active at certain times.    You should feel your baby move several times every day. Your baby now turns less and kicks more.   This is a good time to tour your hospital or birthing center. You may also want to find childcare if needed.         To ease heartburn   Avoid foods that make your symptoms worse, such as chocolate, spicy foods, and caffeine.  Avoid bending over or lying down after meals.  Do not eat for 2 hours before bedtime.  Take antacids like Tums, but don't take ones that have sodium bicarbonate, magnesium trisilicate, or aspirin.        To care for large, swollen veins (varicose veins)   Try to avoid standing for long periods of time.  Sit with your feet propped up.  Wear support hose.  Get some exercise every day, like walking or swimming.  Counting your baby's kicks  Your doctor may ask you to count your baby's movements, such as kicks, flutters, or rolls.    Find a quiet place, and get comfortable. Write down your start time. Count your baby's movements (except hiccups). When your baby has moved 10 times, you can stop counting. Write down how many minutes it took.   If an hour goes by and you don't feel 10 movements, have something to eat or drink. Count for another hour. If you don't feel at least 10 movements in the 2-hour period, call your doctor.   Follow-up care is a key part of your treatment and safety. Be sure to make and go to all appointments,  "and call your doctor if you are having problems. It's also a good idea to know your test results and keep a list of the medicines you take.  Where can you learn more?  Go to https://www.[employer].net/patiented  Enter X471 in the search box to learn more about \"Weeks 30 to 32 of Your Pregnancy: Care Instructions.\"  Current as of: April 30, 2024  Content Version: 14.3    2024 [employer].   Care instructions adapted under license by your healthcare professional. If you have questions about a medical condition or this instruction, always ask your healthcare professional. [employer] disclaims any warranty or liability for your use of this information.    Learning About Birth Control After Childbirth  Birth control is any method used to prevent pregnancy. If you have vaginal sex without birth control, you could get pregnant--even if you haven't started having periods again. You're less likely to get pregnant while breastfeeding, but it's still possible. Finding birth control that works for you can help avoid an unplanned pregnancy.  There are many kinds of birth control. Each has pros and cons. Find what works for you. Talk to your doctor if you've just given birth or are breastfeeding.    Long-acting reversible contraception (LARC). These are placed inside your body by a doctor. They can prevent pregnancy for years.  Examples include:  An implant (hormonal).  Copper intrauterine device (IUD).  Hormonal IUDs.    Short-acting hormonal methods. These release hormones. Examples include:  Combination birth control pills (\"the pill\").  Skin patches.  A vaginal ring.  A shot.  Mini-pills. Choose progestin-only options soon after giving birth.    Barrier methods. Use these every time you have vaginal sex.  Examples include:  External (male) condoms.  Internal (female) condoms.  Diaphragms.  Cervical caps.  Sponges.    Spermicides. These kill sperm or stop sperm from moving. They can be gels, creams, " "foams, films, or tablets. Use them before vaginal sex.  Examples include:  Nonoxynol-9.  pH regulator gel.    Permanent birth control (sterilization). This can be an option if you're sure that you don't want to get pregnant later.  Examples include:  Vasectomy.  Having tubes tied (tubal ligation).    Emergency contraception. This is a backup method. Use it if you didn't use birth control or your birth control method failed.  Examples include:  Copper and hormonal IUDs.  Emergency contraceptive pills.    Fertility awareness. You'll learn when you are most likely to become pregnant (are fertile). You can avoid vaginal sex at that time.  It's also called:  Natural family planning.  The rhythm method.    Breastfeeding. This is most effective when all of these are true:  Your baby is younger than 6 months old.  You're breastfeeding and not bottle-feeding at all.  You aren't having periods.  Follow-up care is a key part of your treatment and safety. Be sure to make and go to all appointments, and call your doctor if you are having problems. It's also a good idea to know your test results and keep a list of the medicines you take.  Where can you learn more?  Go to https://www.[employer].net/patiented  Enter X408 in the search box to learn more about \"Learning About Birth Control After Childbirth.\"  Current as of: April 30, 2024  Content Version: 14.3    2024 [employer].   Care instructions adapted under license by your healthcare professional. If you have questions about a medical condition or this instruction, always ask your healthcare professional. [employer] disclaims any warranty or liability for your use of this information.    "

## 2025-01-07 NOTE — PROGRESS NOTES
Diabetes Self-Management Education & Support    Type of Service: Telephone Visit    Originating Location (Patient Location): Home  Distant Location (Provider Location): Offsite  Mode of Communication:  Telephone    Telephone Visit Start Time:  10am  Telephone Visit End Time (telephone visit stop time): 11am    How would patient like to obtain AVS? MyChart    Assessment  Patient seen for GDM education.  Patient with hx of insulin-controlled GDM in previous pregnancy in 2022.  Patient has been checking her blood sugar throughout this pregnancy.  Has been struggling with 4x/day checks and is asking about CGM today.  Per pharmacy liaison, a PA would be required to obtain a cgm.  Patient not certain about pursuing at this time.    Reviewed blood sugar results and found 0% fasting blood sugars within goal of <95.  Discussed insulin start, how to administer, and dose.  Recommend starting dose of 14 units (0.15u/kg) Novolin NPH insulin to be administered at bedtime.  Video instruction sent to patient via [employer].      Reviewed target blood glucose values, sharps disposal, diagnosis criteria for GDM and importance of good blood glucose management for health of mom and baby. Also, provided instructed on ketone checking and ordered ketostix.       Discussed carbohydrate sources and impact on blood glucose. Reviewed basics of healthy eating and incorporating a variety of foods into meal plan. Instructed on carbohydrate counting and label reading and recommended patient consume 2-3 CHO for breakfast, 3-4 CHO for lunch and dinner and 1-2 CHO for each snack, 3 snacks a day.      Discussed importance of not going too low in carbohydrates since that may cause liver to produce excess glucose and contribute to elevated blood glucose readings and ketone formation. Encouraged eating breakfast within 1 hour of waking.  To also help prevent against ketone formation, protein was encouraged with meals and snacks, especially with the night  snack. Reviewed benefits of exercising to help lower blood glucose and walking after meals, as tolerated and per MD approval, if seeing elevated blood glucose after a meal. Pt verbalized understanding of concepts discussed and recommendations provided.     Blood Glucose/Ketone Log:    Date Ketones Fasting Post Breakfast Post Lunch Post Supper   12/30  105   137   12/31  96  130    1/1  96   112   1/2  101  118 141   1/3  108  146    1/4  98  109    1/5  102      1/6  96   117   1/7  109            Intervention  Patient instructed on NPH insulin today.  Also instructed on goals for 4x/day bg checks and daily ketone testing for 1 week.      Educational topics covered today:  GDM diagnosis, pathophysiology, risks and complications of GDM, means of controlling GDM, using a blood glucose monitor, blood glucose goals, logging and interpreting glucose results, ketone testing, when to call a diabetes educator or OB provider, healthy eating during pregnancy, counting carbohydrates, meal planning for GDM, and physical activity    Educational materials provided today:   Neli Understanding Gestational Diabetes  GDM Log Book  Sharps Disposal  Care After Delivery      Plan    Start 14 units NPH insulin at bedtime.  Follow-up via [employer] message on Monday 1/13.    Check glucose 4 times daily, before breakfast and 1 hour after each meal.     Check Ketones daily for one week, if negative, reduce testing to once a week.     Physical activity recommended: as able.    Meal plan: 30-45g carbohydrates at breakfast, 45-60g carbohydrates at lunch, 45-60g carbohydrates at supper, 15-30g carbohydrates at 3 snacks a day.  Follow consistent carbohydrate meal plan, eat carbohydrates and protein/fat at all meals/snacks.    Call/MyChart message diabetes educator if 3 or more blood sugars are above the goal in 1 week, if ketones are positive, or with questions/concerns.    Follow-up:    Follow up on Upcoming Diabetes Ed Appointments     Visit  Type Date Time Department    GDM ED 1/7/2025 10:00 AM [facility]    GDM FOLLOW UP 1/15/2025 10:30 AM [facility]        Subjective/Objective  Briana is an 34 year old year old, presenting for the following diabetes education related to:      Accompanied by: Self  Gestational weeks: 29w2d  [facility] delivery[facility]  Next OB Visit Date: 01/08/25  Number of previous pregnancies: 2  Had any babies over 9 lbs: No  Previously had Gestational Diabetes: Yes  Had Diabetes Education before: Yes  Previous insulin or other diabetes medication during that pregnancy: Yes    Cultural Influences/Ethnic Background:  Not  or       Estimated Date of Delivery: Mar 23, 2025    1 hour OGTT  Lab Results   Component Value Date    GLU1 165 (H) 12/18/2024         3 hour OGTT    Fasting  Lab Results   Component Value Date    GTTGF 93 12/24/2024       1 hour  Lab Results   Component Value Date    GTTG1 180 (H) 12/24/2024       2 hour  Lab Results   Component Value Date    GTTG2 166 (H) 12/24/2024       3 hour  Lab Results   Component Value Date    GTTG3 123 12/24/2024       Healthy Eating:  Exercise:: Yes (walking 10k steps daily)  How intense was your typical exercise? : Light (like stretching or slow walking)  Barrier to exercise: None  Cultural/Latter-day diet restrictions?: No  Meal planning/habits: Carb counting  Meals include: Breakfast, Lunch, Dinner, Morning Snack, Afternoon Snack, Evening Snack  Breakfast: misses on weekdays OR scrambled eggs, 1 toast OR Emirati crepe with tea  Lunch: tuna sandwich OR soup - chicken or lentil OR 1 cup rice, chicken/beef  Dinner: similar to lunch - rice/pasta/bread, meat, melon, salad  Snacks: AM snack - small slice banana bread / fruit or homemade fruit juice / hs - apple or dry granola  Other: dislikes most vegetables - eats lettuce  Beverages: Water, Juice (juice made with juicer at home (cucumber, apple, carol))  How many servings of fruits/vegetables per  day: 4  Biggest challenges to healthy eating: None  Pre-zeinab vitamin?: Yes  Supplements?: Yes  List supplements currently taking: vitamin d  Experiencing nausea?: No  Experiencing heartburn?: Yes    Healthy Coping:  Emotional response to diabetes: Ready to learn  Informal Support system:: None, Family  Stage of change: ACTION (Actively working towards change)    Current Management:  Taking medications for gestational diabetes?: No    Sivlia Villalobos RD  Time Spent: 60 minutes  Encounter Type: Individual     Any diabetes medication dose changes were made via the CDCES Standing Orders under the patient's referring provider.

## 2025-01-07 NOTE — LETTER
1/7/2025         RE: Briana Newman  [address] [address]        Dear Colleague,    Thank you for referring your patient, Briana Newman, to the [facility]. Please see a copy of my visit note below.    Diabetes Self-Management Education & Support    Type of Service: Telephone Visit    Originating Location (Patient Location): Home  Distant Location (Provider Location): Offsite  Mode of Communication:  Telephone    Telephone Visit Start Time:  10am  Telephone Visit End Time (telephone visit stop time): 11am    How would patient like to obtain AVS? MyChart    Assessment  Patient seen for GDM education.  Patient with hx of insulin-controlled GDM in previous pregnancy in 2022.  Patient has been checking her blood sugar throughout this pregnancy.  Has been struggling with 4x/day checks and is asking about CGM today.  Per pharmacy liaison, a PA would be required to obtain a cgm.  Patient not certain about pursuing at this time.    Reviewed blood sugar results and found 0% fasting blood sugars within goal of <95.  Discussed insulin start, how to administer, and dose.  Recommend starting dose of 14 units (0.15u/kg) Novolin NPH insulin to be administered at bedtime.  Video instruction sent to patient via [employer].      Reviewed target blood glucose values, sharps disposal, diagnosis criteria for GDM and importance of good blood glucose management for health of mom and baby. Also, provided instructed on ketone checking and ordered ketostix.       Discussed carbohydrate sources and impact on blood glucose. Reviewed basics of healthy eating and incorporating a variety of foods into meal plan. Instructed on carbohydrate counting and label reading and recommended patient consume 2-3 CHO for breakfast, 3-4 CHO for lunch and dinner and 1-2 CHO for each snack, 3 snacks a day.      Discussed importance of not going too low in carbohydrates since that may cause liver to produce excess glucose  and contribute to elevated blood glucose readings and ketone formation. Encouraged eating breakfast within 1 hour of waking.  To also help prevent against ketone formation, protein was encouraged with meals and snacks, especially with the night snack. Reviewed benefits of exercising to help lower blood glucose and walking after meals, as tolerated and per MD approval, if seeing elevated blood glucose after a meal. Pt verbalized understanding of concepts discussed and recommendations provided.     Blood Glucose/Ketone Log:    Date Ketones Fasting Post Breakfast Post Lunch Post Supper   12/30  105   137   12/31  96  130    1/1  96   112   1/2  101  118 141   1/3  108  146    1/4  98  109    1/5  102      1/6  96   117   1/7  109            Intervention  Patient instructed on NPH insulin today.  Also instructed on goals for 4x/day bg checks and daily ketone testing for 1 week.      Educational topics covered today:  GDM diagnosis, pathophysiology, risks and complications of GDM, means of controlling GDM, using a blood glucose monitor, blood glucose goals, logging and interpreting glucose results, ketone testing, when to call a diabetes educator or OB provider, healthy eating during pregnancy, counting carbohydrates, meal planning for GDM, and physical activity    Educational materials provided today:   Neli Understanding Gestational Diabetes  GDM Log Book  Sharps Disposal  Care After Delivery      Plan    Start 14 units NPH insulin at bedtime.  Follow-up via [employer] message on Monday 1/13.    Check glucose 4 times daily, before breakfast and 1 hour after each meal.     Check Ketones daily for one week, if negative, reduce testing to once a week.     Physical activity recommended: as able.    Meal plan: 30-45g carbohydrates at breakfast, 45-60g carbohydrates at lunch, 45-60g carbohydrates at supper, 15-30g carbohydrates at 3 snacks a day.  Follow consistent carbohydrate meal plan, eat carbohydrates and protein/fat  at all meals/snacks.    Call/MyChart message diabetes educator if 3 or more blood sugars are above the goal in 1 week, if ketones are positive, or with questions/concerns.    Follow-up:    Follow up on Upcoming Diabetes Ed Appointments     Visit Type Date Time Department    GDM ED 1/7/2025 10:00 AM [facility]    GDM FOLLOW UP 1/15/2025 10:30 AM [facility]        Subjective/Objective  Briana is an 34 year old year old, presenting for the following diabetes education related to:      Accompanied by: Self  Gestational weeks: 29w2d  [facility] planned [facility]  Next OB Visit Date: 01/08/25  Number of previous pregnancies: 2  Had any babies over 9 lbs: No  Previously had Gestational Diabetes: Yes  Had Diabetes Education before: Yes  Previous insulin or other diabetes medication during that pregnancy: Yes    Cultural Influences/Ethnic Background:  Not  or       Estimated Date of Delivery: Mar 23, 2025    1 hour OGTT  Lab Results   Component Value Date    GLU1 165 (H) 12/18/2024         3 hour OGTT    Fasting  Lab Results   Component Value Date    GTTGF 93 12/24/2024       1 hour  Lab Results   Component Value Date    GTTG1 180 (H) 12/24/2024       2 hour  Lab Results   Component Value Date    GTTG2 166 (H) 12/24/2024       3 hour  Lab Results   Component Value Date    GTTG3 123 12/24/2024       Healthy Eating:  Exercise:: Yes (walking 10k steps daily)  How intense was your typical exercise? : Light (like stretching or slow walking)  Barrier to exercise: None  Cultural/Anglican diet restrictions?: No  Meal planning/habits: Carb counting  Meals include: Breakfast, Lunch, Dinner, Morning Snack, Afternoon Snack, Evening Snack  Breakfast: misses on weekdays OR scrambled eggs, 1 toast OR Paraguayan crepe with tea  Lunch: tuna sandwich OR soup - chicken or lentil OR 1 cup rice, chicken/beef  Dinner: similar to lunch - rice/pasta/bread, meat, melon, salad  Snacks: AM snack - small slice banana  bread / fruit or homemade fruit juice / hs - apple or dry granola  Other: dislikes most vegetables - eats lettuce  Beverages: Water, Juice (juice made with juicer at home (cucumber, apple, carol))  How many servings of fruits/vegetables per day: 4  Biggest challenges to healthy eating: None  Pre-zeinab vitamin?: Yes  Supplements?: Yes  List supplements currently taking: vitamin d  Experiencing nausea?: No  Experiencing heartburn?: Yes    Healthy Coping:  Emotional response to diabetes: Ready to learn  Informal Support system:: None, Family  Stage of change: ACTION (Actively working towards change)    Current Management:  Taking medications for gestational diabetes?: No    Silvia Villalobos RD  Time Spent: 60 minutes  Encounter Type: Individual     Any diabetes medication dose changes were made via the CDCES Standing Orders under the patient's referring provider.

## 2025-01-08 ENCOUNTER — PRENATAL OFFICE VISIT (OUTPATIENT)
Dept: OBGYN | Facility: CLINIC | Age: 35
End: 2025-01-08
Attending: ADVANCED PRACTICE MIDWIFE

## 2025-01-08 VITALS
BODY MASS INDEX: 37.56 KG/M2 | HEIGHT: 63 IN | SYSTOLIC BLOOD PRESSURE: 118 MMHG | DIASTOLIC BLOOD PRESSURE: 79 MMHG | WEIGHT: 212 LBS | HEART RATE: 75 BPM

## 2025-01-08 DIAGNOSIS — O09.291 HX OF PREECLAMPSIA, PRIOR PREGNANCY, CURRENTLY PREGNANT, FIRST TRIMESTER: ICD-10-CM

## 2025-01-08 DIAGNOSIS — O24.414 INSULIN CONTROLLED GESTATIONAL DIABETES MELLITUS (GDM) IN THIRD TRIMESTER: Primary | ICD-10-CM

## 2025-01-08 DIAGNOSIS — N83.201 RIGHT OVARIAN CYST: ICD-10-CM

## 2025-01-08 DIAGNOSIS — O09.891 SUPERVISION OF OTHER HIGH RISK PREGNANCIES, FIRST TRIMESTER: ICD-10-CM

## 2025-01-08 DIAGNOSIS — O09.299 HX OF MATERNAL THIRD DEGREE PERINEAL LACERATION, CURRENTLY PREGNANT: ICD-10-CM

## 2025-01-08 PROBLEM — O24.419 GDM (GESTATIONAL DIABETES MELLITUS): Status: ACTIVE | Noted: 2024-12-24

## 2025-01-08 PROBLEM — R73.09 ELEVATED GLUCOSE LEVEL: Status: RESOLVED | Noted: 2024-12-19 | Resolved: 2025-01-08

## 2025-01-08 PROCEDURE — 99213 OFFICE O/P EST LOW 20 MIN: CPT | Performed by: ADVANCED PRACTICE MIDWIFE

## 2025-01-08 NOTE — LETTER
January 8, 2025        Briana Newman  [address] [place] [address]    To Whom it May Concern:    Briana Newman was seen in our office on 1/8/2025 and was given the following instructions:  Patient may return to work on 1/8/25. Please excuse from work twice per week starting 2/1/25 for healthcare visits.    Sincerely,        Becca Kendrick CNM

## 2025-01-08 NOTE — PROGRESS NOTES
"Subjective:      34 year old  at 29w3d presents for a routine prenatal appointment.     no vaginal bleeding or leakage of fluid.  Irregular painless BH ctx.   Reports regular fetal movement.       No HA, visual changes, RUQ or epigastric pain.    Patient concerns: Wondering about US for R ovarian cyst due to pain. Believes it could be RLP as well but wants to check. Constant sharp dull aching pain RLQ. Worsened pain with trying to lift her R leg when laying down. Pain began 1wk ago. Not worsening or improving. Good appetite. No fever, chills, nausea or vomiting.        GDM:   Current therapy:   Long-Acting Insulin, Novolin 14 units at bedtime  ordered yesterday per RD encounter note- pt has not been able to start yet as pharmacy did not have the Rx but they will have it for her today hopefully. Follow-up scheduled     She is checking her blood sugars regularly, including Fasting and 1-hour post-prandial.    Blood sugars are as follows: (complete table or insert clinical media)    Copied from BRAD Villalobos's Note dated 25  Blood Glucose/Ketone Log:     Date Ketones Fasting Post Breakfast Post Lunch Post Supper      105     137      96   130     1/1   96     112   1/2   101   118 141   1/3   108   146     1/4   98   109     1/5   102         1/6   96     117   1/7   109           1/7 1hr after dinner 128    Objective:  Vitals:    25 0837   BP: 118/79   Pulse: 75   Weight: 96.2 kg (212 lb)   Height: 1.6 m (5' 3\")   , see ob flowsheet    Blood type: A POS   Assessment/Plan     Patient Active Problem List    Diagnosis Date Noted    GDM (gestational diabetes mellitus) 2024     Priority: Medium     : Supplies sent    25 started on Novolin 14 units at bedtime    growth q4 @28wks, twice weekly BPP @32wks with delivery by 39w6d if well controlled- orders placed      Palpitations 10/16/2024     Priority: Medium    Rubella non-immune status, antepartum 08/15/2024     Priority: " Medium     Equivocal rubella       Supervision of other high risk pregnancies, first trimester 2024     Priority: Medium     [facility] Provider Group choice: CNM group  Partner's name: Christianne Ca  Employment: part time MA  [x]NOB folder  [x]Dating  [x] 1st trimester screening: Patient declines 1st tri genetic screening  [x]Fetal anatomy US ordered  [x] recommended LD ASA after 12 wks for PRE-E risk  [x] GDM risk, recommended early GCT and hgb A1C  [x]No need for utox in labor  [x]COVID vaccine completed  [x]Pap UTD- due     12-23wks________________________  []Offer AFP after 15 wks  []Rubella equivocal  [x]Hep B immune   [x]Varicella immune  []FLU shot    24-28wk_________________________  [x]EOB folder  [x]Labor plans: Un-Medicated preference  [x]Prenatal Ed  []: Declines  [x]Infant feeding plan: . Has had issues with latching and supply in the past.  [x]Bath care provider choice: Galileo Leal with [facility]  [x]PP Contraception plan: NFP  []TDAP after 27 weeks- declines, may get at work  []Rhogam if needed, date: N/A    29-35 wk________________________  []TOLAC consent done NA  [x] Water birth interest- form given  [x]GCT - failed 3hr, GDM  []RSV    36-37 wks______________________   [] GBS/CBC  []OTC PP meds sent  []PP recovery plans/support:  []Planning CS-ERAS pkt    38-42 wks______________________  []IOL reason/plans  []Postdates BPP       History of insulin controlled gestational diabetes mellitus (GDM) 2024     Priority: Medium     A1C today 24 and early 1hr    - did not complete early 1 hour  Plan 1 hour at EOB    2024: 1 hour      Hx of preeclampsia, prior pregnancy, currently pregnant, first trimester 2024     Priority: Medium     Baseline PreE labs 24, to start ASA @12wks    : reiterated taking aspirin daily    2024: repeat labs today per pt preference      Hx of maternal third degree perineal laceration,  currently pregnant 08/14/2024     Priority: Medium    Migraine with aura and without status migrainosus, not intractable 05/15/2024     Priority: Medium    Cervical high risk HPV (human papillomavirus) test positive 04/24/2024     Priority: Medium     7/7/20 NIL pap   4/24/24 NIL pap, + HR HPV (not 16 or 18). Plan: cotest in 1 yr.   4/30/24 Pt notified       Current moderate episode of major depressive disorder without prior episode (H) 02/01/2024     Priority: Medium    Hx of CHRIS (generalized anxiety disorder) 02/01/2024     Priority: Medium    Dyspnea on exertion 10/05/2022     Priority: Medium    Postural tachycardia with syncope 08/29/2022     Priority: Medium     In pregnancy.-Has had postural tachycardia with syncope, went to ED twice for concerns of lightheadedness   Heart rate has gone up to 150 during these episodes  Wore a Holter monitor for three days. She will ship the monitor today. Has an echo scheduled for early September and visit with cardiology in October 2022 9/26/22 Briana has continued to feel a racing heart all day long. She has had a normal echocardiogram and Holter monitor workup. Has appt with cardiology early October. Denies any syncope now that she is out of the first trimester  10/5/22 met with Cardiology, they recommended compression socks    10/16/24: Cardiology recommendations:  POTS - consider beta blocker with pregnancy  - will check zio first  2. Palpitations - as above  3. Dyspnea - check stress echo      BMI 35.0-35.9,adult 07/29/2022     Priority: Medium     BPP weekly @37wks  Growth ultrasound 32 weeks    12/18/2024: order placed for scheduling at 32 weeks      Vitamin D deficiency 07/15/2022     Priority: Medium     18 at intake. Rx sent for recommended supplementation of 4000IU daily.   11/16/22- Vit D 26- review at next visit___           Orders Placed This Encounter   Procedures    US OB Fetal Biophys Prf wo NonStrs Singls Sgl    US OB Fetal Biophys Prf wo NonStrs Singls  Sgl    US OB Fetal Biophys Prf wo NonStrs Singls Sgl    US OB Fetal Biophys Prf wo NonStrs Singls Sgl    US OB Fetal Biophys Prf wo NonStrs Singls Sgl    US OB Fetal Biophys Prf wo NonStrs Singls Sgl    US OB Follow Up >14 Weeks    US OB Fetal Biophys Prf wo NonStrs Singls Sgl    US OB Fetal Biophys Prf wo NonStrs Singls Sgl    US Pelvic Transabdominal and Transvaginal       Updated individualized prenatal care plan on the problem list for 3rd trimester. Reviewed 28wk lab results. Questions answered re:  surveillance for GDMA2. Growth US scheduled , orders placed for further growth US and BPPs. Order placed for pelvic US to eval R ovarian cyst. Tdap - declines today- may get through her employer. Gave work note to allow for twice weekly appts starting in February.     Return to clinic in 2 weeks and prn if questions or concerns.     Becca Kendrick CNM

## 2025-01-13 ENCOUNTER — MYC MEDICAL ADVICE (OUTPATIENT)
Dept: EDUCATION SERVICES | Facility: CLINIC | Age: 35
End: 2025-01-13

## 2025-01-13 DIAGNOSIS — L70.8 OTHER ACNE: ICD-10-CM

## 2025-01-13 DIAGNOSIS — O24.419 GDM (GESTATIONAL DIABETES MELLITUS): ICD-10-CM

## 2025-01-13 RX ORDER — CLINDAMYCIN PHOSPHATE 10 UG/ML
LOTION TOPICAL 2 TIMES DAILY
Qty: 60 ML | Refills: 4 | OUTPATIENT
Start: 2025-01-13

## 2025-01-13 NOTE — TELEPHONE ENCOUNTER
Gestational Diabetes Follow-up    Subjective/Objective:    Briana Newman sent in blood glucose log for review. Last date of communication was: 25.    Gestational diabetes is being managed with diet, activity, and medications    Taking diabetes medications: yes:     Diabetes Medication(s)       Insulin       insulin NPH (NOVOLIN N FLEXPEN) 100 UNIT/ML injection Inject 14 Units subcutaneously at bedtime.            Estimated Date of Delivery: Mar 23, 2025    BG/Food Log:   Dominick Vega or nurses. As requested here are my readings last week since starting insulin:  Glucose readings   2024  Fastin  Breakfast: - 118  Lunch 1 hr: 125  Dinner 1 hr: 116  -  1/10/2024  Fastin  Breakfast 1 hr: 145  Lunch 1 hr:  106  Dinner 1hr: 216  Dinner 2hr: 135  -  2024  Fastin (in and out of sleep until I checked at 11am)  Breakfast 1 hr: 150  Lunch 1 hr: 150  Dinner 1hr: 99  -  2024  Fastin   Breakfast 1 hr: 152  Lunch 1 hr: 112  Dinner 1hr: 138  -  2024  Fastin     Also my Ketone urine testing every day last week has been negative.      Assessment:    Ketones: neg.   Fasting blood glucoses: 60% in target.   After breakfast: 25% in target.  Before lunch: -% in target.  After lunch: 66% in target.  Before dinner: -% in target.  After dinner: 100% in target.    Plan/Response:  Dominick Warren,    Thank you for sending your blood sugar readings. It looks like your fasting blood sugars have improved, but some are still above goal along with the after breakfast readings. I would recommend a small increase to the bedtime insulin from 14 units to 16 units, and make sure to take it right before bed. By taking the insulin as late as possible can help the breakfast readings.   How are you doing with the meal plan? Aim for 30-45 grams of carbohydrate with a good source of protein, and avoid cereal, fruit  and juice at breakfast.   Let us know if you need any suggestions before your follow up appointment with  [facility] on Wednesday.     Lilia SHERWOOD, RN, PHN, CDCES       Lilia SHERWOOD, RN, PHN, CDCES         Any diabetes medication dose changes were made via the CDE Protocol and Collaborative Practice Agreement with the patient's referring provider. A copy of this encounter was shared with the provider.

## 2025-01-15 ENCOUNTER — VIRTUAL VISIT (OUTPATIENT)
Dept: EDUCATION SERVICES | Facility: CLINIC | Age: 35
End: 2025-01-15

## 2025-01-15 ENCOUNTER — HOSPITAL ENCOUNTER (OUTPATIENT)
Dept: ULTRASOUND IMAGING | Facility: CLINIC | Age: 35
Discharge: HOME | End: 2025-01-15
Attending: ADVANCED PRACTICE MIDWIFE

## 2025-01-15 DIAGNOSIS — O24.414 INSULIN CONTROLLED GESTATIONAL DIABETES MELLITUS (GDM) IN THIRD TRIMESTER: Primary | ICD-10-CM

## 2025-01-15 DIAGNOSIS — O24.419 GDM (GESTATIONAL DIABETES MELLITUS): ICD-10-CM

## 2025-01-15 DIAGNOSIS — N83.201 RIGHT OVARIAN CYST: ICD-10-CM

## 2025-01-15 PROCEDURE — 76857 US EXAM PELVIC LIMITED: CPT

## 2025-01-15 PROCEDURE — 76857 US EXAM PELVIC LIMITED: CPT | Mod: 26 | Performed by: RADIOLOGY

## 2025-01-15 RX ORDER — INSULIN HUMAN 100 [IU]/ML
16 INJECTION, SUSPENSION SUBCUTANEOUS AT BEDTIME
Qty: 15 ML | Refills: 1 | Status: SHIPPED | OUTPATIENT
Start: 2025-01-15

## 2025-01-15 NOTE — PROGRESS NOTES
Diabetes Self-Management Education & Support    Type of Service: Telephone Visit    Originating Location (Patient Location): Home  Distant Location (Provider Location): Offsite  Mode of Communication:  Telephone    Telephone Visit Start Time:  10:30 am  Telephone Visit End Time (telephone visit stop time): 11:00 am    How would patient like to obtain AVS? MyChart      Assessment:   Ketones: neg x 1 week.   Fasting blood glucoses: 50% in target since last insulin increase on 1/13.  After breakfast: 100% in target.  After lunch: 100% in target.  After dinner: 50% in target.    Dinner last night: WW bread (2 pieces) with banana and PB and 3 dates (bigger ones). This was likely over the recommended carb amount which is why her glucose was high last night. Discussed just having 1 date (since they are larger ones) and aiming for the recommended carb amount of 45-60 grams at dinner.     Breakfast is the hardest. She often misses it at work. Does better on Wednesdays and weekends. Encouraged her to really try to test after breakfast and not miss this meal.     There has only been 2 doses of her increased dose of NPH at bedtime so will see how her glucose is tomorrow.  If high, recommend increasing to 18 units at bedtime. If in target, she can stay at her current dose of 16 units with plan to follow up on Monday.     She has a hard time eating a bedtime snack. Explained how this can actually help her morning numbers. Discussed some small ideas like crackers and peanut butter or a tall glass of milk right before bed. She agreed to try this.     Intervention  Educational topics covered today:  What to expect after delivery, future testing for Type 2 diabetes (2 hour OGTT at 6 week post-partum check-up and annual fasting blood glucose level), risk of GDM and planning ahead for future pregnancies, recommended lifestyle interventions for reducing the risk of Type 2 Diabetes, when to call a Diabetes Educator or OB  provider    Educational Materials provided today:  [place] Preventing Diabetes    Patient verbalized understanding of diabetes self-management education concepts discussed, opportunities for ongoing education and support, and recommendations provided today    Plan  If glucose is 95 or more tomorrow, increase to 18 units at bedtime. Otherwise, if in target, keep at 16 units at bedtime.   Mychart numbers on Monday 1/20/25.   Try some crackers with peanut butter for bedtime snack or glass of milk if really not hungry.  Check glucose four times daily, before breakfast and 1 hour after each meal.  Check ketones once a week when readings are consistently negative.  Continue with recommended physical activity.  Continue to follow recommended meal plan: 30-45g carbohydratess at breakfast, 45-60g carbohydratess at lunch, 45-60g carbohydrates at supper, 15-30g carbohydrates at snacks.  Follow consistent carbohydrate meal plan, eat carbohydrates and protein/fat at all meals/snacks.    Send in Glucose Log (blood sugars) via SpeakPhone in 4-5 days. If 3 or more blood sugars are above the goal at a given time, or if Ketones are small, moderate or large, call or BuzzTablehart message the diabetes educator.    Follow-up:    Follow up on Upcoming Diabetes Ed Appointments    Mychart on Monday 1/20/25.       Subjective/Objective  Briana is an 34 year old year old, presenting for the following diabetes education related to: Follow-up    Accompanied by: Self, Spouse    Cultural Influences/Ethnic Background:  Not  or     LMP 06/16/2024 (Exact Date)     Weight trend. Currently 30 weeks gestation.  Wt Readings from Last 5 Encounters:   01/08/25 96.2 kg (212 lb)   12/18/24 95.7 kg (211 lb)   10/30/24 93.9 kg (207 lb)   10/16/24 93.4 kg (205 lb 14.4 oz)   09/18/24 91.8 kg (202 lb 6.4 oz)       Estimated Date of Delivery: Mar 23, 2025      Blood Glucose/Ketone Log:    Date Ketones Fasting Post Breakfast Post Lunch Post Supper   1/13 neg  92 missed 117 137   1/14 neg 99 121 117 151   1/15 neg 91 Has not had yet         Healthy Eating:  Exercise:: Yes (walking 10k steps daily)  How intense was your typical exercise? : Light (like stretching or slow walking)  Barrier to exercise: None  Cultural/[place] diet restrictions?: No  Meal planning/habits: Carb counting  Meals include: Breakfast, Lunch, Dinner, Morning Snack, Afternoon Snack, Evening Snack  Breakfast: misses on weekdays OR egg and cheese on english muffin  Lunch: tuna sandwich OR soup - chicken or lentil or broccoli cheddar OR 1 cup rice, chicken/beef  Dinner: similar to lunch - rice/pasta/bread, meat, melon, salad  Snacks: AM and PM snack -fruit, cheese stick or nuts; HS is hard for her  Other: dislikes most vegetables - eats lettuce  Beverages: Water, Juice (juice made with juicer at home (cucumber, apple, carol))    Healthy Coping:  Emotional response to diabetes: Ready to learn, Concern for health and well-being  Informal Support system:: None, Family  Stage of change: ACTION (Actively working towards change)    Current Management:  Taking diabetes medications? yes:     Diabetes Medication(s)       Insulin       insulin NPH (NOVOLIN N FLEXPEN) 100 UNIT/ML injection Inject 14 Units subcutaneously at bedtime.              Guadalupe Mckee RD  Time Spent: 30 minutes  Encounter Type: Individual     Diabetes medication dose changes were made via the CDCES Standing Orders under the patient's referring provider.

## 2025-01-15 NOTE — LETTER
1/15/2025         RE: Briana Newman  [address] [address]        Dear Colleague,    Thank you for referring your patient, Briana Newman, to the [facility] [place]. Please see a copy of my visit note below.       Diabetes Self-Management Education & Support    Type of Service: Telephone Visit    Originating Location (Patient Location): Home  Distant Location (Provider Location): Offsite  Mode of Communication:  Telephone    Telephone Visit Start Time:  10:30 am  Telephone Visit End Time (telephone visit stop time): 11:00 am    How would patient like to obtain AVS? MyChart      Assessment:   Ketones: neg x 1 week.   Fasting blood glucoses: 50% in target since last insulin increase on 1/13.  After breakfast: 100% in target.  After lunch: 100% in target.  After dinner: 50% in target.    Dinner last night: WW bread (2 pieces) with banana and PB and 3 dates (bigger ones). This was likely over the recommended carb amount which is why her glucose was high last night. Discussed just having 1 date (since they are larger ones) and aiming for the recommended carb amount of 45-60 grams at dinner.     Breakfast is the hardest. She often misses it at work. Does better on Wednesdays and weekends. Encouraged her to really try to test after breakfast and not miss this meal.     There has only been 2 doses of her increased dose of NPH at bedtime so will see how her glucose is tomorrow.  If high, recommend increasing to 18 units at bedtime. If in target, she can stay at her current dose of 16 units with plan to follow up on Monday.     She has a hard time eating a bedtime snack. Explained how this can actually help her morning numbers. Discussed some small ideas like crackers and peanut butter or a tall glass of milk right before bed. She agreed to try this.     Intervention  Educational topics covered today:  What to expect after delivery, future testing for Type 2 diabetes (2 hour OGTT at 6  week post-partum check-up and annual fasting blood glucose level), risk of GDM and planning ahead for future pregnancies, recommended lifestyle interventions for reducing the risk of Type 2 Diabetes, when to call a Diabetes Educator or OB provider    Educational Materials provided today:  [place] Preventing Diabetes    Patient verbalized understanding of diabetes self-management education concepts discussed, opportunities for ongoing education and support, and recommendations provided today    Plan  If glucose is 95 or more tomorrow, increase to 18 units at bedtime. Otherwise, if in target, keep at 16 units at bedtime.   Mychart numbers on Monday 1/20/25.   Try some crackers with peanut butter for bedtime snack or glass of milk if really not hungry.  Check glucose four times daily, before breakfast and 1 hour after each meal.  Check ketones once a week when readings are consistently negative.  Continue with recommended physical activity.  Continue to follow recommended meal plan: 30-45g carbohydratess at breakfast, 45-60g carbohydratess at lunch, 45-60g carbohydrates at supper, 15-30g carbohydrates at snacks.  Follow consistent carbohydrate meal plan, eat carbohydrates and protein/fat at all meals/snacks.    Send in Glucose Log (blood sugars) via PreApps in 4-5 days. If 3 or more blood sugars are above the goal at a given time, or if Ketones are small, moderate or large, call or Curiosidyhart message the diabetes educator.    Follow-up:    Follow up on Upcoming Diabetes Ed Appointments    Shopgatehart on Monday 1/20/25.       Subjective/Objective  Briana is an 34 year old year old, presenting for the following diabetes education related to: Follow-up    Accompanied by: Self, Spouse    Cultural Influences/Ethnic Background:  Not  or     LMP 06/16/2024 (Exact Date)     Weight trend. Currently 30 weeks gestation.  Wt Readings from Last 5 Encounters:   01/08/25 96.2 kg (212 lb)   12/18/24 95.7 kg (211 lb)   10/30/24  93.9 kg (207 lb)   10/16/24 93.4 kg (205 lb 14.4 oz)   09/18/24 91.8 kg (202 lb 6.4 oz)       Estimated Date of Delivery: Mar 23, 2025      Blood Glucose/Ketone Log:    Date Ketones Fasting Post Breakfast Post Lunch Post Supper   1/13 neg 92 missed 117 137   1/14 neg 99 121 117 151   1/15 neg 91 Has not had yet         Healthy Eating:  Exercise:: Yes (walking 10k steps daily)  How intense was your typical exercise? : Light (like stretching or slow walking)  Barrier to exercise: None  Cultural/Catholic diet restrictions?: No  Meal planning/habits: Carb counting  Meals include: Breakfast, Lunch, Dinner, Morning Snack, Afternoon Snack, Evening Snack  Breakfast: misses on weekdays OR egg and cheese on english muffin  Lunch: tuna sandwich OR soup - chicken or lentil or broccoli cheddar OR 1 cup rice, chicken/beef  Dinner: similar to lunch - rice/pasta/bread, meat, melon, salad  Snacks: AM and PM snack -fruit, cheese stick or nuts; HS is hard for her  Other: dislikes most vegetables - eats lettuce  Beverages: Water, Juice (juice made with juicer at home (cucumber, apple, carol))    Healthy Coping:  Emotional response to diabetes: Ready to learn, Concern for health and well-being  Informal Support system:: None, Family  Stage of change: ACTION (Actively working towards change)    Current Management:  Taking diabetes medications? yes:     Diabetes Medication(s)       Insulin       insulin NPH (NOVOLIN N FLEXPEN) 100 UNIT/ML injection Inject 14 Units subcutaneously at bedtime.              Guadalupe Mckee RD  Time Spent: 30 minutes  Encounter Type: Individual     Diabetes medication dose changes were made via the CDCES Standing Orders under the patient's referring provider.

## 2025-01-20 ENCOUNTER — MYC MEDICAL ADVICE (OUTPATIENT)
Dept: EDUCATION SERVICES | Facility: CLINIC | Age: 35
End: 2025-01-20

## 2025-01-20 NOTE — TELEPHONE ENCOUNTER
Gestational Diabetes Follow-up    Subjective/Objective:    Briana Newman sent in blood glucose log for review. Last date of communication was: 1/15.    Gestational diabetes is being managed with diet, activity, and medications    Taking diabetes medications: yes:     Diabetes Medication(s)       Insulin       insulin NPH (HUMULIN N KWIKPEN) 100 UNIT/ML injection Inject 16 Units subcutaneously at bedtime.            Estimated Date of Delivery: Mar 23, 2025    BG/Food Lo2025  Fastin  Breakfast 1 hr:   Lunch 1 hr: 117  Dinner 1hr: 137     2025  Fastin  Breakfast 1 hr: 121  Lunch 1 hr: 117  Dinner 1hr: 151     1/15/2025  Fastin  Breakfast 1 hr:   Lunch 1 hr:   Dinner 1hr: 123     2025  Fastin  Breakfast 1 hr: 95  Lunch 1 hr: 121  Dinner 1hr: 98     2025  Fell asleep, took insulin at 1:30am  Fastin  Breakfast 1 hr: 117  Lunch 1 hr: 132  Dinner 1hr:  147     2025  Fastin  Breakfast 1 hr: 175  Lunch 1 hr:  85  Dinner 1hr: 134     2025  Fastin  Breakfast 1 hr: 136  Lunch 1 hr: 117  Dinner 1hr: 106     2025  Fastin     I continued to stay on 16 units. I took my insulin a later than normal time on  and my morning readings have been nice and low since. I did stop eating dates and sour dough bread at night and changed a lot of my eating habits since last conversation.     Assessment:    Ketones: na.   Fasting blood glucoses: 75% in target.  After breakfast: 80% in target.  Before lunch: -% in target.  After lunch: 100% in target.  Before dinner: -% in target.  After dinner: 71% in target.    Plan/Response:  No changes in the patient's current treatment plan.  Follow-up in 1 week.    Lilia MUJICAN, RN, PHN, CDCES     Any diabetes medication dose changes were made via the CDE Protocol and Collaborative Practice Agreement with the patient's referring provider. A copy of this encounter was shared with the provider.

## 2025-01-21 NOTE — PATIENT INSTRUCTIONS
Weeks 26 to 30 of Your Pregnancy: Care Instructions  You're starting your last trimester. You'll probably feel your baby moving around more. Your back may ache as your body gets used to your baby's size and length. Take care of yourself, and pay attention to what your body needs.    Talk to your doctor about getting the Tdap shot. It will help protect your  against whooping cough (pertussis). Also ask your doctor about flu and COVID-19 shots if you haven't had them yet. If your blood type is Rh negative, you may be given a shot of Rh immune globulin (such as RhoGAM). It can help prevent problems for your baby.   You may have Hopewell-Smiley contractions. They are single or several strong contractions without a pattern. These are practice contractions but not the start of labor.   Be kind to yourself.       Take breaks when you're tired.  Change positions often. Don't sit for too long or stand for too long.  At work, rest during breaks if you can. If you don't get breaks, talk to your doctor about writing a letter to your employer to request them.  Avoid fumes, chemicals, and tobacco smoke.  Be sexual if you want to.       You may be interested in sex, or you may not. Everyone is different.  Sex is okay unless your doctor tells you not to.  Your belly can make it hard to find good positions for sex. [place] and explore.  Watch for signs of  labor.        These signs include:  Menstrual-like cramps. Or you may have pain or pressure in your pelvis that happens in a pattern.  About 6 or more contractions in an hour (even after rest and a glass of water).  A low, dull backache that doesn't go away when you change positions.  An increase or change in vaginal discharge.  Light vaginal bleeding or spotting.  Your water breaking.  Know what to do if you think you are having contractions.       Drink 1 or 2 glasses of water.  Lie down on your left side for at least an hour.  While on your side, feel the top of  "your belly to see if it's tight.  Write down your contractions for an hour. Time how long it is from the start of one contraction to the start of the next.  Call your doctor if you have regular contractions.  Follow-up care is a key part of your treatment and safety. Be sure to make and go to all appointments, and call your doctor if you are having problems. It's also a good idea to know your test results and keep a list of the medicines you take.  Where can you learn more?  Go to https://www.Fluidigm.net/patiented  Enter S999 in the search box to learn more about \"Weeks 26 to 30 of Your Pregnancy: Care Instructions.\"  Current as of: April 30, 2024  Content Version: 14.3    2024 [employer].   Care instructions adapted under license by your healthcare professional. If you have questions about a medical condition or this instruction, always ask your healthcare professional. [employer] disclaims any warranty or liability for your use of this information.    Weeks 30 to 32 of Your Pregnancy: Care Instructions  Your baby is growing more every day. Its eyes can open and close, and it may have hair on its head. Your baby may sleep 20 to 45 minutes at a time and is more active at certain times.    You should feel your baby move several times every day. Your baby now turns less and kicks more.   This is a good time to tour your hospital or birthing center. You may also want to find childcare if needed.         To ease heartburn   Avoid foods that make your symptoms worse, such as chocolate, spicy foods, and caffeine.  Avoid bending over or lying down after meals.  Do not eat for 2 hours before bedtime.  Take antacids like Tums, but don't take ones that have sodium bicarbonate, magnesium trisilicate, or aspirin.        To care for large, swollen veins (varicose veins)   Try to avoid standing for long periods of time.  Sit with your feet propped up.  Wear support hose.  Get some exercise every day, " "like walking or swimming.  Counting your baby's kicks  Your doctor may ask you to count your baby's movements, such as kicks, flutters, or rolls.    Find a quiet place, and get comfortable. Write down your start time. Count your baby's movements (except hiccups). When your baby has moved 10 times, you can stop counting. Write down how many minutes it took.   If an hour goes by and you don't feel 10 movements, have something to eat or drink. Count for another hour. If you don't feel at least 10 movements in the 2-hour period, call your doctor.   Follow-up care is a key part of your treatment and safety. Be sure to make and go to all appointments, and call your doctor if you are having problems. It's also a good idea to know your test results and keep a list of the medicines you take.  Where can you learn more?  Go to https://www.[employer].Coopkanics/patiented  Enter X471 in the search box to learn more about \"Weeks 30 to 32 of Your Pregnancy: Care Instructions.\"  Current as of: April 30, 2024  Content Version: 14.3    2024 [employer].   Care instructions adapted under license by your healthcare professional. If you have questions about a medical condition or this instruction, always ask your healthcare professional. [employer] disclaims any warranty or liability for your use of this information.    Learning About Birth Control After Childbirth  Birth control is any method used to prevent pregnancy. If you have vaginal sex without birth control, you could get pregnant--even if you haven't started having periods again. You're less likely to get pregnant while breastfeeding, but it's still possible. Finding birth control that works for you can help avoid an unplanned pregnancy.  There are many kinds of birth control. Each has pros and cons. Find what works for you. Talk to your doctor if you've just given birth or are breastfeeding.    Long-acting reversible contraception (LARC). These are placed " "inside your body by a doctor. They can prevent pregnancy for years.  Examples include:  An implant (hormonal).  Copper intrauterine device (IUD).  Hormonal IUDs.    Short-acting hormonal methods. These release hormones. Examples include:  Combination birth control pills (\"the pill\").  Skin patches.  A vaginal ring.  A shot.  Mini-pills. Choose progestin-only options soon after giving birth.    Barrier methods. Use these every time you have vaginal sex.  Examples include:  External (male) condoms.  Internal (female) condoms.  Diaphragms.  Cervical caps.  Sponges.    Spermicides. These kill sperm or stop sperm from moving. They can be gels, creams, foams, films, or tablets. Use them before vaginal sex.  Examples include:  Nonoxynol-9.  pH regulator gel.    Permanent birth control (sterilization). This can be an option if you're sure that you don't want to get pregnant later.  Examples include:  Vasectomy.  Having tubes tied (tubal ligation).    Emergency contraception. This is a backup method. Use it if you didn't use birth control or your birth control method failed.  Examples include:  Copper and hormonal IUDs.  Emergency contraceptive pills.    Fertility awareness. You'll learn when you are most likely to become pregnant (are fertile). You can avoid vaginal sex at that time.  It's also called:  Natural family planning.  The rhythm method.    Breastfeeding. This is most effective when all of these are true:  Your baby is younger than 6 months old.  You're breastfeeding and not bottle-feeding at all.  You aren't having periods.  Follow-up care is a key part of your treatment and safety. Be sure to make and go to all appointments, and call your doctor if you are having problems. It's also a good idea to know your test results and keep a list of the medicines you take.  Where can you learn more?  Go to https://www.healthwise.net/patiented  Enter X408 in the search box to learn more about \"Learning About Birth Control " "After Childbirth.\"  Current as of: April 30, 2024  Content Version: 14.3    2024 [employer].   Care instructions adapted under license by your healthcare professional. If you have questions about a medical condition or this instruction, always ask your healthcare professional. [employer] disclaims any warranty or liability for your use of this information.    Weeks 32 to 34 of Your Pregnancy: Care Instructions    Decide whether you want to bank or donate your baby's umbilical cord blood. If you want to save this blood, you have to arrange for it ahead of time.   Decide about circumcision. Personal, Anabaptism, or cultural beliefs may play a role in your decision. You get to decide what you want for your baby.         Learn how to ease hemorrhoids.   Get more liquids, fruits, vegetables, and fiber in your diet.  Avoid sitting for too long.  Clean yourself with moist toilet paper. Or try witch hazel pads.  Try ice packs or warm sitz baths for discomfort.  Use hydrocortisone cream for pain or itching.  Ask your doctor about stool softeners.        Consider the benefits of breastfeeding.   It reduces your baby's risk of sudden infant death syndrome (SIDS).   babies are less likely to get certain infections. And they're less likely to be obese or get diabetes later in life.  It can lower your risk of breast and ovarian cancers and osteoporosis.  It saves you money.  Follow-up care is a key part of your treatment and safety. Be sure to make and go to all appointments, and call your doctor if you are having problems. It's also a good idea to know your test results and keep a list of the medicines you take.  Where can you learn more?  Go to https://www.Raiing.net/patiented  Enter X711 in the search box to learn more about \"Weeks 32 to 34 of Your Pregnancy: Care Instructions.\"  Current as of: April 30, 2024  Content Version: 14.3    2024 [employer].   Care instructions adapted " under license by your healthcare professional. If you have questions about a medical condition or this instruction, always ask your healthcare professional. [employer] disclaims any warranty or liability for your use of this information.    You have been provided the Any Day Now: Early Labor at Home document.    Additional copies can be found here:  [email or web address] [email or web address]  You have been provided the What I'd Wish I'd Known About Giving Birth document.    Additional copies can be found here:  [email or web address]

## 2025-01-22 ENCOUNTER — PRENATAL OFFICE VISIT (OUTPATIENT)
Dept: OBGYN | Facility: CLINIC | Age: 35
End: 2025-01-22
Attending: ADVANCED PRACTICE MIDWIFE

## 2025-01-22 ENCOUNTER — ANCILLARY PROCEDURE (OUTPATIENT)
Dept: ULTRASOUND IMAGING | Facility: CLINIC | Age: 35
End: 2025-01-22
Attending: ADVANCED PRACTICE MIDWIFE

## 2025-01-22 VITALS
DIASTOLIC BLOOD PRESSURE: 85 MMHG | HEART RATE: 78 BPM | WEIGHT: 212.8 LBS | BODY MASS INDEX: 37.7 KG/M2 | SYSTOLIC BLOOD PRESSURE: 126 MMHG

## 2025-01-22 DIAGNOSIS — O09.891 SUPERVISION OF OTHER HIGH RISK PREGNANCIES, FIRST TRIMESTER: Primary | ICD-10-CM

## 2025-01-22 DIAGNOSIS — O09.892 SUPERVISION OF OTHER HIGH RISK PREGNANCIES, SECOND TRIMESTER: ICD-10-CM

## 2025-01-22 DIAGNOSIS — O24.410 DIET CONTROLLED GESTATIONAL DIABETES MELLITUS (GDM) IN THIRD TRIMESTER: ICD-10-CM

## 2025-01-22 DIAGNOSIS — O09.291 HX OF PREECLAMPSIA, PRIOR PREGNANCY, CURRENTLY PREGNANT, FIRST TRIMESTER: ICD-10-CM

## 2025-01-22 DIAGNOSIS — O09.299 HX OF MATERNAL THIRD DEGREE PERINEAL LACERATION, CURRENTLY PREGNANT: ICD-10-CM

## 2025-01-22 PROCEDURE — 76816 OB US FOLLOW-UP PER FETUS: CPT

## 2025-01-22 PROCEDURE — 99213 OFFICE O/P EST LOW 20 MIN: CPT | Performed by: ADVANCED PRACTICE MIDWIFE

## 2025-01-22 PROCEDURE — 76816 OB US FOLLOW-UP PER FETUS: CPT | Mod: 26 | Performed by: OBSTETRICS & GYNECOLOGY

## 2025-01-22 ASSESSMENT — PAIN SCALES - GENERAL: PAINLEVEL_OUTOF10: NO PAIN (0)

## 2025-01-22 NOTE — PROGRESS NOTES
Subjective:      34 year old  at 31w3d presents for a routine prenatal appointment.     no vaginal bleeding or leakage of fluid.  Denies concerning contractions or cramping.   Reports regular fetal movement.       No HA, visual changes, RUQ or epigastric pain.    Patient concerns: BS well controlled with 16 units at night, communicating well with DM team  Would like IOL at 39wks, looking at 3/16        Objective:  Vitals:    25 0839   BP: 126/85   Pulse: 78   Weight: 96.5 kg (212 lb 12.8 oz)   , see ob flowsheet    Blood type: A POS   Assessment/Plan     Patient Active Problem List    Diagnosis Date Noted    Supervision of other high risk pregnancies, first trimester 2024     Priority: High     [facility] Patient Provider Group choice: CNM group  Partner's name: Christianne Ca  Employment: part time MA  [x]NOB folder  [x]Dating  [x] 1st trimester screening: Patient declines 1st tri genetic screening  [x]Fetal anatomy US ordered  [x] recommended LD ASA after 12 wks for PRE-E risk  [x] GDM risk, recommended early GCT and hgb A1C  [x]No need for utox in labor  [x]COVID vaccine completed  [x]Pap UTD- due     12-23wks________________________  []Offer AFP after 15 wks  []Rubella equivocal  [x]Hep B immune   [x]Varicella immune  []FLU shot    24-28wk_________________________  [x]EOB folder  [x]Labor plans: Un-Medicated preference  [x]Prenatal Ed  []: Declines  [x]Infant feeding plan: . Has had issues with latching and supply in the past.  [x]Jakin care provider choice: Galileo Leal with [facility]  [x]PP Contraception plan: NFP  []TDAP after 27 weeks- declines, may get at work  []Rhogam if needed, date: N/A    29-35 wk________________________    [x] Water birth interest- form given  [x]GCT - failed 3hr, GDM  []RSV    36-37 wks______________________   [] GBS/CBC  []OTC PP meds sent  []PP recovery plans/support:  []Planning [facility]    38-42  wks______________________  []IOL reason/plans  []Postdates BPP       GDM (gestational diabetes mellitus) 12/24/2024     Priority: Medium     12/24: Supplies sent    1/7/25 started on Novolin 14 units at bedtime    growth q4 @28wks, twice weekly BPP @32wks with delivery by 39w6d if well controlled- orders placed      Palpitations 10/16/2024     Priority: Medium    Rubella non-immune status, antepartum 08/15/2024     Priority: Medium     Equivocal rubella       History of insulin controlled gestational diabetes mellitus (GDM) 08/14/2024     Priority: Medium     A1C today 8/14/24 and early 1hr    - did not complete early 1 hour  Plan 1 hour at EOB    12/18/2024: 1 hour      Hx of preeclampsia, prior pregnancy, currently pregnant, first trimester 08/14/2024     Priority: Medium     Baseline PreE labs 8/14/24, to start ASA @12wks    11/20: reiterated taking aspirin daily    12/18/2024: repeat labs today per pt preference      Hx of maternal third degree perineal laceration, currently pregnant 08/14/2024     Priority: Medium    Migraine with aura and without status migrainosus, not intractable 05/15/2024     Priority: Medium    Cervical high risk HPV (human papillomavirus) test positive 04/24/2024     Priority: Medium     7/7/20 NIL pap   4/24/24 NIL pap, + HR HPV (not 16 or 18). Plan: cotest in 1 yr.   4/30/24 Pt notified       Current moderate episode of major depressive disorder without prior episode (H) 02/01/2024     Priority: Medium    Hx of CHRIS (generalized anxiety disorder) 02/01/2024     Priority: Medium    Dyspnea on exertion 10/05/2022     Priority: Medium    Postural tachycardia with syncope 08/29/2022     Priority: Medium     In pregnancy.-Has had postural tachycardia with syncope, went to ED twice for concerns of lightheadedness   Heart rate has gone up to 150 during these episodes  Wore a Holter monitor for three days. She will ship the monitor today. Has an echo scheduled for early September and visit with  cardiology in October 2022 9/26/22 Briana has continued to feel a racing heart all day long. She has had a normal echocardiogram and Holter monitor workup. Has appt with cardiology early October. Denies any syncope now that she is out of the first trimester  10/5/22 met with Cardiology, they recommended compression socks    10/16/24: Cardiology recommendations:  POTS - consider beta blocker with pregnancy  - will check zio first  2. Palpitations - as above  3. Dyspnea - check stress echo      BMI 35.0-35.9,adult 07/29/2022     Priority: Medium     BPP weekly @37wks  Growth ultrasound 32 weeks    12/18/2024: order placed for scheduling at 32 weeks      Vitamin D deficiency 07/15/2022     Priority: Medium     18 at intake. Rx sent for recommended supplementation of 4000IU daily.   11/16/22- Vit D 26- review at next visit___           No orders of the defined types were placed in this encounter.      Updated individualized prenatal care plan on the problem list for 3rd trimester    Return to clinic in 2 weeks and prn if questions or concerns.     Luisana Pierson, APRN LEAH

## 2025-01-27 ENCOUNTER — MYC MEDICAL ADVICE (OUTPATIENT)
Dept: EDUCATION SERVICES | Facility: CLINIC | Age: 35
End: 2025-01-27

## 2025-01-27 DIAGNOSIS — O09.891 SUPERVISION OF OTHER HIGH RISK PREGNANCIES, FIRST TRIMESTER: ICD-10-CM

## 2025-01-27 DIAGNOSIS — O24.419 GESTATIONAL DIABETES MELLITUS (GDM) IN THIRD TRIMESTER, GESTATIONAL DIABETES METHOD OF CONTROL UNSPECIFIED: ICD-10-CM

## 2025-01-27 RX ORDER — BLOOD-GLUCOSE METER
EACH MISCELLANEOUS
Qty: 1 KIT | Refills: 0 | Status: SHIPPED | OUTPATIENT
Start: 2025-01-27

## 2025-01-27 NOTE — TELEPHONE ENCOUNTER
Gestational Diabetes Follow-up    Subjective/Objective:    Briana Newman sent in blood glucose log for review. Last date of communication was: 25.    Gestational diabetes is being managed with diet, activity, and medications    Taking diabetes medications: yes:     Diabetes Medication(s)       Insulin       insulin NPH (HUMULIN N KWIKPEN) 100 UNIT/ML injection Inject 16 Units subcutaneously at bedtime.            Estimated Date of Delivery: Mar 23, 2025    BG/Food Log:   Good morning! here are my readings for the week. My only concern is that my fasting levels are slowly rising. Should I continue on 16 units?      2025  Fastin  Breakfast 1 hr: 125  Lunch 1 hr: 102  Dinner 1hr: 82     2025  Fastin  Breakfast 1 hr: 135  Lunch 1 hr: 134  Dinner 1hr: 152        2025  Fastin  Breakfast 1 hr: 98  Lunch 1 hr: 127  Dinner 1hr: 122     2025  Fastin  Breakfast 1 hr: 132  Lunch 1 hr:   Dinner 1hr: 138     2025  Fastin  Breakfast 1 hr: 120  Lunch 1 hr: 102  Dinner 1hr: 119     2025  Fastin  Breakfast 1 hr: 98  Lunch 1 hr: 122  Dinner 1hr: 132     2025  Fastin  Breakfast 1 hr: 122  Lunch 1 hr: 134  Dinner 1hr: 133     2025  Fastin      Assessment:    Ketones: na.   Fasting blood glucoses: 63% in target.(3 of the last 5 reading above goal)  After breakfast: 100% in target.  Before lunch: -% in target.  After lunch: 100% in target.  Before dinner: -% in target.  After dinner: 83% in target.    Plan/Response:    Recommend increase to insulin - from 16 units to --> 18 units.  Follow up 1 week.  Lilia MUJICAN, RN, PHN, CDCES .      Any diabetes medication dose changes were made via the CDE Protocol and Collaborative Practice Agreement with the patient's referring provider. A copy of this encounter was shared with the provider.

## 2025-02-03 ENCOUNTER — MYC MEDICAL ADVICE (OUTPATIENT)
Dept: EDUCATION SERVICES | Facility: CLINIC | Age: 35
End: 2025-02-03
Payer: MEDICAID

## 2025-02-04 NOTE — PATIENT INSTRUCTIONS
Weeks 34 to 36 of Your Pregnancy: Care Instructions  Your belly has grown quite large. It's almost time to give birth! Your baby's lungs are almost ready to breathe air. The skull bones are firm enough to protect your baby's head. But they're soft enough to move down through the birth canal.    You might be wondering what to expect during labor. Because each birth is different, there's no way to know exactly what childbirth will be like for you. Talk to your doctor or midwife about any concerns you have.   You'll probably have a test for group B streptococcus (GBS). GBS is bacteria that can live in the vagina and rectum. GBS can make your baby sick after birth. If you test positive, you'll get antibiotics during labor.     Choose what type of pain relief you would like during labor.  You can choose from a few types, including medicine and non-medicine options. You may want to use several types of pain relief.     Know how medicines can help with pain during labor.  Some medicines lower anxiety and help with some of the pain. Others make your lower body numb so that you will feel less pain.     Tell your doctor about your pain medicine choice.  Do this before you start labor or very early in your labor. You may be able to change your mind during labor.     Learn about the stages of labor.    The first stage includes the early (latent) and active phases of labor. Contractions start in early labor. During active labor, contractions get stronger, last longer, and happen more often. And the cervix opens more rapidly.  The second stage starts when you're ready to push. During this stage, your baby is born.  During the third stage, you'll usually have a few more contractions to push out the placenta.   Follow-up care is a key part of your treatment and safety. Be sure to make and go to all appointments, and call your doctor if you are having problems. It's also a good idea to know your test results and keep a list of the  "medicines you take.  Where can you learn more?  Go to https://www.[employer].net/patiented  Enter B912 in the search box to learn more about \"Weeks 34 to 36 of Your Pregnancy: Care Instructions.\"  Current as of: 2024  Content Version: 14.3    2024 [employer].   Care instructions adapted under license by your healthcare professional. If you have questions about a medical condition or this instruction, always ask your healthcare professional. [employer] disclaims any warranty or liability for your use of this information.    Group B Strep During Pregnancy: Care Instructions  Overview     Group B strep infection is caused by a type of bacteria. It's a different kind of bacteria than the kind that causes strep throat.  You may have this kind of bacteria in your body. Sometimes it may cause an infection, but most of the time it doesn't make you sick or cause symptoms. But if you pass the bacteria to your baby during the birth, it can cause serious health problems for your baby.  If you have this bacteria in your body, you will get antibiotics when you are in labor. Antibiotics help prevent problems for a  baby.  After birth, doctors will watch and may test your baby. If your baby tests positive for Group B strep, your baby will get antibiotics.  If you plan to breastfeed your baby, don't worry. It will be safe to breastfeed.  Follow-up care is a key part of your treatment and safety. Be sure to make and go to all appointments, and call your doctor if you are having problems. It's also a good idea to know your test results and keep a list of the medicines you take.  How can you care for yourself at home?  If your doctor has prescribed antibiotics, take them as directed. Do not stop taking them just because you feel better. You need to take the full course of antibiotics.  Tell your doctor if you are allergic to any antibiotic.  If you go into labor, or your water breaks, go to the " "hospital. Your doctor will give you antibiotics to help protect your baby from infection.  Tell the doctors and nurses if you have an allergy to penicillin.  Tell the doctors and nurses at the hospital that you tested positive for group B strep.  When should you call for help?   Call your doctor now or seek immediate medical care if:    You have symptoms of a urinary tract infection. These may include:  Pain or burning when you urinate.  A frequent need to urinate without being able to pass much urine.  Pain in the flank, which is just below the rib cage and above the waist on either side of the back.  Blood in your urine.  A fever.     You think you are in labor or your water has broken.     You have pain in your belly or pelvis.   Watch closely for changes in your health, and be sure to contact your doctor if you have any problems.  Where can you learn more?  Go to https://www.[employer]/patiented  Enter M001 in the search box to learn more about \"Group B Strep During Pregnancy: Care Instructions.\"  Current as of: April 30, 2024  Content Version: 14.3    2024 [employer].   Care instructions adapted under license by your healthcare professional. If you have questions about a medical condition or this instruction, always ask your healthcare professional. [employer] disclaims any warranty or liability for your use of this information.    Circumcision in Infants: What to Expect at Home  Your Child's Recovery  After circumcision, your baby's penis may look red and swollen. It may have petroleum jelly and gauze on it. The gauze will likely come off when your baby urinates. Follow your doctor's directions about whether to put clean gauze back on your baby's penis or to leave the gauze off. If you need to remove gauze from the penis, use warm water to soak the gauze and gently loosen it.  The doctor may have used a Plastibell device to do the circumcision. If so, your baby will have a plastic " ring around the head of the penis. The ring should fall off by itself in 10 to 12 days.  A thin, yellow film may form over the area the day after the procedure. This is part of the normal healing process. It should go away in a few days.  Your baby may seem fussy while the area heals. It may hurt for your baby to urinate. This pain often gets better in 3 or 4 days. But it may last for up to 2 weeks.  Even though your baby's penis will likely start to feel better after 3 or 4 days, it may look worse. The penis often starts to look like it's getting better after about 7 to 10 days.  This care sheet gives you a general idea about how long it will take for your child to recover. But each child recovers at a different pace. Follow the steps below to help your child get better as quickly as possible.  How can you care for your child at home?  Activity    Let your baby rest as much as possible. Sleeping will help with recovery.     You can give your baby a sponge bath the day after surgery. Ask your doctor when it is okay to give your baby a bath.   Medicines    Your doctor will tell you if and when your child can restart any medicines. The doctor will also give you instructions about your child taking any new medicines.     Your doctor may recommend giving your baby acetaminophen (Tylenol) to help with pain after the procedure. Be safe with medicines. Give your child medicines exactly as prescribed. Call your doctor if you think your child is having a problem with a medicine.     Do not give your child two or more pain medicines at the same time unless the doctor told you to. Many pain medicines have acetaminophen, which is Tylenol. Too much acetaminophen (Tylenol) can be harmful.   Circumcision care    Always wash your hands before and after touching the circumcision area.     Gently wash your baby's penis with plain, warm water after each diaper change, and pat it dry. Do not use soap. Don't use hydrogen peroxide or  "alcohol. They can slow healing.     Do not try to remove the film that forms on the penis. The film will go away on its own.     Put plenty of petroleum jelly (such as Vaseline) on the circumcision area during each diaper change. This will prevent your baby's penis from sticking to the diaper while it heals.     Fasten your baby's diapers loosely so that there is less pressure on the penis while it heals.   Follow-up care is a key part of your child's treatment and safety. Be sure to make and go to all appointments, and call your doctor if your child is having problems. It's also a good idea to know your child's test results and keep a list of the medicines your child takes.  When should you call for help?   Call your doctor now or seek immediate medical care if:    Your baby has a fever over 100.4 F.     Your baby is extremely fussy or irritable, has a high-pitched cry, or refuses to eat.     Your baby does not have a wet diaper within 12 hours after the circumcision.     You find a spot of bleeding larger than a 2-inch [place] from the incision.     Your baby has signs of infection. Signs may include severe swelling; redness; a red streak on the shaft of the penis; or a thick, yellow discharge.   Watch closely for changes in your child's health, and be sure to contact your doctor if:    A Plastibell device was used for the circumcision and the ring has not fallen off after 10 to 12 days.   Where can you learn more?  Go to https://www.[employer].net/patiented  Enter S255 in the search box to learn more about \"Circumcision in Infants: What to Expect at Home.\"  Current as of: October 24, 2023  Content Version: 14.3    2024 [employer].   Care instructions adapted under license by your healthcare professional. If you have questions about a medical condition or this instruction, always ask your healthcare professional. [employer] disclaims any warranty or liability for your use of this " information.      Thank you for trusting us with your care!   Please be aware, if you are on Mychart, you may see your results prior to your providers review. If labs are abnormal, we will call or message you on OneBuild with a follow up plan.    If you need to contact us for questions about:  Symptoms, Scheduling & Medical Questions; Non-urgent (2-3 day response) OneBuild message, Urgent (needing response today) [phone] (if after 3:30pm next day response)   Prescriptions: Please call your Pharmacy   Billing: [facility] [phone] or [facility]:[phone]

## 2025-02-04 NOTE — TELEPHONE ENCOUNTER
Gestational Diabetes Follow-up    Subjective/Objective:    Briana Newman sent in blood glucose log for review. Last date of communication was: .    Gestational diabetes is being managed with diet and activity    Taking diabetes medications: yes:     Diabetes Medication(s)       Insulin       insulin NPH (HUMULIN N KWIKPEN) 100 UNIT/ML injection Inject 16 Units subcutaneously at bedtime.            Estimated Date of Delivery: Mar 23, 2025    BG/Food Lo2025  Fastin  Breakfast 1 hr: 134  Lunch 1 hr: 122  Dinner 1hr: 107     2025  Fastin  Breakfast 1 hr: 98  Lunch 1 hr: 102  Dinner 1hr: 114     2025  Fastin  Breakfast 1 hr: 112  Lunch 1 hr: 144  Dinner 1hr: 136     2025  Fastin  Breakfast 1 hr: 100  Lunch 1 hr: 96  Dinner 1hr: 119     2025  Fastin  Breakfast 1 hr: 124  Lunch 1 hr: 107  Dinner 1hr: 139     2024  Fastin  Breakfast 1 hr: 199  Lunch 1 hr: 115  Dinner 1hr: 134     2025  Fastin  Breakfast 1 hr:   Lunch 1 hr:   Dinner 1hr:      2/3/2025  Fastin  Breakfast 1 hr: 98  Lunch 1 hr: 127           Assessment:Majority of BG at goals, continue current plan.     Ketones: n/a.   Fasting blood glucoses: 100% in target.  After breakfast: 86% in target.  Before lunch: -% in target.  After lunch: 86% in target.  Before dinner: -% in target.  After dinner: 100% in target.    Plan/Response:  No changes in the patient's current treatment plan.    Lily Reilly RD, LD, CDCES      Any diabetes medication dose changes were made via the CDE Protocol and Collaborative Practice Agreement with the patient's OB/GYN provider. A copy of this encounter was shared with the provider.

## 2025-02-05 ENCOUNTER — PRENATAL OFFICE VISIT (OUTPATIENT)
Dept: OBGYN | Facility: CLINIC | Age: 35
End: 2025-02-05
Attending: ADVANCED PRACTICE MIDWIFE
Payer: MEDICAID

## 2025-02-05 ENCOUNTER — ANCILLARY PROCEDURE (OUTPATIENT)
Dept: ULTRASOUND IMAGING | Facility: CLINIC | Age: 35
End: 2025-02-05
Attending: ADVANCED PRACTICE MIDWIFE
Payer: MEDICAID

## 2025-02-05 VITALS
HEART RATE: 73 BPM | BODY MASS INDEX: 38.39 KG/M2 | WEIGHT: 216.7 LBS | SYSTOLIC BLOOD PRESSURE: 106 MMHG | HEIGHT: 63 IN | DIASTOLIC BLOOD PRESSURE: 65 MMHG

## 2025-02-05 DIAGNOSIS — O09.291 HX OF PREECLAMPSIA, PRIOR PREGNANCY, CURRENTLY PREGNANT, FIRST TRIMESTER: ICD-10-CM

## 2025-02-05 DIAGNOSIS — O24.414 INSULIN CONTROLLED GESTATIONAL DIABETES MELLITUS (GDM) IN THIRD TRIMESTER: ICD-10-CM

## 2025-02-05 DIAGNOSIS — O09.299 HX OF MATERNAL THIRD DEGREE PERINEAL LACERATION, CURRENTLY PREGNANT: ICD-10-CM

## 2025-02-05 DIAGNOSIS — O09.891 SUPERVISION OF OTHER HIGH RISK PREGNANCIES, FIRST TRIMESTER: Primary | ICD-10-CM

## 2025-02-05 PROCEDURE — 76819 FETAL BIOPHYS PROFIL W/O NST: CPT | Mod: 26 | Performed by: STUDENT IN AN ORGANIZED HEALTH CARE EDUCATION/TRAINING PROGRAM

## 2025-02-05 PROCEDURE — 76819 FETAL BIOPHYS PROFIL W/O NST: CPT

## 2025-02-05 RX ORDER — LANCETS
EACH MISCELLANEOUS
COMMUNITY
Start: 2024-12-24

## 2025-02-05 RX ORDER — URINE ACETONE TEST STRIPS
STRIP MISCELLANEOUS
COMMUNITY
Start: 2025-01-07

## 2025-02-05 NOTE — PROGRESS NOTES
"P [facility]  Return OB Visit    S: Reports feeling overall well today, no concerns.  Denies vaginal bleeding, vaginal discharge, LOF, contractions.  Reports good fetal movement. Denies headache, vision changes, RUQ pain, chest pain, palpitations, or SOB. Per patient sugars have been within goal over the last week, had visit yesterday and did not need to go up on insulin; taking lantus 18 units at bedtime. No episodes of symptomatic hypoglycemia.      Pregnancy notable for  - GDMA2   - Hx infundibulation   - Migraines with aura   - Hx preeclampsia   - Rubella equivicol   - Postural tachycardia       O: /65   Pulse 73   Ht 1.6 m (5' 3\")   Wt 98.3 kg (216 lb 11.2 oz)   LMP 2024 (Exact Date)   BMI 38.39 kg/m    Weight gain: 5.044 kg (11 lb 1.9 oz)    Gen: Well-appearing, NAD  Cervix: Deferred     Fundal Height:  34cm   FHR: 158-165    A/P:  Briana RAMSEY Newman is a 34 year old  at 33w2d by 8w4d US, here for return OB visit. Pregnancy complicated by GDMA2, migraines, hx infundibulation,       Prenatal care  - Rh positive, darnell neg,  infectious labs within normal limits  - Rubella equivicol - MMR postpartum   - GBS to be performed at next visit   - Again declines TDaP vaccine today   - Level 2 US wnl   - Growth scans indicated [id number]  due to GDMA2    # Migraines with aura   - No recent episodes   - No medications     # GDMA2  - Insulin lantus 18u at bedtime   - See GDM clinic note 2/5 for most recent sugars   - EFW 44%ile on last growth   - Biweekly     # Hx preeclampsia   - Declines ASA ppx   - BP normal today   - Briefly reviewed s/sx preeclampsia     Return to clinic in 2 weeks. Discussed return precautions.    Staffed with Dr. Brooke Covington MD   Obstetrics & Gynecology, PGY-2  2025 6:39 PM    "

## 2025-02-07 ENCOUNTER — ANCILLARY PROCEDURE (OUTPATIENT)
Dept: ULTRASOUND IMAGING | Facility: CLINIC | Age: 35
End: 2025-02-07
Attending: ADVANCED PRACTICE MIDWIFE
Payer: MEDICAID

## 2025-02-07 DIAGNOSIS — O24.414 INSULIN CONTROLLED GESTATIONAL DIABETES MELLITUS (GDM) IN THIRD TRIMESTER: ICD-10-CM

## 2025-02-07 PROCEDURE — 76819 FETAL BIOPHYS PROFIL W/O NST: CPT | Mod: 26 | Performed by: STUDENT IN AN ORGANIZED HEALTH CARE EDUCATION/TRAINING PROGRAM

## 2025-02-07 PROCEDURE — 76819 FETAL BIOPHYS PROFIL W/O NST: CPT

## 2025-02-10 ENCOUNTER — MYC MEDICAL ADVICE (OUTPATIENT)
Dept: EDUCATION SERVICES | Facility: CLINIC | Age: 35
End: 2025-02-10
Payer: MEDICAID

## 2025-02-11 DIAGNOSIS — O24.419 GESTATIONAL DIABETES MELLITUS (GDM) IN THIRD TRIMESTER, GESTATIONAL DIABETES METHOD OF CONTROL UNSPECIFIED: Primary | ICD-10-CM

## 2025-02-11 DIAGNOSIS — O09.891 SUPERVISION OF OTHER HIGH RISK PREGNANCIES, FIRST TRIMESTER: ICD-10-CM

## 2025-02-11 NOTE — TELEPHONE ENCOUNTER
Gestational Diabetes Follow-up    Subjective/Objective:    Briana Newman sent in blood glucose log for review. Last date of communication was: .    Gestational diabetes is being managed with diet and activity and medications    Taking diabetes medications: yes:     Diabetes Medication(s)       Insulin       insulin NPH (HUMULIN N KWIKPEN) 100 UNIT/ML injection Inject 16 Units subcutaneously at bedtime.            Estimated Date of Delivery: Mar 23, 2025    BG/Food Lo/3/2025  Dinner 1hr:  134     2025  Fastin  Breakfast 1 hr: 133  Lunch 1 hr: 124  Dinner 1hr: 95     2025  Fastin  Breakfast 1 hr: 98  Lunch 1 hr: 129  Dinner 1hr: 137     2025  Fastin  Breakfast 1 hr: 145  Lunch 1 hr: 124  Dinner 1hr:  138     2025  Fastin  Breakfast 1 hr: 118  Lunch 1 hr: 140  Dinner 1hr: 102     2025  Fastin  Breakfast 1 hr:   Lunch 1 hr: 112  Dinner 1hr: 154     2025  Fastin  Breakfast 1 hr: 164  Lunch 1 hr: 97  Dinner 1hr: 141     2/10/2025  Fastin  Breakfast 1 hr: 114         Assessment:    Ketones: n/a.   Fasting blood glucoses: 57% in target.  After breakfast: 67% in target.  Before lunch: -% in target.  After lunch: 83% in target.  Before dinner: -% in target.  After dinner: 71% in target.    Plan/Response:  No changes in the patient's current treatment plan.  Follow-up in 1 week.    Lily Reilly RD, EMERY, CDCES      Any diabetes medication dose changes were made via the CDE Protocol and Collaborative Practice Agreement with the patient's OB/GYN provider. A copy of this encounter was shared with the provider.

## 2025-02-12 ENCOUNTER — HOSPITAL ENCOUNTER (OUTPATIENT)
Facility: CLINIC | Age: 35
Discharge: HOME | End: 2025-02-12
Attending: ADVANCED PRACTICE MIDWIFE | Admitting: ADVANCED PRACTICE MIDWIFE
Payer: MEDICAID

## 2025-02-12 ENCOUNTER — ALLIED HEALTH/NURSE VISIT (OUTPATIENT)
Dept: OBGYN | Facility: CLINIC | Age: 35
End: 2025-02-12
Payer: MEDICAID

## 2025-02-12 ENCOUNTER — ANCILLARY PROCEDURE (OUTPATIENT)
Dept: ULTRASOUND IMAGING | Facility: CLINIC | Age: 35
End: 2025-02-12
Attending: ADVANCED PRACTICE MIDWIFE
Payer: MEDICAID

## 2025-02-12 VITALS — TEMPERATURE: 98.4 F | RESPIRATION RATE: 16 BRPM | SYSTOLIC BLOOD PRESSURE: 124 MMHG | DIASTOLIC BLOOD PRESSURE: 77 MMHG

## 2025-02-12 DIAGNOSIS — O09.93 SUPERVISION OF HIGH RISK PREGNANCY IN THIRD TRIMESTER: Primary | ICD-10-CM

## 2025-02-12 DIAGNOSIS — O09.291 HX OF PREECLAMPSIA, PRIOR PREGNANCY, CURRENTLY PREGNANT, FIRST TRIMESTER: ICD-10-CM

## 2025-02-12 DIAGNOSIS — O24.414 INSULIN CONTROLLED GESTATIONAL DIABETES MELLITUS (GDM) IN THIRD TRIMESTER: ICD-10-CM

## 2025-02-12 DIAGNOSIS — O09.299 HX OF MATERNAL THIRD DEGREE PERINEAL LACERATION, CURRENTLY PREGNANT: ICD-10-CM

## 2025-02-12 PROBLEM — O36.8390 FETAL TACHYCARDIA AFFECTING MANAGEMENT OF MOTHER: Status: ACTIVE | Noted: 2025-02-12

## 2025-02-12 LAB
ALBUMIN MFR UR ELPH: 11.7 MG/DL
ALBUMIN SERPL BCG-MCNC: 3.6 G/DL (ref 3.5–5.2)
ALP SERPL-CCNC: 92 U/L (ref 40–150)
ALT SERPL W P-5'-P-CCNC: 5 U/L (ref 0–50)
ANION GAP SERPL CALCULATED.3IONS-SCNC: 13 MMOL/L (ref 7–15)
AST SERPL W P-5'-P-CCNC: 13 U/L (ref 0–45)
BILIRUB SERPL-MCNC: 0.2 MG/DL
BUN SERPL-MCNC: 6.9 MG/DL (ref 6–20)
CALCIUM SERPL-MCNC: 9.6 MG/DL (ref 8.8–10.4)
CHLORIDE SERPL-SCNC: 103 MMOL/L (ref 98–107)
CREAT SERPL-MCNC: 0.63 MG/DL (ref 0.51–0.95)
CREAT UR-MCNC: 79.3 MG/DL
EGFRCR SERPLBLD CKD-EPI 2021: >90 ML/MIN/1.73M2
ERYTHROCYTE [DISTWIDTH] IN BLOOD BY AUTOMATED COUNT: 13.6 % (ref 10–15)
GLUCOSE BLDC GLUCOMTR-MCNC: 71 MG/DL (ref 70–99)
GLUCOSE SERPL-MCNC: 137 MG/DL (ref 70–99)
HCO3 SERPL-SCNC: 20 MMOL/L (ref 22–29)
HCT VFR BLD AUTO: 35.2 % (ref 35–47)
HGB BLD-MCNC: 11.9 G/DL (ref 11.7–15.7)
MCH RBC QN AUTO: 28.4 PG (ref 26.5–33)
MCHC RBC AUTO-ENTMCNC: 33.8 G/DL (ref 31.5–36.5)
MCV RBC AUTO: 84 FL (ref 78–100)
PLATELET # BLD AUTO: 322 10E3/UL (ref 150–450)
POTASSIUM SERPL-SCNC: 3.7 MMOL/L (ref 3.4–5.3)
PROT SERPL-MCNC: 7.1 G/DL (ref 6.4–8.3)
PROT/CREAT 24H UR: 0.15 MG/MG CR (ref 0–0.2)
RBC # BLD AUTO: 4.19 10E6/UL (ref 3.8–5.2)
SODIUM SERPL-SCNC: 136 MMOL/L (ref 135–145)
WBC # BLD AUTO: 16.1 10E3/UL (ref 4–11)

## 2025-02-12 PROCEDURE — 82962 GLUCOSE BLOOD TEST: CPT

## 2025-02-12 PROCEDURE — 85041 AUTOMATED RBC COUNT: CPT | Performed by: ADVANCED PRACTICE MIDWIFE

## 2025-02-12 PROCEDURE — 82040 ASSAY OF SERUM ALBUMIN: CPT | Performed by: ADVANCED PRACTICE MIDWIFE

## 2025-02-12 PROCEDURE — G0463 HOSPITAL OUTPT CLINIC VISIT: HCPCS

## 2025-02-12 PROCEDURE — 36415 COLL VENOUS BLD VENIPUNCTURE: CPT | Performed by: ADVANCED PRACTICE MIDWIFE

## 2025-02-12 PROCEDURE — 99214 OFFICE O/P EST MOD 30 MIN: CPT | Mod: 25 | Performed by: ADVANCED PRACTICE MIDWIFE

## 2025-02-12 PROCEDURE — 59025 FETAL NON-STRESS TEST: CPT | Mod: 26 | Performed by: ADVANCED PRACTICE MIDWIFE

## 2025-02-12 PROCEDURE — 84156 ASSAY OF PROTEIN URINE: CPT | Performed by: ADVANCED PRACTICE MIDWIFE

## 2025-02-12 PROCEDURE — 76819 FETAL BIOPHYS PROFIL W/O NST: CPT | Mod: 26 | Performed by: OBSTETRICS & GYNECOLOGY

## 2025-02-12 PROCEDURE — 76819 FETAL BIOPHYS PROFIL W/O NST: CPT

## 2025-02-12 ASSESSMENT — ACTIVITIES OF DAILY LIVING (ADL)
ADLS_ACUITY_SCORE: 16
ADLS_ACUITY_SCORE: 42

## 2025-02-12 NOTE — PROGRESS NOTES
34w3d. . Patient was in clinic for BPP and found to have fetal tachycardia in the 160's to 180's. [facility] requested further follow-up in the way of an NST. RN requested advise from Dr. Mckeon, patient to go to L&D for fluids and further monitoring. Called L&D- they are open, gave report and sent a page to Dr. Ibarra, on-call MD. Pt states she is not sick, no fever. Escorted over to L&D.
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
## 2025-02-12 NOTE — PLAN OF CARE
Data: Patient presented to [facility]: 2025 10:57 AM.  Reason for maternal/fetal assessment is  fetal tachycardia in clinic . Patient reports positive fetal movement and overall feeling well. When asked, Briana does report headaches that are not persistent and resolve spontaneously and having some vision changes over the last two days seeing spots.  Patient denies leaking of vaginal fluid/rupture of membranes, vaginal bleeding, abdominal pain, pelvic pressure, nausea, vomiting, epigastric or RUQ pain, significant edema. Patient reports fetal movement is normal. Patient is a 34w3d . Prenatal record reviewed. Pregnancy has been complicated by diabetes. Support person is present.     Fetal HR baseline was 155, variability is moderate (amplitude range 6 to 25 bpm). Accelerations: increase 15 bpm above baseline lasting 15 seconds. Decelerations: absent. Uterine assessment is no contractions during contractions and soft by palpation at rest. Cervical exam deferred. Fetal presentation cephalic  per  BSUS . Membranes: intact.    Vital signs wnl. Patient reports no pain and is coping.     Action: Verbal consent for EFM. Triage assessment completed. Fasting POCT BG 71. PreE labs drawn. Given normal blood pressures and un-concerning urine protein results, plan to discharge to home and CNM will reach out to patient with lab results. Briana discharged to home at [id number].

## 2025-02-12 NOTE — DISCHARGE INSTRUCTIONS
Learning About When to Call Your Doctor During Pregnancy (After 20 Weeks)  Overview  It's common to have concerns about what might be a problem when you're pregnant. Most pregnancies don't have any serious problems. But it's still important to know when to call your doctor if you have certain symptoms or signs of labor.  These are general suggestions. Your doctor may give you some more information about when to call.  When to call your doctor (after 20 weeks)  Call 911  anytime you think you may need emergency care. For example, call if:  You have severe vaginal bleeding. This means you are soaking through a pad each hour for 2 or more hours.  You have sudden, severe pain in your belly.  You have chest pain, are short of breath, or cough up blood.  You passed out (lost consciousness).  You have a seizure.  You see or feel the umbilical cord.  You think you are about to deliver your baby and can't make it safely to the hospital or birthing center.  Call your doctor now or seek immediate medical care if:  You have vaginal bleeding.  You have belly pain.  You have a fever.  You are dizzy or lightheaded, or you feel like you may faint.  You have signs of a blood clot in your leg (called a deep vein thrombosis), such as:  Pain in the calf, back of the knee, thigh, or groin.  Swelling in your leg or groin.  A color change on the leg or groin. The skin may be reddish or purplish, depending on your usual skin color.  You have symptoms of preeclampsia, such as:  Sudden swelling of your face, hands, or feet.  New vision problems (such as dimness, blurring, or seeing spots).  A severe headache.  You have a sudden release of fluid from your vagina. (You think your water broke.)  You've been having regular contractions for an hour. This means that you've had at least 6 contractions within 1 hour, even after you change your position and drink fluids.  You notice that your baby has stopped moving or is moving less than  "normal.  You have signs of heart failure, such as:  New or increased shortness of breath.  New or worse swelling in your legs, ankles, or feet.  Sudden weight gain, such as more than 2 to 3 pounds in a day or 5 pounds in a week.  Feeling so tired or weak that you cannot do your usual activities.  You have symptoms of a urinary tract infection. These may include:  Pain or burning when you urinate.  A frequent need to urinate without being able to pass much urine.  Pain in the flank, which is just below the rib cage and above the waist on either side of the back.  Blood in your urine.  Watch closely for changes in your health, and be sure to contact your doctor if:  You have vaginal discharge that smells bad.  You feel sad, anxious, or hopeless for more than a few days.  You have skin changes, such as a rash, itching, or a yellow color to your skin.  You have other concerns about your pregnancy.  If you have labor signs at 37 weeks or more  If you have signs of labor at 37 weeks or more, your doctor may tell you to call when your labor becomes more active. Symptoms of active labor include:  Contractions that are regular.  Contractions that are less than 5 minutes apart.  Contractions that are hard to talk through.  Follow-up care is a key part of your treatment and safety. Be sure to make and go to all appointments, and call your doctor if you are having problems. It's also a good idea to know your test results and keep a list of the medicines you take.  Where can you learn more?  Go to https://www.[email or web address].net/patiented  Enter N531 in the search box to learn more about \"Learning About When to Call Your Doctor During Pregnancy (After 20 Weeks).\"  Current as of: April 30, 2024  Content Version: 14.3    2024 [employer] [employer].   Care instructions adapted under license by your healthcare professional. If you have questions about a medical condition or this instruction, always ask your healthcare professional. " [employer], [facility] disclaims any warranty or liability for your use of this information.

## 2025-02-12 NOTE — PROVIDER NOTIFICATION
02/12/25 1430   Provider Notification   Provider Name/Title ELDER Booker CNM   Method of Notification Phone     Given normal B/P and unremarkable urine protein results, ok to discharge Briana after blood is drawn per Pt preference.

## 2025-02-14 ENCOUNTER — ANCILLARY PROCEDURE (OUTPATIENT)
Dept: ULTRASOUND IMAGING | Facility: CLINIC | Age: 35
End: 2025-02-14
Attending: ADVANCED PRACTICE MIDWIFE
Payer: MEDICAID

## 2025-02-14 DIAGNOSIS — O24.414 INSULIN CONTROLLED GESTATIONAL DIABETES MELLITUS (GDM) IN THIRD TRIMESTER: ICD-10-CM

## 2025-02-14 PROCEDURE — 76819 FETAL BIOPHYS PROFIL W/O NST: CPT

## 2025-02-14 PROCEDURE — 76819 FETAL BIOPHYS PROFIL W/O NST: CPT | Mod: 26 | Performed by: STUDENT IN AN ORGANIZED HEALTH CARE EDUCATION/TRAINING PROGRAM

## 2025-02-14 NOTE — PATIENT INSTRUCTIONS
Weeks 34 to 36 of Your Pregnancy: Care Instructions  Your belly has grown quite large. It's almost time to give birth! Your baby's lungs are almost ready to breathe air. The skull bones are firm enough to protect your baby's head. But they're soft enough to move down through the birth canal.    You might be wondering what to expect during labor. Because each birth is different, there's no way to know exactly what childbirth will be like for you. Talk to your doctor or midwife about any concerns you have.   You'll probably have a test for group B streptococcus (GBS). GBS is bacteria that can live in the vagina and rectum. GBS can make your baby sick after birth. If you test positive, you'll get antibiotics during labor.     Choose what type of pain relief you would like during labor.  You can choose from a few types, including medicine and non-medicine options. You may want to use several types of pain relief.     Know how medicines can help with pain during labor.  Some medicines lower anxiety and help with some of the pain. Others make your lower body numb so that you will feel less pain.     Tell your doctor about your pain medicine choice.  Do this before you start labor or very early in your labor. You may be able to change your mind during labor.     Learn about the stages of labor.    The first stage includes the early (latent) and active phases of labor. Contractions start in early labor. During active labor, contractions get stronger, last longer, and happen more often. And the cervix opens more rapidly.  The second stage starts when you're ready to push. During this stage, your baby is born.  During the third stage, you'll usually have a few more contractions to push out the placenta.   Follow-up care is a key part of your treatment and safety. Be sure to make and go to all appointments, and call your doctor if you are having problems. It's also a good idea to know your test results and keep a list of the  "medicines you take.  Where can you learn more?  Go to https://www.[employer].net/patiented  Enter B912 in the search box to learn more about \"Weeks 34 to 36 of Your Pregnancy: Care Instructions.\"  Current as of: 2024  Content Version: 14.3    2024 [employer].   Care instructions adapted under license by your healthcare professional. If you have questions about a medical condition or this instruction, always ask your healthcare professional. [employer] disclaims any warranty or liability for your use of this information.    Group B Strep During Pregnancy: Care Instructions  Overview     Group B strep infection is caused by a type of bacteria. It's a different kind of bacteria than the kind that causes strep throat.  You may have this kind of bacteria in your body. Sometimes it may cause an infection, but most of the time it doesn't make you sick or cause symptoms. But if you pass the bacteria to your baby during the birth, it can cause serious health problems for your baby.  If you have this bacteria in your body, you will get antibiotics when you are in labor. Antibiotics help prevent problems for a  baby.  After birth, doctors will watch and may test your baby. If your baby tests positive for Group B strep, your baby will get antibiotics.  If you plan to breastfeed your baby, don't worry. It will be safe to breastfeed.  Follow-up care is a key part of your treatment and safety. Be sure to make and go to all appointments, and call your doctor if you are having problems. It's also a good idea to know your test results and keep a list of the medicines you take.  How can you care for yourself at home?  If your doctor has prescribed antibiotics, take them as directed. Do not stop taking them just because you feel better. You need to take the full course of antibiotics.  Tell your doctor if you are allergic to any antibiotic.  If you go into labor, or your water breaks, go to the " "hospital. Your doctor will give you antibiotics to help protect your baby from infection.  Tell the doctors and nurses if you have an allergy to penicillin.  Tell the doctors and nurses at the hospital that you tested positive for group B strep.  When should you call for help?   Call your doctor now or seek immediate medical care if:    You have symptoms of a urinary tract infection. These may include:  Pain or burning when you urinate.  A frequent need to urinate without being able to pass much urine.  Pain in the flank, which is just below the rib cage and above the waist on either side of the back.  Blood in your urine.  A fever.     You think you are in labor or your water has broken.     You have pain in your belly or pelvis.   Watch closely for changes in your health, and be sure to contact your doctor if you have any problems.  Where can you learn more?  Go to https://www.[employer]/patiented  Enter M001 in the search box to learn more about \"Group B Strep During Pregnancy: Care Instructions.\"  Current as of: April 30, 2024  Content Version: 14.3    2024 [employer]   Care instructions adapted under license by your healthcare professional. If you have questions about a medical condition or this instruction, always ask your healthcare professional. [employer] disclaims any warranty or liability for your use of this information.    Circumcision in Infants: What to Expect at Home  Your Child's Recovery  After circumcision, your baby's penis may look red and swollen. It may have petroleum jelly and gauze on it. The gauze will likely come off when your baby urinates. Follow your doctor's directions about whether to put clean gauze back on your baby's penis or to leave the gauze off. If you need to remove gauze from the penis, use warm water to soak the gauze and gently loosen it.  The doctor may have used a Plastibell device to do the circumcision. If so, your baby will have a plastic " ring around the head of the penis. The ring should fall off by itself in 10 to 12 days.  A thin, yellow film may form over the area the day after the procedure. This is part of the normal healing process. It should go away in a few days.  Your baby may seem fussy while the area heals. It may hurt for your baby to urinate. This pain often gets better in 3 or 4 days. But it may last for up to 2 weeks.  Even though your baby's penis will likely start to feel better after 3 or 4 days, it may look worse. The penis often starts to look like it's getting better after about 7 to 10 days.  This care sheet gives you a general idea about how long it will take for your child to recover. But each child recovers at a different pace. Follow the steps below to help your child get better as quickly as possible.  How can you care for your child at home?  Activity    Let your baby rest as much as possible. Sleeping will help with recovery.     You can give your baby a sponge bath the day after surgery. Ask your doctor when it is okay to give your baby a bath.   Medicines    Your doctor will tell you if and when your child can restart any medicines. The doctor will also give you instructions about your child taking any new medicines.     Your doctor may recommend giving your baby acetaminophen (Tylenol) to help with pain after the procedure. Be safe with medicines. Give your child medicines exactly as prescribed. Call your doctor if you think your child is having a problem with a medicine.     Do not give your child two or more pain medicines at the same time unless the doctor told you to. Many pain medicines have acetaminophen, which is Tylenol. Too much acetaminophen (Tylenol) can be harmful.   Circumcision care    Always wash your hands before and after touching the circumcision area.     Gently wash your baby's penis with plain, warm water after each diaper change, and pat it dry. Do not use soap. Don't use hydrogen peroxide or  "alcohol. They can slow healing.     Do not try to remove the film that forms on the penis. The film will go away on its own.     Put plenty of petroleum jelly (such as Vaseline) on the circumcision area during each diaper change. This will prevent your baby's penis from sticking to the diaper while it heals.     Fasten your baby's diapers loosely so that there is less pressure on the penis while it heals.   Follow-up care is a key part of your child's treatment and safety. Be sure to make and go to all appointments, and call your doctor if your child is having problems. It's also a good idea to know your child's test results and keep a list of the medicines your child takes.  When should you call for help?   Call your doctor now or seek immediate medical care if:    Your baby has a fever over 100.4 F.     Your baby is extremely fussy or irritable, has a high-pitched cry, or refuses to eat.     Your baby does not have a wet diaper within 12 hours after the circumcision.     You find a spot of bleeding larger than a 2-inch [place] from the incision.     Your baby has signs of infection. Signs may include severe swelling; redness; a red streak on the shaft of the penis; or a thick, yellow discharge.   Watch closely for changes in your child's health, and be sure to contact your doctor if:    A Plastibell device was used for the circumcision and the ring has not fallen off after 10 to 12 days.   Where can you learn more?  Go to https://www.[employer].net/patiented  Enter S255 in the search box to learn more about \"Circumcision in Infants: What to Expect at Home.\"  Current as of: October 24, 2023  Content Version: 14.3    2024 [employer].   Care instructions adapted under license by your healthcare professional. If you have questions about a medical condition or this instruction, always ask your healthcare professional. [employer] disclaims any warranty or liability for your use of this " "information.    Week 37 of Your Pregnancy: Care Instructions    Most babies are born between 37 and 40 weeks.   This is a good time to pack a bag to take with you to the birth. Then it will be ready to go when you are.     Learn about breastfeeding.  For example, find out about ways to hold your baby to make breastfeeding easier. And think about learning how to pump and store milk.     Know that crying is normal.  It's common for babies to cry 1 to 3 hours a day. Some cry more, and some cry less.     Learn why babies cry.  They may be hungry; have gas; need a diaper change; or feel cold, warm, tired, lonely, or tense. Sometimes they cry for unknown reasons.     Think about what will help you stay calm when your baby cries.  Taking slow, deep breaths can help. So can taking a break. It's okay to put your baby somewhere safe (like their crib) and walk away for a few minutes.     Learn about safe sleep for your baby.  Always put your baby to sleep on their back. Place them alone in a crib or bassinet with a firm, flat surface. Keep soft items like stuffed animals out of the crib.     Learn what to expect with  poop.  Your baby will have their own bowel patterns. Some babies have several bowel movements a day. Some have fewer.     Know that  babies will often have loose, yellow bowel movements.  Formula-fed babies have more formed stools. If your baby's poop looks like pellets, your baby is constipated.   Follow-up care is a key part of your treatment and safety. Be sure to make and go to all appointments, and call your doctor if you are having problems. It's also a good idea to know your test results and keep a list of the medicines you take.  Where can you learn more?  Go to https://www.[employer].net/patiented  Enter N257 in the search box to learn more about \"Week 37 of Your Pregnancy: Care Instructions.\"  Current as of: 2024  Content Version: 14.3    2024 [employer] [facility] [employer].   Care " instructions adapted under license by your healthcare professional. If you have questions about a medical condition or this instruction, always ask your healthcare professional. [employer] disclaims any warranty or liability for your use of this information.

## 2025-02-16 PROBLEM — O09.93 SUPERVISION OF HIGH RISK PREGNANCY IN THIRD TRIMESTER: Status: RESOLVED | Noted: 2025-02-12 | Resolved: 2025-02-16

## 2025-02-17 ENCOUNTER — ANCILLARY PROCEDURE (OUTPATIENT)
Dept: ULTRASOUND IMAGING | Facility: CLINIC | Age: 35
End: 2025-02-17
Attending: ADVANCED PRACTICE MIDWIFE
Payer: MEDICAID

## 2025-02-17 ENCOUNTER — MYC MEDICAL ADVICE (OUTPATIENT)
Dept: EDUCATION SERVICES | Facility: CLINIC | Age: 35
End: 2025-02-17

## 2025-02-17 ENCOUNTER — PRENATAL OFFICE VISIT (OUTPATIENT)
Dept: OBGYN | Facility: CLINIC | Age: 35
End: 2025-02-17
Attending: ADVANCED PRACTICE MIDWIFE
Payer: MEDICAID

## 2025-02-17 VITALS
DIASTOLIC BLOOD PRESSURE: 74 MMHG | WEIGHT: 217 LBS | SYSTOLIC BLOOD PRESSURE: 108 MMHG | HEART RATE: 91 BPM | BODY MASS INDEX: 38.44 KG/M2

## 2025-02-17 DIAGNOSIS — O24.414 INSULIN CONTROLLED GESTATIONAL DIABETES MELLITUS (GDM) IN THIRD TRIMESTER: ICD-10-CM

## 2025-02-17 DIAGNOSIS — O09.291 HX OF PREECLAMPSIA, PRIOR PREGNANCY, CURRENTLY PREGNANT, FIRST TRIMESTER: ICD-10-CM

## 2025-02-17 DIAGNOSIS — O09.891 SUPERVISION OF OTHER HIGH RISK PREGNANCIES, FIRST TRIMESTER: Primary | ICD-10-CM

## 2025-02-17 DIAGNOSIS — O09.299 HX OF MATERNAL THIRD DEGREE PERINEAL LACERATION, CURRENTLY PREGNANT: ICD-10-CM

## 2025-02-17 DIAGNOSIS — O36.8390 FETAL TACHYCARDIA AFFECTING MANAGEMENT OF MOTHER: ICD-10-CM

## 2025-02-17 DIAGNOSIS — G44.009 CLUSTER HEADACHE, NOT INTRACTABLE, UNSPECIFIED CHRONICITY PATTERN: ICD-10-CM

## 2025-02-17 PROCEDURE — 76816 OB US FOLLOW-UP PER FETUS: CPT | Mod: 26 | Performed by: STUDENT IN AN ORGANIZED HEALTH CARE EDUCATION/TRAINING PROGRAM

## 2025-02-17 PROCEDURE — 76819 FETAL BIOPHYS PROFIL W/O NST: CPT

## 2025-02-17 PROCEDURE — G0463 HOSPITAL OUTPT CLINIC VISIT: HCPCS | Performed by: REGISTERED NURSE

## 2025-02-17 PROCEDURE — 76819 FETAL BIOPHYS PROFIL W/O NST: CPT | Mod: 26 | Performed by: STUDENT IN AN ORGANIZED HEALTH CARE EDUCATION/TRAINING PROGRAM

## 2025-02-17 RX ORDER — BUTALBITAL, ACETAMINOPHEN AND CAFFEINE 50; 325; 40 MG/1; MG/1; MG/1
1 TABLET ORAL
Qty: 8 TABLET | Refills: 0 | Status: SHIPPED | OUTPATIENT
Start: 2025-02-17

## 2025-02-17 NOTE — CONFIDENTIAL NOTE
Gestational Diabetes Follow-up    Subjective/Objective:    Briana Newman sent in blood glucose log for review. Last date of communication was: 2-10-25.    Gestational diabetes is being managed with diet, activity, and medications    Taking diabetes medications: yes:     Diabetes Medication(s)       Insulin       insulin NPH (HUMULIN N KWIKPEN) 100 UNIT/ML injection Inject 16 Units subcutaneously at bedtime.     insulin  UNIT/ML vial Inject 18 Units subcutaneously at bedtime.            Estimated Date of Delivery: Mar 23, 2025    BG/Food Lo/10/2025  Fastin  Breakfast 1 hr: 114  Lunch 1 hr: 136  Dinner 1hr: 109        2025  Fastin  Breakfast 1 hr: 144  Lunch 1 hr: 138  Dinner 1hr: 129     2025  Fastin  Breakfast 1 hr: 81  Lunch 1 hr: 137  Dinner 1hr: 132     2025  Fastin  Breakfast 1 hr: 126  Lunch 1 hr: 121  Dinner 1hr: 112       Fastin  Breakfast 1 hr: 165  Lunch 1 hr: 126  Dinner 1hr: 157     2/15/2025  Fastin  Breakfast 1 hr: 138  Lunch 1 hr: 132  Dinner 1hr: 111        2025  Fastin  Breakfast 1 hr: 133  Lunch 1 hr: 160  Dinner 1hr: 198        2025  Fastin    Assessment:    Ketones: Negative.   Fasting blood glucoses: 25% in target.  After breakfast: 71% in target.  Before lunch: x% in target.  After lunch: 86% in target.  Before dinner: x% in target.  After dinner: 71% in target.    Plan/Response:  Recommend increase to insulin - NPH 0-0-0-16 to 0-0-0-19. (If already taking 18 units then increase to 22 units)  Follow-up on Thursday or Friday.  [employer] message sent to confirm NPH insulin dose and update her chart since it has 2 doses listed. (Pt responded and confirmed she is already taking 18 units, so will increase to 22 units)    Winsome Monahan RN, CDCES      Any diabetes medication dose changes were made via the CDE Protocol and Collaborative Practice Agreement with the patient's referring provider and OB/GYN provider. RAMSEY  copy of this encounter was shared with the provider.

## 2025-02-17 NOTE — PROGRESS NOTES
"Subjective:      35 year old  at 35w1d presents for a routine prenatal appointment.    No vaginal bleeding or leakage of fluid. Denies concerning contractions or cramping.   Reports regular fetal movement.       No RUQ pain.    US results: BPP 8/8, , MVP 5.9cm, cephaic, EFW 65.8%, 6lbs 2oz    Patient concerns:   - Reports headache for the past week that is persistent throughout the day, waxes and wanes in intensity. Has been taking tylenol without improvement of symptoms. Rates current headache at 2/10. Also reports intermittent dizziness with \"fuzzy\" vision happening 1-2 times over the past week.         Objective:  Vitals:    25 0901   BP: 108/74   Pulse: 91   Weight: 98.4 kg (217 lb)   , see ob flowsheet    Blood type: A POS   Assessment/Plan     Patient Active Problem List    Diagnosis Date Noted    Insulin controlled gestational diabetes mellitus (GDM) in third trimester 2025     Priority: Medium    Fetal tachycardia affecting management of mother 2025     Priority: Medium     Fetal tachycardia noted during BPP 25, monitoring on unit Category 1      GDM (gestational diabetes mellitus) 2024     Priority: Medium     : Supplies sent    25 started on Novolin 14 units at bedtime    growth q4 @28wks, twice weekly BPP @32wks with delivery by 39w6d if well controlled- orders placed      Palpitations 10/16/2024     Priority: Medium    Rubella non-immune status, antepartum 08/15/2024     Priority: Medium     Equivocal rubella       Supervision of other high risk pregnancies, first trimester 2024     Priority: Medium     [facility] Group choice: CNM group  Partner's name: Christianne Ca  Employment: part time MA  [x]NOB folder  [x]Dating  [x] 1st trimester screening: Patient declines 1st tri genetic screening  [x]Fetal anatomy US ordered  [x] recommended LD ASA after 12 wks for PRE-E risk  [x] GDM risk, recommended early GCT and hgb " A1C  [x]No need for utox in labor  [x]COVID vaccine completed  [x]Pap UTD- due     12-23wks________________________  []Offer AFP after 15 wks  []Rubella equivocal  [x]Hep B immune   [x]Varicella immune  []FLU shot    24-28wk_________________________  [x]EOB folder  [x]Labor plans: Un-Medicated preference  [x]Prenatal Ed  []: Declines  [x]Infant feeding plan: . Has had issues with latching and supply in the past.  [x] care provider choice: Galileo Leal with [facility]  [x]PP Contraception plan: NFP  []TDAP after 27 weeks- declines, may get at work  []Rhogam if needed, date: N/A    29-35 wk________________________    [x] Water birth interest- form given  [x]GCT - failed 3hr, GDM  []RSV    36-37 wks______________________   [] GBS/CBC  []OTC PP meds sent  []PP recovery plans/support:  []Planning CS-ERAS pkt    38-42 wks______________________  []IOL reason/plans  []Postdates BPP       History of insulin controlled gestational diabetes mellitus (GDM) 2024     Priority: Medium     A1C today 24 and early 1hr    - did not complete early 1 hour  Plan 1 hour at EOB    2024: 1 hour      Hx of preeclampsia, prior pregnancy, currently pregnant, first trimester 2024     Priority: Medium     Baseline PreE labs 24, to start ASA @12wks    : reiterated taking aspirin daily    2024: repeat labs today per pt preference      Hx of maternal third degree perineal laceration, currently pregnant 2024     Priority: Medium    Migraine with aura and without status migrainosus, not intractable 05/15/2024     Priority: Medium    Cervical high risk HPV (human papillomavirus) test positive 2024     Priority: Medium     20 NIL pap   24 NIL pap, + HR HPV (not 16 or 18). Plan: cotest in 1 yr.   24 Pt notified       Current moderate episode of major depressive disorder without prior episode (H) 2024     Priority: Medium    Hx of CHRIS (generalized anxiety  disorder) 02/01/2024     Priority: Medium    Dyspnea on exertion 10/05/2022     Priority: Medium    Postural tachycardia with syncope 08/29/2022     Priority: Medium     In pregnancy.-Has had postural tachycardia with syncope, went to ED twice for concerns of lightheadedness   Heart rate has gone up to 150 during these episodes  Wore a Holter monitor for three days. She will ship the monitor today. Has an echo scheduled for early September and visit with cardiology in October 2022 9/26/22 Briana has continued to feel a racing heart all day long. She has had a normal echocardiogram and Holter monitor workup. Has appt with cardiology early October. Denies any syncope now that she is out of the first trimester  10/5/22 met with Cardiology, they recommended compression socks    10/16/24: Cardiology recommendations:  POTS - consider beta blocker with pregnancy  - will check zio first  2. Palpitations - as above  3. Dyspnea - check stress echo      BMI 35.0-35.9,adult 07/29/2022     Priority: Medium     BPP weekly @37wks  Growth ultrasound 32 weeks    12/18/2024: order placed for scheduling at 32 weeks      Vitamin D deficiency 07/15/2022     Priority: Medium     18 at intake. Rx sent for recommended supplementation of 4000IU daily.   11/16/22- Vit D 26- review at next visit___           No orders of the defined types were placed in this encounter.    Assessment and Plan:  Discussed management of headaches with patient. Reviewed ensuring adequate hydration, implementing electrolyte beverage into daily routine, and continued management of blood glucose levels. Prescribed fioricet for persistent, severe headache. Reviewed the need to avoid additional acetaminophen when using medication. GBS to be collected at next visit. Declined RSV. Continue with twice weekly BPP.     Updated individualized prenatal care plan on the problem list for 3rd trimester    Return to clinic in 1 weeks and prn if questions or concerns.     I,  Silvia Reid RN, am serving as a scribe; to document services personally performed by Mariella Vaughn CNM based on data collection and the provider's statements to me.     Silvia Reid RN    I agree with the PFSH and ROS as completed by Silvia Reid except for changes made by me. The remainder of the encounter was performed by me and scribed by Silvia Reid. The scribed note accurately reflects my personal services and decisions made by me.   ERICA Manuel CNM

## 2025-02-17 NOTE — NURSING NOTE
35 weeks ob visit  ultrasound today-  still has a headache for one week  tylenol doesn't  help  sometimes she sees stars-  had a episode at work Friday   felt really dizzy and room was spininng   blood pressure was wnl.   Feels woozy and slow      Sent  blood sugars mychart one hour ago-  no control of values

## 2025-02-18 RX ORDER — INSULIN HUMAN 100 [IU]/ML
22 INJECTION, SUSPENSION SUBCUTANEOUS AT BEDTIME
Qty: 15 ML | Refills: 1 | Status: SHIPPED | OUTPATIENT
Start: 2025-02-18

## 2025-02-21 ENCOUNTER — HOSPITAL ENCOUNTER (OUTPATIENT)
Facility: CLINIC | Age: 35
Discharge: HOME | End: 2025-02-21
Attending: ADVANCED PRACTICE MIDWIFE | Admitting: ADVANCED PRACTICE MIDWIFE
Payer: MEDICAID

## 2025-02-21 VITALS
SYSTOLIC BLOOD PRESSURE: 112 MMHG | WEIGHT: 217 LBS | RESPIRATION RATE: 16 BRPM | DIASTOLIC BLOOD PRESSURE: 66 MMHG | HEIGHT: 63 IN | BODY MASS INDEX: 38.45 KG/M2 | TEMPERATURE: 98 F | HEART RATE: 103 BPM

## 2025-02-21 DIAGNOSIS — O09.891 SUPERVISION OF OTHER HIGH RISK PREGNANCIES, FIRST TRIMESTER: Primary | ICD-10-CM

## 2025-02-21 DIAGNOSIS — O09.291 HX OF PREECLAMPSIA, PRIOR PREGNANCY, CURRENTLY PREGNANT, FIRST TRIMESTER: ICD-10-CM

## 2025-02-21 DIAGNOSIS — O36.8390 FETAL TACHYCARDIA AFFECTING MANAGEMENT OF MOTHER: ICD-10-CM

## 2025-02-21 DIAGNOSIS — O09.299 HX OF MATERNAL THIRD DEGREE PERINEAL LACERATION, CURRENTLY PREGNANT: ICD-10-CM

## 2025-02-21 PROBLEM — Z36.89 ENCOUNTER FOR TRIAGE IN PREGNANT PATIENT: Status: ACTIVE | Noted: 2025-02-21

## 2025-02-21 LAB
ALBUMIN MFR UR ELPH: 18 MG/DL
ALBUMIN SERPL BCG-MCNC: 3.8 G/DL (ref 3.5–5.2)
ALP SERPL-CCNC: 107 U/L (ref 40–150)
ALT SERPL W P-5'-P-CCNC: 7 U/L (ref 0–50)
ANION GAP SERPL CALCULATED.3IONS-SCNC: 15 MMOL/L (ref 7–15)
AST SERPL W P-5'-P-CCNC: 18 U/L (ref 0–45)
BILIRUB SERPL-MCNC: 0.3 MG/DL
BUN SERPL-MCNC: 4.6 MG/DL (ref 6–20)
CALCIUM SERPL-MCNC: 9.6 MG/DL (ref 8.8–10.4)
CHLORIDE SERPL-SCNC: 105 MMOL/L (ref 98–107)
CREAT SERPL-MCNC: 0.47 MG/DL (ref 0.51–0.95)
CREAT UR-MCNC: 109 MG/DL
EGFRCR SERPLBLD CKD-EPI 2021: >90 ML/MIN/1.73M2
ERYTHROCYTE [DISTWIDTH] IN BLOOD BY AUTOMATED COUNT: 13.5 % (ref 10–15)
GLUCOSE BLDC GLUCOMTR-MCNC: 82 MG/DL (ref 70–99)
GLUCOSE SERPL-MCNC: 94 MG/DL (ref 70–99)
HCO3 SERPL-SCNC: 19 MMOL/L (ref 22–29)
HCT VFR BLD AUTO: 36 % (ref 35–47)
HGB BLD-MCNC: 12.2 G/DL (ref 11.7–15.7)
MCH RBC QN AUTO: 28.2 PG (ref 26.5–33)
MCHC RBC AUTO-ENTMCNC: 33.9 G/DL (ref 31.5–36.5)
MCV RBC AUTO: 83 FL (ref 78–100)
PLATELET # BLD AUTO: 360 10E3/UL (ref 150–450)
POTASSIUM SERPL-SCNC: 4.2 MMOL/L (ref 3.4–5.3)
PROT SERPL-MCNC: 7.4 G/DL (ref 6.4–8.3)
PROT/CREAT 24H UR: 0.17 MG/MG CR (ref 0–0.2)
RBC # BLD AUTO: 4.32 10E6/UL (ref 3.8–5.2)
SODIUM SERPL-SCNC: 139 MMOL/L (ref 135–145)
WBC # BLD AUTO: 14.1 10E3/UL (ref 4–11)

## 2025-02-21 PROCEDURE — 80053 COMPREHEN METABOLIC PANEL: CPT | Performed by: ADVANCED PRACTICE MIDWIFE

## 2025-02-21 PROCEDURE — 85014 HEMATOCRIT: CPT | Performed by: ADVANCED PRACTICE MIDWIFE

## 2025-02-21 PROCEDURE — 87653 STREP B DNA AMP PROBE: CPT | Performed by: ADVANCED PRACTICE MIDWIFE

## 2025-02-21 PROCEDURE — 59025 FETAL NON-STRESS TEST: CPT | Mod: 26 | Performed by: ADVANCED PRACTICE MIDWIFE

## 2025-02-21 PROCEDURE — 36415 COLL VENOUS BLD VENIPUNCTURE: CPT | Performed by: ADVANCED PRACTICE MIDWIFE

## 2025-02-21 PROCEDURE — 99214 OFFICE O/P EST MOD 30 MIN: CPT | Mod: 25 | Performed by: ADVANCED PRACTICE MIDWIFE

## 2025-02-21 PROCEDURE — 82962 GLUCOSE BLOOD TEST: CPT

## 2025-02-21 PROCEDURE — G0463 HOSPITAL OUTPT CLINIC VISIT: HCPCS

## 2025-02-21 PROCEDURE — 84156 ASSAY OF PROTEIN URINE: CPT | Performed by: ADVANCED PRACTICE MIDWIFE

## 2025-02-21 RX ORDER — LIDOCAINE 40 MG/G
CREAM TOPICAL
Status: DISCONTINUED | OUTPATIENT
Start: 2025-02-21 | End: 2025-02-21 | Stop reason: HOSPADM

## 2025-02-21 ASSESSMENT — ACTIVITIES OF DAILY LIVING (ADL)
ADLS_ACUITY_SCORE: 16
ADLS_ACUITY_SCORE: 16

## 2025-02-21 NOTE — PROVIDER NOTIFICATION
02/21/25 1005   Provider Notification   Provider Name/Title A Page CNM   Method of Notification In Department   Notification Reason Status Update     Reviewed FHT, okayed for patient to come off monitor. See flow record.
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
## 2025-02-21 NOTE — DISCHARGE INSTRUCTIONS
Learning About When to Call Your Doctor During Pregnancy (After 20 Weeks)  Overview  It's common to have concerns about what might be a problem when you're pregnant. Most pregnancies don't have any serious problems. But it's still important to know when to call your doctor if you have certain symptoms or signs of labor.  These are general suggestions. Your doctor may give you some more information about when to call.  When to call your doctor (after 20 weeks)  Call 911  anytime you think you may need emergency care. For example, call if:  You have severe vaginal bleeding. This means you are soaking through a pad each hour for 2 or more hours.  You have sudden, severe pain in your belly.  You have chest pain, are short of breath, or cough up blood.  You passed out (lost consciousness).  You have a seizure.  You see or feel the umbilical cord.  You think you are about to deliver your baby and can't make it safely to the hospital or birthing center.  Call your doctor now or seek immediate medical care if:  You have vaginal bleeding.  You have belly pain.  You have a fever.  You are dizzy or lightheaded, or you feel like you may faint.  You have signs of a blood clot in your leg (called a deep vein thrombosis), such as:  Pain in the calf, back of the knee, thigh, or groin.  Swelling in your leg or groin.  A color change on the leg or groin. The skin may be reddish or purplish, depending on your usual skin color.  You have symptoms of preeclampsia, such as:  Sudden swelling of your face, hands, or feet.  New vision problems (such as dimness, blurring, or seeing spots).  A severe headache.  You have a sudden release of fluid from your vagina. (You think your water broke.)  You've been having regular contractions for an hour. This means that you've had at least 6 contractions within 1 hour, even after you change your position and drink fluids.  You notice that your baby has stopped moving or is moving less than  "normal.  You have signs of heart failure, such as:  New or increased shortness of breath.  New or worse swelling in your legs, ankles, or feet.  Sudden weight gain, such as more than 2 to 3 pounds in a day or 5 pounds in a week.  Feeling so tired or weak that you cannot do your usual activities.  You have symptoms of a urinary tract infection. These may include:  Pain or burning when you urinate.  A frequent need to urinate without being able to pass much urine.  Pain in the flank, which is just below the rib cage and above the waist on either side of the back.  Blood in your urine.  Watch closely for changes in your health, and be sure to contact your doctor if:  You have vaginal discharge that smells bad.  You feel sad, anxious, or hopeless for more than a few days.  You have skin changes, such as a rash, itching, or a yellow color to your skin.  You have other concerns about your pregnancy.  If you have labor signs at 37 weeks or more  If you have signs of labor at 37 weeks or more, your doctor may tell you to call when your labor becomes more active. Symptoms of active labor include:  Contractions that are regular.  Contractions that are less than 5 minutes apart.  Contractions that are hard to talk through.  Follow-up care is a key part of your treatment and safety. Be sure to make and go to all appointments, and call your doctor if you are having problems. It's also a good idea to know your test results and keep a list of the medicines you take.  Where can you learn more?  Go to https://www.[employer].net/patiented  Enter N531 in the search box to learn more about \"Learning About When to Call Your Doctor During Pregnancy (After 20 Weeks).\"  Current as of: April 30, 2024  Content Version: 14.3    2024 [facility] [employer].   Care instructions adapted under license by your healthcare professional. If you have questions about a medical condition or this instruction, always ask your healthcare professional. " [employer], [facility] disclaims any warranty or liability for your use of this information.

## 2025-02-21 NOTE — PROVIDER NOTIFICATION
02/21/25 1009   Provider Notification   Provider Name/Title A Page CNM   Method of Notification In Department   Notification Reason Status Update     A Page CNM okayed for patient to discharge to home.

## 2025-02-21 NOTE — PROVIDER NOTIFICATION
25 0903   Provider Notification   Provider Name/Title A Page CNM   Method of Notification In Department   Request Evaluate in Person   Notification Reason Patient Arrived     Patient here for r/o pre-e. , 35w5d, h/o pre-e first pregnancy. Pt c/o of headache for a week, not relieved by Tylenol per pt report. First /79. No edema, reflexes 2+, no clonus. FHT appropriate for gestational age, see flow record.

## 2025-02-21 NOTE — DISCHARGE SUMMARY
Data: Patient assessed in the [facility] for  rule out pre-eclampsia . Cervix   not examined. Fetal station  . Membranes intact. Contractions are present. Contactions are infrequent and last 40-50 seconds. Uterine assessment is mild by palpation during contractions and soft by palpation at rest. See flowsheets for fetal assessment documentation.     Action: Presumed adequate fetal oxygenation documented. Discharge instructions reviewed. Patient instructed to report change in fetal movement, vaginal leaking of fluid or bleeding, abdominal pain, or any concerns related to the pregnancy to provider/clinic.      Response: Orders to discharge home per Luisana Page CNM, see provider's notes. Patient verbalized understanding of education and agreement with plan. Discharged to home at 1030.

## 2025-02-22 LAB — GP B STREP DNA SPEC QL NAA+PROBE: NEGATIVE

## 2025-02-24 ENCOUNTER — MYC MEDICAL ADVICE (OUTPATIENT)
Dept: EDUCATION SERVICES | Facility: CLINIC | Age: 35
End: 2025-02-24
Payer: MEDICAID

## 2025-02-24 DIAGNOSIS — O24.414 INSULIN CONTROLLED GESTATIONAL DIABETES MELLITUS (GDM) IN THIRD TRIMESTER: ICD-10-CM

## 2025-02-24 RX ORDER — INSULIN HUMAN 100 [IU]/ML
25 INJECTION, SUSPENSION SUBCUTANEOUS AT BEDTIME
Qty: 15 ML | Refills: 1 | Status: SHIPPED | OUTPATIENT
Start: 2025-02-24

## 2025-02-24 NOTE — TELEPHONE ENCOUNTER
Gestational Diabetes Follow-up    Subjective/Objective:    Briana Newman sent in blood glucose log for review. Last date of communication was: .    Gestational diabetes is being managed with diet, activity, and medications    Taking diabetes medications: yes:     Diabetes Medication(s)       Insulin       insulin NPH (HUMULIN N KWIKPEN) 100 UNIT/ML injection Inject 22 Units subcutaneously at bedtime. Dose increase for next refill.            Estimated Date of Delivery: Mar 23, 2025    BG/Food Lo2025  Fastin  Breakfast 1 hr: 138  Lunch 1 hr: 141  Dinner 1hr: 133     2025  Fastin  Breakfast 1 hr:   Lunch 1 hr: 129  Dinner 1hr: 159     2025  Fastin  Breakfast 1 hr: 137  Lunch 1 hr: 176  Dinner 1hr: 151     2025  Fastin  Breakfast 1 hr: 128  Lunch 1 hr: 144  Dinner 1hr: 133        2025  Fastin  Breakfast 1 hr: 129  Lunch 1 hr: 147  Dinner 1hr: 142     2025  Fastin  Breakfast 1 hr: missed  Lunch 1 hr: 158  Dinner 1hr: 112     2025  Fastin     Negative urine tests, started the 22 units at bedtime but my readings seem to just go higher. I have not changed my eating habits and am still eating the best that I can. Also wondering if this contributes to my migraines I have been experiencing for 2 weeks now?     Assessment:    Ketones: neg.   Fasting blood glucoses: 29% in target.  After breakfast: 100% in target.  Before lunch: -% in target.  After lunch: 17% in target.  Before dinner: -% in target.  After dinner: 50% in target.    Plan/Response:  Recommend increase to insulin - 22 units to --> 25 units. Do additional test before lunch and dinner to evaluate additional insulin needs of either NPH in the morning or mealtime insulin. Additional overnight test to make sure she is not experiencing overnight hypoglycemia and rebounding in the morning.   Follow-up in 3-4 days.    Lilia MUJICAN, RN, PHN, CDCES     Any diabetes medication dose changes  were made via the CDE Protocol and Collaborative Practice Agreement with the patient's referring provider. A copy of this encounter was shared with the provider.

## 2025-02-24 NOTE — PATIENT INSTRUCTIONS
Called Black and reached his voicemail  Detailed message left as requested of the results and recommendations.    ----- Message from Augustine Diehl NP sent at 2/24/2025  8:34 AM CST -----  Labs completed in follow up after restarting spironolactone for heart failure.  There has been an improvement in probnp at 1097, previously 1537. Electrolytes and renal function are stable. Continue same treatment.    Radiology phone number to schedule imaging studies:   [phone]

## 2025-02-25 NOTE — PATIENT INSTRUCTIONS
Weeks 34 to 36 of Your Pregnancy: Care Instructions  Your belly has grown quite large. It's almost time to give birth! Your baby's lungs are almost ready to breathe air. The skull bones are firm enough to protect your baby's head. But they're soft enough to move down through the birth canal.    You might be wondering what to expect during labor. Because each birth is different, there's no way to know exactly what childbirth will be like for you. Talk to your doctor or midwife about any concerns you have.   You'll probably have a test for group B streptococcus (GBS). GBS is bacteria that can live in the vagina and rectum. GBS can make your baby sick after birth. If you test positive, you'll get antibiotics during labor.     Choose what type of pain relief you would like during labor.  You can choose from a few types, including medicine and non-medicine options. You may want to use several types of pain relief.     Know how medicines can help with pain during labor.  Some medicines lower anxiety and help with some of the pain. Others make your lower body numb so that you will feel less pain.     Tell your doctor about your pain medicine choice.  Do this before you start labor or very early in your labor. You may be able to change your mind during labor.     Learn about the stages of labor.    The first stage includes the early (latent) and active phases of labor. Contractions start in early labor. During active labor, contractions get stronger, last longer, and happen more often. And the cervix opens more rapidly.  The second stage starts when you're ready to push. During this stage, your baby is born.  During the third stage, you'll usually have a few more contractions to push out the placenta.   Follow-up care is a key part of your treatment and safety. Be sure to make and go to all appointments, and call your doctor if you are having problems. It's also a good idea to know your test results and keep a list of the  "medicines you take.  Where can you learn more?  Go to https://www.[employer].net/patiented  Enter B912 in the search box to learn more about \"Weeks 34 to 36 of Your Pregnancy: Care Instructions.\"  Current as of: 2024  Content Version: 14.3    2024 [employer].   Care instructions adapted under license by your healthcare professional. If you have questions about a medical condition or this instruction, always ask your healthcare professional. [employer] disclaims any warranty or liability for your use of this information.    Group B Strep During Pregnancy: Care Instructions  Overview     Group B strep infection is caused by a type of bacteria. It's a different kind of bacteria than the kind that causes strep throat.  You may have this kind of bacteria in your body. Sometimes it may cause an infection, but most of the time it doesn't make you sick or cause symptoms. But if you pass the bacteria to your baby during the birth, it can cause serious health problems for your baby.  If you have this bacteria in your body, you will get antibiotics when you are in labor. Antibiotics help prevent problems for a  baby.  After birth, doctors will watch and may test your baby. If your baby tests positive for Group B strep, your baby will get antibiotics.  If you plan to breastfeed your baby, don't worry. It will be safe to breastfeed.  Follow-up care is a key part of your treatment and safety. Be sure to make and go to all appointments, and call your doctor if you are having problems. It's also a good idea to know your test results and keep a list of the medicines you take.  How can you care for yourself at home?  If your doctor has prescribed antibiotics, take them as directed. Do not stop taking them just because you feel better. You need to take the full course of antibiotics.  Tell your doctor if you are allergic to any antibiotic.  If you go into labor, or your water breaks, go to the " "hospital. Your doctor will give you antibiotics to help protect your baby from infection.  Tell the doctors and nurses if you have an allergy to penicillin.  Tell the doctors and nurses at the hospital that you tested positive for group B strep.  When should you call for help?   Call your doctor now or seek immediate medical care if:    You have symptoms of a urinary tract infection. These may include:  Pain or burning when you urinate.  A frequent need to urinate without being able to pass much urine.  Pain in the flank, which is just below the rib cage and above the waist on either side of the back.  Blood in your urine.  A fever.     You think you are in labor or your water has broken.     You have pain in your belly or pelvis.   Watch closely for changes in your health, and be sure to contact your doctor if you have any problems.  Where can you learn more?  Go to https://www.[employer]/patiented  Enter M001 in the search box to learn more about \"Group B Strep During Pregnancy: Care Instructions.\"  Current as of: April 30, 2024  Content Version: 14.3    2024 [employer].   Care instructions adapted under license by your healthcare professional. If you have questions about a medical condition or this instruction, always ask your healthcare professional. [employer] disclaims any warranty or liability for your use of this information.    Circumcision in Infants: What to Expect at Home  Your Child's Recovery  After circumcision, your baby's penis may look red and swollen. It may have petroleum jelly and gauze on it. The gauze will likely come off when your baby urinates. Follow your doctor's directions about whether to put clean gauze back on your baby's penis or to leave the gauze off. If you need to remove gauze from the penis, use warm water to soak the gauze and gently loosen it.  The doctor may have used a Plastibell device to do the circumcision. If so, your baby will have a plastic " ring around the head of the penis. The ring should fall off by itself in 10 to 12 days.  A thin, yellow film may form over the area the day after the procedure. This is part of the normal healing process. It should go away in a few days.  Your baby may seem fussy while the area heals. It may hurt for your baby to urinate. This pain often gets better in 3 or 4 days. But it may last for up to 2 weeks.  Even though your baby's penis will likely start to feel better after 3 or 4 days, it may look worse. The penis often starts to look like it's getting better after about 7 to 10 days.  This care sheet gives you a general idea about how long it will take for your child to recover. But each child recovers at a different pace. Follow the steps below to help your child get better as quickly as possible.  How can you care for your child at home?  Activity    Let your baby rest as much as possible. Sleeping will help with recovery.     You can give your baby a sponge bath the day after surgery. Ask your doctor when it is okay to give your baby a bath.   Medicines    Your doctor will tell you if and when your child can restart any medicines. The doctor will also give you instructions about your child taking any new medicines.     Your doctor may recommend giving your baby acetaminophen (Tylenol) to help with pain after the procedure. Be safe with medicines. Give your child medicines exactly as prescribed. Call your doctor if you think your child is having a problem with a medicine.     Do not give your child two or more pain medicines at the same time unless the doctor told you to. Many pain medicines have acetaminophen, which is Tylenol. Too much acetaminophen (Tylenol) can be harmful.   Circumcision care    Always wash your hands before and after touching the circumcision area.     Gently wash your baby's penis with plain, warm water after each diaper change, and pat it dry. Do not use soap. Don't use hydrogen peroxide or  "alcohol. They can slow healing.     Do not try to remove the film that forms on the penis. The film will go away on its own.     Put plenty of petroleum jelly (such as Vaseline) on the circumcision area during each diaper change. This will prevent your baby's penis from sticking to the diaper while it heals.     Fasten your baby's diapers loosely so that there is less pressure on the penis while it heals.   Follow-up care is a key part of your child's treatment and safety. Be sure to make and go to all appointments, and call your doctor if your child is having problems. It's also a good idea to know your child's test results and keep a list of the medicines your child takes.  When should you call for help?   Call your doctor now or seek immediate medical care if:    Your baby has a fever over 100.4 F.     Your baby is extremely fussy or irritable, has a high-pitched cry, or refuses to eat.     Your baby does not have a wet diaper within 12 hours after the circumcision.     You find a spot of bleeding larger than a 2-inch [place] from the incision.     Your baby has signs of infection. Signs may include severe swelling; redness; a red streak on the shaft of the penis; or a thick, yellow discharge.   Watch closely for changes in your child's health, and be sure to contact your doctor if:    A Plastibell device was used for the circumcision and the ring has not fallen off after 10 to 12 days.   Where can you learn more?  Go to https://www.[employer].net/patiented  Enter S255 in the search box to learn more about \"Circumcision in Infants: What to Expect at Home.\"  Current as of: October 24, 2023  Content Version: 14.3    2024 [employer].   Care instructions adapted under license by your healthcare professional. If you have questions about a medical condition or this instruction, always ask your healthcare professional. [employer] disclaims any warranty or liability for your use of this " "information.    Week 37 of Your Pregnancy: Care Instructions    Most babies are born between 37 and 40 weeks.   This is a good time to pack a bag to take with you to the birth. Then it will be ready to go when you are.     Learn about breastfeeding.  For example, find out about ways to hold your baby to make breastfeeding easier. And think about learning how to pump and store milk.     Know that crying is normal.  It's common for babies to cry 1 to 3 hours a day. Some cry more, and some cry less.     Learn why babies cry.  They may be hungry; have gas; need a diaper change; or feel cold, warm, tired, lonely, or tense. Sometimes they cry for unknown reasons.     Think about what will help you stay calm when your baby cries.  Taking slow, deep breaths can help. So can taking a break. It's okay to put your baby somewhere safe (like their crib) and walk away for a few minutes.     Learn about safe sleep for your baby.  Always put your baby to sleep on their back. Place them alone in a crib or bassinet with a firm, flat surface. Keep soft items like stuffed animals out of the crib.     Learn what to expect with  poop.  Your baby will have their own bowel patterns. Some babies have several bowel movements a day. Some have fewer.     Know that  babies will often have loose, yellow bowel movements.  Formula-fed babies have more formed stools. If your baby's poop looks like pellets, your baby is constipated.   Follow-up care is a key part of your treatment and safety. Be sure to make and go to all appointments, and call your doctor if you are having problems. It's also a good idea to know your test results and keep a list of the medicines you take.  Where can you learn more?  Go to https://www.[employer].net/patiented  Enter N257 in the search box to learn more about \"Week 37 of Your Pregnancy: Care Instructions.\"  Current as of: 2024  Content Version: 14.3    2024 [facility] [employer].   Care " instructions adapted under license by your healthcare professional. If you have questions about a medical condition or this instruction, always ask your healthcare professional. [employer] disclaims any warranty or liability for your use of this information.

## 2025-02-26 ENCOUNTER — PRENATAL OFFICE VISIT (OUTPATIENT)
Dept: OBGYN | Facility: CLINIC | Age: 35
End: 2025-02-26
Attending: ADVANCED PRACTICE MIDWIFE
Payer: MEDICAID

## 2025-02-26 ENCOUNTER — LAB (OUTPATIENT)
Dept: LAB | Facility: CLINIC | Age: 35
End: 2025-02-26
Attending: ADVANCED PRACTICE MIDWIFE
Payer: MEDICAID

## 2025-02-26 ENCOUNTER — MYC MEDICAL ADVICE (OUTPATIENT)
Dept: FAMILY MEDICINE | Facility: CLINIC | Age: 35
End: 2025-02-26

## 2025-02-26 ENCOUNTER — ANCILLARY PROCEDURE (OUTPATIENT)
Dept: ULTRASOUND IMAGING | Facility: CLINIC | Age: 35
End: 2025-02-26
Attending: ADVANCED PRACTICE MIDWIFE
Payer: MEDICAID

## 2025-02-26 VITALS
SYSTOLIC BLOOD PRESSURE: 124 MMHG | BODY MASS INDEX: 38.44 KG/M2 | HEART RATE: 105 BPM | HEIGHT: 63 IN | DIASTOLIC BLOOD PRESSURE: 84 MMHG

## 2025-02-26 DIAGNOSIS — O09.291 HX OF PREECLAMPSIA, PRIOR PREGNANCY, CURRENTLY PREGNANT, FIRST TRIMESTER: ICD-10-CM

## 2025-02-26 DIAGNOSIS — O09.299 HX OF MATERNAL THIRD DEGREE PERINEAL LACERATION, CURRENTLY PREGNANT: ICD-10-CM

## 2025-02-26 DIAGNOSIS — L29.9 ITCHING: ICD-10-CM

## 2025-02-26 DIAGNOSIS — O24.414 INSULIN CONTROLLED GESTATIONAL DIABETES MELLITUS (GDM) IN THIRD TRIMESTER: ICD-10-CM

## 2025-02-26 DIAGNOSIS — O36.8390 FETAL TACHYCARDIA AFFECTING MANAGEMENT OF MOTHER: ICD-10-CM

## 2025-02-26 DIAGNOSIS — O09.891 SUPERVISION OF OTHER HIGH RISK PREGNANCIES, FIRST TRIMESTER: Primary | ICD-10-CM

## 2025-02-26 DIAGNOSIS — O09.891 SUPERVISION OF OTHER HIGH RISK PREGNANCIES, FIRST TRIMESTER: ICD-10-CM

## 2025-02-26 PROBLEM — R06.09 DYSPNEA ON EXERTION: Status: RESOLVED | Noted: 2022-10-05 | Resolved: 2025-02-26

## 2025-02-26 PROBLEM — Z36.89 ENCOUNTER FOR TRIAGE IN PREGNANT PATIENT: Status: RESOLVED | Noted: 2025-02-21 | Resolved: 2025-02-26

## 2025-02-26 PROBLEM — O24.419 GDM (GESTATIONAL DIABETES MELLITUS): Status: RESOLVED | Noted: 2024-12-24 | Resolved: 2025-02-26

## 2025-02-26 LAB
ALBUMIN MFR UR ELPH: 21.1 MG/DL
ALBUMIN SERPL BCG-MCNC: 3.8 G/DL (ref 3.5–5.2)
ALP SERPL-CCNC: 115 U/L (ref 40–150)
ALT SERPL W P-5'-P-CCNC: 6 U/L (ref 0–50)
ANION GAP SERPL CALCULATED.3IONS-SCNC: 16 MMOL/L (ref 7–15)
AST SERPL W P-5'-P-CCNC: 20 U/L (ref 0–45)
BILIRUB SERPL-MCNC: 0.3 MG/DL
BUN SERPL-MCNC: 4.3 MG/DL (ref 6–20)
CALCIUM SERPL-MCNC: 9.7 MG/DL (ref 8.8–10.4)
CHLORIDE SERPL-SCNC: 104 MMOL/L (ref 98–107)
CREAT SERPL-MCNC: 0.48 MG/DL (ref 0.51–0.95)
CREAT UR-MCNC: 127.5 MG/DL
EGFRCR SERPLBLD CKD-EPI 2021: >90 ML/MIN/1.73M2
ERYTHROCYTE [DISTWIDTH] IN BLOOD BY AUTOMATED COUNT: 13.6 % (ref 10–15)
GLUCOSE SERPL-MCNC: 68 MG/DL (ref 70–99)
HCO3 SERPL-SCNC: 19 MMOL/L (ref 22–29)
HCT VFR BLD AUTO: 36.3 % (ref 35–47)
HGB BLD-MCNC: 12.2 G/DL (ref 11.7–15.7)
MCH RBC QN AUTO: 28.1 PG (ref 26.5–33)
MCHC RBC AUTO-ENTMCNC: 33.6 G/DL (ref 31.5–36.5)
MCV RBC AUTO: 84 FL (ref 78–100)
PLATELET # BLD AUTO: 384 10E3/UL (ref 150–450)
POTASSIUM SERPL-SCNC: 3.8 MMOL/L (ref 3.4–5.3)
PROT SERPL-MCNC: 7.3 G/DL (ref 6.4–8.3)
PROT/CREAT 24H UR: 0.17 MG/MG CR (ref 0–0.2)
RBC # BLD AUTO: 4.34 10E6/UL (ref 3.8–5.2)
SODIUM SERPL-SCNC: 139 MMOL/L (ref 135–145)
WBC # BLD AUTO: 13.3 10E3/UL (ref 4–11)

## 2025-02-26 PROCEDURE — G0463 HOSPITAL OUTPT CLINIC VISIT: HCPCS | Performed by: ADVANCED PRACTICE MIDWIFE

## 2025-02-26 PROCEDURE — 80053 COMPREHEN METABOLIC PANEL: CPT

## 2025-02-26 PROCEDURE — 36415 COLL VENOUS BLD VENIPUNCTURE: CPT

## 2025-02-26 PROCEDURE — 76819 FETAL BIOPHYS PROFIL W/O NST: CPT

## 2025-02-26 PROCEDURE — 84156 ASSAY OF PROTEIN URINE: CPT

## 2025-02-26 PROCEDURE — 76819 FETAL BIOPHYS PROFIL W/O NST: CPT | Mod: 26 | Performed by: OBSTETRICS & GYNECOLOGY

## 2025-02-26 PROCEDURE — 85014 HEMATOCRIT: CPT

## 2025-02-26 PROCEDURE — 82239 BILE ACIDS TOTAL: CPT

## 2025-02-28 ENCOUNTER — HOSPITAL ENCOUNTER (OUTPATIENT)
Facility: CLINIC | Age: 35
Discharge: HOME | End: 2025-02-28
Attending: ADVANCED PRACTICE MIDWIFE | Admitting: ADVANCED PRACTICE MIDWIFE
Payer: MEDICAID

## 2025-02-28 ENCOUNTER — ANCILLARY PROCEDURE (OUTPATIENT)
Dept: ULTRASOUND IMAGING | Facility: CLINIC | Age: 35
End: 2025-02-28
Attending: ADVANCED PRACTICE MIDWIFE
Payer: MEDICAID

## 2025-02-28 VITALS — DIASTOLIC BLOOD PRESSURE: 83 MMHG | RESPIRATION RATE: 16 BRPM | SYSTOLIC BLOOD PRESSURE: 137 MMHG | TEMPERATURE: 97.9 F

## 2025-02-28 DIAGNOSIS — O09.299 HX OF MATERNAL THIRD DEGREE PERINEAL LACERATION, CURRENTLY PREGNANT: ICD-10-CM

## 2025-02-28 DIAGNOSIS — O09.891 SUPERVISION OF OTHER HIGH RISK PREGNANCIES, FIRST TRIMESTER: Primary | ICD-10-CM

## 2025-02-28 DIAGNOSIS — O09.291 HX OF PREECLAMPSIA, PRIOR PREGNANCY, CURRENTLY PREGNANT, FIRST TRIMESTER: ICD-10-CM

## 2025-02-28 DIAGNOSIS — O24.414 INSULIN CONTROLLED GESTATIONAL DIABETES MELLITUS (GDM) IN THIRD TRIMESTER: ICD-10-CM

## 2025-02-28 DIAGNOSIS — O36.8390 FETAL TACHYCARDIA AFFECTING MANAGEMENT OF MOTHER: ICD-10-CM

## 2025-02-28 LAB
ALBUMIN SERPL BCG-MCNC: 3.4 G/DL (ref 3.5–5.2)
ALBUMIN UR-MCNC: 10 MG/DL
ALP SERPL-CCNC: 105 U/L (ref 40–150)
ALT SERPL W P-5'-P-CCNC: 6 U/L (ref 0–50)
ANION GAP SERPL CALCULATED.3IONS-SCNC: 12 MMOL/L (ref 7–15)
APPEARANCE UR: CLEAR
AST SERPL W P-5'-P-CCNC: 16 U/L (ref 0–45)
B-OH-BUTYR SERPL-SCNC: 0.19 MMOL/L
BACTERIA #/AREA URNS HPF: ABNORMAL /HPF
BILE AC SERPL-SCNC: <1 UMOL/L
BILIRUB SERPL-MCNC: 0.2 MG/DL
BILIRUB UR QL STRIP: NEGATIVE
BUN SERPL-MCNC: 7.5 MG/DL (ref 6–20)
C TRACH DNA SPEC QL NAA+PROBE: NEGATIVE
CALCIUM SERPL-MCNC: 9.5 MG/DL (ref 8.8–10.4)
CHLORIDE SERPL-SCNC: 100 MMOL/L (ref 98–107)
CLUE CELLS: ABNORMAL
COLOR UR AUTO: ABNORMAL
CREAT SERPL-MCNC: 0.46 MG/DL (ref 0.51–0.95)
EGFRCR SERPLBLD CKD-EPI 2021: >90 ML/MIN/1.73M2
EST. AVERAGE GLUCOSE BLD GHB EST-MCNC: 120 MG/DL
GLUCOSE BLDC GLUCOMTR-MCNC: 115 MG/DL (ref 70–99)
GLUCOSE SERPL-MCNC: 89 MG/DL (ref 70–99)
GLUCOSE UR STRIP-MCNC: NEGATIVE MG/DL
HBA1C MFR BLD: 5.8 %
HCO3 SERPL-SCNC: 19 MMOL/L (ref 22–29)
HGB UR QL STRIP: ABNORMAL
KETONES UR STRIP-MCNC: 10 MG/DL
LEUKOCYTE ESTERASE UR QL STRIP: ABNORMAL
MUCOUS THREADS #/AREA URNS LPF: PRESENT /LPF
N GONORRHOEA DNA SPEC QL NAA+PROBE: NEGATIVE
NITRATE UR QL: NEGATIVE
PH UR STRIP: 6 [PH] (ref 5–7)
POTASSIUM SERPL-SCNC: 3.8 MMOL/L (ref 3.4–5.3)
PROT SERPL-MCNC: 6.7 G/DL (ref 6.4–8.3)
RBC URINE: 32 /HPF
SODIUM SERPL-SCNC: 131 MMOL/L (ref 135–145)
SP GR UR STRIP: 1.02 (ref 1–1.03)
SPECIMEN TYPE: NORMAL
SPECIMEN TYPE: NORMAL
SQUAMOUS EPITHELIAL: 2 /HPF
TRANSITIONAL EPI: <1 /HPF
TRICHOMONAS, WET PREP: ABNORMAL
UROBILINOGEN UR STRIP-MCNC: NORMAL MG/DL
WBC URINE: 2 /HPF
WBC'S/HIGH POWER FIELD, WET PREP: ABNORMAL
YEAST, WET PREP: ABNORMAL

## 2025-02-28 PROCEDURE — 87086 URINE CULTURE/COLONY COUNT: CPT | Performed by: ADVANCED PRACTICE MIDWIFE

## 2025-02-28 PROCEDURE — 76819 FETAL BIOPHYS PROFIL W/O NST: CPT | Mod: 26 | Performed by: OBSTETRICS & GYNECOLOGY

## 2025-02-28 PROCEDURE — 36415 COLL VENOUS BLD VENIPUNCTURE: CPT | Performed by: ADVANCED PRACTICE MIDWIFE

## 2025-02-28 PROCEDURE — 87210 SMEAR WET MOUNT SALINE/INK: CPT | Performed by: ADVANCED PRACTICE MIDWIFE

## 2025-02-28 PROCEDURE — 83036 HEMOGLOBIN GLYCOSYLATED A1C: CPT | Performed by: ADVANCED PRACTICE MIDWIFE

## 2025-02-28 PROCEDURE — 84155 ASSAY OF PROTEIN SERUM: CPT | Performed by: ADVANCED PRACTICE MIDWIFE

## 2025-02-28 PROCEDURE — 82962 GLUCOSE BLOOD TEST: CPT

## 2025-02-28 PROCEDURE — 87491 CHLMYD TRACH DNA AMP PROBE: CPT | Performed by: ADVANCED PRACTICE MIDWIFE

## 2025-02-28 PROCEDURE — 258N000003 HC RX IP 258 OP 636: Performed by: ADVANCED PRACTICE MIDWIFE

## 2025-02-28 PROCEDURE — 99214 OFFICE O/P EST MOD 30 MIN: CPT | Performed by: ADVANCED PRACTICE MIDWIFE

## 2025-02-28 PROCEDURE — 87591 N.GONORRHOEAE DNA AMP PROB: CPT | Performed by: ADVANCED PRACTICE MIDWIFE

## 2025-02-28 PROCEDURE — 82010 KETONE BODYS QUAN: CPT | Performed by: ADVANCED PRACTICE MIDWIFE

## 2025-02-28 PROCEDURE — 81001 URINALYSIS AUTO W/SCOPE: CPT | Performed by: ADVANCED PRACTICE MIDWIFE

## 2025-02-28 PROCEDURE — G0463 HOSPITAL OUTPT CLINIC VISIT: HCPCS | Mod: 25

## 2025-02-28 PROCEDURE — 76819 FETAL BIOPHYS PROFIL W/O NST: CPT

## 2025-02-28 PROCEDURE — 59025 FETAL NON-STRESS TEST: CPT

## 2025-02-28 RX ADMIN — SODIUM CHLORIDE, POTASSIUM CHLORIDE, SODIUM LACTATE AND CALCIUM CHLORIDE 500 ML: 600; 310; 30; 20 INJECTION, SOLUTION INTRAVENOUS at 11:07

## 2025-02-28 ASSESSMENT — ACTIVITIES OF DAILY LIVING (ADL)
ADLS_ACUITY_SCORE: 16

## 2025-02-28 NOTE — DISCHARGE SUMMARY
Data: Patient assessed in the [facility] for abdominal pain. Cervix 0 cm dilated and 50% effaced. Fetal station -2. Membranes intact. Contractions are present. Contactions are  , x1 minutes apart, and last 70 seconds. Uterine assessment is mild by palpation during contractions and soft by palpation at rest. See flowsheets for fetal assessment documentation.     Action: Presumed adequate fetal oxygenation documented. Discharge instructions reviewed. Patient instructed to report change in fetal movement, vaginal leaking of fluid or bleeding, abdominal pain, or any concerns related to the pregnancy to provider/clinic.      Response: Orders to discharge home per Nessa LYNN. Provider will call patient with laboratory results. Patient verbalized understanding of education and agreement with plan. Discharged to home at 1305.
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
## 2025-03-01 LAB — BACTERIA UR CULT: NO GROWTH

## 2025-03-03 ENCOUNTER — TELEPHONE (OUTPATIENT)
Dept: OBGYN | Facility: CLINIC | Age: 35
End: 2025-03-03
Payer: COMMERCIAL

## 2025-03-03 ENCOUNTER — ALLIED HEALTH/NURSE VISIT (OUTPATIENT)
Dept: OBGYN | Facility: CLINIC | Age: 35
End: 2025-03-03
Attending: ADVANCED PRACTICE MIDWIFE
Payer: COMMERCIAL

## 2025-03-03 ENCOUNTER — ANCILLARY PROCEDURE (OUTPATIENT)
Dept: ULTRASOUND IMAGING | Facility: CLINIC | Age: 35
End: 2025-03-03
Attending: ADVANCED PRACTICE MIDWIFE
Payer: COMMERCIAL

## 2025-03-03 ENCOUNTER — MYC MEDICAL ADVICE (OUTPATIENT)
Dept: OBGYN | Facility: CLINIC | Age: 35
End: 2025-03-03

## 2025-03-03 DIAGNOSIS — O24.414 INSULIN CONTROLLED GESTATIONAL DIABETES MELLITUS (GDM) IN THIRD TRIMESTER: Primary | ICD-10-CM

## 2025-03-03 DIAGNOSIS — O09.291 HX OF PREECLAMPSIA, PRIOR PREGNANCY, CURRENTLY PREGNANT, FIRST TRIMESTER: ICD-10-CM

## 2025-03-03 DIAGNOSIS — O09.891 SUPERVISION OF OTHER HIGH RISK PREGNANCIES, FIRST TRIMESTER: ICD-10-CM

## 2025-03-03 PROCEDURE — 76819 FETAL BIOPHYS PROFIL W/O NST: CPT

## 2025-03-03 PROCEDURE — 76819 FETAL BIOPHYS PROFIL W/O NST: CPT | Mod: 26 | Performed by: OBSTETRICS & GYNECOLOGY

## 2025-03-03 NOTE — PATIENT INSTRUCTIONS
"Week 37 of Your Pregnancy: Care Instructions    Most babies are born between 37 and 40 weeks.   This is a good time to pack a bag to take with you to the birth. Then it will be ready to go when you are.     Learn about breastfeeding.  For example, find out about ways to hold your baby to make breastfeeding easier. And think about learning how to pump and store milk.     Know that crying is normal.  It's common for babies to cry 1 to 3 hours a day. Some cry more, and some cry less.     Learn why babies cry.  They may be hungry; have gas; need a diaper change; or feel cold, warm, tired, lonely, or tense. Sometimes they cry for unknown reasons.     Think about what will help you stay calm when your baby cries.  Taking slow, deep breaths can help. So can taking a break. It's okay to put your baby somewhere safe (like their crib) and walk away for a few minutes.     Learn about safe sleep for your baby.  Always put your baby to sleep on their back. Place them alone in a crib or bassinet with a firm, flat surface. Keep soft items like stuffed animals out of the crib.     Learn what to expect with  poop.  Your baby will have their own bowel patterns. Some babies have several bowel movements a day. Some have fewer.     Know that  babies will often have loose, yellow bowel movements.  Formula-fed babies have more formed stools. If your baby's poop looks like pellets, your baby is constipated.   Follow-up care is a key part of your treatment and safety. Be sure to make and go to all appointments, and call your doctor if you are having problems. It's also a good idea to know your test results and keep a list of the medicines you take.  Where can you learn more?  Go to https://www.[employer].net/patiented  Enter N257 in the search box to learn more about \"Week 37 of Your Pregnancy: Care Instructions.\"  Current as of: 2024  Content Version: 14.3    2024 20/20 [employer] [employer].   Care instructions " adapted under license by your healthcare professional. If you have questions about a medical condition or this instruction, always ask your healthcare professional. [employer], [employer] Agent disclaims any warranty or liability for your use of this information.

## 2025-03-03 NOTE — PROGRESS NOTES
Roomed Briana and went over BG numbers over the weekend per Caity's request. The numbers are as follows:    Blood sugars this weekend were as follows:  Saturday:  Fastin  One hour after breakfast: 133  Before lunch: 122  One hour after lunch: 125  Before dinner: 120  One hour after dinner: 155    :  Fastin  One hour after breakfast: 147  One hour after lunch: 119  One hour after dinner: 155    Monday:   Fastin    Will route numbers to midwife group as patient routed to Diabetic educator.

## 2025-03-05 ENCOUNTER — PRENATAL OFFICE VISIT (OUTPATIENT)
Dept: OBGYN | Facility: CLINIC | Age: 35
End: 2025-03-05
Attending: REGISTERED NURSE
Payer: COMMERCIAL

## 2025-03-05 VITALS
HEART RATE: 80 BPM | HEIGHT: 63 IN | BODY MASS INDEX: 38.98 KG/M2 | DIASTOLIC BLOOD PRESSURE: 79 MMHG | WEIGHT: 220 LBS | SYSTOLIC BLOOD PRESSURE: 110 MMHG

## 2025-03-05 DIAGNOSIS — O24.414 INSULIN CONTROLLED GESTATIONAL DIABETES MELLITUS (GDM) IN THIRD TRIMESTER: ICD-10-CM

## 2025-03-05 DIAGNOSIS — O09.891 SUPERVISION OF OTHER HIGH RISK PREGNANCIES, FIRST TRIMESTER: Primary | ICD-10-CM

## 2025-03-05 DIAGNOSIS — O09.291 HX OF PREECLAMPSIA, PRIOR PREGNANCY, CURRENTLY PREGNANT, FIRST TRIMESTER: ICD-10-CM

## 2025-03-05 DIAGNOSIS — O36.8390 FETAL TACHYCARDIA AFFECTING MANAGEMENT OF MOTHER: ICD-10-CM

## 2025-03-05 DIAGNOSIS — O09.299 HX OF MATERNAL THIRD DEGREE PERINEAL LACERATION, CURRENTLY PREGNANT: ICD-10-CM

## 2025-03-05 PROCEDURE — G0463 HOSPITAL OUTPT CLINIC VISIT: HCPCS | Performed by: REGISTERED NURSE

## 2025-03-05 NOTE — PROGRESS NOTES
"Subjective:      35 year old  at 37w3d presents for a routine prenatal appointment.  No vaginal bleeding or leakage of fluid.  Intermittent contractions or cramping.   Reports regular fetal movement.       No HA, visual changes, RUQ or epigastric pain.   Reviewed BPP 3/2: 8/8, cephalic, normal MVP. Next BPP 3/6    GDM:   Current therapy:   Long-Acting Insulin, 29 units at bedtime  Was recommended to started Novalog 4 units with each meal, but has not done this yet.    She is checking her blood sugars regularly, including Fasting and 1-hour post-prandial.    Blood sugars are as follows: (complete table or insert clinical media)    Date Fasting Post-Breakfast Post-Lunch Post-Dinner   3/5 101 148 (eggs)     3/4 97  144 138                                        Diabetes Symptoms:   none    Patient concerns:   - Concerned about the amount of insulin needed and the continued increase in needs even despite dietary changes.   - Accepts cervical exam and membrane sweep today. Would like to schedule IOL.       Objective:  Vitals:    25 1450   BP: 110/79   Pulse: 80   Weight: 99.8 kg (220 lb)   Height: 1.6 m (5' 3\")    See OB flowsheet  Cervix /50/-2/mid/soft, membrane sweep performed.     Assessment/Plan     Patient Active Problem List    Diagnosis Date Noted    Insulin controlled gestational diabetes mellitus (GDM) in third trimester 2025     Priority: Medium     Staying in contact with DE regarding glucose log.  As of 25: On 25 units of insulin    BPPs 2x/week  Monthly growth scan     DE started pt on 14u of NPH at bedtime   14 > 16u   16 > 18u   18> 22u   22> 25u      Fetal tachycardia affecting management of mother 2025     Priority: Medium     Fetal tachycardia noted during BPP 25, monitoring on unit Category 1      Palpitations 10/16/2024     Priority: Medium    Rubella non-immune status, antepartum 08/15/2024     Priority: Medium     Equivocal rubella       " Supervision of other high risk pregnancies, first trimester 2024     Priority: Medium     [facility] Patient Provider Group choice: CNM group  Partner's name: Christianne Ca  Employment: part time MA  [x]NOB folder  [x]Dating  [x] 1st trimester screening: Patient declines 1st tri genetic screening  [x]Fetal anatomy US ordered  [x] recommended LD ASA after 12 wks for PRE-E risk  [x] GDM risk, recommended early GCT and hgb A1C  [x]No need for utox in labor  [x]COVID vaccine completed  [x]Pap UTD- due     12-23wks________________________  []Offer AFP after 15 wks  []Rubella equivocal  [x]Hep B immune   [x]Varicella immune  []FLU shot    24-28wk_________________________  [x]EOB folder  [x]Labor plans: Un-Medicated preference  [x]Prenatal Ed  []: Declines  [x]Infant feeding plan: . Has had issues with latching and supply in the past.  [x][place] [facility]  [x]PP Contraception plan: NFP  []TDAP after 27 weeks- declines, may get at work  []Rhogam if needed, date: N/A    29-35 wk________________________    [x] Water birth interest- form given  [x]GCT - failed 3hr, GDM  []RSV    36-37 wks______________________   [x] GBS/CBC GBS neg  []OTC PP meds sent  []PP recovery plans/support:  []Planning CS-ERAS pkt    38-42 wks______________________  []IOL reason/plans  []Postdates BPP       History of insulin controlled gestational diabetes mellitus (GDM) 2024     Priority: Medium     A1C today 24 and early 1hr    - did not complete early 1 hour  Plan 1 hour at EOB    2024: 1 hour      Hx of preeclampsia, prior pregnancy, currently pregnant, first trimester 2024     Priority: Medium     Baseline PreE labs 24, to start ASA @12wks    : reiterated taking aspirin daily    2024: repeat labs today per pt preference    25: repeat labs r/t constant headache and visual change. Patient normotensive      Hx of maternal  third degree perineal laceration, currently pregnant 08/14/2024     Priority: Medium    Migraine with aura and without status migrainosus, not intractable 05/15/2024     Priority: Medium    Cervical high risk HPV (human papillomavirus) test positive 04/24/2024     Priority: Medium     7/7/20 NIL pap   4/24/24 NIL pap, + HR HPV (not 16 or 18). Plan: cotest in 1 yr.   4/30/24 Pt notified       Current moderate episode of major depressive disorder without prior episode (H) 02/01/2024     Priority: Medium    Hx of CHRIS (generalized anxiety disorder) 02/01/2024     Priority: Medium    Postural tachycardia with syncope 08/29/2022     Priority: Medium     In pregnancy.-Has had postural tachycardia with syncope, went to ED twice for concerns of lightheadedness   Heart rate has gone up to 150 during these episodes  Wore a Holter monitor for three days. She will ship the monitor today. Has an echo scheduled for early September and visit with cardiology in October 2022 9/26/22 Briana has continued to feel a racing heart all day long. She has had a normal echocardiogram and Holter monitor workup. Has appt with cardiology early October. Denies any syncope now that she is out of the first trimester  10/5/22 met with Cardiology, they recommended compression socks    10/16/24: Cardiology recommendations:  POTS - consider beta blocker with pregnancy  - will check zio first  2. Palpitations - as above  3. Dyspnea - check stress echo      BMI 35.0-35.9,adult 07/29/2022     Priority: Medium     BPP weekly @37wks  Growth ultrasound 32 weeks    12/18/2024: order placed for scheduling at 32 weeks      Vitamin D deficiency 07/15/2022     Priority: Medium     18 at intake. Rx sent for recommended supplementation of 4000IU daily.   11/16/22- Vit D 26- review at next visit___               5/15/2024    10:21 AM 7/10/2024     7:56 AM 7/31/2024     8:52 AM   PHQ-9 SCORE   PHQ-9 Total Score MyChart 10 (Moderate depression) 8 (Mild depression)  7 (Mild depression)   PHQ-9 Total Score 10 8 7         5/15/2024    10:21 AM 7/10/2024     7:56 AM 2024     8:52 AM   PHQ   PHQ-9 Total Score 10 8 7   Q9: Thoughts of better off dead/self-harm past 2 weeks Not at all Not at all Not at all     No orders of the defined types were placed in this encounter.    - GBS negative.   -Reviewed methods that might promote labor including cervical membrane sweep, intercourse, orgasm, nipple stimulation, acupuncture, chiropractic care. Discussed that research of each of these individual methods has not shown to significantly increase the likelihood that she will go into labor earlier, but she could try them and they might help promote labor, especially if she combines methods.       -Reviewed rationale for  testing in the 41st week, specifically increased risk of stillbirth from 41 to 42 weeks (2/1,000 to 4/1,000)  -Discussed that placental function can diminish the closer she gets to 42 weeks and sometimes babies do not tolerate labor as well closer to the 42nd week.  - IOL scheduled 3/6 @ 0730     Labor signs discussed. Reinforced daily fetal movement counts.  Reviewed why/how to contact provider if headache/visual changes/RUQ or epigastric pain, decreased fetal movement, vaginal bleeding, leakage of fluid.  Return to clinic in 1 week and prn if questions or concerns.     ERICA Manuel CNM

## 2025-03-06 ENCOUNTER — ANESTHESIA EVENT (OUTPATIENT)
Dept: OBGYN | Facility: CLINIC | Age: 35
End: 2025-03-06
Payer: COMMERCIAL

## 2025-03-06 ENCOUNTER — HOSPITAL ENCOUNTER (INPATIENT)
Facility: CLINIC | Age: 35
End: 2025-03-06
Attending: ADVANCED PRACTICE MIDWIFE | Admitting: ADVANCED PRACTICE MIDWIFE
Payer: COMMERCIAL

## 2025-03-06 ENCOUNTER — ANESTHESIA (OUTPATIENT)
Dept: OBGYN | Facility: CLINIC | Age: 35
End: 2025-03-06
Payer: COMMERCIAL

## 2025-03-06 VITALS
TEMPERATURE: 98.6 F | SYSTOLIC BLOOD PRESSURE: 102 MMHG | BODY MASS INDEX: 38.97 KG/M2 | HEART RATE: 96 BPM | OXYGEN SATURATION: 98 % | HEIGHT: 63 IN | RESPIRATION RATE: 16 BRPM | DIASTOLIC BLOOD PRESSURE: 58 MMHG

## 2025-03-06 DIAGNOSIS — O09.891 SUPERVISION OF OTHER HIGH RISK PREGNANCIES, FIRST TRIMESTER: Primary | ICD-10-CM

## 2025-03-06 DIAGNOSIS — O09.291 HX OF PREECLAMPSIA, PRIOR PREGNANCY, CURRENTLY PREGNANT, FIRST TRIMESTER: ICD-10-CM

## 2025-03-06 DIAGNOSIS — O09.299 HX OF MATERNAL THIRD DEGREE PERINEAL LACERATION, CURRENTLY PREGNANT: ICD-10-CM

## 2025-03-06 DIAGNOSIS — O36.8390 FETAL TACHYCARDIA AFFECTING MANAGEMENT OF MOTHER: ICD-10-CM

## 2025-03-06 DIAGNOSIS — O24.414 INSULIN CONTROLLED GESTATIONAL DIABETES MELLITUS (GDM) IN THIRD TRIMESTER: ICD-10-CM

## 2025-03-06 LAB
ABO + RH BLD: NORMAL
ALBUMIN UR-MCNC: 20 MG/DL
APPEARANCE UR: ABNORMAL
BACTERIA #/AREA URNS HPF: ABNORMAL /HPF
BILIRUB UR QL STRIP: NEGATIVE
BLD GP AB SCN SERPL QL: NEGATIVE
COLOR UR AUTO: YELLOW
ERYTHROCYTE [DISTWIDTH] IN BLOOD BY AUTOMATED COUNT: 13.4 % (ref 10–15)
EST. AVERAGE GLUCOSE BLD GHB EST-MCNC: 126 MG/DL
GLUCOSE BLDC GLUCOMTR-MCNC: 107 MG/DL (ref 70–99)
GLUCOSE BLDC GLUCOMTR-MCNC: 118 MG/DL (ref 70–99)
GLUCOSE BLDC GLUCOMTR-MCNC: 118 MG/DL (ref 70–99)
GLUCOSE BLDC GLUCOMTR-MCNC: 121 MG/DL (ref 70–99)
GLUCOSE BLDC GLUCOMTR-MCNC: 130 MG/DL (ref 70–99)
GLUCOSE BLDC GLUCOMTR-MCNC: 70 MG/DL (ref 70–99)
GLUCOSE BLDC GLUCOMTR-MCNC: 70 MG/DL (ref 70–99)
GLUCOSE BLDC GLUCOMTR-MCNC: 96 MG/DL (ref 70–99)
GLUCOSE UR STRIP-MCNC: NEGATIVE MG/DL
HBA1C MFR BLD: 6 %
HCT VFR BLD AUTO: 33.2 % (ref 35–47)
HGB BLD-MCNC: 11.2 G/DL (ref 11.7–15.7)
HGB UR QL STRIP: NEGATIVE
KETONES UR STRIP-MCNC: 40 MG/DL
LEUKOCYTE ESTERASE UR QL STRIP: ABNORMAL
MCH RBC QN AUTO: 27.9 PG (ref 26.5–33)
MCHC RBC AUTO-ENTMCNC: 33.7 G/DL (ref 31.5–36.5)
MCV RBC AUTO: 83 FL (ref 78–100)
MUCOUS THREADS #/AREA URNS LPF: PRESENT /LPF
NITRATE UR QL: NEGATIVE
PH UR STRIP: 6.5 [PH] (ref 5–7)
PLATELET # BLD AUTO: 347 10E3/UL (ref 150–450)
RBC # BLD AUTO: 4.02 10E6/UL (ref 3.8–5.2)
RBC URINE: 12 /HPF
SP GR UR STRIP: 1.02 (ref 1–1.03)
SPECIMEN EXP DATE BLD: NORMAL
SQUAMOUS EPITHELIAL: 4 /HPF
T PALLIDUM AB SER QL: NONREACTIVE
TRANSITIONAL EPI: <1 /HPF
URATE CRY #/AREA URNS HPF: ABNORMAL /HPF
UROBILINOGEN UR STRIP-MCNC: NORMAL MG/DL
WBC # BLD AUTO: 12.1 10E3/UL (ref 4–11)
WBC URINE: 7 /HPF

## 2025-03-06 PROCEDURE — 999N000285 HC STATISTIC VASC ACCESS LAB DRAW WITH PIV START

## 2025-03-06 PROCEDURE — 120N000002 HC R&B MED SURG/OB UMMC

## 2025-03-06 PROCEDURE — 87086 URINE CULTURE/COLONY COUNT: CPT | Performed by: ADVANCED PRACTICE MIDWIFE

## 2025-03-06 PROCEDURE — 86850 RBC ANTIBODY SCREEN: CPT | Performed by: ADVANCED PRACTICE MIDWIFE

## 2025-03-06 PROCEDURE — 250N000013 HC RX MED GY IP 250 OP 250 PS 637: Performed by: ADVANCED PRACTICE MIDWIFE

## 2025-03-06 PROCEDURE — 86780 TREPONEMA PALLIDUM: CPT | Performed by: ADVANCED PRACTICE MIDWIFE

## 2025-03-06 PROCEDURE — 250N000009 HC RX 250: Performed by: ADVANCED PRACTICE MIDWIFE

## 2025-03-06 PROCEDURE — 00HU33Z INSERTION OF INFUSION DEVICE INTO SPINAL CANAL, PERCUTANEOUS APPROACH: ICD-10-PCS | Performed by: ANESTHESIOLOGY

## 2025-03-06 PROCEDURE — 999N000127 HC STATISTIC PERIPHERAL IV START W US GUIDANCE

## 2025-03-06 PROCEDURE — 3E0R3BZ INTRODUCTION OF ANESTHETIC AGENT INTO SPINAL CANAL, PERCUTANEOUS APPROACH: ICD-10-PCS | Performed by: ANESTHESIOLOGY

## 2025-03-06 PROCEDURE — 370N000003 HC ANESTHESIA WARD SERVICE: Performed by: ANESTHESIOLOGY

## 2025-03-06 PROCEDURE — 3E0P7GC INTRODUCTION OF OTHER THERAPEUTIC SUBSTANCE INTO FEMALE REPRODUCTIVE, VIA NATURAL OR ARTIFICIAL OPENING: ICD-10-PCS | Performed by: ADVANCED PRACTICE MIDWIFE

## 2025-03-06 PROCEDURE — 83036 HEMOGLOBIN GLYCOSYLATED A1C: CPT | Performed by: ADVANCED PRACTICE MIDWIFE

## 2025-03-06 PROCEDURE — 258N000003 HC RX IP 258 OP 636: Performed by: ADVANCED PRACTICE MIDWIFE

## 2025-03-06 PROCEDURE — 258N000003 HC RX IP 258 OP 636

## 2025-03-06 PROCEDURE — 999N000040 HC STATISTIC CONSULT NO CHARGE VASC ACCESS

## 2025-03-06 PROCEDURE — 81003 URINALYSIS AUTO W/O SCOPE: CPT | Performed by: ADVANCED PRACTICE MIDWIFE

## 2025-03-06 PROCEDURE — 86900 BLOOD TYPING SEROLOGIC ABO: CPT | Performed by: ADVANCED PRACTICE MIDWIFE

## 2025-03-06 PROCEDURE — 250N000011 HC RX IP 250 OP 636

## 2025-03-06 PROCEDURE — 85014 HEMATOCRIT: CPT | Performed by: ADVANCED PRACTICE MIDWIFE

## 2025-03-06 RX ORDER — NICOTINE POLACRILEX 4 MG
15-30 LOZENGE BUCCAL
Status: DISCONTINUED | OUTPATIENT
Start: 2025-03-06 | End: 2025-03-06

## 2025-03-06 RX ORDER — NALOXONE HYDROCHLORIDE 0.4 MG/ML
0.4 INJECTION, SOLUTION INTRAMUSCULAR; INTRAVENOUS; SUBCUTANEOUS
Status: DISCONTINUED | OUTPATIENT
Start: 2025-03-06 | End: 2025-03-08

## 2025-03-06 RX ORDER — KETOROLAC TROMETHAMINE 30 MG/ML
15 INJECTION, SOLUTION INTRAMUSCULAR; INTRAVENOUS
Status: DISCONTINUED | OUTPATIENT
Start: 2025-03-06 | End: 2025-03-07 | Stop reason: HOSPADM

## 2025-03-06 RX ORDER — ONDANSETRON 4 MG/1
4 TABLET, ORALLY DISINTEGRATING ORAL EVERY 6 HOURS PRN
Status: DISCONTINUED | OUTPATIENT
Start: 2025-03-06 | End: 2025-03-07 | Stop reason: HOSPADM

## 2025-03-06 RX ORDER — DEXTROSE MONOHYDRATE 100 MG/ML
INJECTION, SOLUTION INTRAVENOUS CONTINUOUS PRN
Status: DISCONTINUED | OUTPATIENT
Start: 2025-03-06 | End: 2025-03-08

## 2025-03-06 RX ORDER — METHYLERGONOVINE MALEATE 0.2 MG/ML
200 INJECTION INTRAVENOUS
Status: DISCONTINUED | OUTPATIENT
Start: 2025-03-06 | End: 2025-03-07 | Stop reason: HOSPADM

## 2025-03-06 RX ORDER — OXYTOCIN 10 [USP'U]/ML
INJECTION, SOLUTION INTRAMUSCULAR; INTRAVENOUS
Status: DISCONTINUED
Start: 2025-03-06 | End: 2025-03-07 | Stop reason: WASHOUT

## 2025-03-06 RX ORDER — TRANEXAMIC ACID 10 MG/ML
1 INJECTION, SOLUTION INTRAVENOUS EVERY 30 MIN PRN
Status: DISCONTINUED | OUTPATIENT
Start: 2025-03-06 | End: 2025-03-07 | Stop reason: HOSPADM

## 2025-03-06 RX ORDER — OXYTOCIN 10 [USP'U]/ML
10 INJECTION, SOLUTION INTRAMUSCULAR; INTRAVENOUS
Status: DISCONTINUED | OUTPATIENT
Start: 2025-03-06 | End: 2025-03-08

## 2025-03-06 RX ORDER — NALOXONE HYDROCHLORIDE 0.4 MG/ML
0.2 INJECTION, SOLUTION INTRAMUSCULAR; INTRAVENOUS; SUBCUTANEOUS
Status: DISCONTINUED | OUTPATIENT
Start: 2025-03-06 | End: 2025-03-08

## 2025-03-06 RX ORDER — LIDOCAINE 40 MG/G
CREAM TOPICAL
Status: DISCONTINUED | OUTPATIENT
Start: 2025-03-06 | End: 2025-03-08

## 2025-03-06 RX ORDER — NALBUPHINE HYDROCHLORIDE 10 MG/ML
2.5-5 INJECTION INTRAMUSCULAR; INTRAVENOUS; SUBCUTANEOUS EVERY 6 HOURS PRN
Status: DISCONTINUED | OUTPATIENT
Start: 2025-03-06 | End: 2025-03-08

## 2025-03-06 RX ORDER — ACETAMINOPHEN 325 MG/1
650 TABLET ORAL EVERY 4 HOURS PRN
Status: DISCONTINUED | OUTPATIENT
Start: 2025-03-06 | End: 2025-03-07 | Stop reason: HOSPADM

## 2025-03-06 RX ORDER — MISOPROSTOL 200 UG/1
TABLET ORAL
Status: DISCONTINUED
Start: 2025-03-06 | End: 2025-03-07 | Stop reason: WASHOUT

## 2025-03-06 RX ORDER — OXYTOCIN 10 [USP'U]/ML
10 INJECTION, SOLUTION INTRAMUSCULAR; INTRAVENOUS
Status: DISCONTINUED | OUTPATIENT
Start: 2025-03-06 | End: 2025-03-07 | Stop reason: HOSPADM

## 2025-03-06 RX ORDER — DEXTROSE MONOHYDRATE 100 MG/ML
INJECTION, SOLUTION INTRAVENOUS CONTINUOUS PRN
Status: DISCONTINUED | OUTPATIENT
Start: 2025-03-06 | End: 2025-03-06

## 2025-03-06 RX ORDER — SODIUM CHLORIDE, SODIUM LACTATE, POTASSIUM CHLORIDE, CALCIUM CHLORIDE 600; 310; 30; 20 MG/100ML; MG/100ML; MG/100ML; MG/100ML
INJECTION, SOLUTION INTRAVENOUS CONTINUOUS
Status: DISCONTINUED | OUTPATIENT
Start: 2025-03-06 | End: 2025-03-07 | Stop reason: HOSPADM

## 2025-03-06 RX ORDER — FENTANYL CITRATE-0.9 % NACL/PF 10 MCG/ML
100 PLASTIC BAG, INJECTION (ML) INTRAVENOUS EVERY 5 MIN PRN
Status: DISCONTINUED | OUTPATIENT
Start: 2025-03-06 | End: 2025-03-08

## 2025-03-06 RX ORDER — DEXTROSE MONOHYDRATE 25 G/50ML
25-50 INJECTION, SOLUTION INTRAVENOUS
Status: DISCONTINUED | OUTPATIENT
Start: 2025-03-06 | End: 2025-03-07

## 2025-03-06 RX ORDER — ONDANSETRON 2 MG/ML
4 INJECTION INTRAMUSCULAR; INTRAVENOUS EVERY 6 HOURS PRN
Status: DISCONTINUED | OUTPATIENT
Start: 2025-03-06 | End: 2025-03-07 | Stop reason: HOSPADM

## 2025-03-06 RX ORDER — CARBOPROST TROMETHAMINE 250 UG/ML
250 INJECTION, SOLUTION INTRAMUSCULAR
Status: DISCONTINUED | OUTPATIENT
Start: 2025-03-06 | End: 2025-03-07 | Stop reason: HOSPADM

## 2025-03-06 RX ORDER — MISOPROSTOL 100 UG/1
25 TABLET ORAL EVERY 4 HOURS PRN
Status: DISCONTINUED | OUTPATIENT
Start: 2025-03-06 | End: 2025-03-07 | Stop reason: HOSPADM

## 2025-03-06 RX ORDER — SODIUM CHLORIDE, SODIUM LACTATE, POTASSIUM CHLORIDE, CALCIUM CHLORIDE 600; 310; 30; 20 MG/100ML; MG/100ML; MG/100ML; MG/100ML
INJECTION, SOLUTION INTRAVENOUS CONTINUOUS PRN
Status: DISCONTINUED | OUTPATIENT
Start: 2025-03-06 | End: 2025-03-07 | Stop reason: HOSPADM

## 2025-03-06 RX ORDER — OXYTOCIN/0.9 % SODIUM CHLORIDE 30/500 ML
340 PLASTIC BAG, INJECTION (ML) INTRAVENOUS CONTINUOUS PRN
Status: DISCONTINUED | OUTPATIENT
Start: 2025-03-06 | End: 2025-03-07 | Stop reason: HOSPADM

## 2025-03-06 RX ORDER — LIDOCAINE 40 MG/G
CREAM TOPICAL
Status: DISCONTINUED | OUTPATIENT
Start: 2025-03-06 | End: 2025-03-06

## 2025-03-06 RX ORDER — SODIUM CHLORIDE 9 MG/ML
INJECTION, SOLUTION INTRAVENOUS CONTINUOUS PRN
Status: DISCONTINUED | OUTPATIENT
Start: 2025-03-06 | End: 2025-03-06

## 2025-03-06 RX ORDER — IBUPROFEN 800 MG/1
800 TABLET, FILM COATED ORAL
Status: DISCONTINUED | OUTPATIENT
Start: 2025-03-06 | End: 2025-03-07 | Stop reason: HOSPADM

## 2025-03-06 RX ORDER — FENTANYL/ROPIVACAINE/NS/PF 2MCG/ML-.1
PLASTIC BAG, INJECTION (ML) EPIDURAL
Status: DISCONTINUED | OUTPATIENT
Start: 2025-03-06 | End: 2025-03-08

## 2025-03-06 RX ORDER — LOPERAMIDE HYDROCHLORIDE 2 MG/1
4 CAPSULE ORAL
Status: DISCONTINUED | OUTPATIENT
Start: 2025-03-06 | End: 2025-03-07 | Stop reason: HOSPADM

## 2025-03-06 RX ORDER — MISOPROSTOL 200 UG/1
800 TABLET ORAL
Status: DISCONTINUED | OUTPATIENT
Start: 2025-03-06 | End: 2025-03-07 | Stop reason: HOSPADM

## 2025-03-06 RX ORDER — OXYTOCIN/0.9 % SODIUM CHLORIDE 30/500 ML
100-340 PLASTIC BAG, INJECTION (ML) INTRAVENOUS CONTINUOUS PRN
Status: DISCONTINUED | OUTPATIENT
Start: 2025-03-06 | End: 2025-03-08

## 2025-03-06 RX ORDER — DEXTROSE MONOHYDRATE 25 G/50ML
25-50 INJECTION, SOLUTION INTRAVENOUS
Status: DISCONTINUED | OUTPATIENT
Start: 2025-03-06 | End: 2025-03-06

## 2025-03-06 RX ORDER — MISOPROSTOL 200 UG/1
400 TABLET ORAL
Status: DISCONTINUED | OUTPATIENT
Start: 2025-03-06 | End: 2025-03-07 | Stop reason: HOSPADM

## 2025-03-06 RX ORDER — LIDOCAINE HYDROCHLORIDE 10 MG/ML
INJECTION, SOLUTION EPIDURAL; INFILTRATION; INTRACAUDAL; PERINEURAL
Status: DISCONTINUED
Start: 2025-03-06 | End: 2025-03-07 | Stop reason: HOSPADM

## 2025-03-06 RX ORDER — FENTANYL CITRATE 50 UG/ML
100 INJECTION, SOLUTION INTRAMUSCULAR; INTRAVENOUS
Status: DISCONTINUED | OUTPATIENT
Start: 2025-03-06 | End: 2025-03-07 | Stop reason: HOSPADM

## 2025-03-06 RX ORDER — METOCLOPRAMIDE 10 MG/1
10 TABLET ORAL EVERY 6 HOURS PRN
Status: DISCONTINUED | OUTPATIENT
Start: 2025-03-06 | End: 2025-03-07 | Stop reason: HOSPADM

## 2025-03-06 RX ORDER — LOPERAMIDE HYDROCHLORIDE 2 MG/1
2 CAPSULE ORAL
Status: DISCONTINUED | OUTPATIENT
Start: 2025-03-06 | End: 2025-03-07 | Stop reason: HOSPADM

## 2025-03-06 RX ORDER — METOCLOPRAMIDE HYDROCHLORIDE 5 MG/ML
10 INJECTION INTRAMUSCULAR; INTRAVENOUS EVERY 6 HOURS PRN
Status: DISCONTINUED | OUTPATIENT
Start: 2025-03-06 | End: 2025-03-07 | Stop reason: HOSPADM

## 2025-03-06 RX ORDER — OXYTOCIN/0.9 % SODIUM CHLORIDE 30/500 ML
1-24 PLASTIC BAG, INJECTION (ML) INTRAVENOUS CONTINUOUS
Status: DISCONTINUED | OUTPATIENT
Start: 2025-03-06 | End: 2025-03-07 | Stop reason: HOSPADM

## 2025-03-06 RX ORDER — PROCHLORPERAZINE MALEATE 10 MG
10 TABLET ORAL EVERY 6 HOURS PRN
Status: DISCONTINUED | OUTPATIENT
Start: 2025-03-06 | End: 2025-03-07 | Stop reason: HOSPADM

## 2025-03-06 RX ORDER — CITRIC ACID/SODIUM CITRATE 334-500MG
30 SOLUTION, ORAL ORAL
Status: DISCONTINUED | OUTPATIENT
Start: 2025-03-06 | End: 2025-03-07 | Stop reason: HOSPADM

## 2025-03-06 RX ORDER — SODIUM CHLORIDE 9 MG/ML
INJECTION, SOLUTION INTRAVENOUS CONTINUOUS PRN
Status: DISCONTINUED | OUTPATIENT
Start: 2025-03-06 | End: 2025-03-08

## 2025-03-06 RX ORDER — OXYTOCIN/0.9 % SODIUM CHLORIDE 30/500 ML
PLASTIC BAG, INJECTION (ML) INTRAVENOUS
Status: COMPLETED
Start: 2025-03-06 | End: 2025-03-06

## 2025-03-06 RX ORDER — NICOTINE POLACRILEX 4 MG
15-30 LOZENGE BUCCAL
Status: DISCONTINUED | OUTPATIENT
Start: 2025-03-06 | End: 2025-03-07

## 2025-03-06 RX ADMIN — SODIUM CHLORIDE, POTASSIUM CHLORIDE, SODIUM LACTATE AND CALCIUM CHLORIDE 500 ML: 600; 310; 30; 20 INJECTION, SOLUTION INTRAVENOUS at 09:38

## 2025-03-06 RX ADMIN — MISOPROSTOL 25 MCG: 100 TABLET ORAL at 11:32

## 2025-03-06 RX ADMIN — Medication 2 MILLI-UNITS/MIN: at 16:52

## 2025-03-06 RX ADMIN — SODIUM CHLORIDE, POTASSIUM CHLORIDE, SODIUM LACTATE AND CALCIUM CHLORIDE: 600; 310; 30; 20 INJECTION, SOLUTION INTRAVENOUS at 21:03

## 2025-03-06 RX ADMIN — SODIUM CHLORIDE, POTASSIUM CHLORIDE, SODIUM LACTATE AND CALCIUM CHLORIDE: 600; 310; 30; 20 INJECTION, SOLUTION INTRAVENOUS at 16:51

## 2025-03-06 RX ADMIN — Medication: at 18:32

## 2025-03-06 RX ADMIN — SODIUM CHLORIDE, POTASSIUM CHLORIDE, SODIUM LACTATE AND CALCIUM CHLORIDE 500 ML: 600; 310; 30; 20 INJECTION, SOLUTION INTRAVENOUS at 17:35

## 2025-03-06 ASSESSMENT — ACTIVITIES OF DAILY LIVING (ADL)
ADLS_ACUITY_SCORE: 19
ADLS_ACUITY_SCORE: 42
ADLS_ACUITY_SCORE: 19
ADLS_ACUITY_SCORE: 22
ADLS_ACUITY_SCORE: 19
ADLS_ACUITY_SCORE: 19
ADLS_ACUITY_SCORE: 22
ADLS_ACUITY_SCORE: 19
ADLS_ACUITY_SCORE: 22
ADLS_ACUITY_SCORE: 19
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
## 2025-03-06 NOTE — PROGRESS NOTES
"Labor Progress Note     SUBJECTIVE:  ==============  Briana A Trent  Estimated Date of Delivery: Mar 23, 2025  General appearance: comfortable  Support: Shannon Ca at bedside      OBJECTIVE:  ==============  VITALS  Blood pressure 122/69, pulse 96, temperature 97.9  F (36.6  C), temperature source Oral, resp. rate 18, height 1.6 m (5' 3\"), last menstrual period 2024, not currently breastfeeding.  Patient Vitals for the past 24 hrs:   BP Temp Temp src Pulse Resp Height   25 1136 122/69 -- -- -- -- --   25 0816 -- -- -- -- -- 1.6 m (5' 3\")   25 0752 129/73 97.9  F (36.6  C) Oral 96 18 --     Recent Labs   Lab 25  1159 25  1051 25  0941 25  1456 25  1312   * 130* 107* 89 115*       FETAL HEART RATE ASSESSMENT:  Baseline rate 150, normal  Variability moderate  Accelerations x1  Decelerations not present     CONTRACTIONS:  cramping and not palpable  Pitocin- none,  Antibiotics- none    ROM: not ruptured  PELVIC EXAM: deferred    # Pain Assessment:      2025     9:20 AM   Current Pain Score   Patient currently in pain? yes   Briana bryant pain level was assessed and she currently denies pain.        FETAL HEART RATE ASSESSMENT:  Reviewed fetal monitoring strip on unit  EFM interpretation suggests: absence of concern for metabolic acidemia due to: moderate variability. EFM suggests no concern for interruption of the oxygen pathway..        Labor course:  3/5/25  In clinic: 50/-2/mid/soft- membrane sweep    3/6/25  0830 60/-2/mid/avg- plan for vag miso x1  0938 500ml LR IV bolus for minimal variability  1132 25mcg vaginal miso      ASSESSMENT:  ==============  Briana A Trent  35 year old  female  Estimated Date of Delivery: Mar 23, 2025  IUP @ 37w4d for induction of labor.  Indication: GDM on insulin poorly controlled  Fetal Heart Rate Tracing category one over the last 30 minutes  GBS- negative    Patient Active Problem List   Diagnosis    " Vitamin D deficiency    BMI 35.0-35.9,adult    Postural tachycardia with syncope    Current moderate episode of major depressive disorder without prior episode (H)    Hx of CHRIS (generalized anxiety disorder)    Cervical high risk HPV (human papillomavirus) test positive    Migraine with aura and without status migrainosus, not intractable    Supervision of other high risk pregnancies, first trimester    History of insulin controlled gestational diabetes mellitus (GDM)    Hx of preeclampsia, prior pregnancy, currently pregnant, first trimester    Hx of maternal third degree perineal laceration, currently pregnant    Rubella non-immune status, antepartum    Palpitations    Insulin controlled gestational diabetes mellitus (GDM) in third trimester    Fetal tachycardia affecting management of mother          PLAN:  ===========  -Continue GDM protocol, has not needed insulin at this time.  - Encouraged frequent position changes to facilitate labor and fetal descent.  - Anticipate progress and NSVB.   - Reevaluate Sanchez score/progress in 3-5 hours or sooner with a change in status.    PRESLEY Gustafson RN Student Nurse Midwife    I, Rayne Rich, am serving as a scribe; to document services personally performed by  Luisana Hernandez CNM based on data collection and the provider's statements to me.   Rayne Rich  I agree with the PFSH and ROS as completed by the student, except for changes made by me. The remainder of the encounter scribed by the student. The scribed note accurately reflects my personal services and decisions made by me.  Luisana Pierson DNP, CNM, APRN

## 2025-03-06 NOTE — PLAN OF CARE
Pt arrived for IOL at 37.4 r/t GDMA2. Denies ctx tho endorses some cramping, baby is active, no bleeding or LOF. Reports HA x 3 weeks that has been evaluated. Denies visual changes, epigastric pain, edema. BP WNL. Discuss intake paperwork, orient to room, call light, use of white board, menu and ordering, food options for spouse, staff to see pt, IV start and labs, two IV sites for insulin administration, continuous monitoring and option for Ade when available, frequency of BG checks. Questions encouraged. Pt reports FBG today was 98. Forgot to take her lantus last emelia. Came to BP with banana bread and large matcha drink from [employer].  at bedside. CNMs in to see pt and discuss induction method. Pt would like to start with vaginal miso then proceed to pitocin. IV team in now assisting with IV start and labs.

## 2025-03-06 NOTE — PROGRESS NOTES
"Labor Progress Note     SUBJECTIVE:  ==============  Briana A Trent  Estimated Date of Delivery: Mar 23, 2025  General appearance: comfortable  Support:  at bedside      OBJECTIVE:  ==============  VITALS  Blood pressure 121/75, pulse 96, temperature 97.9  F (36.6  C), temperature source Oral, resp. rate 18, height 1.6 m (5' 3\"), last menstrual period 2024, not currently breastfeeding.  Patient Vitals for the past 24 hrs:   BP Temp Temp src Pulse Resp Height   25 1620 121/75 -- -- -- -- --   25 1136 122/69 -- -- -- -- --   25 0816 -- -- -- -- -- 1.6 m (5' 3\")   25 0752 129/73 97.9  F (36.6  C) Oral 96 18 --       FETAL HEART RATE ASSESSMENT:  Baseline rate 150, normal  Variability moderate  Accelerations present   Decelerations not present   CONTRACTIONS: cramping and not palpable  Pitocin- none,  Antibiotics- none  ROM: not ruptured  PELVIC EXAM: PELVIC EXAM: 3/ 60/ Mid/ soft/ -2 Sanchez 8    # Pain Assessment:      3/6/2025     3:15 PM   Current Pain Score   Patient currently in pain? yes   Briana bryant pain level was assessed and she currently denies pain.        FETAL HEART RATE ASSESSMENT:  Reviewed fetal monitoring strip on unit  EFM interpretation suggests: absence of concern for metabolic acidemia due to: moderate variability. EFM suggests no concern for interruption of the oxygen pathway..      Labor course:  3/5/25  In clinic: 50/-2/mid/soft- membrane sweep    3/6/25  0830 60/-2/mid/avg- plan for vag miso x1  0938 500ml LR IV bolus for minimal variability  1132 25mcg vaginal miso  1620 3/60/-2 mid/soft  1652 Pit started 2mU    ASSESSMENT:  ==============  Briana A Trent  35 year old  female  Estimated Date of Delivery: Mar 23, 2025  IUP @ 37w4d for induction of labor.  Indication: GDM on insulin   Fetal Heart Rate Tracing category one over the last 30 minutes  GBS- negative    Patient Active Problem List   Diagnosis    Vitamin D deficiency    BMI " 35.0-35.9,adult    Postural tachycardia with syncope    Current moderate episode of major depressive disorder without prior episode (H)    Hx of CHRIS (generalized anxiety disorder)    Cervical high risk HPV (human papillomavirus) test positive    Migraine with aura and without status migrainosus, not intractable    Supervision of other high risk pregnancies, first trimester    History of insulin controlled gestational diabetes mellitus (GDM)    Hx of preeclampsia, prior pregnancy, currently pregnant, first trimester    Hx of maternal third degree perineal laceration, currently pregnant    Rubella non-immune status, antepartum    Palpitations    Insulin controlled gestational diabetes mellitus (GDM) in third trimester    Fetal tachycardia affecting management of mother          PLAN:  ===========   -Start pitocin at 2 mU and titrate to adequate contraction pattern  - Continue GDM protocol  -Encouraged frequent position changes to facilitate labor and fetal descent.  - Anticipate progress and NSVB.   - Reevaluate progress in 2 hours or sooner with a change in maternal or fetal indication    PRESLEY Gustafson RN Student Nurse Midwife    I, Rayne Rich, am serving as a scribe; to document services personally performed by  Luisana Collazo CNM based on data collection and the provider's statements to me.   Rayne Rich  I agree with the PFSH and ROS as completed by the student, except for changes made by me. The remainder of the encounter scribed by the student. The scribed note accurately reflects my personal services and decisions made by me.  Luisana Pierson, RADHAMES, CNM, APRN
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
## 2025-03-06 NOTE — PLAN OF CARE
Pt reports some cramping pain when she moved from bed to ball, but pain diminished after about 30 min, reported as very mild. Changing pos in room, enc to amb in hallway. Exp mgt.

## 2025-03-06 NOTE — CONSULTS
"Consult received for Vascular access care.  See LDA for details. For additional needs place \"Nursing to Consult for Vascular Access\" [id number] order in EPIC.  "

## 2025-03-07 LAB
BACTERIA UR CULT: NORMAL
ERYTHROCYTE [DISTWIDTH] IN BLOOD BY AUTOMATED COUNT: 13.5 % (ref 10–15)
GLUCOSE BLDC GLUCOMTR-MCNC: 119 MG/DL (ref 70–99)
GLUCOSE BLDC GLUCOMTR-MCNC: 79 MG/DL (ref 70–99)
GLUCOSE BLDC GLUCOMTR-MCNC: 90 MG/DL (ref 70–99)
GLUCOSE BLDC GLUCOMTR-MCNC: 91 MG/DL (ref 70–99)
GLUCOSE BLDC GLUCOMTR-MCNC: 94 MG/DL (ref 70–99)
GLUCOSE BLDC GLUCOMTR-MCNC: 97 MG/DL (ref 70–99)
HCT VFR BLD AUTO: 31.8 % (ref 35–47)
HGB BLD-MCNC: 10.7 G/DL (ref 11.7–15.7)
MCH RBC QN AUTO: 28.3 PG (ref 26.5–33)
MCHC RBC AUTO-ENTMCNC: 33.6 G/DL (ref 31.5–36.5)
MCV RBC AUTO: 84 FL (ref 78–100)
PLATELET # BLD AUTO: 330 10E3/UL (ref 150–450)
RBC # BLD AUTO: 3.78 10E6/UL (ref 3.8–5.2)
WBC # BLD AUTO: 16.7 10E3/UL (ref 4–11)

## 2025-03-07 PROCEDURE — 59400 OBSTETRICAL CARE: CPT | Performed by: ADVANCED PRACTICE MIDWIFE

## 2025-03-07 PROCEDURE — 36415 COLL VENOUS BLD VENIPUNCTURE: CPT | Performed by: REGISTERED NURSE

## 2025-03-07 PROCEDURE — 82310 ASSAY OF CALCIUM: CPT | Performed by: REGISTERED NURSE

## 2025-03-07 PROCEDURE — 120N000002 HC R&B MED SURG/OB UMMC

## 2025-03-07 PROCEDURE — 250N000011 HC RX IP 250 OP 636: Performed by: ADVANCED PRACTICE MIDWIFE

## 2025-03-07 PROCEDURE — 85041 AUTOMATED RBC COUNT: CPT | Performed by: REGISTERED NURSE

## 2025-03-07 PROCEDURE — 250N000009 HC RX 250: Performed by: ADVANCED PRACTICE MIDWIFE

## 2025-03-07 PROCEDURE — 722N000001 HC LABOR CARE VAGINAL DELIVERY SINGLE

## 2025-03-07 PROCEDURE — 250N000013 HC RX MED GY IP 250 OP 250 PS 637: Performed by: ADVANCED PRACTICE MIDWIFE

## 2025-03-07 PROCEDURE — 85014 HEMATOCRIT: CPT | Performed by: REGISTERED NURSE

## 2025-03-07 PROCEDURE — 0HQ9XZZ REPAIR PERINEUM SKIN, EXTERNAL APPROACH: ICD-10-PCS | Performed by: ADVANCED PRACTICE MIDWIFE

## 2025-03-07 PROCEDURE — 10907ZC DRAINAGE OF AMNIOTIC FLUID, THERAPEUTIC FROM PRODUCTS OF CONCEPTION, VIA NATURAL OR ARTIFICIAL OPENING: ICD-10-PCS | Performed by: ADVANCED PRACTICE MIDWIFE

## 2025-03-07 PROCEDURE — 250N000011 HC RX IP 250 OP 636

## 2025-03-07 PROCEDURE — 258N000003 HC RX IP 258 OP 636: Performed by: ADVANCED PRACTICE MIDWIFE

## 2025-03-07 PROCEDURE — 82040 ASSAY OF SERUM ALBUMIN: CPT | Performed by: REGISTERED NURSE

## 2025-03-07 PROCEDURE — 82247 BILIRUBIN TOTAL: CPT | Performed by: REGISTERED NURSE

## 2025-03-07 PROCEDURE — 84156 ASSAY OF PROTEIN URINE: CPT | Performed by: REGISTERED NURSE

## 2025-03-07 RX ORDER — DEXTROSE MONOHYDRATE 25 G/50ML
25-50 INJECTION, SOLUTION INTRAVENOUS
Status: DISCONTINUED | OUTPATIENT
Start: 2025-03-07 | End: 2025-03-08 | Stop reason: HOSPADM

## 2025-03-07 RX ORDER — OXYTOCIN/0.9 % SODIUM CHLORIDE 30/500 ML
340 PLASTIC BAG, INJECTION (ML) INTRAVENOUS CONTINUOUS PRN
Status: DISCONTINUED | OUTPATIENT
Start: 2025-03-07 | End: 2025-03-08 | Stop reason: HOSPADM

## 2025-03-07 RX ORDER — AMOXICILLIN 250 MG
2 CAPSULE ORAL
Status: DISCONTINUED | OUTPATIENT
Start: 2025-03-07 | End: 2025-03-08 | Stop reason: HOSPADM

## 2025-03-07 RX ORDER — ACETAMINOPHEN 325 MG/1
650 TABLET ORAL EVERY 4 HOURS PRN
Status: DISCONTINUED | OUTPATIENT
Start: 2025-03-07 | End: 2025-03-08 | Stop reason: HOSPADM

## 2025-03-07 RX ORDER — MISOPROSTOL 200 UG/1
400 TABLET ORAL
Status: DISCONTINUED | OUTPATIENT
Start: 2025-03-07 | End: 2025-03-08 | Stop reason: HOSPADM

## 2025-03-07 RX ORDER — LOPERAMIDE HYDROCHLORIDE 2 MG/1
2 CAPSULE ORAL
Status: DISCONTINUED | OUTPATIENT
Start: 2025-03-07 | End: 2025-03-08 | Stop reason: HOSPADM

## 2025-03-07 RX ORDER — OXYTOCIN 10 [USP'U]/ML
10 INJECTION, SOLUTION INTRAMUSCULAR; INTRAVENOUS
Status: DISCONTINUED | OUTPATIENT
Start: 2025-03-07 | End: 2025-03-08 | Stop reason: HOSPADM

## 2025-03-07 RX ORDER — NICOTINE POLACRILEX 4 MG
15-30 LOZENGE BUCCAL
Status: DISCONTINUED | OUTPATIENT
Start: 2025-03-07 | End: 2025-03-08 | Stop reason: HOSPADM

## 2025-03-07 RX ORDER — CARBOPROST TROMETHAMINE 250 UG/ML
250 INJECTION, SOLUTION INTRAMUSCULAR
Status: DISCONTINUED | OUTPATIENT
Start: 2025-03-07 | End: 2025-03-08 | Stop reason: HOSPADM

## 2025-03-07 RX ORDER — TRANEXAMIC ACID 10 MG/ML
1 INJECTION, SOLUTION INTRAVENOUS EVERY 30 MIN PRN
Status: DISCONTINUED | OUTPATIENT
Start: 2025-03-07 | End: 2025-03-08 | Stop reason: HOSPADM

## 2025-03-07 RX ORDER — NICOTINE POLACRILEX 4 MG
15-30 LOZENGE BUCCAL
Status: DISCONTINUED | OUTPATIENT
Start: 2025-03-07 | End: 2025-03-07

## 2025-03-07 RX ORDER — LOPERAMIDE HYDROCHLORIDE 2 MG/1
4 CAPSULE ORAL
Status: DISCONTINUED | OUTPATIENT
Start: 2025-03-07 | End: 2025-03-08 | Stop reason: HOSPADM

## 2025-03-07 RX ORDER — IBUPROFEN 800 MG/1
800 TABLET, FILM COATED ORAL EVERY 6 HOURS PRN
Status: DISCONTINUED | OUTPATIENT
Start: 2025-03-07 | End: 2025-03-08 | Stop reason: HOSPADM

## 2025-03-07 RX ORDER — METHYLERGONOVINE MALEATE 0.2 MG/ML
200 INJECTION INTRAVENOUS
Status: DISCONTINUED | OUTPATIENT
Start: 2025-03-07 | End: 2025-03-08 | Stop reason: HOSPADM

## 2025-03-07 RX ORDER — HYDROCORTISONE 25 MG/G
CREAM TOPICAL 3 TIMES DAILY PRN
Status: DISCONTINUED | OUTPATIENT
Start: 2025-03-07 | End: 2025-03-08 | Stop reason: HOSPADM

## 2025-03-07 RX ORDER — KETOROLAC TROMETHAMINE 30 MG/ML
30 INJECTION, SOLUTION INTRAMUSCULAR; INTRAVENOUS ONCE
Status: COMPLETED | OUTPATIENT
Start: 2025-03-07 | End: 2025-03-07

## 2025-03-07 RX ORDER — DEXTROSE MONOHYDRATE 25 G/50ML
25-50 INJECTION, SOLUTION INTRAVENOUS
Status: DISCONTINUED | OUTPATIENT
Start: 2025-03-07 | End: 2025-03-07

## 2025-03-07 RX ORDER — MISOPROSTOL 200 UG/1
800 TABLET ORAL
Status: DISCONTINUED | OUTPATIENT
Start: 2025-03-07 | End: 2025-03-08 | Stop reason: HOSPADM

## 2025-03-07 RX ADMIN — IBUPROFEN 800 MG: 800 TABLET, FILM COATED ORAL at 14:07

## 2025-03-07 RX ADMIN — ACETAMINOPHEN 650 MG: 325 TABLET, FILM COATED ORAL at 23:39

## 2025-03-07 RX ADMIN — ACETAMINOPHEN 650 MG: 325 TABLET, FILM COATED ORAL at 09:58

## 2025-03-07 RX ADMIN — KETOROLAC TROMETHAMINE 30 MG: 30 INJECTION, SOLUTION INTRAMUSCULAR at 08:13

## 2025-03-07 RX ADMIN — IBUPROFEN 800 MG: 800 TABLET, FILM COATED ORAL at 21:01

## 2025-03-07 RX ADMIN — SENNOSIDES AND DOCUSATE SODIUM 2 TABLET: 50; 8.6 TABLET ORAL at 21:01

## 2025-03-07 RX ADMIN — SODIUM CHLORIDE, POTASSIUM CHLORIDE, SODIUM LACTATE AND CALCIUM CHLORIDE: 600; 310; 30; 20 INJECTION, SOLUTION INTRAVENOUS at 04:54

## 2025-03-07 RX ADMIN — SODIUM CHLORIDE, POTASSIUM CHLORIDE, SODIUM LACTATE AND CALCIUM CHLORIDE: 600; 310; 30; 20 INJECTION, SOLUTION INTRAVENOUS at 05:45

## 2025-03-07 RX ADMIN — ACETAMINOPHEN 650 MG: 325 TABLET, FILM COATED ORAL at 18:47

## 2025-03-07 RX ADMIN — LIDOCAINE HYDROCHLORIDE 20 ML: 10 INJECTION, SOLUTION EPIDURAL; INFILTRATION; INTRACAUDAL; PERINEURAL at 06:43

## 2025-03-07 RX ADMIN — Medication: at 00:34

## 2025-03-07 ASSESSMENT — ACTIVITIES OF DAILY LIVING (ADL)
ADLS_ACUITY_SCORE: 22
ADLS_ACUITY_SCORE: 21
ADLS_ACUITY_SCORE: 22
ADLS_ACUITY_SCORE: 21
ADLS_ACUITY_SCORE: 22
ADLS_ACUITY_SCORE: 21
ADLS_ACUITY_SCORE: 22
ADLS_ACUITY_SCORE: 21
ADLS_ACUITY_SCORE: 22

## 2025-03-07 NOTE — PLAN OF CARE
Goal Outcome Evaluation:    Vital signs stable. Postpartum assessment WDL. Pain controlled with Tylenol and Ibuprofen. Patient voiding without difficulty. Breastfeeding on cue with minimal assist. Patient and infant bonding well. Will continue with current plan of care.       Problem: Adult Inpatient Plan of Care  Goal: Optimal Comfort and Wellbeing  3/7/2025 1457 by Dahlia Santos RN  Outcome: Progressing  3/7/2025 1442 by Dahlia Santos RN  Outcome: Progressing  Intervention: Provide Person-Centered Care  Recent Flowsheet Documentation  Taken 3/7/2025 0910 by Dahlia Santos RN  Trust Relationship/Rapport: care explained     Problem: Postpartum (Vaginal Delivery)  Goal: Optimal Pain Control and Function  3/7/2025 1457 by Dahlia Santos RN  Outcome: Progressing  3/7/2025 1442 by Dahlia Santos RN  Outcome: Progressing     Problem: Postpartum (Vaginal Delivery)  Goal: Effective Urinary Elimination  3/7/2025 1457 by Dahlia Santos RN  Outcome: Progressing        Cryotherapy Text: The wound bed was treated with cryotherapy after the biopsy was performed.

## 2025-03-07 NOTE — ANESTHESIA PROCEDURE NOTES
"Epidural catheter Procedure Note    Pre-Procedure   Staff -        Anesthesiologist:  Marv Williamson MD       Resident/Fellow: Ed Butts MD       Performed By: with residents       Procedure performed by resident/fellow/CRNA in presence of a teaching physician.         Location: OR       Pre-Anesthestic Checklist: patient identified, IV checked, risks and benefits discussed, informed consent, monitors and equipment checked, pre-op evaluation, at physician/surgeon's request and post-op pain management  Timeout:       Correct Patient: Yes        Correct Procedure: Yes        Correct Site: Yes        Correct Position: Yes   Procedure Documentation  Procedure: epidural catheter         Patient Position: sitting       Skin prep: Chloraprep       Local skin infiltrated with mL of 1% lidocaine.        Insertion Site: L4-5. (midline approach).       Technique: LORT saline        ADAM at 9 cm.       Needle Type: PicBadgesy needle       Needle Gauge: 17.        Needle Length (Inches): 3.5        Catheter: 19 G.          Catheter threaded easily.         5 cm epidural space.         Threaded 14 cm at skin.         # of attempts: 1 and  # of redirects:  1    Assessment/Narrative         Paresthesias: No.       Test dose of 3 mL at.         Test dose negative, 3 minutes after injection, for signs of intravascular, subdural, or intrathecal injection.       Insertion/Infusion Method: LORT saline       No aspiration negative for Heme or CSF via Epidural Catheter.       Sensory Level Left: T9.       Sensory Level Right: T9.    Medication(s) Administered   0.1% ropivacaine + 2 mcg/mL fentaNYL in NS - EPIDURAL   6 mL - 3/6/2025 6:40:00 PM    FOR [facility] ([facility]) ONLY:   Pain Team Contact information: please page the Pain Team Via [place]. Search \"Pain\". During daytime hours, please page the attending first. At night please page the resident first.      "

## 2025-03-07 NOTE — PLAN OF CARE
Pitocin started at 1652.  Briana noticed more back pain and tightening in her abdomen and requested an epidural. Epidural placed at 1830. Briana tolerated the procedure well. After the epidural, baby with minimal variability and late decelerations. Pt repositioned and baby with moderate variability and decelerations resolved. Briana is comfortable. Report given to Radha FRIEDMAN

## 2025-03-07 NOTE — PLAN OF CARE
Pt. admitted from L&D  via wheelchair and transferred to bed with stand by assist. Pt. arrived with baby and was accompanied by  and arrived with personal belongings. Report was taken from  in L&D. VSS. Fundus is firm and midline.  Vaginal bleeding is scant.  Pitocin infusing on R PIV. .  Pt. oriented to the room and call light system.

## 2025-03-07 NOTE — PROGRESS NOTES
"Labor Progress Note     SUBJECTIVE:  ==============  Briana Newman  Estimated Date of Delivery: Mar 23, 2025  General appearance: comfortable following epidural placement  Support: Christianne Ca at bedside.    Patient comfortable after epidural and requesting SVE. Inquires about AROM- educated regarding frequency of cervical exams and infection risk; educated on fetal station and how baby is still too high for AROM safely.    OBJECTIVE:  ==============  VITALS  Blood pressure 122/57, pulse 96, temperature 98.6  F (37  C), temperature source Oral, resp. rate 16, height 1.6 m (5' 3\"), last menstrual period 2024, SpO2 100%, not currently breastfeeding.  Patient Vitals for the past 24 hrs:   BP Temp Temp src Pulse Resp SpO2 Height   250 122/57 -- -- -- 16 100 % --   25 122/74 -- -- -- 16 100 % --   25 122/72 -- -- -- -- 100 % --   25 1905 122/72 -- -- -- 16 100 % --   25 1900 134/66 -- -- -- 16 100 % --   25 1855 129/76 -- -- -- -- 100 % --   25 1850 117/74 -- -- -- -- 100 % --   25 1843 115/56 -- -- -- -- -- --   25 1840 120/62 -- -- -- -- 98 % --   25 1839 136/63 -- -- -- -- 99 % --   25 1836 127/72 -- -- -- -- 99 % --   25 1834 128/69 -- -- -- -- 99 % --   25 1832 138/75 -- -- -- -- 99 % --   25 1830 126/84 -- -- -- 16 100 % --   25 1815 134/89 -- -- -- 16 97 % --   25 1620 121/75 98.6  F (37  C) Oral -- 16 -- --   25 1136 122/69 -- -- -- -- -- --   25 0816 -- -- -- -- -- -- 1.6 m (5' 3\")   25 0752 129/73 97.9  F (36.6  C) Oral 96 18 -- --       FETAL HEART RATE ASSESSMENT:  Baseline rate 155, normal  Variability moderate  Accelerations not present   Decelerations present, deceleration type: variable, deceleration frequency: intermittent. Are the decelerations significant? {Significant = variable decelerations of timoteo more than 60 bpm below baseline x 60 sec, Variable " deceleration timoteo of less than 60 bpm regardless of baseline x 60 sec, any late deceleration, any prolonged deceleration  :[id number]}     CONTRACTIONS: Contractions every 2-4 minutes.  Palpate: mild  Pitocin- 6 mu/min.,  Antibiotics- none  ROM: not ruptured  PELVIC EXAM: PELVIC EXAM: / Mid/ soft/ -2     # Pain Assessment:      3/6/2025     7:15 PM   Current Pain Score   Patient currently in pain? denies   Briana bryant pain level was assessed and she currently denies pain after epidural placement      FETAL HEART RATE ASSESSMENT:  Reviewed fetal monitoring strip at bedside  EFM interpretation suggests: absence of concern for metabolic acidemia due to: moderate variability. EFM suggests no concern for interruption of the oxygen pathway..    The interventions currently taken to improve the fetal heart rate tracing and fetal oxygenation are: maternal positioning, IV fluid bolus , evaluate labor progress, sterile vaginal exam, and blood pressure check    Labor course:  3/5/25  In clinic: 50/-2/mid/soft- membrane sweep    3/6/25  0830 /-2/mid/avg- plan for vag miso x1  0938 500ml LR IV bolus for minimal variability  1132 25mcg vaginal miso  1620 3/60/-2 mid/soft  1652 Pit started at 2mU  1754 Pit at 4 mU  1840 Epidural placed  1845 Pit at 6 mU  1857 /-2    ASSESSMENT:  ==============  Briana MUSTAFA Trent  35 year old  female  Estimated Date of Delivery: Mar 23, 2025  IUP @ 37w4d for induction of labor.  Indication: GDM on insulin   Fetal Heart Rate Tracing category two over the last 15 minutes  GBS- negative    Patient Active Problem List   Diagnosis    Vitamin D deficiency    BMI 35.0-35.9,adult    Postural tachycardia with syncope    Current moderate episode of major depressive disorder without prior episode (H)    Hx of CHRIS (generalized anxiety disorder)    Cervical high risk HPV (human papillomavirus) test positive    Migraine with aura and without status migrainosus, not intractable    Supervision of other  high risk pregnancies, first trimester    History of insulin controlled gestational diabetes mellitus (GDM)    Hx of preeclampsia, prior pregnancy, currently pregnant, first trimester    Hx of maternal third degree perineal laceration, currently pregnant    Rubella non-immune status, antepartum    Palpitations    Insulin controlled gestational diabetes mellitus (GDM) in third trimester    Fetal tachycardia affecting management of mother          PLAN:  ===========  - Encouraged frequent position changes with peanut ball to facilitate labor and fetal descent.  -GDM orders updated to active labor management.  - Reevaluate progress in 2 hours or sooner with a change in maternal or fetal indication.  - Plan for AROM if fetal station allows.  - Anticipate progress and NSVB.      PRESLEY Gustafson RN Student Nurse Midwife    I, Rayne Rich, am serving as a scribe; to document services personally performed by  Luisana Pierson CNM based on data collection and the provider's statements to me.   Rayne Rich

## 2025-03-07 NOTE — PROGRESS NOTES
"SUBJECTIVE:  ==============  General appearance: comfortable with labor epidural.  Requests SVE and possible AROM.  Support: partner, Christianne    OBJECTIVE:  ==============  VITALS  Blood pressure 102/58, pulse 96, temperature 98.6  F (37  C), temperature source Oral, resp. rate 16, height 1.6 m (5' 3\"), last menstrual period 2024, SpO2 98%, not currently breastfeeding.    FETAL HEART RATE ASSESSMENT:  Baseline rate 145, normal  Variability moderate  Accelerations present   Decelerations: not present    CONTRACTIONS: Contractions every 2-3 minutes.  Palpate: moderate  Pitocin- 8 mu/min.,  Antibiotics- none    ROM: not ruptured  PELVIC EXAM: /-3, post, soft    Assessment: EFM interpretation suggests absence of concern for fetal metabolic acidemia at this time due to accelerations present and variability: moderate    Labor course:   0830 /-2/mid/avg- plan for vag miso x1  0938 500ml LR IV bolus for minimal variability  1132 25mcg vaginal miso  1620 3/60/-2 mid/soft  1652 Pit started at 2mU  1754 Pit at 4 mU  1840 Epidural placed  1845 Pit at 6 mU  1857 /-2    ASSESSMENT:  ==============  Briana RAMSEY Trent  35 year old  female  Estimated Date of Delivery: Mar 23, 2025  IUP @ 37w4d IOL GDMA2   Fetal Heart Rate Tracing category one over the last 45 minutes  GBS- negative    Patient Active Problem List   Diagnosis    Vitamin D deficiency    BMI 35.0-35.9,adult    Postural tachycardia with syncope    Current moderate episode of major depressive disorder without prior episode (H)    Hx of CHRIS (generalized anxiety disorder)    Cervical high risk HPV (human papillomavirus) test positive    Migraine with aura and without status migrainosus, not intractable    Supervision of other high risk pregnancies, first trimester    History of insulin controlled gestational diabetes mellitus (GDM)    Hx of preeclampsia, prior pregnancy, currently pregnant, first trimester    Hx of maternal third degree perineal " laceration, currently pregnant    Rubella non-immune status, antepartum    Palpitations    Insulin controlled gestational diabetes mellitus (GDM) in third trimester    Fetal tachycardia affecting management of mother      PLAN:  ===========  - Frequent position changes to facilitate labor with epidural anesthesia. Reviewed different positioning with peanut ball to facilitate fetal descent.  - Continue labor induction with Pitocin.  Will consider AROM with future exam.  - Reevaluate in 2-4 hours, earlier as clinically indicated    ERICA ReinaM

## 2025-03-07 NOTE — PROGRESS NOTES
"Labor Progress Note     SUBJECTIVE:  ==============  Briana RAMSEY Trent  Estimated Date of Delivery: Mar 23, 2025  General appearance: comfortable in a semi- reclined position. Mild discomfort with contractions with intermittent pressure. Called to bedside to evaluate FHR tracing.  Support: partner, Christianne    OBJECTIVE:  ==============  VITALS  Blood pressure 111/70, pulse 96, temperature 98.6  F (37  C), temperature source Oral, resp. rate 16, height 1.6 m (5' 3\"), last menstrual period 2024, SpO2 99%, not currently breastfeeding.    FETAL HEART RATE ASSESSMENT:  Baseline rate 145, normal  Variability minimal to moderate  Accelerations not present  Decelerations present, deceleration type: intermittent variable and late decelerations with resolution to baseline    CONTRACTIONS: Contractions every 2-3 minutes. Coupling noted on a few contractions.  Palpate: moderate  Pitocin- 14 mu/min.,  Antibiotics- none    ROM: clear fluid  PELVIC EXAM:  7/ 80%/ Anterior/ soft/ 0    FETAL HEART RATE ASSESSMENT:  Reviewed fetal monitoring strip on unit  EFM interpretation suggests: absence of concern for metabolic acidemia due to: presence of accelerations and moderate variability. EFM suggests no concern for interruption of the oxygen pathway..    The interventions currently taken to improve the fetal heart rate tracing and fetal oxygenation are: maternal positioning, IV fluid bolus , sterile vaginal exam, and emptied bladder.    Labor course:  0830 2/60/-2/mid/avg- plan for vag miso x1  0938 500ml LR IV bolus for minimal variability  1132 25mcg vaginal miso  1620 3/60/-2 mid/soft  1652 Pit started at 2mU  1754 Pit at 4 mU  1840 Epidural placed  1845 Pit at 6 mU  1857 4/70/-2  2210 4/50/-3, soft/post  0126 4/50/-2, SROM clear fluid    ASSESSMENT:  ==============  Briana RAMSEY Trent  35 year old  female  Estimated Date of Delivery: Mar 23, 2025  IUP @ 37w5d for induction of labor.  Indication: GDM on insulin   Fetal " Heart Rate Tracing category two over the last 25 minutes.  GBS- negative    Patient Active Problem List   Diagnosis    Vitamin D deficiency    BMI 35.0-35.9,adult    Postural tachycardia with syncope    Current moderate episode of major depressive disorder without prior episode (H)    Hx of CHRIS (generalized anxiety disorder)    Cervical high risk HPV (human papillomavirus) test positive    Migraine with aura and without status migrainosus, not intractable    Supervision of other high risk pregnancies, first trimester    History of insulin controlled gestational diabetes mellitus (GDM)    Hx of preeclampsia, prior pregnancy, currently pregnant, first trimester    Hx of maternal third degree perineal laceration, currently pregnant    Rubella non-immune status, antepartum    Palpitations    Insulin controlled gestational diabetes mellitus (GDM) in third trimester    Fetal tachycardia affecting management of mother     PLAN:  ===========  - Continue IOL with pitocin. Titrate per protocol.  - Encouraged frequent position changes to facilitate labor and fetal descent.  - Reevaluate progress in 2 hours or sooner with a change in status.     Per the category II algorithm, the plan is to continue observe/conservative management. Reevaluate in 30-60 minutes.       I, Ariane Dyson, am serving as a scribe; to document services personally performed by  Dionisio MALDONADO CNM based on data collection and the provider's statements to me.  - Ariane Dysno RN, SNM     The encounter was performed by me and scribed by the student. The scribed note accurately reflects my personal services and decisions made by me. ERICA Reina CNM

## 2025-03-07 NOTE — PROGRESS NOTES
of viable Female with Dionisio Reeder CNM and LEAH Thakkar Student in attendance. Nursery RN Sandra present. Infant with spontaneous cry to mothers abdomen, dried and stimulated. Apgars 8/9. Placenta delivered without complications, pitocin bolused, x1 1st degree laceration with repairs done. Georgina cares provided. Mother and baby in stable condition.

## 2025-03-07 NOTE — PLAN OF CARE
Data: Briana Newman transferred to [place] [address] via wheelchair at 0900. Baby transferred via parent's arms.  Action: Receiving unit notified of transfer: Yes. Patient and family notified of room change. Report given to Jesus Manuel RN at 0905. Belongings sent to receiving unit. Accompanied by Registered Nurse. Oriented patient to surroundings. Call light within reach. ID bands double-checked with receiving RN.  Response: Patient tolerated transfer and is stable.

## 2025-03-07 NOTE — L&D DELIVERY NOTE
DELIVERY NOTE:  Briana Newman is a 35 year old  at 37w5d who [facility] for GDMA2.  Pt received one dose of vaginal misoprostol before Pitocin was started.  Patient received epidural with adequate pain relief.  Progressed to 4/50/-2 and AROM performed with patient's consent at 0126.  Briana progressed to complete at 0604 and began pushing shortly after.  Pushed effectively with CNM coaching. FHR with variable decels with pushing effort, return to baseline and moderate variability throughout. Head delivered OA and restituted to ROT.  Loose nuchal cord x1, which was delivered through. Shoulders easily delivered under maternal effort.  Live female  delivered at 0628 under epidural anesthesia.  Spontaneous breath, vigorous cry, well flexed, HR>100. Infant directly to maternal abdomen, skin to skin. Delayed cord clamping for 2 minutes then clamped x2 and cut by FOB. 20 units of pitocin infusing IV after baby with pt's consent. Cord blood obtained for typing.  Intact placenta spontaneously delivered via [place] at 0639.  3 vessel cord. Fundus firm @ midline. Vagina, perineum, and rectum inspected, perineum found to have a 1st degree perineal laceration that was repaired with 3.0 vicryl suture in the usual fashion. Hemostasis noted. Mother and infant stable; continued skin to skin. Good family bonding observed.     LEAH Booker assumed cares after birth of baby, attended pt during third stage of labor and repair of perineal laceration.    Delivery Note:   IUP at 37 weeks gestation delivered on 2025.     delivery of a viable Female infant.  Weight : pending  Apgars of 8 at 1 minute and 9 at 5 minutes.  Labor was induced. Vaginal Miso x1. Pitocin  Medications administered  in labor:  Pain Rx Epidural; Antibiotics No;  Perineum: Intact, Vaginal Laceration  Placenta-mechanism: spontaneous, intact,  with a 3 vessel cord. IV oxytocin was given After delivery of  baby  Quantitative Blood Loss was 100cc.   Complications of labor and delivery: Category two FHT tracing and Nuchal cord  Anticipated Discharge Date: 3/8/25  Birth attendants: Dionisio Reeder CNM, Student Nurse Midwife Ariane Dyson, and ERICA Lopez CNM, CNM    Delivery Summary    Briana Newman MRN# [id number]   Age: 35 year old YOB: 1990     ASSESSMENT & PLAN:        Trent, Female-Briana [[id number]]      Labor Event Times      Latent labor onset date/time: 3/6/2025 1620    Active labor onset date: 3/7/25 Onset time:  1:27 AM   Dilation complete date: 3/7/25 Complete time:  6:04 AM   Start pushing date/time: 3/7/2025 0605          Labor Length      1st Stage (hrs): 4 (min): 37   2nd Stage (hrs): 0 (min): 24   3rd Stage (hrs): 0 (min): 11          Labor Events     labor?: No   steroids: None  Labor Type: AROM, Induction/Cervical ripening  Predominate monitoring during 1st stage: continuous electronic fetal monitoring     Antibiotics received during labor?: No       Rupture date/time: 3/7/25 0126   Rupture type: Artificial Rupture of Membranes  Fluid color: Clear  Fluid odor: Normal     Induction: Misoprostol  Induction date/time:      Cervical ripening date/time:      Indications for induction: Diabetes (Comment to specify)     Augmentation: Oxytocin, AROM  Indications for augmentation: Ineffective Contraction Pattern       Delivery/Placenta Date and Time      Delivery Date: 3/7/25 Delivery Time:  6:28 AM   Placenta Date/Time: 3/7/2025  6:39 AM  Oxytocin given at the time of delivery: after delivery of baby  Delivering clinician: Dionisio Reeder APRN CNM   Other personnel present at delivery:  Provider Role   Rayne Cross, RN Delivery Nurse   Jack, Ashia Student Sandifer, Jaylynn A, RN Registered Nurse   Patricia Booker CNM Certified Nurse Midwife             Vaginal Counts       Initial count performed by 2 team members:  Two Team Members  "  LEAH Zelaya, JUSTINE         Needles Suture Needles Sponges (RETIRED) Instruments   Initial counts 2 0 5    Added to count  1     Relief counts       Final counts 2 1 5            Placed during labor Accounted for at the end of labor   FSE No NA   IUPC No NA   Cervidil No NA                  Final count performed by 2 team members:  Two Team Members   BETHANY Molina, RN      Final count correct?: Yes  Pre-Birth Team Brief: Complete  Post-Birth Team Debrief: Complete       Apgars    Living status: Living   1 Minute 5 Minute 10 Minute 15 Minute 20 Minute   Skin color: 1  1       Heart rate: 2  2       Reflex irritability: 2  2       Muscle tone: 2  2       Respiratory effort: 1  2       Total: 8  9       Apgars assigned by: DARRIN NEWELL RN       Cord      Vessels: 3 Vessels    Cord Complications: Nuchal   Nuchal Intervention: delivered through         Nuchal cord description: loose nuchal cord         Cord Blood Disposition: Discard    Gases Sent?: No    Delayed cord clamping?: Yes    Cord Clamping Delay (seconds): >120 seconds    Stem cell collection?: No            Resuscitation    Methods: None   Care at Delivery: Data:  of female baby born at 0628. Delivery unremarkable .  Action: Interventions at birth were drying, and warm blankets. Infant placed skin-to-skin with mother.  Response: Vitally stable . Positive bonding behaviors shown through skin to skin and breastfeeding.               Measurements      Weight: 7 lb 4.4 oz Length: 1' 9\"     Head circumference: 35.6 cm           Skin to Skin and Feeding Plan      Skin to skin initiation date/time: 1/3/1841    Skin to skin with: Mother  Skin to skin end date/time:     Breastfeeding initiated date/time: 3/7/2025 0658       Labor Events and Shoulder Dystocia    Fetal Tracing Prior to Delivery: Category 2  Shoulder dystocia present?: Neg       Delivery (Maternal) (Provider to Complete) ([id number])  "   Episiotomy: None  Perineal lacerations: 1st Repaired?: Yes   Repair suture: 3-0 Vicryl  Number of repair packets: 1  Genital tract inspection done: Pos       Blood Loss  Mother: Briana Newman #[id number]     Start of Mother's Information      Delivery Blood Loss   Intrapartum & Postpartum: 03/07/25 0127 - 03/07/25 2040    Delivery Admission: 03/06/25 0741 - 03/07/25 2040         Intrapartum & Postpartum Delivery Admission    Delivery QBL (mL) Hospital Encounter 100 mL 100 mL    Total  100 mL 100 mL               End of Mother's Information  Mother: Briana Newman #[id number]                Delivery - Provider to Complete ([id number])    Delivering clinician: Dionisio Reeder APRN CNM  Delivery Type (Choose the 1 that will go to the Birth History): Vaginal, Spontaneous                         Other personnel:  Provider Role   Rayne Cross RN Delivery Nurse   Jack, Ashia Student Sandifer, Jaylynn A, RN Registered Nurse   Patricia Booker CNM Certified Nurse Midwife                    Placenta    Date/Time: 3/7/2025  6:39 AM  Removal: Spontaneous  Comments: digna  Disposition: Hospital disposal             Anesthesia    Method: Epidural  Cervical dilation at placement: 4-7                    Presentation and Position    Presentation: Vertex    Position: Left Occiput Anterior                   Ariane HARRINGTON am serving as a scribe; to document services personally performed by  Dionisio MALDONADO CNM based on data collection and the provider's statements to me.  - Ariane Dyson RN, SNM    The encounter was performed by me and scribed by the student. The scribed note accurately reflects my personal services and decisions made by me. ERICA Reina CNM    The student participated in the procedure. I was present for the entire procedure.    ERICA Lowery CNM

## 2025-03-07 NOTE — PROGRESS NOTES
"Labor Progress Note     SUBJECTIVE:  ==============  Briana A Trent  Estimated Date of Delivery: Mar 23, 2025  General appearance: comfortable in the left lateral position.  Requests SVE.   Support: partner, Christianne    OBJECTIVE:  ==============  VITALS  Blood pressure 101/58, pulse 96, temperature 98.6  F (37  C), temperature source Oral, resp. rate 16, height 1.6 m (5' 3\"), last menstrual period 2024, SpO2 98%, not currently breastfeeding.    FETAL HEART RATE ASSESSMENT:  Baseline rate 150, normal  Variability moderate  Accelerations present   Decelerations present, deceleration type: variable, deceleration frequency: intermittent. Are the decelerations significant? No. Resolved with repositioning.   CONTRACTIONS: Contractions every 2-4 minutes.  Palpate: moderate  Pitocin- 10 mu/min.,  Antibiotics- none    ROM: AROM for clear fluid at 0126  PELVIC EXAM: 4/ 50%/ Posterior/ soft/ -2     FETAL HEART RATE ASSESSMENT:  Reviewed fetal monitoring strip on unit  EFM interpretation suggests: absence of concern for metabolic acidemia due to: presence of accelerations and moderate variability. EFM suggests no concern for interruption of the oxygen pathway..    Labor course:  0830 2/60/-2/mid/avg- plan for vag miso x1  0938 500ml LR IV bolus for minimal variability  1132 25mcg vaginal miso  1620 3/60/-2 mid/soft  1652 Pit started at 2mU  1754 Pit at 4 mU  1840 Epidural placed  1845 Pit at 6 mU  1857 4/70/-2  2210 4/50/-3, soft/post    ASSESSMENT:  ==============  Briana RAMSEY Trent  35 year old  female  Estimated Date of Delivery: Mar 23, 2025  IUP @ 37w5d for induction of labor.  Indication: GDM on insulin   Fetal Heart Rate Tracing category one with occasional Cat II tracing w/ variable decels. Resolved with maternal reposition.  GBS- negative  AROM with clear fluid.     Patient Active Problem List   Diagnosis    Vitamin D deficiency    BMI 35.0-35.9,adult    Postural tachycardia with syncope    Current " moderate episode of major depressive disorder without prior episode (H)    Hx of CHRIS (generalized anxiety disorder)    Cervical high risk HPV (human papillomavirus) test positive    Migraine with aura and without status migrainosus, not intractable    Supervision of other high risk pregnancies, first trimester    History of insulin controlled gestational diabetes mellitus (GDM)    Hx of preeclampsia, prior pregnancy, currently pregnant, first trimester    Hx of maternal third degree perineal laceration, currently pregnant    Rubella non-immune status, antepartum    Palpitations    Insulin controlled gestational diabetes mellitus (GDM) in third trimester    Fetal tachycardia affecting management of mother        PLAN:  ===========  - Continue IOL with pitocin. Titrate per protocol.  - Encouraged frequent position changes to facilitate labor and fetal descent.  - Reviewed augmentation AROM risks and benefits.  Pt opts for AROM at this time.  - Reevaluate progress in 4 hours of PRN clinically indicated.       I, Ariane Dyson, am serving as a scribe; to document services personally performed by  Dionisio MALDONADO CNM based on data collection and the provider's statements to me. - Ariane Dyson  RN, SNM    The encounter was performed by me and scribed by the student. The scribed note accurately reflects my personal services and decisions made by me. ERICA Reina CNM

## 2025-03-07 NOTE — PROGRESS NOTES
"  Anesthesia Post-Partum Follow-Up Note After VaginalDelivery with Epidural    Patient: Briana Newman    Patient location: Post-partum floor    Anesthesia type: Epidural    Subjective  Briana Newman does not complain of pruritis at this time. She denies weakness, denies paresthesia, denies difficulties breathing or voiding, denies nausea or vomiting, and denies headache. She is able to ambulate and tolerates regular diet.     Objective  Respiratory Function (RR / SpO2 / Airway Patency): Satisfactory  Cardiac Function (HR / Rhythm / BP): Satisfactory  Strength and sensation lower extremities: Normal  Epidual site: No signs of infection or inflammation    Most recent vitals  /59   Pulse 62   Temp 36.8  C (98.3  F) (Oral)   Resp 16   Ht 1.6 m (5' 3\")   LMP 2024 (Exact Date)   SpO2 100%   Breastfeeding Unknown   BMI 38.97 kg/m      Assessment and plan  Briana Newman is a 35 year old female  post partum day #1 s/p vaginal delivery with epidural for post-operative pain control.    At this time, there is no evidence of adverse side effects associated with anesthetic procedures performed. We encourage the continued use of multimodal analgesic therapy for adequate pain management over the next several days, and if any questions arise regarding anesthetic care, please reach out to the obstetric anesthesiology team.     Thank you for including us in the care of this patient.    Janel Malik MD  Anesthesiology Resident PGY-2, CA-1    "

## 2025-03-08 VITALS
HEART RATE: 71 BPM | RESPIRATION RATE: 17 BRPM | OXYGEN SATURATION: 100 % | BODY MASS INDEX: 38.08 KG/M2 | WEIGHT: 214.9 LBS | HEIGHT: 63 IN | TEMPERATURE: 98.1 F | SYSTOLIC BLOOD PRESSURE: 99 MMHG | DIASTOLIC BLOOD PRESSURE: 60 MMHG

## 2025-03-08 LAB
ALBUMIN MFR UR ELPH: 15.5 MG/DL
ALBUMIN SERPL BCG-MCNC: 3.3 G/DL (ref 3.5–5.2)
ALP SERPL-CCNC: 95 U/L (ref 40–150)
ALT SERPL W P-5'-P-CCNC: 6 U/L (ref 0–50)
ANION GAP SERPL CALCULATED.3IONS-SCNC: 13 MMOL/L (ref 7–15)
AST SERPL W P-5'-P-CCNC: 18 U/L (ref 0–45)
BILIRUB SERPL-MCNC: 0.2 MG/DL
BUN SERPL-MCNC: 7.6 MG/DL (ref 6–20)
CALCIUM SERPL-MCNC: 9.2 MG/DL (ref 8.8–10.4)
CHLORIDE SERPL-SCNC: 104 MMOL/L (ref 98–107)
CREAT SERPL-MCNC: 0.57 MG/DL (ref 0.51–0.95)
CREAT UR-MCNC: 69.8 MG/DL
EGFRCR SERPLBLD CKD-EPI 2021: >90 ML/MIN/1.73M2
GLUCOSE BLDC GLUCOMTR-MCNC: 95 MG/DL (ref 70–99)
GLUCOSE SERPL-MCNC: 88 MG/DL (ref 70–99)
HCO3 SERPL-SCNC: 20 MMOL/L (ref 22–29)
HGB BLD-MCNC: 10.3 G/DL (ref 11.7–15.7)
POTASSIUM SERPL-SCNC: 4.3 MMOL/L (ref 3.4–5.3)
PROT SERPL-MCNC: 6.1 G/DL (ref 6.4–8.3)
PROT/CREAT 24H UR: 0.22 MG/MG CR (ref 0–0.2)
SODIUM SERPL-SCNC: 137 MMOL/L (ref 135–145)

## 2025-03-08 PROCEDURE — 85018 HEMOGLOBIN: CPT | Performed by: ADVANCED PRACTICE MIDWIFE

## 2025-03-08 PROCEDURE — 36415 COLL VENOUS BLD VENIPUNCTURE: CPT | Performed by: ADVANCED PRACTICE MIDWIFE

## 2025-03-08 PROCEDURE — 250N000013 HC RX MED GY IP 250 OP 250 PS 637: Performed by: ADVANCED PRACTICE MIDWIFE

## 2025-03-08 RX ORDER — ACETAMINOPHEN 325 MG/1
650 TABLET ORAL EVERY 6 HOURS PRN
Qty: 100 TABLET | Refills: 0 | Status: SHIPPED | OUTPATIENT
Start: 2025-03-08

## 2025-03-08 RX ORDER — FERROUS SULFATE 325(65) MG
325 TABLET ORAL
Qty: 60 TABLET | Refills: 0 | Status: SHIPPED | OUTPATIENT
Start: 2025-03-08

## 2025-03-08 RX ORDER — AMOXICILLIN 250 MG
1 CAPSULE ORAL DAILY
Qty: 100 TABLET | Refills: 0 | Status: SHIPPED | OUTPATIENT
Start: 2025-03-08

## 2025-03-08 RX ORDER — SODIUM CHLORIDE 9 MG/ML
INJECTION, SOLUTION INTRAVENOUS
Status: DISCONTINUED
Start: 2025-03-08 | End: 2025-03-08 | Stop reason: HOSPADM

## 2025-03-08 RX ORDER — IBUPROFEN 600 MG/1
600 TABLET, FILM COATED ORAL EVERY 6 HOURS PRN
Qty: 60 TABLET | Refills: 0 | Status: SHIPPED | OUTPATIENT
Start: 2025-03-08

## 2025-03-08 RX ADMIN — ACETAMINOPHEN 650 MG: 325 TABLET, FILM COATED ORAL at 09:30

## 2025-03-08 RX ADMIN — IBUPROFEN 800 MG: 800 TABLET, FILM COATED ORAL at 09:30

## 2025-03-08 ASSESSMENT — ACTIVITIES OF DAILY LIVING (ADL)
ADLS_ACUITY_SCORE: 21

## 2025-03-08 NOTE — PLAN OF CARE
VSS with the exception of /92. Provider aware and labs drawn. Blood pressures appropriate the remainder of shift. Postpartum assessment WDL. Fundus firm, midline, at umbilicus. Bleeding scant and rubra. Denies any pre-E symptoms. Voiding independently. Ambulating independently. Bonding well with baby in room. Breastfeeding every 2-3 hours.

## 2025-03-08 NOTE — PROVIDER NOTIFICATION
03/07/25 2245   Provider Notification   Provider Name/Title LEAH Vaughn   Method of Notification Electronic Page   Request Evaluate-Remote   Notification Reason Vital Signs Change  (/92 denies pre-e symptoms)

## 2025-03-08 NOTE — PLAN OF CARE
Goal Outcome Evaluation:    Vital signs stable. Postpartum assessment WDL. Pain controlled with Tylenol and Ibuprofen. Patient voiding without difficulty. Breastfeeding on cue with minimal assist. Patient and infant bonding well. Will continue with current plan of care.     Problem: Diabetes in Pregnancy  Goal: Optimal Coping  Outcome: Adequate for Care Transition  Intervention: Support Wellbeing and Self-Management Success  Recent Flowsheet Documentation  Taken 3/8/2025 8233 by Dahlia Santos RN  Supportive Measures:   active listening utilized   positive reinforcement provided   self-care encouraged  Family/Support System Care: support provided     Problem: Adult Inpatient Plan of Care  Goal: Plan of Care Review  Description: The Plan of Care Review/Shift note should be completed every shift.  The Outcome Evaluation is a brief statement about your assessment that the patient is improving, declining, or no change.  This information will be displayed automatically on your shift  note.  Outcome: Adequate for Care Transition     Problem: Adult Inpatient Plan of Care  Goal: Readiness for Transition of Care  Outcome: Adequate for Care Transition

## 2025-03-08 NOTE — LACTATION NOTE
"This note was copied from a baby's chart.  Consult for:  Patient request, Early Term Infant    Infant Name: Sin    Infant's Primary Care Clinic: Undecided but likely an [facility]    Delivery Information:  Sin was born at Gestational Age: 37w5d via vaginal delivery on 3/7/2025 6:28 AM     Maternal Health History:    Information for the patient's mother:  Briana Newman [[id number]]     Patient Active Problem List   Diagnosis    Vitamin D deficiency    BMI 35.0-35.9,adult    Postural tachycardia with syncope    Current moderate episode of major depressive disorder without prior episode (H)    Hx of CHRIS (generalized anxiety disorder)    Cervical high risk HPV (human papillomavirus) test positive    Migraine with aura and without status migrainosus, not intractable    Supervision of other high risk pregnancies, first trimester    History of insulin controlled gestational diabetes mellitus (GDM)    Hx of preeclampsia, prior pregnancy, currently pregnant, first trimester    Hx of maternal third degree perineal laceration, currently pregnant    Rubella non-immune status, antepartum    Palpitations    Insulin controlled gestational diabetes mellitus (GDM) in third trimester    Fetal tachycardia affecting management of mother     Maternal Breast Exam:  Briana noted breast growth and sensitivity in early pregnancy. She denies any history of breast/chest injury or surgery. Her breasts are soft and symmetrical with bilateral intact, everted nipples. She has been able to hand express colostrum. ?    Breastfeeding/ Lactation History: Sin is Briana's 3rd baby. She shares she attempted to breastfeed her first 2 daughters but did not go beyond about 1 week with each. She shares she had challenges related to her mental health and that both girls were diagnosed with \"tongue tie\".      Infant information: Sin was AGA at birth and has age appropriate output and weight loss.      Weight Change Since Birth: -5% at 1 " day old     Oral exam of baby:  Sin has a higher arched palate with good length of tongue beyond lingual frenulum attachment, extension well beyond gum ridge & organized when sucking on finger.     Feeding History: Briana has been breastfeeding Sin on demand and is trying to feed at least every 3 hours. She shares that Sin did some cluster feeding overnight.   Briana denies discomfort with breastfeeding. She has done some hand expression but has not practiced with each feeding.     Feeding Assessment:  Briana was independently able to position and latch Sin. Sin appeared to have a deep latch and demonstrated sustained, coordinated sucking. Swallowing noted throughout the feeding.     Education:   [x] Potential feeding challenges of early term infants  [x] Expected  feeding patterns in the first few days (pg. 38 of Your Guide to To Postpartum and  Care)/ the Second Night  [x] Stages of milk production  [x] Benefits of hand expression of colostrum  [x] Early feeding cues   [x] Feeding frequency- encourage at least every 3 hours.     [] Benefits of feeding on cue  [x] Benefits of skin to skin  [] Breastfeeding positions  [x] Tips to get and maintain a deep latch  [] Nutritive vs.non-nutritive sucking  [x] Gentle breast compressions as needed to enhance milk transfer  [x] How to tell when baby is finished  [x] How to tell if baby is getting enough  [x] Expected  output  [x]  weight loss  [x] Infant Feeding Log  [] [employer] Breastfeeding/Pumping videos  [] Signs breastfeeding is going well (comfortable latch, audible swallows, age appropriate output and weight loss)    [] Tips to prevent engorgement  [] Signs of engorgement  [] Tips to manage engorgement  [x] Hand expression and pumping recommendations  [] Supplement recommendations   [] Satiety cues   [] CDC breast pump part/infant feeding supplies cleaning recommendations  [x] Inpatient breastfeeding support  [x]  "Outpatient lactation resources and follow up recommendations    Handouts: Infant Feeding Log (Week 1, Your Guide to Postpartum &  Care Book)    Home Breast Pump: Briana would like a pump at discharge. I reviewed the 2 types of pumps we have available at the hospital. Briana plans to review pumps on google and will decide prior to discharge.      Plan (Early Term): Frequent skin to skin, watch for early feeding cues and breastfeed on cue with goal of 8 to 12 feedings in 24 hours. Go no longer than 3 hours between feedings. Encourage breast compressions to enhance milk transfer when infant at the breast.    Encourage hand expression after feedings until milk is in.  Supplement after breastfeeding with any expressed milk.     If concerns about milk supply, add in hands on pumping 4-6 times daily to support \"full term\" supply.     Follow up with outpatient lactation consultant within a week of discharge for support with early term infant, and weaning from pumping after supply established. Family plans to follow up with [facility]. Reviewed the [facility] Lactation Resource Handout.        Marielle Orlando RN, IBCLC   Lactation Consultant  [place]: [facility] [phone]  Office: [phone]                "

## 2025-03-08 NOTE — DISCHARGE INSTRUCTIONS
Warning Signs after Having a Baby    Keep this paper on your fridge or somewhere else where you can see it.    Call your provider if you have any of these symptoms up to 12 weeks after having your baby.    Thoughts of hurting yourself or your baby  Pain in your chest or trouble breathing  Severe headache not helped by pain medicine  Eyesight concerns (blurry vision, seeing spots or flashes of light, other changes to eyesight)  Fainting, shaking or other signs of a seizure    Call 9-1-1 if you feel that it is an emergency.     The symptoms below can happen to anyone after giving birth. They can be very serious. Call your provider if you have any of these warning signs.    My provider s phone number: _______________________    Losing too much blood (hemorrhage)    Call your provider if you soak through a pad in less than an hour or pass blood clots bigger than a golf ball. These may be signs that you are bleeding too much.    Blood clots in the legs or lungs    After you give birth, your body naturally clots its blood to help prevent blood loss. Sometimes this increased clotting can happen in other areas of the body, like the legs or lungs. This can block your blood flow and be very dangerous.     Call your provider if you:  Have a red, swollen spot on the back of your leg that is warm or painful when you touch it.   Are coughing up blood.     Infection    Call your provider if you have any of these symptoms:  Fever of 100.4 F (38 C) or higher.  Pain or redness around your stitches if you had an incision.   Any yellow, white, or green fluid coming from places where you had stitches or surgery.    Mood Problems (postpartum depression)    Many people feel sad or have mood changes after having a baby. But for some people, these mood swings are worse.     Call your provider right away if you feel so anxious or nervous that you can't care for yourself or your baby.    Preeclampsia (high blood pressure)    Even if you  didn't have high blood pressure when you were pregnant, you are at risk for the high blood pressure disease called preeclampsia. This risk can last up to 12 weeks after giving birth.     Call your provider if you have:   Pain on your right side under your rib cage  Sudden swelling in the hands and face    Remember: You know your body. If something doesn't feel right, get medical help.     For informational purposes only. Not to replace the advice of your health care provider. Copyright 2020 [facility]. All rights reserved. Clinically reviewed by Marycarmen Chawla, RNC-OB, MSN. Trup[employer]ion [id number] - Rev 02/23.

## 2025-03-10 ENCOUNTER — OFFICE VISIT (OUTPATIENT)
Dept: FAMILY MEDICINE | Facility: CLINIC | Age: 35
End: 2025-03-10
Payer: COMMERCIAL

## 2025-03-10 VITALS
DIASTOLIC BLOOD PRESSURE: 68 MMHG | HEIGHT: 63 IN | HEART RATE: 77 BPM | RESPIRATION RATE: 16 BRPM | WEIGHT: 209 LBS | SYSTOLIC BLOOD PRESSURE: 120 MMHG | BODY MASS INDEX: 37.03 KG/M2 | TEMPERATURE: 98.1 F | OXYGEN SATURATION: 99 %

## 2025-03-10 DIAGNOSIS — R39.15 URINARY URGENCY: ICD-10-CM

## 2025-03-10 DIAGNOSIS — N30.01 ACUTE CYSTITIS WITH HEMATURIA: Primary | ICD-10-CM

## 2025-03-10 LAB
ALBUMIN UR-MCNC: NEGATIVE MG/DL
APPEARANCE UR: CLEAR
BACTERIA #/AREA URNS HPF: ABNORMAL /HPF
BILIRUB UR QL STRIP: NEGATIVE
COLOR UR AUTO: YELLOW
GLUCOSE UR STRIP-MCNC: NEGATIVE MG/DL
HGB UR QL STRIP: ABNORMAL
KETONES UR STRIP-MCNC: NEGATIVE MG/DL
LEUKOCYTE ESTERASE UR QL STRIP: ABNORMAL
NITRATE UR QL: NEGATIVE
PH UR STRIP: 7 [PH] (ref 5–8)
RBC #/AREA URNS AUTO: ABNORMAL /HPF
SP GR UR STRIP: 1.02 (ref 1–1.03)
SQUAMOUS #/AREA URNS AUTO: ABNORMAL /LPF
UROBILINOGEN UR STRIP-ACNC: 0.2 E.U./DL
WBC #/AREA URNS AUTO: ABNORMAL /HPF

## 2025-03-10 PROCEDURE — 99214 OFFICE O/P EST MOD 30 MIN: CPT | Performed by: FAMILY MEDICINE

## 2025-03-10 PROCEDURE — 81001 URINALYSIS AUTO W/SCOPE: CPT | Performed by: FAMILY MEDICINE

## 2025-03-10 PROCEDURE — 87086 URINE CULTURE/COLONY COUNT: CPT | Performed by: FAMILY MEDICINE

## 2025-03-10 RX ORDER — CEPHALEXIN 500 MG/1
500 CAPSULE ORAL 2 TIMES DAILY
Qty: 14 CAPSULE | Refills: 0 | Status: SHIPPED | OUTPATIENT
Start: 2025-03-10 | End: 2025-03-17

## 2025-03-10 ASSESSMENT — PATIENT HEALTH QUESTIONNAIRE - PHQ9
SUM OF ALL RESPONSES TO PHQ QUESTIONS 1-9: 0
10. IF YOU CHECKED OFF ANY PROBLEMS, HOW DIFFICULT HAVE THESE PROBLEMS MADE IT FOR YOU TO DO YOUR WORK, TAKE CARE OF THINGS AT HOME, OR GET ALONG WITH OTHER PEOPLE: NOT DIFFICULT AT ALL
SUM OF ALL RESPONSES TO PHQ QUESTIONS 1-9: 0

## 2025-03-10 NOTE — PROGRESS NOTES
"  Assessment & Plan     Urinary urgency  Discussed good hydration, cephalexin prescribed take as directed, possible side effect discussed included breast-feeding consideration, follow-up with GYN if not improving next 5 to 7 days.  Sooner if symptoms get worse.  - UA Macroscopic with reflex to Microscopic and Culture - Clinic Collect  - Urine Macroscopic with reflex to Microscopic; Future  - Urine Macroscopic with reflex to Microscopic  - Urine Microscopic Exam  - cephALEXin (KEFLEX) 500 MG capsule; Take 1 capsule (500 mg) by mouth 2 times daily for 7 days.    Cervical cancer screening  Screening for cervical cancer  She will follow-up with her GYN.    Review of external notes as documented elsewhere in note  30 minutes spent by me on the date of the encounter doing chart review, review of outside records, review of test results, interpretation of tests, patient visit, and documentation     MED REC REQUIRED  Post Medication Reconciliation Status: discharge medications reconciled and changed, per note/orders  BMI  Estimated body mass index is 37.02 kg/m  as calculated from the following:    Height as of this encounter: 1.6 m (5' 3\").    Weight as of this encounter: 94.8 kg (209 lb).             Don Warren is a 35 year old, presenting for the following health issues:  Rule out Urinary Tract Infection (Has been having cramping, painful to walk , urgency painful urination)        3/10/2025     3:00 PM   Additional Questions   Roomed by Rocio     History of Present Illness       Reason for visit:  Uti  Symptom onset:  1-3 days ago   She is taking medications regularly.    Patient is here today with about 2 to 3 days duration of frequency, cramping with urination, no fever or chills, she is postpartum day #3.  According to patient GBS was negative.Has had similar episode after her second pregnancy delivery about 2 years ago and she was tried with cephalosporin and responded well.Not currently breast-feeding but " "planning to start anytime soon.             Genitourinary - Female  Onset/Duration: 3 days recent pregnancy  Description:   Painful urination (Dysuria): YES           Frequency: YES  Blood in urine (Hematuria): YES- recent pregnancy   Delay in urine (Hesitency): No  Intensity: moderate  Progression of Symptoms:  worsening  Accompanying Signs & Symptoms:  Fever/chills: No  Flank pain: YES  Nausea and vomiting: No  Vaginal symptoms: none  Abdominal/Pelvic Pain: No  History:   History of frequent UTI s: YES  History of kidney stones: No  Sexually Active: No  Possibility of pregnancy: recent pregancy   Precipitating or alleviating factors: None      Review of Systems  Constitutional, HEENT, cardiovascular, pulmonary, gi and gu systems are negative, except as otherwise noted.      Objective    /68   Pulse 77   Temp 98.1  F (36.7  C) (Oral)   Resp 16   Ht 1.6 m (5' 3\")   Wt 94.8 kg (209 lb)   LMP 06/16/2024 (Exact Date)   SpO2 99%   Breastfeeding Yes   BMI 37.02 kg/m    Body mass index is 37.02 kg/m .  Physical Exam   GENERAL: alert and no distress  NECK: no adenopathy, no asymmetry, masses, or scars  RESP: lungs clear to auscultation - no rales, rhonchi or wheezes  CV: regular rate and rhythm, normal S1 S2, no S3 or S4, no murmur, click or rub, no peripheral edema  ABDOMEN: soft, nontender and No CVA bilaterally's.  MS: no gross musculoskeletal defects noted, no edema    Results for orders placed or performed in visit on 03/10/25   Urine Macroscopic with reflex to Microscopic     Status: Abnormal   Result Value Ref Range    Color Urine Yellow Colorless, Straw, Light Yellow, Yellow    Appearance Urine Clear Clear    Glucose Urine Negative Negative mg/dL    Bilirubin Urine Negative Negative    Ketones Urine Negative Negative mg/dL    Specific Gravity Urine 1.020 1.005 - 1.030    Blood Urine Large (A) Negative    pH Urine 7.0 5.0 - 8.0    Protein Albumin Urine Negative Negative mg/dL    Urobilinogen Urine 0.2 " 0.2, 1.0 E.U./dL    Nitrite Urine Negative Negative    Leukocyte Esterase Urine Small (A) Negative   Urine Microscopic Exam     Status: Abnormal   Result Value Ref Range    Bacteria Urine Moderate (A) None Seen /HPF    RBC Urine  (A) 0-2 /HPF /HPF    WBC Urine 10-25 (A) 0-5 /HPF /HPF    Squamous Epithelials Urine Moderate (A) None Seen /LPF         This note was completed in part using a voice recognition software, any grammatical or context distortion are unintentional and inherent to the software.    Signed Electronically by: Cande Bowman MD

## 2025-03-11 ENCOUNTER — PATIENT OUTREACH (OUTPATIENT)
Dept: CARE COORDINATION | Facility: CLINIC | Age: 35
End: 2025-03-11
Payer: COMMERCIAL

## 2025-03-11 LAB — BACTERIA UR CULT: NO GROWTH

## 2025-03-11 NOTE — PROGRESS NOTES
[facility]    Background: Transitional Care Management program identified per system criteria and reviewed by [facility] team for possible outreach.    Assessment:  Upon chart review, patient is in communication with a clinic team member, nurse or provider within [facility] for reason of discussing hospital follow up plan of care and answering questions patient may have related to discharge instructions. [facility] Team member will update episode status of Transitional Care Management program to enrolled per system workflows.     [facility] made first outreach attempt on 3/10 to reach patient for hospital follow up and left message and that time.    Patient has a follow up appointment with an appropriate provider today for hospital discharge    Patient had an appointment yesterday with an MD in Family Medicine from [facility]. Patient's appointment was appropriate for ED/Hospital discharge and follow-up. No [facility] outreach call needed at this time. Chart review second outreach.    Plan: [facility] will make no additional outreach attempts to minimize duplicative efforts and reduce potential confusion for patient.      VIVEK Campuzano  [phone]  [facility]     *[facility] Resource Team does NOT follow patient ongoing. Referrals are identified based on internal discharge reports and the outreach is to ensure patient has an understanding of their discharge instructions.

## 2025-04-10 ENCOUNTER — PATIENT OUTREACH (OUTPATIENT)
Dept: MIDWIFE SERVICES | Facility: CLINIC | Age: 35
End: 2025-04-10
Payer: COMMERCIAL

## 2025-04-10 DIAGNOSIS — R87.810 CERVICAL HIGH RISK HPV (HUMAN PAPILLOMAVIRUS) TEST POSITIVE: Primary | ICD-10-CM

## 2025-04-24 ENCOUNTER — PRENATAL OFFICE VISIT (OUTPATIENT)
Dept: OBGYN | Facility: CLINIC | Age: 35
End: 2025-04-24
Attending: ADVANCED PRACTICE MIDWIFE
Payer: COMMERCIAL

## 2025-04-24 VITALS
HEART RATE: 80 BPM | SYSTOLIC BLOOD PRESSURE: 127 MMHG | DIASTOLIC BLOOD PRESSURE: 86 MMHG | WEIGHT: 206.2 LBS | BODY MASS INDEX: 36.53 KG/M2

## 2025-04-24 DIAGNOSIS — Z12.4 CERVICAL CANCER SCREENING: ICD-10-CM

## 2025-04-24 DIAGNOSIS — O09.299 HX OF MATERNAL THIRD DEGREE PERINEAL LACERATION, CURRENTLY PREGNANT: ICD-10-CM

## 2025-04-24 DIAGNOSIS — F32.1 CURRENT MODERATE EPISODE OF MAJOR DEPRESSIVE DISORDER WITHOUT PRIOR EPISODE (H): ICD-10-CM

## 2025-04-24 PROCEDURE — G0463 HOSPITAL OUTPT CLINIC VISIT: HCPCS | Performed by: ADVANCED PRACTICE MIDWIFE

## 2025-04-24 RX ORDER — BUPROPION HYDROCHLORIDE 100 MG/1
100 TABLET ORAL DAILY
Qty: 30 TABLET | Refills: 4 | Status: CANCELLED | OUTPATIENT
Start: 2025-04-24

## 2025-04-24 RX ORDER — BUPROPION HYDROCHLORIDE 300 MG/1
300 TABLET ORAL EVERY MORNING
Qty: 90 TABLET | Refills: 3 | Status: SHIPPED | OUTPATIENT
Start: 2025-05-08

## 2025-04-24 RX ORDER — BUPROPION HYDROCHLORIDE 150 MG/1
150 TABLET ORAL EVERY MORNING
Qty: 14 TABLET | Refills: 0 | Status: SHIPPED | OUTPATIENT
Start: 2025-04-24

## 2025-04-24 NOTE — PATIENT INSTRUCTIONS
Thank you for trusting us with your care!   Please be aware, if you are on Mychart, you may see your results prior to your providers review. If labs are abnormal, we will call or message you on [employer] with a follow up plan.    If you need to contact us for questions about:  Symptoms, Scheduling & Medical Questions; Non-urgent (2-3 day response) [employer] message, Urgent (needing response today) [phone] (if after 3:30pm next day response)   Prescriptions: Please call your Pharmacy   Billing: [facility] [phone] or [facility]:[phone]

## 2025-04-24 NOTE — NURSING NOTE
Chief Complaint   Patient presents with    Postpartum Care     6 wk PP     SUBJECTIVE:   Briana Newman is here for her 6-week postpartum checkup.     EPDS score: 9    Delivery Date: 2025.    Delivering provider:  Dionisio Reeder CNM.    Type of delivery:  .  Perineum:  tear, with repair.     Delivery complications: None  Infant gender:  girl, weight 7 pounds 4 oz.  Feeding Method:  Bottlefed, formula.  Complications reported with feeding:  none, infant thriving .    Bleeding:  Moderate.  Duration:  3 weeks.  Menses resumed:  Yes   Bowel/Urinary problems:  No    Contraception Planned:  none right now.   She  has had intercourse since delivery and experienced  No discomfort.  .

## 2025-04-24 NOTE — PROGRESS NOTES
6-week Postpartum Visit:       Subjective:     Briana Newman is a 35 year old yo  here for her 6-week postpartum checkup.     HPI: She denies any concerns or questions.    DELIVERY DATE: 3/7/25   of a viable girl. Pregnancy complicated by Gestational Diabetes - A2  PERINEAL LACERATION: 1st degree.  PP HGB: 10.3  FEEDING METHOD:Bottlefed  POSTPARTUM SUPPORT: Christianne will be home. Returns to work May 30th.  Other support - uncle and aunt live in the cities; her family visits often  HX OF DEPRESSION:Yes: would like to start Wellbutrin    CONTRACEPTION PLANNED: condoms  She  has not had intercourse since delivery and complains of No discomfort.  HX OF ABUSE:No  OTHER HPI: She has NA signs of infection, bleeding or other complications. Bleeding stopped, epis/lac healing well. No bowel or bladder concerns      Review of Systems  -A 12 point comprehensive review of systems was negative except as noted above.  -Prenatal history and intrapartum course were also reviewed today.  -Social, Medical and family history reviewed       Objective:   /86   Pulse 80   Wt 93.5 kg (206 lb 3.2 oz)   LMP 2025 (Exact Date)   Breastfeeding No   BMI 36.53 kg/m      EPDS: 9   EXAM:  LMP 2024 (Exact Date)   GENERAL APPEARANCE: healthy, alert and no distress  NECK: thyroid normal to palpation  BREAST: soft, nontender, nipples intact  ABDOMEN: soft, nontender, diastasis recti closing  PSYCH: mentation appears normal and affect normal/bright  PELVIC EXAM:  Vulva: BUS WNL, no lesions noted,   Vagina: Discharge normal and physiologic, no lesions, well rugated, good tone,  Cervix: Smooth, pink, no visible lesions  Rectal exam: deferred    Immunization History   Administered Date(s) Administered    DTAP (<7y) 1997, 1998, 09/15/1998, 10/30/2007    Flu, Unspecified 10/30/2007, 2009, 10/22/2009, 10/26/2009, 2013, 2019    HPV Quadrivalent 10/30/2007, 2009, 2010, 2010,  2016, 10/06/2016    HepB, Unspecified 1997, 1998, 1998    Influenza (H1N1) 10/26/2009    Influenza Vaccine >6 months,quad, PF 2023    Influenza, Split Virus, Trivalent, Pf (Fluzone\Fluarix) 2024    MMR (MMRII) 1997, 1998    Meningococcal ACWY (Menactra ) 10/30/2007, 2012    OPV, unspecified 1997, 1997, 1998, 09/15/1998    TDAP (Adacel,Boostrix) 2012, 2019, 2022    Varicella (Varivax) 1998, 2010     Immunization status: up to date and documented    Assessment:     Normal postpartum exam after .  Last pap = . HPV cotesting collected today.       Plan:     Adjustment to parenting, self care and importance of a support system discussed. Postpartum education given including: postpartum mood changes and postpartum depression.  Return of fertility discussed. Plans condoms and NFP for contraception. Education given on this method. Resumption of intercourse reviewed with possible changes in libido.  Return as needed or at time interval for next routine pap, pelvic, or breast exam.  Discussed resumption of exercise and normal timing of return to pre-pregnancy weight. Postpartum physical activity reviewed Encouraged integrating exercise, such as walking 20-30mns daily.   Nutrition and supplements reviewed.  Advised continuation of a prenatal or multivitamin, also Vitamin D3 2000 IU daily.  Reviewed warning signs of pelvic pain, excessive bleeding or abdominal pain, fever/chills, or signs of breast infection.    Pap smear was obtained.        I, Ariane Dyson, am serving as a scribe; to document services personally performed by  Luisana MALDONADO CNM based on data collection and the provider's statements to me.     Ariane Dyson RN, SNM  I agree with the PFSH and ROS as completed by the student, except for changes made by me. The remainder of the encounter scribed by the student. The scribed note accurately reflects my  personal services and decisions made by me.  Luisana Pierson, DNP, CNM, APRN

## 2025-04-25 NOTE — PLAN OF CARE
[id number]: The patient's vitals are WNL. She is taking analgesics and using hot packs for cramping. She is breastfeeding well and independently, but baby has been sleely. Continue to support.     [facility] called and need recent orders  resent over said they copy they received was choppy and not a quality scan and said it needed to be signed as well please advise

## 2025-04-30 ENCOUNTER — OFFICE VISIT (OUTPATIENT)
Dept: FAMILY MEDICINE | Facility: CLINIC | Age: 35
End: 2025-04-30
Payer: COMMERCIAL

## 2025-04-30 VITALS
WEIGHT: 206 LBS | RESPIRATION RATE: 12 BRPM | HEART RATE: 70 BPM | SYSTOLIC BLOOD PRESSURE: 122 MMHG | HEIGHT: 64 IN | TEMPERATURE: 98 F | DIASTOLIC BLOOD PRESSURE: 81 MMHG | BODY MASS INDEX: 35.17 KG/M2 | OXYGEN SATURATION: 100 %

## 2025-04-30 DIAGNOSIS — E66.01 MORBID OBESITY (H): ICD-10-CM

## 2025-04-30 DIAGNOSIS — O24.414 INSULIN CONTROLLED GESTATIONAL DIABETES MELLITUS (GDM) IN THIRD TRIMESTER: Primary | ICD-10-CM

## 2025-04-30 DIAGNOSIS — D64.9 ANEMIA, UNSPECIFIED TYPE: ICD-10-CM

## 2025-04-30 LAB
BKR AP ASSOCIATED HPV REPORT: NORMAL
BKR LAB AP GYN ADEQUACY: NORMAL
BKR LAB AP GYN INTERPRETATION: NORMAL
BKR LAB AP LMP: NORMAL
BKR LAB AP PREVIOUS ABNORMAL: NORMAL
EST. AVERAGE GLUCOSE BLD GHB EST-MCNC: 103 MG/DL
HBA1C MFR BLD: 5.2 % (ref 0–5.6)
HGB BLD-MCNC: 13.1 G/DL (ref 11.7–15.7)
PATH REPORT.COMMENTS IMP SPEC: NORMAL
PATH REPORT.COMMENTS IMP SPEC: NORMAL
PATH REPORT.RELEVANT HX SPEC: NORMAL

## 2025-04-30 PROCEDURE — 85018 HEMOGLOBIN: CPT | Performed by: NURSE PRACTITIONER

## 2025-04-30 PROCEDURE — 36415 COLL VENOUS BLD VENIPUNCTURE: CPT | Performed by: NURSE PRACTITIONER

## 2025-04-30 PROCEDURE — 83036 HEMOGLOBIN GLYCOSYLATED A1C: CPT | Performed by: NURSE PRACTITIONER

## 2025-04-30 PROCEDURE — 99214 OFFICE O/P EST MOD 30 MIN: CPT | Performed by: NURSE PRACTITIONER

## 2025-04-30 NOTE — PROGRESS NOTES
Assessment & Plan     Diagnosis: Insulin controlled gestational diabetes mellitus (GDM) in third trimester  Comments: History of gestational diabetes. No current concerns for diabetes. Denies polyuria, polydipsia, polyphagia, burning, tingling, numbness in feet, and blurry vision. A1c is 5.2 today with an estimated average glucose of 103. Gestational diabetes resolved.  Plan: Check A1c today.  - Dietary changes stressed  - Exercise recommended  Follow up in 6 months.     Diagnosis: Morbid obesity (H)  - Comments: BMI of 35.6. Previous attempts at lifestyle changes including diet and exercise were unsuccessful. Requesting weight management medication.  - Plan: Start semaglutide 2.5 mg for four weeks. Follow-up appointment in four weeks to assess weight loss and side effects. If insurance approves, increase dose. Discussed side effects of semaglutide. Advised to return to clinic with any questions or concerns about medication administration.    Diagnosis: Anemia, unspecified type  - Comments: History of low hemoglobin while at the hospital or before delivery. Current hemoglobin is 13.1, up from 10.3. Anemia resolved.  - Plan: Check hemoglobin today.        Don Warren is a 35 year old, presenting for the following health issues:  weight concerns and LAB REQUEST (A1c, has history of gestational diabetes )        4/30/2025    11:36 AM   Additional Questions   Roomed by Sonw HOWARD CMA         4/30/2025    11:36 AM   Patient Reported Additional Medications   Patient reports taking the following new medications None     History of Present Illness       Reason for visit:  Weight   She is taking medications regularly.   History of Present Illness    Briana Newman, 35 years old female, is here for a follow-up on gestational diabetes and weight management. She has a history of gestational diabetes and stopped taking insulin, specifically Lantus and Humalog, around March 7, 2025. Her A1c was checked about a month ago  and was 6. She did not require diabetic medication after delivering her baby in the past and hopes this will be the case again. She denies any current concerns for diabetes and reports no symptoms such as polyuria, polydipsia, polyphagia, burning, tingling, or numbness in her feet. She also denies any blurry vision and does not check her blood glucose regularly. Her gestational diabetes is reported to have resolved.    Briana is also here for weight management, with a BMI of 35.6. She has previously attempted lifestyle changes, including diet and physical activity, but reports no success with these efforts. She is currently requesting weight management medication.    Briana reported having low hemoglobin while she was at the hospital or before she delivered the baby. She denies experiencing fatigue, palpitations, or chest pain. Her hemoglobin level was previously 10.3 and has increased to 13.1, indicating resolution of anemia.            Gastational Diabetes Follow-up- stopped lantus and humolog after she delivered     How often are you checking your blood sugar? Not at all  What concerns do you have today about your diabetes? None   Do you have any of these symptoms? (Select all that apply)  No numbness or tingling in feet.  No redness, sores or blisters on feet.  No complaints of excessive thirst.  No reports of blurry vision.  No significant changes to weight.  March 7    BP Readings from Last 2 Encounters:   04/30/25 122/81   04/24/25 127/86     Hemoglobin A1C (%)   Date Value   04/30/2025 5.2   03/06/2025 6.0 (H)     LDL Cholesterol Calculated (mg/dL)   Date Value   08/23/2023 141 (H)   02/16/2023 207 (H)         How many servings of fruits and vegetables do you eat daily?          Review of Systems  Constitutional, HEENT, cardiovascular, pulmonary, GI, , musculoskeletal, neuro, skin, endocrine and psych systems are negative, except as otherwise noted.      Objective    /81   Pulse 70   Temp 98  F  "(36.7  C) (Temporal)   Resp 12   Ht 1.62 m (5' 3.78\")   Wt 93.4 kg (206 lb)   LMP 04/19/2025 (Exact Date)   SpO2 100%   BMI 35.60 kg/m    Body mass index is 35.6 kg/m .  Physical Exam   GENERAL: alert and no distress  NECK: no adenopathy, no asymmetry, masses, or scars  RESP: lungs clear to auscultation - no rales, rhonchi or wheezes  CV: regular rate and rhythm, normal S1 S2, no S3 or S4, no murmur, click or rub, no peripheral edema  ABDOMEN: soft, nontender, no hepatosplenomegaly, no masses and bowel sounds normal  MS: no gross musculoskeletal defects noted, no edema  SKIN: no suspicious lesions or rashes  NEURO: Normal strength and tone, mentation intact and speech normal  PSYCH: mentation appears normal, affect normal/bright            Signed Electronically by: ERICA Vanegas CNP      I am utilizing AI dictation through [employer] to document the patient's history and physical examination. Please note that while the AI is designed to assist in capturing detailed information, there may be errors in the dictation. I will review and edit the content for accuracy before finalizing.     "

## 2025-05-01 ENCOUNTER — MYC MEDICAL ADVICE (OUTPATIENT)
Dept: FAMILY MEDICINE | Facility: CLINIC | Age: 35
End: 2025-05-01
Payer: COMMERCIAL

## 2025-05-01 NOTE — TELEPHONE ENCOUNTER
Called pharmacy to confirm need for prior auth. TE made and sent to prior auth team.    Fozia Ellison RN on 5/1/2025 at 10:03 AM

## 2025-05-08 ENCOUNTER — TELEPHONE (OUTPATIENT)
Dept: ALLERGY | Facility: CLINIC | Age: 35
End: 2025-05-08
Payer: COMMERCIAL

## 2025-05-08 NOTE — TELEPHONE ENCOUNTER
Standardized panels  [x] Standard panel (40 tests)  [x] Preservatives & Antimicrobials (31 tests)  [x] Emulsifiers & Additives (25 tests)   [x] Perfumes/Flavours & Plants (25 tests)  [x] Hairdresser panel (12 tests)  [x] Rubber Chemicals (22 tests)  [] Plastics (26 tests)  [x] Colorants/Dyes/Food additives (20 tests)  [] Metals (implants/dental) (24 tests)  [] Local anaesthetics/NSAIDs (13 tests)  [] Antibiotics & Antimycotics (14 tests)   [] Corticosteroids (15 tests)   [] Photopatch test (62 tests)   [] Others:   STANDARD Series                                          # Substance 2 days 4 days remarks     1 Keon Mix III 10% - -       2 Colophony - -       3  2-Mercaptobenzothiazole  - -       4 Methylisothiazolinone - -       5 Carba Mix - -       6 Thiuram Mix [A] - -       7 Bisphenol A Epoxy Resin - -       8 M-Pizf-Uzwoiptanpk-Formaldehyde Resin - -       9 Mercapto Mix [A] - -       10 Black Rubber Mix- PPD [B] - -       11 Potassium Dichromate  -  -       12 Balsam of [place] (Myroxylon Pereirae Resin) - -       13 Nickel Sulphate Hexahydrate - -       14 Mixed Dialkyl Thiourea - -       15 Paraben Mix [B] - -       16 Methyldibromo Glutaronitrile - -       17 Fragrance Mix 8% - -       18 2-Bromo-2-Nitropropane-1,3-Diol (Bronopol) CT - -       19 Lyral - -       20 Tixocortol-21- Pivalate CT - -       21 Diazolidinyl urea (Germall II) - -        22 Methyl Methacrylate - -       23 Cobalt (II) Chloride Hexahydrate - -       24 Fragrance Mix II  - -       25 Compositae Mix II - -       26 Benzoyl Peroxide - -       27 Bacitracin - -       28 Formaldehyde - -       29 Methylchloroisothiazolinone / Methylisothiazolinone - -       30 Corticosteroid Mix CT - -       31 Sodium Lauryl Sulfate - -       32 Lanolin Alcohol - -       33 Turpentine - -       34 Cetylstearylalcohol - -       35 Chlorhexidine Dicluconate - -       36 Budesonide - -       37 Imidazolidinyl Urea  - -       38 Ethyl-2 Cyanoacrylate - -        39 Quaternium 15 (Dowicil 200) - -       40 Decyl Glucoside - -     Compositae Mix II - common yarrow, mountain arnica, Khmer chamomile, feverfew, and the common tansy   PRESERVATIVES & ANTIMICROBIALS        # Substance 2 days 4 days remarks   41 1 1,2-Benzisothiazoline-3-One, Sodium Salt - -     2 1,3,5-Dain (2-Hydroxyethyl) - Hexahydrotriazine (Grotan BK) - -     3 Dichlorophene - -     4 3, 4, 4' - Triclocarban - -    45 5 4 - Chloro - 3 - Cresol - -     6 4 - Chloro - 4 - Xylenol (PCMX) - -     7 7-Ethylbicyclooxazolidine (Bioban [id number]) - -     8 Benzalkonium Chloride CT - -     9 Benzyl Alcohol - -    50 10 Cetalkonium Chloride - -     11 Cetylpyrimidine Chloride  - -     12 Chloroacetamide - -     13 DMDM Hydantoin - -     14 Glutaraldehyde - -    55 15 Triclosan - -     16 Glyoxal Trimeric Dihydrate - -     17 Iodopropynyl Butylcarbamate - -     18 Octylisothiazoline - -     19 Acetophenone Azine - -    60 20 Bioban P 1487 (Nitrobutyl) Morpholine/(Ethylnitro-Trimethylene) Dimorpholine - -     21 Phenoxyethanol - -     22 Phenyl Salicylate - -     23 Povidone Iodine - -     24 Sodium Benzoate - -    65 25 Sodium Disulfite - -     26 Sorbic Acid - -     27 Thimerosal - -     28 Melamine Formaldehyde Resin - -     29 Ethylenediamine Dihydrochloride - -      Parabens      70 30 Butyl-P-Hydroxybenzoate - -     31 Ethyl-P-Hydroxybenzoate - -     32 Methyl-P-Hydroxybenzoate - -    73 33 Propyl-P-Hydroxybenzoate - -     EMULSIFIERS & ADDITIVES       # Substance 2 days 4 days remarks   74 1 Polyethylene Glycol-400 - -    75 2 Cocamidopropyl Betaine - -     3 Amerchol L101 - -     4 Propylene Glycol - -     5 Triethanolamine - -     6 Sorbitane Sesquiolate CT - -    80 7 Isopropylmyristate - -     8 Polysorbate 80 CT - -     9 Amidoamine   (Stearamidopropyl Dimethylamine) - -     10 Oleamidopropyl Dimethylamine - -     11 Lauryl Glucoside - -    85 12 Coconut Diethanolamide  - -     13 2-Hydroxy-4-Methoxy  Benzophenone (Oxybenzone) - -     14 Benzophenone-4 (Sulisobenzon) - -     15 Propolis - -     16 Dexpanthenol - -    90 17 Abitol (Hydroabietyl Alcohol) - -     18 Tert-Butylhydroquinone - -     19 Benzyl Salicylate - -     20 Dimethylaminopropylamin (DMPA) - -     21 Zinc Pyrithione (Zinc Omadine)  - -    95 22 Dain(Hydroxymethyl) Nitromethane  - -      Antioxidant       23 Dodecyl Gallate - -     24 Butylhydroxyanisole (BHA) - -     25 Butylhydroxytoluene (BHT) - -     26 Di-Alpha-Tocopherol (Vit E) - -    100 27 Propyl Gallate - -     PERFUMES, FLAVORS & PLANTS        # Substance 2 days 4 days remarks   101 1 Benzyl Cinnamate - -     2 Di-Limonene (Dipentene) - -     3 Cananga Odorata (Anni Hutton) (I) - -     4 Lichen Acid Mix - -    105 5 Mentha Piperita Oil (Peppermint Oil) - -     6 Sesquiterpenelactone mix - -     7 Tea Tree Oil, Oxidized - -     8 Wood Tar Mix - -     9 Abietic Acid - -    110 10 Lavendula Angustifolia Oil (Lavender Oil) - -     11 Fragrance mix II CT * 14% - -      Fragrance Mix I       12 Oakmoss Absolute - -     13 Eugenol - -     14 Geraniol - -    115 15 Hydroxycitronellal - -     16 Isoeugenol - -     17 Cinnamic Aldehyde - -     18 Cinnamic Alcohol  - -      Fragrance mix II       19 Citronellol - -    120 20 Alpha-Hexylcinnamic Aldehyde    - -     21 Citral - -     22 Farnesol - -    123 23 Coumarin - -    Hexylcinnamic aldehyde, Coumarin, Farnesol, Hydroxyisohexy3-cyclohexene carboxaldehyde, citral, citrolellol   HAIRDRESSER        # Substance 2 days 4 days remarks   124 1 P-Phenylenediamine -  -    125 2 P-Aminodiphenylamine - -     3 P-Toluenediamine Sulphate  -  -     4 Ammonium Thioglycolate - -     5 Ammonium Persulfate - -     6 Resorcinol - -    130 7 3-Aminophenol - -     8 P-Aminophenol - -     9 Glyceryl Monothioglycolate - -    133 10 Pyrogallol - -     RUBBER CHEMICALS        # Substance 2 days 4 days remarks     Carbamate      134 1 Zinc Bis ( Diethyldithio carbamate  ) (ZDEC) - -    135 2 Zinc Bis (Dibutyldithiocarbamate) - -     3 1,3-Diphenylguanidine (DPG) - -      Thiourea       4 Dibutylthiourea - -     5 Diphenyltiourea - -     6 Thiourea - -      Mercapto chemicals      140 7 Morpholinyl Mercaptobenzothiazole - -     8 U-Gfepmwmkor-5-Benzothiazyl-Sulfenamide - -     9 Dibenzothiazyl Disulfide - -     10 Dodecyl Mercaptan  - -      Thiuram chemicals       11 Dipentamethylenethiuram Disulfide - -    145 12 Tetraethylthiuram Disulfide (Disulfiram) - -     13 Tetramethylthiuram Disulfide - -     14 Tetramethyl Thiuram Monosulfide (TMTM) - -      4-Phenylenediamine derivatives       15 N-Isopropyl-N'-Phenyl-P-Phenylenediamine (IPPD) - -     16 Dbgpcqfa-W-Hqrlhlymkrlwfxbt (DPPD) - -      Various Rubber Additives      150 17 Hydroquinone Monobenzylether  - -     18 Hexamethylenetetramine - -     19 4,4'-Dihydroxybiphenyl - -     20 Cyclohexylthiophtalimide - -    154 21 N-Phenyl-B-Naphthylamine - -     COLORANTS, DYES, FOOD ADDITIVES   # Substance 2 days 4 days remarks     Textile dyes      155 1 Madan Brown R - -     2 Disperse Blue-3 - -     3 Disperse Orange-3 - -     4 Disperse Red 1 - 1% - -     5 Disperse Yellow-9 1% - -    160 6 Disperse blue mix 106/124 - -     7 Disperse yellow 3 - -     8 Naphthol AS - -     9 Disperse Red 11 - -     10 Reactive Black 5 - -    165 11 Disperse Red 17 - -     12 Acid Yellow 61 - -     13 Acid Red 118 - -     14 Disperse Blue 35 - -     15 Basic Red 46 - -      Food dyes/additives      170 16 Metanil yellow (F/T) - -     17 Cochineal Red (F/T) - -     18 Brilliant Black - -     19 Erythrosine-B (F/T) - -     20 Aspartame - -    175 21 Franklin (F/T) - -     22 Quinoline Yellow - -     23 Azorubine - -    178 24 Tartrazine - -      Results of patch tests:                         Interpretation:  - Negative                    A    = Allergic      (+) Erythema    TI   = Toxic/irritant   + E + Infiltration    RaP = Relevance at  Present     ++ E/I + Papulovesicle   Rpr  = Relevance Previously     +++ E/I/P + Blister     nR   = No Relevance

## 2025-05-12 ENCOUNTER — OFFICE VISIT (OUTPATIENT)
Dept: ALLERGY | Facility: CLINIC | Age: 35
End: 2025-05-12
Payer: COMMERCIAL

## 2025-05-12 DIAGNOSIS — L23.5 ALLERGIC DERMATITIS DUE TO OTHER CHEMICAL PRODUCT: ICD-10-CM

## 2025-05-12 DIAGNOSIS — L20.89 OTHER ATOPIC DERMATITIS: Primary | ICD-10-CM

## 2025-05-12 PROCEDURE — 95044 PATCH/APPLICATION TESTS: CPT | Performed by: DERMATOLOGY

## 2025-05-12 PROCEDURE — 95004 PERQ TESTS W/ALRGNC XTRCS: CPT | Performed by: DERMATOLOGY

## 2025-05-12 NOTE — LETTER
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     16 Dexpanthenol - -    90 17 Abitol (Hydroabietyl Alcohol) - -     18 Tert-Butylhydroquinone - -     19 Benzyl Salicylate - -     20 Dimethylaminopropylamin (DMPA) - -     21 Zinc Pyrithione (Zinc Omadine)  - -    95 22 Dain(Hydroxymethyl) Nitromethane  - -      Antioxidant       23 Dodecyl Gallate - -     24 Butylhydroxyanisole (BHA) - -     25 Butylhydroxytoluene (BHT) - -     26 Di-Alpha-Tocopherol (Vit E) - -    100 27 Propyl Gallate - -     PERFUMES, FLAVORS & PLANTS        # Substance 2 days 4 days remarks   101 1 Benzyl Cinnamate - -     2 Di-Limonene (Dipentene) - -     3 Cananga Odorata (Anni Hutton) (I) - -     4 Lichen Acid Mix - -    105 5 Mentha Piperita Oil (Peppermint Oil) - -     6 Sesquiterpenelactone mix - -     7 Tea Tree Oil, Oxidized - -     8 Wood Tar Mix - -     9 Abietic Acid - -    110 10 Lavendula Angustifolia Oil (Lavender Oil) - -     11 Fragrance mix II CT * 14% - -      Fragrance Mix I       12 [place]moss Absolute - -     13 Eugenol - -     14 Geraniol - -    115 15 Hydroxycitronellal - -     16 Isoeugenol - -     17 Cinnamic Aldehyde - -     18 Cinnamic Alcohol  - -      Fragrance mix II       19 Citronellol - -    120 20 Alpha-Hexylcinnamic Aldehyde    - -     21 Citral - -     22 Farnesol - -    123 23 Coumarin - -    Hexylcinnamic  aldehyde, Coumarin, Farnesol, Hydroxyisohexy3-cyclohexene carboxaldehyde, citral, citrolellol   HAIRDRESSER        # Substance 2 days 4 days remarks   124 1 P-Phenylenediamine -  -    125 2 P-Aminodiphenylamine - -     3 P-Toluenediamine Sulphate  -  -     4 Ammonium Thioglycolate - -     5 Ammonium Persulfate - -     6 Resorcinol - -    130 7 3-Aminophenol - -     8 P-Aminophenol - -     9 Glyceryl Monothioglycolate - -    133 10 Pyrogallol - -     RUBBER CHEMICALS        # Substance 2 days 4 days remarks     Carbamate      134 1 Zinc Bis ( Diethyldithio carbamate ) (ZDEC) - -    135 2 Zinc Bis (Dibutyldithiocarbamate) - -     3 1,3-Diphenylguanidine (DPG) - -      Thiourea       4 Dibutylthiourea - -     5 Diphenyltiourea - -     6 Thiourea - -      Mercapto chemicals      140 7 Morpholinyl Mercaptobenzothiazole - -     8 S-Vfutvuhdri-1-Benzothiazyl-Sulfenamide - -     9 Dibenzothiazyl Disulfide - -     10 Dodecyl Mercaptan  - -      Thiuram chemicals       11 Dipentamethylenethiuram Disulfide - -    145 12 Tetraethylthiuram Disulfide (Disulfiram) - -     13 Tetramethylthiuram Disulfide - -     14 Tetramethyl Thiuram Monosulfide (TMTM) - -      4-Phenylenediamine derivatives       15 N-Isopropyl-N'-Phenyl-P-Phenylenediamine (IPPD) - -     16 Nvjfeyye-Y-Eiluhcdooginxwil (DPPD) - -      Various Rubber Additives      150 17 Hydroquinone Monobenzylether  - -     18 Hexamethylenetetramine - -     19 4,4'-Dihydroxybiphenyl - -     20 Cyclohexylthiophtalimide - -    154 21 N-Phenyl-B-Naphthylamine - -     COLORANTS, DYES, FOOD ADDITIVES   # Substance 2 days 4 days remarks     Textile dyes      155 1 Madan Brown R - -     2 Disperse Blue-3 - -     3 Disperse Orange-3 - -     4 Disperse Red 1 - 1% - -     5 Disperse Yellow-9 1% - -    160 6 Disperse blue mix 106/124 - -     7 Disperse yellow 3 - -     8 Naphthol AS - -     9 Disperse Red 11 - -     10 Reactive Black 5 - -    165 11 Disperse Red 17 - -     12 Acid Yellow  61 - -     13 Acid Red 118 - -     14 Disperse Blue 35 - -     15 Basic Red 46 - -      Food dyes/additives      170 16 Metanil yellow (F/T) - -     17 Cochineal Red (F/T) - -     18 Brilliant Black - -     19 Erythrosine-B (F/T) - -     20 Aspartame - -    175 21 [place] (F/T) - -     22 Quinoline Yellow - -     23 Azorubine - -    178 24 Tartrazine - -      Results of patch tests:                         Interpretation:  - Negative                    A    = Allergic      (+) Erythema    TI   = Toxic/irritant   + E + Infiltration    RaP = Relevance at Present     ++ E/I + Papulovesicle   Rpr  = Relevance Previously     +++ E/I/P + Blister     nR   = No Relevance    [] No relevant allergic reaction observed    [] Allergic reaction diagnosed against following allergens:      Interpretation/ remarks:   See later    [] Patient information given   [] ACDS CAMP information's (# ....) to following compounds: .....   [] General information's to following compounds: ......    ____________________________________________    Assessment & Plan:    ==> Final Diagnosis:     # Recurrent dermatitis since fall 2020 on palms, scalp, and back  DDx: Atopic dermatitis (improved on Dupixent - started 12/20/23, stopped 6/2024 when she found out she was pregnant)  DDx: Additional Allergic contact dermatitis?  * chronic illness with exacerbation, progression, side effects from treatment    # Atopic predisposition? with:   No inhalant allergies (prick tests negative)  Dermatitis responding to Dupixent, which is an argument for possible intrinsic dermatitis    These conclusions are made at the best of one's knowledge and belief based on the provided evidence such as patient's history and allergy test results and they can change over time or can be incomplete because of missing information.    ==> Treatment Plan:    >> Will update below treatment plan on Friday after 2nd readings and final conclusions:  >> Discussed atopic dermatitis versus  allergic contact dermatitis, and then updated topical regimen:  - On Scalp Clobex shampoo alternating with Ketoconazole shampoo  - On Body regular use of Vanicream cream/shampoo/soap and if necessary triamcinolone ointment    Procedures Performed: Allergy tests, including prick and patch tests     Staff Involved: Provider, Staff, Resident, and Scribe    Scribe Disclosure:   I, Ervin Zavaleta, am serving as a scribe to document services personally performed by Arsh Childers MD based on data collection and the provider's statements to me.     Staff Physician Comments:  I was present with the scribe who participated in the documentation of the note. I have verified the history and personally performed the physical exam and medical decision making. I agree with the assessment and plan as documented in the note. I have reviewed and if necessary amended the note.      Arsh Childers MD  Professor  Head of Dermato-Allergy Division  Department of Dermatology  [facility]     Follow-up in Derm-Allergy clinic for 1st readings of patch tests after 2 days   ___________________________    I spent a total of 30 minutes with Briana Newman during today s  visit. This time was spent discussing all the individual test results, correlating them to the clinical relevance, counseling the patient and/or coordinating care and performing allergy tests or procedures.    Again, thank you for allowing me to participate in the care of your patient.        Sincerely,        Arsh Childers MD    Electronically signed

## 2025-05-12 NOTE — PROGRESS NOTES
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      Antioxidant       23 Dodecyl Gallate - -     24 Butylhydroxyanisole (BHA) - -     25 Butylhydroxytoluene (BHT) - -     26 Di-Alpha-Tocopherol (Vit E) - -    100 27 Propyl Gallate - -     PERFUMES, FLAVORS & PLANTS        # Substance 2 days 4 days remarks   101 1 Benzyl Cinnamate - -     2 Di-Limonene (Dipentene) - -     3 Cananga Odorata (Anni Hutton) (I) - -     4 Lichen Acid Mix - -    105 5 Mentha Piperita Oil (Peppermint Oil) - -     6 Sesquiterpenelactone mix - -     7 Tea Tree Oil, Oxidized - -     8 Wood Tar Mix - -     9 Abietic Acid - -    110 10 Lavendula Angustifolia Oil (Lavender Oil) - -     11 Fragrance mix II CT * 14% - -      Fragrance Mix I       12 Oakmoss Absolute - -     13 Eugenol - -     14 Geraniol - -    115 15 Hydroxycitronellal - -     16 Isoeugenol - -     17 Cinnamic Aldehyde - -     18 Cinnamic Alcohol  - -      Fragrance mix II       19 Citronellol - -    120 20 Alpha-Hexylcinnamic Aldehyde    - -     21 Citral - -     22 Farnesol - -    123 23 Coumarin - -    Hexylcinnamic aldehyde, Coumarin, Farnesol, Hydroxyisohexy3-cyclohexene carboxaldehyde, citral, citrolellol   HAIRDRESSER        # Substance 2 days 4 days remarks   124 1 P-Phenylenediamine -  -    125 2 P-Aminodiphenylamine - -     3 P-Toluenediamine Sulphate  -  -     4 Ammonium Thioglycolate - -     5 Ammonium Persulfate - -     6 Resorcinol  - -    130 7 3-Aminophenol - -     8 P-Aminophenol - -     9 Glyceryl Monothioglycolate - -    133 10 Pyrogallol - -     RUBBER CHEMICALS        # Substance 2 days 4 days remarks     Carbamate      134 1 Zinc Bis ( Diethyldithio carbamate ) (ZDEC) - -    135 2 Zinc Bis (Dibutyldithiocarbamate) - -     3 1,3-Diphenylguanidine (DPG) - -      Thiourea       4 Dibutylthiourea - -     5 Diphenyltiourea - -     6 Thiourea - -      Mercapto chemicals      140 7 Morpholinyl Mercaptobenzothiazole - -     8 B-Qyvpplsxzz-4-Benzothiazyl-Sulfenamide - -     9 Dibenzothiazyl Disulfide - -     10 Dodecyl Mercaptan  - -      Thiuram chemicals       11 Dipentamethylenethiuram Disulfide - -    145 12 Tetraethylthiuram Disulfide (Disulfiram) - -     13 Tetramethylthiuram Disulfide - -     14 Tetramethyl Thiuram Monosulfide (TMTM) - -      4-Phenylenediamine derivatives       15 N-Isopropyl-N'-Phenyl-P-Phenylenediamine (IPPD) - -     16 Plbqmbom-L-Jojbknulyptfshma (DPPD) - -      Various Rubber Additives      150 17 Hydroquinone Monobenzylether  - -     18 Hexamethylenetetramine - -     19 4,4'-Dihydroxybiphenyl - -     20 Cyclohexylthiophtalimide - -    154 21 N-Phenyl-B-Naphthylamine - -     COLORANTS, DYES, FOOD ADDITIVES   # Substance 2 days 4 days remarks     Textile dyes      155 1 Madan Brown R - -     2 Disperse Blue-3 - -     3 Disperse Orange-3 - -     4 Disperse Red 1 - 1% - -     5 Disperse Yellow-9 1% - -    160 6 Disperse blue mix 106/124 - -     7 Disperse yellow 3 - -     8 Naphthol AS - -     9 Disperse Red 11 - -     10 Reactive Black 5 - -    165 11 Disperse Red 17 - -     12 Acid Yellow 61 - -     13 Acid Red 118 - -     14 Disperse Blue 35 - -     15 Basic Red 46 - -      Food dyes/additives      170 16 Metanil yellow (F/T) - -     17 Cochineal Red (F/T) - -     18 Brilliant Black - -     19 Erythrosine-B (F/T) - -     20 Aspartame - -    175 21 Castalia (F/T) - -     22 Quinoline Yellow - -     23 Azorubine - -     178 24 Tartrazine - -      Results of patch tests:                         Interpretation:  - Negative                    A    = Allergic      (+) Erythema    TI   = Toxic/irritant   + E + Infiltration    RaP = Relevance at Present     ++ E/I + Papulovesicle   Rpr  = Relevance Previously     +++ E/I/P + Blister     nR   = No Relevance    [] No relevant allergic reaction observed    [] Allergic reaction diagnosed against following allergens:      Interpretation/ remarks:   See later    [] Patient information given   [] ACDS CAMP information's (# ....) to following compounds: .....   [] General information's to following compounds: ......    ____________________________________________    Assessment & Plan:    ==> Final Diagnosis:     # Recurrent dermatitis since fall 2020 on palms, scalp, and back  DDx: Atopic dermatitis (improved on Dupixent - started 12/20/23, stopped 6/2024 when she found out she was pregnant)  DDx: Additional Allergic contact dermatitis?  * chronic illness with exacerbation, progression, side effects from treatment    # Atopic predisposition? with:   No inhalant allergies (prick tests negative)  Dermatitis responding to Dupixent, which is an argument for possible intrinsic dermatitis    These conclusions are made at the best of one's knowledge and belief based on the provided evidence such as patient's history and allergy test results and they can change over time or can be incomplete because of missing information.    ==> Treatment Plan:    >> Will update below treatment plan on Friday after 2nd readings and final conclusions:  >> Discussed atopic dermatitis versus allergic contact dermatitis, and then updated topical regimen:  - On Scalp Clobex shampoo alternating with Ketoconazole shampoo  - On Body regular use of Vanicream cream/shampoo/soap and if necessary triamcinolone ointment    Procedures Performed: Allergy tests, including prick and patch tests     Staff Involved: Provider, Staff,  Resident, and Scribe    Scribe Disclosure:   I, Ervin Zavaleta, am serving as a scribe to document services personally performed by Arsh Childers MD based on data collection and the provider's statements to me.     Staff Physician Comments:  I was present with the scribe who participated in the documentation of the note. I have verified the history and personally performed the physical exam and medical decision making. I agree with the assessment and plan as documented in the note. I have reviewed and if necessary amended the note.      Arsh Childers MD  Professor  Head of Dermato-Allergy Division  Department of Dermatology  [facility]     Follow-up in Derm-Allergy clinic for 1st readings of patch tests after 2 days   ___________________________    I spent a total of 30 minutes with Briana Newman during today s  visit. This time was spent discussing all the individual test results, correlating them to the clinical relevance, counseling the patient and/or coordinating care and performing allergy tests or procedures.

## 2025-05-12 NOTE — NURSING NOTE
Chief Complaint   Patient presents with    Allergy Testing Followup     Patch testing day 1 / atopy     María Burrows RN

## 2025-05-13 ENCOUNTER — MYC MEDICAL ADVICE (OUTPATIENT)
Dept: ALLERGY | Facility: CLINIC | Age: 35
End: 2025-05-13
Payer: COMMERCIAL

## 2025-05-14 ENCOUNTER — OFFICE VISIT (OUTPATIENT)
Dept: ALLERGY | Facility: CLINIC | Age: 35
End: 2025-05-14
Payer: COMMERCIAL

## 2025-05-14 DIAGNOSIS — L23.5 ALLERGIC DERMATITIS DUE TO OTHER CHEMICAL PRODUCT: ICD-10-CM

## 2025-05-14 DIAGNOSIS — L20.89 OTHER ATOPIC DERMATITIS: Primary | ICD-10-CM

## 2025-05-14 NOTE — LETTER
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 (Hydroabietyl Alcohol) - -     18 Tert-Butylhydroquinone - -     19 Benzyl Salicylate - -     20 Dimethylaminopropylamin (DMPA) - -     21 Zinc Pyrithione (Zinc Omadine)  - -    95 22 Dain(Hydroxymethyl) Nitromethane  - -      Antioxidant       23 Dodecyl Gallate - -     24 Butylhydroxyanisole (BHA) - -     25 Butylhydroxytoluene (BHT) - -     26 Di-Alpha-Tocopherol (Vit E) - -    100 27 Propyl Gallate - -     PERFUMES, FLAVORS & PLANTS        # Substance 2 days 4 days remarks   101 1 Benzyl Cinnamate - -     2 Di-Limonene (Dipentene) - -     3 Cananga Odorata (Anni Hutton) (I) - -     4 Lichen Acid Mix - -    105 5 Mentha Piperita Oil (Peppermint Oil) - -     6 Sesquiterpenelactone mix - -     7 Tea Tree Oil, Oxidized - -     8 Wood Tar Mix - -     9 Abietic Acid - -    110 10 Lavendula Angustifolia Oil (Lavender Oil) - -     11 Fragrance mix II CT * 14% + -      Fragrance Mix I       12 Oakmoss Absolute - -     13 Eugenol - -     14 Geraniol - -    115 15 Hydroxycitronellal - -     16 Isoeugenol - -     17 Cinnamic Aldehyde - -     18 Cinnamic Alcohol  - -      Fragrance mix II       19 Citronellol - -    120 20 Alpha-Hexylcinnamic Aldehyde    - -     21 Citral - -     22 Farnesol - -    123 23 Coumarin - -    Hexylcinnamic aldehyde, Coumarin, Farnesol,  Hydroxyisohexy3-cyclohexene carboxaldehyde, citral, citrolellol   HAIRDRESSER        # Substance 2 days 4 days remarks   124 1 P-Phenylenediamine -  -    125 2 P-Aminodiphenylamine - -     3 P-Toluenediamine Sulphate  -  -     4 Ammonium Thioglycolate - -     5 Ammonium Persulfate - -     6 Resorcinol - -    130 7 3-Aminophenol - -     8 P-Aminophenol - -     9 Glyceryl Monothioglycolate - -    133 10 Pyrogallol - -     RUBBER CHEMICALS        # Substance 2 days 4 days remarks     Carbamate      134 1 Zinc Bis ( Diethyldithio carbamate ) (ZDEC) - -    135 2 Zinc Bis (Dibutyldithiocarbamate) - -     3 1,3-Diphenylguanidine (DPG) - -      Thiourea       4 Dibutylthiourea - -     5 Diphenyltiourea - -     6 Thiourea - -      Mercapto chemicals      140 7 Morpholinyl Mercaptobenzothiazole - -     8 Q-Gmqwjscrkm-1-Benzothiazyl-Sulfenamide - -     9 Dibenzothiazyl Disulfide - -     10 Dodecyl Mercaptan  - -      Thiuram chemicals       11 Dipentamethylenethiuram Disulfide - -    145 12 Tetraethylthiuram Disulfide (Disulfiram) - -     13 Tetramethylthiuram Disulfide - -     14 Tetramethyl Thiuram Monosulfide (TMTM) - -      4-Phenylenediamine derivatives       15 N-Isopropyl-N'-Phenyl-P-Phenylenediamine (IPPD) - -     16 Mbjuwpah-X-Mvqxzaxzpiobkpir (DPPD) - -      Various Rubber Additives      150 17 Hydroquinone Monobenzylether  - -     18 Hexamethylenetetramine - -     19 4,4'-Dihydroxybiphenyl ? -     20 Cyclohexylthiophtalimide - -    154 21 N-Phenyl-B-Naphthylamine - -     COLORANTS, DYES, FOOD ADDITIVES   # Substance 2 days 4 days remarks     Textile dyes      155 1 Madan Brown R - -     2 Disperse Blue-3 - -     3 Disperse Orange-3 - -     4 Disperse Red 1 - 1% - -     5 Disperse Yellow-9 1% - -    160 6 Disperse blue mix 106/124 + -     7 Disperse yellow 3 - -     8 Naphthol AS - -     9 Disperse Red 11 - -     10 Reactive Black 5 - -    165 11 Disperse Red 17 - -     12 Acid Yellow 61 - -     13 Acid Red 118 - -      14 Disperse Blue 35 - -     15 Basic Red 46 - -      Food dyes/additives      170 16 Metanil yellow (F/T) - -     17 Cochineal Red (F/T) - -     18 Brilliant Black - -     19 Erythrosine-B (F/T) - -     20 Aspartame - -    175 21 Hinkle (F/T) - -     22 Quinoline Yellow - -     23 Azorubine - -    178 24 Tartrazine - -      Results of patch tests:                         Interpretation:  - Negative                    A    = Allergic      (+) Erythema    TI   = Toxic/irritant   + E + Infiltration    RaP = Relevance at Present     ++ E/I + Papulovesicle   Rpr  = Relevance Previously     +++ E/I/P + Blister     nR   = No Relevance    [x] No relevant allergic reaction observed: Some initial reactions to some fragrance and maybe to disperse blue.     [] Allergic reaction diagnosed against following allergens:      Interpretation/ remarks:   See later    [] Patient information given   [] ACDS CAMP information's (# ....) to following compounds: .....   [] General information's to following compounds: ......    ____________________________________________    Assessment & Plan:    ==> Final Diagnosis:     # Recurrent dermatitis since fall 2020 on palms, scalp, and back  DDx: Atopic dermatitis (improved on Dupixent - started 12/20/23, stopped 6/2024 when she found out she was pregnant)  DDx: Additional Allergic contact dermatitis?  * chronic illness with exacerbation, progression, side effects from treatment    # Atopic predisposition? with:   No inhalant allergies (prick tests negative)  Dermatitis responding to Dupixent, which is an argument for possible intrinsic dermatitis    These conclusions are made at the best of one's knowledge and belief based on the provided evidence such as patient's history and allergy test results and they can change over time or can be incomplete because of missing information.    ==> Treatment Plan:    >> Will update below treatment plan on Friday after 2nd readings and final  conclusions:  >> Discussed atopic dermatitis versus allergic contact dermatitis, and then updated topical regimen:  - On Scalp Clobex shampoo alternating with Ketoconazole shampoo  - On Body regular use of Vanicream cream/shampoo/soap and if necessary triamcinolone ointment       Procedures Performed: none    Staff Involved: Provider, Staff, and Scribe    Scribe Disclosure:   I, Ervin Zavaleta, am serving as a scribe to document services personally performed by Arsh Childers MD based on data collection and the provider's statements to me.     Staff Physician Comments:  I was present with the scribe who participated in the documentation of the note. I have verified the history and personally performed the physical exam and medical decision making. I agree with the assessment and plan as documented in the note. I have reviewed and if necessary amended the note.      Arsh Childers MD  Professor  Head of Dermato-Allergy Division  Department of Dermatology  [facility]      Follow-up in Derm-Allergy clinic for 2nd readings and final conclusions after 4 days   ___________________________    I spent a total of 15 minutes with Briana Newman during today s  visit. This time was spent discussing all the individual test results, correlating them to the clinical relevance, counseling the patient and/or coordinating care.       Again, thank you for allowing me to participate in the care of your patient.        Sincerely,        Arsh Childers MD    Electronically signed

## 2025-05-14 NOTE — PROGRESS NOTES
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 (BHA) - -     25 Butylhydroxytoluene (BHT) - -     26 Di-Alpha-Tocopherol (Vit E) - -    100 27 Propyl Gallate - -     PERFUMES, FLAVORS & PLANTS        # Substance 2 days 4 days remarks   101 1 Benzyl Cinnamate - -     2 Di-Limonene (Dipentene) - -     3 Cananga Odorata (Anni Hutton) (I) - -     4 Lichen Acid Mix - -    105 5 Mentha Piperita Oil (Peppermint Oil) - -     6 Sesquiterpenelactone mix - -     7 Tea Tree Oil, Oxidized - -     8 Wood Tar Mix - -     9 Abietic Acid - -    110 10 Lavendula Angustifolia Oil (Lavender Oil) - -     11 Fragrance mix II CT * 14% + -      Fragrance Mix I       12 Oakmoss Absolute - -     13 Eugenol - -     14 Geraniol - -    115 15 Hydroxycitronellal - -     16 Isoeugenol - -     17 Cinnamic Aldehyde - -     18 Cinnamic Alcohol  - -      Fragrance mix II       19 Citronellol - -    120 20 Alpha-Hexylcinnamic Aldehyde    - -     21 Citral - -     22 Farnesol - -    123 23 Coumarin - -    Hexylcinnamic aldehyde, Coumarin, Farnesol, Hydroxyisohexy3-cyclohexene carboxaldehyde, citral, citrolellol   HAIRDRESSER        # Substance 2 days 4 days remarks   124 1 P-Phenylenediamine -  -    125 2 P-Aminodiphenylamine - -     3 P-Toluenediamine Sulphate  -  -     4 Ammonium Thioglycolate - -     5 Ammonium Persulfate - -     6 Resorcinol - -    130 7 3-Aminophenol - -     8 P-Aminophenol - -      9 Glyceryl Monothioglycolate - -    133 10 Pyrogallol - -     RUBBER CHEMICALS        # Substance 2 days 4 days remarks     Carbamate      134 1 Zinc Bis ( Diethyldithio carbamate ) (ZDEC) - -    135 2 Zinc Bis (Dibutyldithiocarbamate) - -     3 1,3-Diphenylguanidine (DPG) - -      Thiourea       4 Dibutylthiourea - -     5 Diphenyltiourea - -     6 Thiourea - -      Mercapto chemicals      140 7 Morpholinyl Mercaptobenzothiazole - -     8 C-Fxbmcuflsu-2-Benzothiazyl-Sulfenamide - -     9 Dibenzothiazyl Disulfide - -     10 Dodecyl Mercaptan  - -      Thiuram chemicals       11 Dipentamethylenethiuram Disulfide - -    145 12 Tetraethylthiuram Disulfide (Disulfiram) - -     13 Tetramethylthiuram Disulfide - -     14 Tetramethyl Thiuram Monosulfide (TMTM) - -      4-Phenylenediamine derivatives       15 N-Isopropyl-N'-Phenyl-P-Phenylenediamine (IPPD) - -     16 Vgrmksej-X-Gisyihgmxreukwet (DPPD) - -      Various Rubber Additives      150 17 Hydroquinone Monobenzylether  - -     18 Hexamethylenetetramine - -     19 4,4'-Dihydroxybiphenyl ? -     20 Cyclohexylthiophtalimide - -    154 21 N-Phenyl-B-Naphthylamine - -     COLORANTS, DYES, FOOD ADDITIVES   # Substance 2 days 4 days remarks     Textile dyes      155 1 Madan Brown R - -     2 Disperse Blue-3 - -     3 Disperse Orange-3 - -     4 Disperse Red 1 - 1% - -     5 Disperse Yellow-9 1% - -    160 6 Disperse blue mix 106/124 + -     7 Disperse yellow 3 - -     8 Naphthol AS - -     9 Disperse Red 11 - -     10 Reactive Black 5 - -    165 11 Disperse Red 17 - -     12 Acid Yellow 61 - -     13 Acid Red 118 - -     14 Disperse Blue 35 - -     15 Basic Red 46 - -      Food dyes/additives      170 16 Metanil yellow (F/T) - -     17 Cochineal Red (F/T) - -     18 Brilliant Black - -     19 Erythrosine-B (F/T) - -     20 Aspartame - -    175 21 Dickson (F/T) - -     22 Quinoline Yellow - -     23 Azorubine - -    178 24 Tartrazine - -      Results of patch tests:                          Interpretation:  - Negative                    A    = Allergic      (+) Erythema    TI   = Toxic/irritant   + E + Infiltration    RaP = Relevance at Present     ++ E/I + Papulovesicle   Rpr  = Relevance Previously     +++ E/I/P + Blister     nR   = No Relevance    [x] No relevant allergic reaction observed: Some initial reactions to some fragrance and maybe to disperse blue.     [] Allergic reaction diagnosed against following allergens:      Interpretation/ remarks:   See later    [] Patient information given   [] ACDS CAMP information's (# ....) to following compounds: .....   [] General information's to following compounds: ......    ____________________________________________    Assessment & Plan:    ==> Final Diagnosis:     # Recurrent dermatitis since fall 2020 on palms, scalp, and back  DDx: Atopic dermatitis (improved on Dupixent - started 12/20/23, stopped 6/2024 when she found out she was pregnant)  DDx: Additional Allergic contact dermatitis?  * chronic illness with exacerbation, progression, side effects from treatment    # Atopic predisposition? with:   No inhalant allergies (prick tests negative)  Dermatitis responding to Dupixent, which is an argument for possible intrinsic dermatitis    These conclusions are made at the best of one's knowledge and belief based on the provided evidence such as patient's history and allergy test results and they can change over time or can be incomplete because of missing information.    ==> Treatment Plan:    >> Will update below treatment plan on Friday after 2nd readings and final conclusions:  >> Discussed atopic dermatitis versus allergic contact dermatitis, and then updated topical regimen:  - On Scalp Clobex shampoo alternating with Ketoconazole shampoo  - On Body regular use of Vanicream cream/shampoo/soap and if necessary triamcinolone ointment       Procedures Performed: none    Staff Involved: Provider, Staff, and Scribe    Scribe  Disclosure:   I, Ervin Zavaleta, am serving as a scribe to document services personally performed by Arsh Childers MD based on data collection and the provider's statements to me.     Staff Physician Comments:  I was present with the scribe who participated in the documentation of the note. I have verified the history and personally performed the physical exam and medical decision making. I agree with the assessment and plan as documented in the note. I have reviewed and if necessary amended the note.      Arsh Childers MD  Professor  Head of Dermato-Allergy Division  Department of Dermatology  [facility]      Follow-up in Derm-Allergy clinic for 2nd readings and final conclusions after 4 days   ___________________________    I spent a total of 15 minutes with Briana Newman during today s  visit. This time was spent discussing all the individual test results, correlating them to the clinical relevance, counseling the patient and/or coordinating care.

## 2025-05-14 NOTE — NURSING NOTE
Chief Complaint   Patient presents with    Allergy Testing Followup     Patch day 3     Yoseph Lenz RN

## 2025-05-16 ENCOUNTER — OFFICE VISIT (OUTPATIENT)
Dept: ALLERGY | Facility: CLINIC | Age: 35
End: 2025-05-16
Payer: COMMERCIAL

## 2025-05-16 DIAGNOSIS — L70.0 ACNE VULGARIS: ICD-10-CM

## 2025-05-16 DIAGNOSIS — L20.84 INTRINSIC ATOPIC DERMATITIS: Primary | ICD-10-CM

## 2025-05-16 PROCEDURE — 99214 OFFICE O/P EST MOD 30 MIN: CPT | Performed by: DERMATOLOGY

## 2025-05-16 RX ORDER — TACROLIMUS 1 MG/G
OINTMENT TOPICAL 2 TIMES DAILY
Qty: 60 G | Refills: 3 | Status: SHIPPED | OUTPATIENT
Start: 2025-05-16

## 2025-05-16 RX ORDER — CLINDAMYCIN PHOSPHATE 10 UG/ML
LOTION TOPICAL 2 TIMES DAILY
Qty: 60 ML | Refills: 4 | Status: SHIPPED | OUTPATIENT
Start: 2025-05-16

## 2025-05-16 RX ORDER — KETOCONAZOLE 20 MG/ML
SHAMPOO, SUSPENSION TOPICAL
Qty: 120 ML | Refills: 4 | Status: SHIPPED | OUTPATIENT
Start: 2025-05-16

## 2025-05-16 RX ORDER — FLUOCINOLONE ACETONIDE 0.11 MG/ML
OIL TOPICAL
Qty: 118.28 ML | Refills: 2 | Status: SHIPPED | OUTPATIENT
Start: 2025-05-19

## 2025-05-16 NOTE — PROGRESS NOTES
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 Polysorbate 80 CT - -     9 Amidoamine   (Stearamidopropyl Dimethylamine) - - comedo    10 Oleamidopropyl Dimethylamine - -     11 Lauryl Glucoside - -    85 12 Coconut Diethanolamide  - -     13 2-Hydroxy-4-Methoxy Benzophenone (Oxybenzone) - -     14 Benzophenone-4 (Sulisobenzon) - -     15 Propolis (+) -     16 Dexpanthenol - -    90 17 Abitol (Hydroabietyl Alcohol) - -     18 Tert-Butylhydroquinone - -     19 Benzyl Salicylate - -     20 Dimethylaminopropylamin (DMPA) - -     21 Zinc Pyrithione (Zinc Omadine)  - -    95 22 Dain(Hydroxymethyl) Nitromethane  - -      Antioxidant       23 Dodecyl Gallate - -     24 Butylhydroxyanisole (BHA) - -     25 Butylhydroxytoluene (BHT) - -     26 Di-Alpha-Tocopherol (Vit E) - -    100 27 Propyl Gallate - -     PERFUMES, FLAVORS & PLANTS        # Substance 2 days 4 days remarks   101 1 Benzyl Cinnamate - -     2 Di-Limonene (Dipentene) - -     3 Cananga Odorata (Ylang Ylang) (I) - -     4 Lichen Acid Mix - -    105 5 Mentha Piperita Oil (Peppermint Oil) - -     6 Sesquiterpenelactone mix - -     7 Tea Tree Oil, Oxidized - -     8 Wood Tar Mix - -     9 Abietic Acid - -    110 10 Lavendula Angustifolia Oil (Lavender Oil) - -     11 Fragrance mix II CT * 14% + -      Fragrance Mix I       12 Oakmoss Absolute - -     13 Eugenol - -     14 Geraniol - -    115 15 Hydroxycitronellal - -     16 Isoeugenol - -     17  Cinnamic Aldehyde - -     18 Cinnamic Alcohol  - -      Fragrance mix II       19 Citronellol - -    120 20 Alpha-Hexylcinnamic Aldehyde    - -     21 Citral - -     22 Farnesol - -    123 23 Coumarin - -    Hexylcinnamic aldehyde, Coumarin, Farnesol, Hydroxyisohexy3-cyclohexene carboxaldehyde, citral, citrolellol   HAIRDRESSER        # Substance 2 days 4 days remarks   124 1 P-Phenylenediamine -  -    125 2 P-Aminodiphenylamine - -     3 P-Toluenediamine Sulphate  -  -     4 Ammonium Thioglycolate - -     5 Ammonium Persulfate - -     6 Resorcinol - -    130 7 3-Aminophenol - -     8 P-Aminophenol - -     9 Glyceryl Monothioglycolate - -    133 10 Pyrogallol - -     RUBBER CHEMICALS        # Substance 2 days 4 days remarks     Carbamate      134 1 Zinc Bis ( Diethyldithio carbamate ) (ZDEC) - -    135 2 Zinc Bis (Dibutyldithiocarbamate) - -     3 1,3-Diphenylguanidine (DPG) - -      Thiourea       4 Dibutylthiourea - -     5 Diphenyltiourea - -     6 Thiourea - -      Mercapto chemicals      140 7 Morpholinyl Mercaptobenzothiazole - -     8 Z-Crdmzzahjr-4-Benzothiazyl-Sulfenamide - -     9 Dibenzothiazyl Disulfide - -     10 Dodecyl Mercaptan  - -      Thiuram chemicals       11 Dipentamethylenethiuram Disulfide - -    145 12 Tetraethylthiuram Disulfide (Disulfiram) - -     13 Tetramethylthiuram Disulfide - -     14 Tetramethyl Thiuram Monosulfide (TMTM) - -      4-Phenylenediamine derivatives       15 N-Isopropyl-N'-Phenyl-P-Phenylenediamine (IPPD) - -     16 Kxhpniol-K-Nsvtelnyhtpqfnwp (DPPD) - -      Various Rubber Additives      150 17 Hydroquinone Monobenzylether  - -     18 Hexamethylenetetramine - -     19 4,4'-Dihydroxybiphenyl ? -     20 Cyclohexylthiophtalimide - -    154 21 N-Phenyl-B-Naphthylamine - -     COLORANTS, DYES, FOOD ADDITIVES   # Substance 2 days 4 days remarks     Textile dyes      155 1 Madan Brown R - -     2 Disperse Blue-3 - -     3 Disperse Orange-3 - -     4 Disperse Red 1 - 1% - -      5 Disperse Yellow-9 1% - -    160 6 Disperse blue mix 106/124 + ? Not infiltrated (color?)    7 Disperse yellow 3 - -     8 Naphthol AS - -     9 Disperse Red 11 - -     10 Reactive Black 5 - -    165 11 Disperse Red 17 - -     12 Acid Yellow 61 - -     13 Acid Red 118 - -     14 Disperse Blue 35 - -     15 Basic Red 46 - -      Food dyes/additives      170 16 Metanil yellow (F/T) - -     17 Cochineal Red (F/T) - -     18 Brilliant Black - ? Only hyperpigmentation    19 Erythrosine-B (F/T) - -     20 Aspartame - -    175 21 Chilo (F/T) - -     22 Quinoline Yellow - -     23 Azorubine - -    178 24 Tartrazine - -      Results of patch tests:                         Interpretation:  - Negative                    A    = Allergic      (+) Erythema    TI   = Toxic/irritant   + E + Infiltration    RaP = Relevance at Present     ++ E/I + Papulovesicle   Rpr  = Relevance Previously     +++ E/I/P + Blister     nR   = No Relevance    [x] No relevant allergic reaction observed:     [] Allergic reaction diagnosed against following allergens:      Interpretation/ remarks:   Patient has hyperpigmentations, but no active eczema and in patch tests no relevant sensitizations. Therefore, with the positive reaction to Dupixent and the negative hx and prick tests mostly intrinsic atopic dermatitis.    [] Patient information given   [] ACDS CAMP information's (# ....) to following compounds: .....   [] General information's to following compounds: ......    ____________________________________________    Assessment & Plan:    ==> Final Diagnosis:     # Recurrent dermatitis since fall 2020 on palms, scalp, and back  mostly intrinsic Atopic dermatitis (improved on Dupixent - started 12/20/23, stopped 6/2024 when she found out she was pregnant)  No sign for additional allergic contact dermatitis  * chronic illness with exacerbation, progression, side effects from treatment    # Atopic predisposition? with:   No inhalant allergies  (prick tests negative)  Dermatitis responding to Dupixent, which is an argument for possible intrinsic dermatitis    These conclusions are made at the best of one's knowledge and belief based on the provided evidence such as patient's history and allergy test results and they can change over time or can be incomplete because of missing information.    ==> Treatment Plan:    >> the topical treatment on scalp helped and skin in the moment not very active (mostly hyperpigmentations).  Discussed with patient the systemic treatments (had some joint pain with Dupixent) and we might prefer Adbry injections to Dupixent.    >> patient prefers for the moment to continue with topical treatment:  - Continue on Scalp Clobex shampoo alternating with Ketoconazole shampoo  - continue on body regular use of coconut oil and low scented body washes  - try on chronic areas regularly 2xdaily Protopic ointment 0.1% and on flare ups for 3-4 days if necessary triamcinolone ointment      Staff Involved: Provider and Staff      Follow-up in [facility] clinic if necessary (follow with Dr. TERRI Arizmendi)  ___________________________    I spent a total of 30 minutes with Briana Newman during today s  visit. This time was spent discussing all the individual test results, correlating them to the clinical relevance, counseling the patient and/or coordinating care

## 2025-05-16 NOTE — PATIENT INSTRUCTIONS
You can always access your most recent office visit note with this allergy clinic via [facility]'s MyChart, which contains all of your results from testing performed by Dr. Chiledrs along with the details of the discussed treatment plan.  If you do not have access to [facility], please discuss with a [facility] staff member. Additionally, if your provider is within [facility] or their EMR participates in the [facility]) network, they can also access this information.     ____________________________________________      Assessment & Plan:     ==> Final Diagnosis:      # Recurrent dermatitis since fall 2020 on palms, scalp, and back  mostly intrinsic Atopic dermatitis (improved on Dupixent - started 12/20/23, stopped 6/2024 when she found out she was pregnant)  No sign for additional allergic contact dermatitis  * chronic illness with exacerbation, progression, side effects from treatment     # Atopic predisposition? with:   No inhalant allergies (prick tests negative)  Dermatitis responding to Dupixent, which is an argument for possible intrinsic dermatitis     These conclusions are made at the best of one's knowledge and belief based on the provided evidence such as patient's history and allergy test results and they can change over time or can be incomplete because of missing information.     ==> Treatment Plan:     >> the topical treatment on scalp helped and skin in the moment not very active (mostly hyperpigmentations).  Discussed with patient the systemic treatments (had some joint pain with Dupixent) and we might prefer Adbry injections to Dupixent.     >> patient prefers for the moment to continue with topical treatment:  - Continue on Scalp Clobex shampoo alternating with Ketoconazole shampoo  - continue on body regular use of coconut oil and low scented body washes  - try on chronic areas regularly 2xdaily Protopic ointment 0.1% and on flare ups for 3-4 days if necessary triamcinolone ointment

## 2025-05-16 NOTE — LETTER
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 Substance 2 days 4 days remarks   74 1 Polyethylene Glycol-400 - -    75 2 Cocamidopropyl Betaine - -     3 Amerchol L101 - -     4 Propylene Glycol - -     5 Triethanolamine - -     6 Sorbitane Sesquiolate CT - -    80 7 Isopropylmyristate - -     8 Polysorbate 80 CT - -     9 Amidoamine   (Stearamidopropyl Dimethylamine) - - comedo    10 Oleamidopropyl Dimethylamine - -     11 Lauryl Glucoside - -    85 12 Coconut Diethanolamide  - -     13 2-Hydroxy-4-Methoxy Benzophenone (Oxybenzone) - -     14 Benzophenone-4 (Sulisobenzon) - -     15 Propolis (+) -     16 Dexpanthenol - -    90 17 Abitol (Hydroabietyl Alcohol) - -     18 Tert-Butylhydroquinone - -     19 Benzyl Salicylate - -     20 Dimethylaminopropylamin (DMPA) - -     21 Zinc Pyrithione (Zinc Omadine)  - -    95 22 Dain(Hydroxymethyl) Nitromethane  - -      Antioxidant       23 Dodecyl Gallate - -     24 Butylhydroxyanisole (BHA) - -     25 Butylhydroxytoluene (BHT) - -     26 Di-Alpha-Tocopherol (Vit E) - -    100 27 Propyl Gallate - -     PERFUMES, FLAVORS & PLANTS        # Substance 2 days 4 days remarks   101 1 Benzyl Cinnamate - -     2 Di-Limonene (Dipentene) - -     3 Cananga Odorata (Anni Hutton) (I) - -     4 Lichen Acid Mix - -    105 5 Mentha Piperita Oil (Peppermint Oil) -  -     6 Sesquiterpenelactone mix - -     7 Tea Tree Oil, Oxidized - -     8 Wood Tar Mix - -     9 Abietic Acid - -    110 10 Lavendula Angustifolia Oil (Lavender Oil) - -     11 Fragrance mix II CT * 14% + -      Fragrance Mix I       12 Oakmoss Absolute - -     13 Eugenol - -     14 Geraniol - -    115 15 Hydroxycitronellal - -     16 Isoeugenol - -     17 Cinnamic Aldehyde - -     18 Cinnamic Alcohol  - -      Fragrance mix II       19 Citronellol - -    120 20 Alpha-Hexylcinnamic Aldehyde    - -     21 Citral - -     22 Farnesol - -    123 23 Coumarin - -    Hexylcinnamic aldehyde, Coumarin, Farnesol, Hydroxyisohexy3-cyclohexene carboxaldehyde, citral, citrolellol   HAIRDRESSER        # Substance 2 days 4 days remarks   124 1 P-Phenylenediamine -  -    125 2 P-Aminodiphenylamine - -     3 P-Toluenediamine Sulphate  -  -     4 Ammonium Thioglycolate - -     5 Ammonium Persulfate - -     6 Resorcinol - -    130 7 3-Aminophenol - -     8 P-Aminophenol - -     9 Glyceryl Monothioglycolate - -    133 10 Pyrogallol - -     RUBBER CHEMICALS        # Substance 2 days 4 days remarks     Carbamate      134 1 Zinc Bis ( Diethyldithio carbamate ) (ZDEC) - -    135 2 Zinc Bis (Dibutyldithiocarbamate) - -     3 1,3-Diphenylguanidine (DPG) - -      Thiourea       4 Dibutylthiourea - -     5 Diphenyltiourea - -     6 Thiourea - -      Mercapto chemicals      140 7 Morpholinyl Mercaptobenzothiazole - -     8 K-Vjefnmmonv-5-Benzothiazyl-Sulfenamide - -     9 Dibenzothiazyl Disulfide - -     10 Dodecyl Mercaptan  - -      Thiuram chemicals       11 Dipentamethylenethiuram Disulfide - -    145 12 Tetraethylthiuram Disulfide (Disulfiram) - -     13 Tetramethylthiuram Disulfide - -     14 Tetramethyl Thiuram Monosulfide (TMTM) - -      4-Phenylenediamine derivatives       15 N-Isopropyl-N'-Phenyl-P-Phenylenediamine (IPPD) - -     16 Cdvwfktn-H-Uisqegjnnfqqalgo (DPPD) - -      Various Rubber Additives      150 17 Hydroquinone  Monobenzylether  - -     18 Hexamethylenetetramine - -     19 4,4'-Dihydroxybiphenyl ? -     20 Cyclohexylthiophtalimide - -    154 21 N-Phenyl-B-Naphthylamine - -     COLORANTS, DYES, FOOD ADDITIVES   # Substance 2 days 4 days remarks     Textile dyes      155 1 Madan Brown R - -     2 Disperse Blue-3 - -     3 Disperse Orange-3 - -     4 Disperse Red 1 - 1% - -     5 Disperse Yellow-9 1% - -    160 6 Disperse blue mix 106/124 + ? Not infiltrated (color?)    7 Disperse yellow 3 - -     8 Naphthol AS - -     9 Disperse Red 11 - -     10 Reactive Black 5 - -    165 11 Disperse Red 17 - -     12 Acid Yellow 61 - -     13 Acid Red 118 - -     14 Disperse Blue 35 - -     15 Basic Red 46 - -      Food dyes/additives      170 16 Metanil yellow (F/T) - -     17 Cochineal Red (F/T) - -     18 Brilliant Black - ? Only hyperpigmentation    19 Erythrosine-B (F/T) - -     20 Aspartame - -    175 21 Dexter (F/T) - -     22 Quinoline Yellow - -     23 Azorubine - -    178 24 Tartrazine - -      Results of patch tests:                         Interpretation:  - Negative                    A    = Allergic      (+) Erythema    TI   = Toxic/irritant   + E + Infiltration    RaP = Relevance at Present     ++ E/I + Papulovesicle   Rpr  = Relevance Previously     +++ E/I/P + Blister     nR   = No Relevance    [x] No relevant allergic reaction observed:     [] Allergic reaction diagnosed against following allergens:      Interpretation/ remarks:   Patient has hyperpigmentations, but no active eczema and in patch tests no relevant sensitizations. Therefore, with the positive reaction to Dupixent and the negative hx and prick tests mostly intrinsic atopic dermatitis.    [] Patient information given   [] ACDS CAMP information's (# ....) to following compounds: .....   [] General information's to following compounds: ......    ____________________________________________    Assessment & Plan:    ==> Final Diagnosis:     # Recurrent  dermatitis since fall 2020 on palms, scalp, and back  mostly intrinsic Atopic dermatitis (improved on Dupixent - started 12/20/23, stopped 6/2024 when she found out she was pregnant)  No sign for additional allergic contact dermatitis  * chronic illness with exacerbation, progression, side effects from treatment    # Atopic predisposition? with:   No inhalant allergies (prick tests negative)  Dermatitis responding to Dupixent, which is an argument for possible intrinsic dermatitis    These conclusions are made at the best of one's knowledge and belief based on the provided evidence such as patient's history and allergy test results and they can change over time or can be incomplete because of missing information.    ==> Treatment Plan:    >> the topical treatment on scalp helped and skin in the moment not very active (mostly hyperpigmentations).  Discussed with patient the systemic treatments (had some joint pain with Dupixent) and we might prefer Adbry injections to Dupixent.    >> patient prefers for the moment to continue with topical treatment:  - Continue on Scalp Clobex shampoo alternating with Ketoconazole shampoo  - continue on body regular use of coconut oil and low scented body washes  - try on chronic areas regularly 2xdaily Protopic ointment 0.1% and on flare ups for 3-4 days if necessary triamcinolone ointment      Staff Involved: Provider and Staff      Follow-up in Derm-Allergy clinic if necessary (follow with Dr. TERRI Arizmendi)  ___________________________    I spent a total of 30 minutes with Briana Newman during today s  visit. This time was spent discussing all the individual test results, correlating them to the clinical relevance, counseling the patient and/or coordinating care       Again, thank you for allowing me to participate in the care of your patient.        Sincerely,        Arsh Childers MD    Electronically signed

## 2025-05-28 ENCOUNTER — VIRTUAL VISIT (OUTPATIENT)
Dept: FAMILY MEDICINE | Facility: CLINIC | Age: 35
End: 2025-05-28
Payer: COMMERCIAL

## 2025-05-28 DIAGNOSIS — E66.01 MORBID OBESITY (H): Primary | ICD-10-CM

## 2025-05-28 DIAGNOSIS — K59.00 CONSTIPATION, UNSPECIFIED CONSTIPATION TYPE: ICD-10-CM

## 2025-05-28 PROCEDURE — 98006 SYNCH AUDIO-VIDEO EST MOD 30: CPT | Performed by: NURSE PRACTITIONER

## 2025-05-28 NOTE — PROGRESS NOTES
Briana is a 35 year old who is being evaluated via a billable video visit.    How would you like to obtain your AVS? [employer]  If the video visit is dropped, the invitation should be resent by: Text to cell phone: [phone]  Will anyone else be joining your video visit? No    Assessment & Plan  Morbid obesity (H)  Comment:History of morbid obesity with recent weight loss of about 6 lbs since starting medication. No reported medication side effects except mild constipation.  - Plan:  - Increased Tirzepatide dose from 2.5 mg to 5 mg   - Continue lifestyle changes including physical activity and healthy eating.  Orders:     tirzepatide-Weight Management (ZEPBOUND) 5 MG/0.5ML prefilled pen; Inject 0.5 mLs (5 mg) subcutaneously every 7 days.  - Schedule a follow-up appointment.  Constipation, unspecified constipation type  Comment: Likely due to tirzepatide  PLAN:   - Increase dietary fiber intake and hydration.  - Recommend over-the-counter stool softeners or laxatives as needed.  - Encourage regular physical activity.  - Follow-up as needed or if symptoms worsen.                 Subjective   Briana is a 35 year old, presenting for the following health issues:  Recheck Medication (Zepbound )        5/28/2025     7:48 AM   Additional Questions   Roomed by Patient completed chart review and e-checkin questionnaires through [employer] (Snow HOWARD CMA)     Video Start Time: 8:25 am    History of Present Illness       Reason for visit:  Medication management   She is taking medications regularly.   Briana Newman, 35 years, female, is here for weight management. She has a history of morbid obesity and gestational diabetes. She recently started taking a medication, tirzepatide, at a dose of 2.5 mg. Since starting the medication, she has lost about 6 pounds. She denies experiencing any major side effects from the medication initially but later reported mild constipation. Briana is actively trying to make lifestyle changes,  including being physically active and eating healthily. She is requesting an increase in her medication dose.            Review of Systems  Constitutional, HEENT, cardiovascular, pulmonary, GI, , musculoskeletal, neuro, skin, endocrine and psych systems are negative, except as otherwise noted.      Objective           Vitals:  No vitals were obtained today due to virtual visit.    Physical Exam   GENERAL: alert and no distress  EYES: Eyes grossly normal to inspection.  No discharge or erythema, or obvious scleral/conjunctival abnormalities.  RESP: No audible wheeze, cough, or visible cyanosis.    SKIN: Visible skin clear. No significant rash, abnormal pigmentation or lesions.  NEURO: Cranial nerves grossly intact.  Mentation and speech appropriate for age.  PSYCH: Appropriate affect, tone, and pace of words          Video-Visit Details    Type of service:  Video Visit   Video End Time:8:52 pm  Originating Location (pt. Location): Home    Distant Location (provider location):  On-site  Platform used for Video Visit: Winter  Signed Electronically by: ERICA Vanegas CNP      I am utilizing AI dictation through [employer] to document the patient's history and physical examination. Please note that while the AI is designed to assist in capturing detailed information, there may be errors in the dictation. I will review and edit the content for accuracy before finalizing.

## 2025-06-10 ENCOUNTER — PATIENT OUTREACH (OUTPATIENT)
Dept: CARE COORDINATION | Facility: CLINIC | Age: 35
End: 2025-06-10
Payer: COMMERCIAL

## 2025-06-11 ENCOUNTER — RESULTS FOLLOW-UP (OUTPATIENT)
Dept: FAMILY MEDICINE | Facility: CLINIC | Age: 35
End: 2025-06-11

## 2025-06-11 ENCOUNTER — OFFICE VISIT (OUTPATIENT)
Dept: FAMILY MEDICINE | Facility: CLINIC | Age: 35
End: 2025-06-11
Payer: COMMERCIAL

## 2025-06-11 VITALS
WEIGHT: 192 LBS | RESPIRATION RATE: 15 BRPM | SYSTOLIC BLOOD PRESSURE: 112 MMHG | HEIGHT: 64 IN | DIASTOLIC BLOOD PRESSURE: 64 MMHG | BODY MASS INDEX: 32.78 KG/M2 | HEART RATE: 56 BPM | OXYGEN SATURATION: 100 % | TEMPERATURE: 98.1 F

## 2025-06-11 DIAGNOSIS — R07.89 CHEST PRESSURE: Primary | ICD-10-CM

## 2025-06-11 DIAGNOSIS — F41.9 ANXIETY: ICD-10-CM

## 2025-06-11 PROBLEM — Z28.39 RUBELLA NON-IMMUNE STATUS, ANTEPARTUM: Status: RESOLVED | Noted: 2024-08-15 | Resolved: 2025-06-11

## 2025-06-11 PROBLEM — O09.891 SUPERVISION OF OTHER HIGH RISK PREGNANCIES, FIRST TRIMESTER: Status: RESOLVED | Noted: 2024-08-14 | Resolved: 2025-06-11

## 2025-06-11 PROBLEM — O09.291 HX OF PREECLAMPSIA, PRIOR PREGNANCY, CURRENTLY PREGNANT, FIRST TRIMESTER: Status: RESOLVED | Noted: 2024-08-14 | Resolved: 2025-06-11

## 2025-06-11 PROBLEM — O09.899 RUBELLA NON-IMMUNE STATUS, ANTEPARTUM: Status: RESOLVED | Noted: 2024-08-15 | Resolved: 2025-06-11

## 2025-06-11 PROBLEM — O36.8390 FETAL TACHYCARDIA AFFECTING MANAGEMENT OF MOTHER: Status: RESOLVED | Noted: 2025-02-12 | Resolved: 2025-06-11

## 2025-06-11 PROBLEM — O09.299 HX OF MATERNAL THIRD DEGREE PERINEAL LACERATION, CURRENTLY PREGNANT: Status: RESOLVED | Noted: 2024-08-14 | Resolved: 2025-06-11

## 2025-06-11 PROCEDURE — 99214 OFFICE O/P EST MOD 30 MIN: CPT | Performed by: NURSE PRACTITIONER

## 2025-06-11 PROCEDURE — 93000 ELECTROCARDIOGRAM COMPLETE: CPT | Performed by: NURSE PRACTITIONER

## 2025-06-11 RX ORDER — HYDROXYZINE HYDROCHLORIDE 25 MG/1
25 TABLET, FILM COATED ORAL 3 TIMES DAILY PRN
Qty: 30 TABLET | Refills: 1 | Status: SHIPPED | OUTPATIENT
Start: 2025-06-11

## 2025-06-11 ASSESSMENT — ANXIETY QUESTIONNAIRES
7. FEELING AFRAID AS IF SOMETHING AWFUL MIGHT HAPPEN: MORE THAN HALF THE DAYS
GAD7 TOTAL SCORE: 9
GAD7 TOTAL SCORE: 9
7. FEELING AFRAID AS IF SOMETHING AWFUL MIGHT HAPPEN: MORE THAN HALF THE DAYS
8. IF YOU CHECKED OFF ANY PROBLEMS, HOW DIFFICULT HAVE THESE MADE IT FOR YOU TO DO YOUR WORK, TAKE CARE OF THINGS AT HOME, OR GET ALONG WITH OTHER PEOPLE?: SOMEWHAT DIFFICULT
2. NOT BEING ABLE TO STOP OR CONTROL WORRYING: SEVERAL DAYS
3. WORRYING TOO MUCH ABOUT DIFFERENT THINGS: SEVERAL DAYS
6. BECOMING EASILY ANNOYED OR IRRITABLE: NEARLY EVERY DAY
4. TROUBLE RELAXING: SEVERAL DAYS
5. BEING SO RESTLESS THAT IT IS HARD TO SIT STILL: NOT AT ALL
GAD7 TOTAL SCORE: 9
1. FEELING NERVOUS, ANXIOUS, OR ON EDGE: SEVERAL DAYS
IF YOU CHECKED OFF ANY PROBLEMS ON THIS QUESTIONNAIRE, HOW DIFFICULT HAVE THESE PROBLEMS MADE IT FOR YOU TO DO YOUR WORK, TAKE CARE OF THINGS AT HOME, OR GET ALONG WITH OTHER PEOPLE: SOMEWHAT DIFFICULT

## 2025-06-11 ASSESSMENT — PAIN SCALES - GENERAL: PAINLEVEL_OUTOF10: MILD PAIN (2)

## 2025-06-11 NOTE — PATIENT INSTRUCTIONS
"Iron Rich Foods    Avoid dairy, coffee, and tea (which decrease absorption of iron) for an hour before or after a meal.    Cook with a cast-iron skillet or a product like a [place] Iron Fish    Eat \"heme iron\" rich foods such as beef, eggs, tuna, lamb, and kangaroo (if available)    Improve absorption of \"non-heme iron\" sources (almonds, figs, apricots, kidney beans, green leafy vegetables, tempeh, tofu, dark chocolate) by pairing with heme-rich sources and/or 50 milligrams (mg) of vitamin C (1/2 cup of pineapple, strawberries, broccoli, or red peppers)    If taking an iron supplement (prescribed by a provider), take with vitamin C - rich food  Iron supplement less likely to cause constipation - Malathi iron bisglycinate, Feosol bifera, Hemaplex      "

## 2025-06-11 NOTE — PROGRESS NOTES
"  Assessment & Plan     Chest pressure  EKG today is reassuring.  She does have some mild tenderness in chest wall so her chest pressure may be musculoskeletal related.  We also discussed anxiety and treatment options as below.  - EKG 12-lead complete w/read - Clinics    Anxiety  Recommend therapy and referral placed.  Discussed medication options and patient desired to trial hydroxyzine, as she has used this in the past.  Discussed with patient the indication and use of medication(s), risks/benefits, and potential adverse side effects.  Patient/guardian verbalized understanding and agreement with the plan.   - Adult Mental Health  Referral; Future  - Start hydrOXYzine HCl (ATARAX) 25 MG tablet; Take 1 tablet (25 mg) by mouth 3 times daily as needed for anxiety or other (sleep).  - Follow-up in 4-6 weeks with PCP        BMI  Estimated body mass index is 33.16 kg/m  as calculated from the following:    Height as of this encounter: 1.621 m (5' 3.8\").    Weight as of this encounter: 87.1 kg (192 lb).             Don Warren is a 35 year old, presenting for the following health issues:  chest discomfort  (With headache and dizzy )    History of Present Illness       Mental Health Follow-up:  Patient presents to follow-up on Depression & Anxiety.Patient's depression since last visit has been:  Better  The patient is not having other symptoms associated with depression.  Patient's anxiety since last visit has been:  Better  The patient is not having other symptoms associated with anxiety.  Any significant life events: other  Patient is feeling anxious or having panic attacks.  Patient has no concerns about alcohol or drug use.    Reason for visit:  Chest pain not sure if stress related  Symptom onset:  1-3 days ago  Symptom intensity:  Mild  Symptom progression:  Staying the same  Had these symptoms before:  No  What makes it worse:  Anxiety  What makes it better:  No   She is taking medications " "regularly.      Additional provider notes:    New patient to provider.  She having a lot of stress lately.  She is having some marital struggles.  Reports safe environment.  3 children and her youngest is 3 months old.  She recently restarted Wellbutrin about 1 month ago for depression.  She has been having increased feelings of anxiety and stress.  She has also been having some chest pressure which she thinks is related to her stress/anxiety.                Objective    /64 (BP Location: Right arm, Patient Position: Sitting, Cuff Size: Adult Large)   Pulse 56   Temp 98.1  F (36.7  C) (Oral)   Resp 15   Ht 1.619 m (5' 3.75\")   Wt 87.1 kg (192 lb)   LMP 05/17/2025 (Exact Date)   SpO2 100%   BMI 33.22 kg/m    Body mass index is 33.22 kg/m .  Physical Exam   GENERAL: healthy, alert, no distress, and obese  RESP: lungs clear to auscultation - no rales, rhonchi or wheezes  CV: regular rates and rhythm, normal S1 S2, no S3 or S4, and no murmur, click or rub  MS: tenderness to chest with palpation midline and left of midline  PSYCH: mentation appears normal, tearful, judgement and insight intact, and appearance well groomed    EKG - Reviewed and interpreted by me sinus rhythm, normal axis, normal intervals, no acute ST/T changes c/w ischemia, no LVH by voltage criteria, unchanged from previous tracings        Signed Electronically by: ERICA Zarco CNP    "

## 2025-06-12 ENCOUNTER — PATIENT OUTREACH (OUTPATIENT)
Dept: CARE COORDINATION | Facility: CLINIC | Age: 35
End: 2025-06-12
Payer: COMMERCIAL

## 2025-06-16 ENCOUNTER — PATIENT OUTREACH (OUTPATIENT)
Dept: CARE COORDINATION | Facility: CLINIC | Age: 35
End: 2025-06-16
Payer: COMMERCIAL

## 2025-06-23 ENCOUNTER — MYC MEDICAL ADVICE (OUTPATIENT)
Dept: FAMILY MEDICINE | Facility: CLINIC | Age: 35
End: 2025-06-23
Payer: COMMERCIAL

## 2025-06-23 DIAGNOSIS — E66.01 MORBID OBESITY (H): Primary | ICD-10-CM

## 2025-06-24 ENCOUNTER — PATIENT OUTREACH (OUTPATIENT)
Dept: CARE COORDINATION | Facility: CLINIC | Age: 35
End: 2025-06-24
Payer: COMMERCIAL

## 2025-07-23 ENCOUNTER — VIRTUAL VISIT (OUTPATIENT)
Dept: FAMILY MEDICINE | Facility: CLINIC | Age: 35
End: 2025-07-23
Payer: COMMERCIAL

## 2025-07-23 DIAGNOSIS — E66.01 MORBID OBESITY (H): Primary | ICD-10-CM

## 2025-07-23 PROCEDURE — 98006 SYNCH AUDIO-VIDEO EST MOD 30: CPT | Performed by: NURSE PRACTITIONER

## 2025-07-23 NOTE — PROGRESS NOTES
"Briana is a 35 year old who is being evaluated via a billable video visit.    What phone number would you like to be contacted at? [phone]  How would you like to obtain your AVS? Albert      Assessment & Plan     Morbid obesity (H)  Comments: Patient has lost weight from 207 lbs to 185 lbs on phentermine 7.5 mg, but reports no weight loss in the past 7 to 10 days despite continued lifestyle efforts. No medication side effects reported. Patient requests dose increase.    Plan:  - Continue lifestyle modifications: regular exercise, healthy eating, and working with a .  - Consider increasing phentermine to 10 mg as requested   - Monitor for medication side effects.  - If no improvement in weight loss, reassess treatment plan.  - REVIEW OF HEALTH MAINTENANCE PROTOCOL ORDERS  - tirzepatide-Weight Management (ZEPBOUND) 10 MG/0.5ML prefilled pen  Dispense: 2 mL; Refill: 0      BMI  Estimated body mass index is 33.16 kg/m  as calculated from the following:    Height as of 6/11/25: 1.621 m (5' 3.8\").    Weight as of 6/11/25: 87.1 kg (192 lb).           Subjective   Briana is a 35 year old, presenting for the following health issues:  Recheck Medication      7/23/2025     3:48 PM   Additional Questions   Roomed by Rayne     Video Start Time: 5: 30 pm    History of Present Illness       Reason for visit:  Medication Management  Symptom onset:  More than a month  Symptoms include:  No concerns with the 7.5 mg dose of zepbound, on last dose of 7.5 mg wanted to check in regarding refill and if she is to go up   She is taking medications regularly.  Briana Newman, 35-year-old female, presents for weight management. She is currently taking Zepbound 7.5 mg. Her initial weight was 207 lbs and is now 185 lbs. Over the past 7 to 10 days, she has not lost additional weight despite regular exercise, gym attendance, working with a , and healthy eating. She is concerned about this plateau. She denies any medication " side effects and is satisfied with her weight loss so far. She requests an increase in her medication dose to 10 mg.          Review of Systems  Constitutional, HEENT, cardiovascular, pulmonary, GI, , musculoskeletal, neuro, skin, endocrine and psych systems are negative, except as otherwise noted.      Objective           Vitals:  No vitals were obtained today due to virtual visit.    Physical Exam   GENERAL: alert and no distress  EYES: Eyes grossly normal to inspection.  No discharge or erythema, or obvious scleral/conjunctival abnormalities.  RESP: No audible wheeze, cough, or visible cyanosis.    SKIN: Visible skin clear. No significant rash, abnormal pigmentation or lesions.  NEURO: Cranial nerves grossly intact.  Mentation and speech appropriate for age.  PSYCH: Appropriate affect, tone, and pace of words          Video-Visit Details    Type of service:  Video Visit   Video End Time:6:00 PM  Originating Location (pt. Location): Home    Distant Location (provider location):  On-site  Platform used for Video Visit: Winter  Signed Electronically by: ERICA Vanegas CNP

## (undated) RX ORDER — LIDOCAINE HYDROCHLORIDE 10 MG/ML
INJECTION, SOLUTION EPIDURAL; INFILTRATION; INTRACAUDAL; PERINEURAL
Status: DISPENSED
Start: 2024-10-18